# Patient Record
Sex: MALE | Race: OTHER | NOT HISPANIC OR LATINO | Employment: FULL TIME | URBAN - METROPOLITAN AREA
[De-identification: names, ages, dates, MRNs, and addresses within clinical notes are randomized per-mention and may not be internally consistent; named-entity substitution may affect disease eponyms.]

---

## 2022-12-09 ENCOUNTER — HOSPITAL ENCOUNTER (INPATIENT)
Facility: HOSPITAL | Age: 47
LOS: 19 days | Discharge: NURSING, REHAB OR LONG-TERM CARE | DRG: 853 | End: 2022-12-28
Attending: STUDENT IN AN ORGANIZED HEALTH CARE EDUCATION/TRAINING PROGRAM | Admitting: STUDENT IN AN ORGANIZED HEALTH CARE EDUCATION/TRAINING PROGRAM

## 2022-12-09 DIAGNOSIS — N17.9 ACUTE KIDNEY INJURY: ICD-10-CM

## 2022-12-09 DIAGNOSIS — N41.2 PROSTATIC ABSCESS: ICD-10-CM

## 2022-12-09 DIAGNOSIS — R07.9 CHEST PAIN: ICD-10-CM

## 2022-12-09 DIAGNOSIS — R79.89 ELEVATED BRAIN NATRIURETIC PEPTIDE (BNP) LEVEL: ICD-10-CM

## 2022-12-09 DIAGNOSIS — K75.0 HEPATIC ABSCESS: Primary | ICD-10-CM

## 2022-12-09 DIAGNOSIS — R00.0 TACHYCARDIA: ICD-10-CM

## 2022-12-09 PROBLEM — R65.20 SEVERE SEPSIS: Status: ACTIVE | Noted: 2022-12-09

## 2022-12-09 PROBLEM — J98.11 ATELECTASIS: Status: ACTIVE | Noted: 2022-12-09

## 2022-12-09 PROBLEM — A41.9 SEVERE SEPSIS: Status: ACTIVE | Noted: 2022-12-09

## 2022-12-09 PROBLEM — K56.7 ILEUS: Status: ACTIVE | Noted: 2022-12-09

## 2022-12-09 PROBLEM — R82.81 PYURIA: Status: ACTIVE | Noted: 2022-12-09

## 2022-12-09 LAB
ALLENS TEST: ABNORMAL
CREAT UR-MCNC: 98 MG/DL (ref 23–375)
DELSYS: ABNORMAL
FIO2: 32
FLOW: 3
HCO3 UR-SCNC: 16.6 MMOL/L (ref 24–28)
LACTATE SERPL-SCNC: 1.2 MMOL/L (ref 0.5–2.2)
MODE: ABNORMAL
PCO2 BLDA: 27.5 MMHG (ref 35–45)
PH SMN: 7.39 [PH] (ref 7.35–7.45)
PO2 BLDA: 65 MMHG (ref 80–100)
POC BE: -7 MMOL/L
POC SATURATED O2: 93 % (ref 95–100)
POC TCO2: 17 MMOL/L (ref 23–27)
SAMPLE: ABNORMAL
SITE: ABNORMAL
SODIUM UR-SCNC: 30 MMOL/L (ref 20–250)
SP02: 94

## 2022-12-09 PROCEDURE — 36415 COLL VENOUS BLD VENIPUNCTURE: CPT | Performed by: STUDENT IN AN ORGANIZED HEALTH CARE EDUCATION/TRAINING PROGRAM

## 2022-12-09 PROCEDURE — 87102 FUNGUS ISOLATION CULTURE: CPT | Performed by: STUDENT IN AN ORGANIZED HEALTH CARE EDUCATION/TRAINING PROGRAM

## 2022-12-09 PROCEDURE — 63600175 PHARM REV CODE 636 W HCPCS: Performed by: STUDENT IN AN ORGANIZED HEALTH CARE EDUCATION/TRAINING PROGRAM

## 2022-12-09 PROCEDURE — 82570 ASSAY OF URINE CREATININE: CPT | Performed by: STUDENT IN AN ORGANIZED HEALTH CARE EDUCATION/TRAINING PROGRAM

## 2022-12-09 PROCEDURE — 99900035 HC TECH TIME PER 15 MIN (STAT)

## 2022-12-09 PROCEDURE — 87205 SMEAR GRAM STAIN: CPT | Performed by: STUDENT IN AN ORGANIZED HEALTH CARE EDUCATION/TRAINING PROGRAM

## 2022-12-09 PROCEDURE — 87070 CULTURE OTHR SPECIMN AEROBIC: CPT | Performed by: STUDENT IN AN ORGANIZED HEALTH CARE EDUCATION/TRAINING PROGRAM

## 2022-12-09 PROCEDURE — S0030 INJECTION, METRONIDAZOLE: HCPCS | Performed by: STUDENT IN AN ORGANIZED HEALTH CARE EDUCATION/TRAINING PROGRAM

## 2022-12-09 PROCEDURE — 63600175 PHARM REV CODE 636 W HCPCS: Performed by: RADIOLOGY

## 2022-12-09 PROCEDURE — 94761 N-INVAS EAR/PLS OXIMETRY MLT: CPT

## 2022-12-09 PROCEDURE — 87075 CULTR BACTERIA EXCEPT BLOOD: CPT | Performed by: STUDENT IN AN ORGANIZED HEALTH CARE EDUCATION/TRAINING PROGRAM

## 2022-12-09 PROCEDURE — 82803 BLOOD GASES ANY COMBINATION: CPT

## 2022-12-09 PROCEDURE — 87077 CULTURE AEROBIC IDENTIFY: CPT | Performed by: STUDENT IN AN ORGANIZED HEALTH CARE EDUCATION/TRAINING PROGRAM

## 2022-12-09 PROCEDURE — A4216 STERILE WATER/SALINE, 10 ML: HCPCS | Performed by: STUDENT IN AN ORGANIZED HEALTH CARE EDUCATION/TRAINING PROGRAM

## 2022-12-09 PROCEDURE — 20000000 HC ICU ROOM

## 2022-12-09 PROCEDURE — 25000003 PHARM REV CODE 250: Performed by: STUDENT IN AN ORGANIZED HEALTH CARE EDUCATION/TRAINING PROGRAM

## 2022-12-09 PROCEDURE — 83605 ASSAY OF LACTIC ACID: CPT | Mod: 91 | Performed by: STUDENT IN AN ORGANIZED HEALTH CARE EDUCATION/TRAINING PROGRAM

## 2022-12-09 PROCEDURE — 84300 ASSAY OF URINE SODIUM: CPT | Performed by: STUDENT IN AN ORGANIZED HEALTH CARE EDUCATION/TRAINING PROGRAM

## 2022-12-09 PROCEDURE — 87186 SC STD MICRODIL/AGAR DIL: CPT | Performed by: STUDENT IN AN ORGANIZED HEALTH CARE EDUCATION/TRAINING PROGRAM

## 2022-12-09 PROCEDURE — 36600 WITHDRAWAL OF ARTERIAL BLOOD: CPT

## 2022-12-09 PROCEDURE — 25000003 PHARM REV CODE 250: Performed by: RADIOLOGY

## 2022-12-09 RX ORDER — LANOLIN ALCOHOL/MO/W.PET/CERES
800 CREAM (GRAM) TOPICAL
Status: DISCONTINUED | OUTPATIENT
Start: 2022-12-09 | End: 2022-12-10

## 2022-12-09 RX ORDER — CEFEPIME HYDROCHLORIDE 1 G/50ML
2 INJECTION, SOLUTION INTRAVENOUS
Status: DISCONTINUED | OUTPATIENT
Start: 2022-12-09 | End: 2022-12-11

## 2022-12-09 RX ORDER — ACETAMINOPHEN 325 MG/1
650 TABLET ORAL EVERY 4 HOURS PRN
Status: DISCONTINUED | OUTPATIENT
Start: 2022-12-09 | End: 2022-12-28 | Stop reason: HOSPADM

## 2022-12-09 RX ORDER — FENTANYL CITRATE 50 UG/ML
INJECTION, SOLUTION INTRAMUSCULAR; INTRAVENOUS
Status: COMPLETED | OUTPATIENT
Start: 2022-12-09 | End: 2022-12-09

## 2022-12-09 RX ORDER — SODIUM,POTASSIUM PHOSPHATES 280-250MG
2 POWDER IN PACKET (EA) ORAL
Status: DISCONTINUED | OUTPATIENT
Start: 2022-12-09 | End: 2022-12-10

## 2022-12-09 RX ORDER — LIDOCAINE HYDROCHLORIDE 10 MG/ML
INJECTION INFILTRATION; PERINEURAL
Status: COMPLETED | OUTPATIENT
Start: 2022-12-09 | End: 2022-12-09

## 2022-12-09 RX ORDER — NALOXONE HCL 0.4 MG/ML
0.02 VIAL (ML) INJECTION
Status: DISCONTINUED | OUTPATIENT
Start: 2022-12-09 | End: 2022-12-28 | Stop reason: HOSPADM

## 2022-12-09 RX ORDER — MUPIROCIN 20 MG/G
OINTMENT TOPICAL 2 TIMES DAILY
Status: COMPLETED | OUTPATIENT
Start: 2022-12-09 | End: 2022-12-14

## 2022-12-09 RX ORDER — ACETAMINOPHEN 325 MG/1
650 TABLET ORAL EVERY 8 HOURS PRN
Status: DISCONTINUED | OUTPATIENT
Start: 2022-12-09 | End: 2022-12-12

## 2022-12-09 RX ORDER — IBUPROFEN 200 MG
24 TABLET ORAL
Status: DISCONTINUED | OUTPATIENT
Start: 2022-12-09 | End: 2022-12-28 | Stop reason: HOSPADM

## 2022-12-09 RX ORDER — SODIUM CHLORIDE 0.9 % (FLUSH) 0.9 %
10 SYRINGE (ML) INJECTION EVERY 8 HOURS
Status: DISCONTINUED | OUTPATIENT
Start: 2022-12-09 | End: 2022-12-28 | Stop reason: HOSPADM

## 2022-12-09 RX ORDER — GLUCAGON 1 MG
1 KIT INJECTION
Status: DISCONTINUED | OUTPATIENT
Start: 2022-12-09 | End: 2022-12-28 | Stop reason: HOSPADM

## 2022-12-09 RX ORDER — HYDROMORPHONE HYDROCHLORIDE 1 MG/ML
0.5 INJECTION, SOLUTION INTRAMUSCULAR; INTRAVENOUS; SUBCUTANEOUS ONCE
Status: COMPLETED | OUTPATIENT
Start: 2022-12-10 | End: 2022-12-09

## 2022-12-09 RX ORDER — IBUPROFEN 200 MG
16 TABLET ORAL
Status: DISCONTINUED | OUTPATIENT
Start: 2022-12-09 | End: 2022-12-28 | Stop reason: HOSPADM

## 2022-12-09 RX ORDER — TALC
6 POWDER (GRAM) TOPICAL NIGHTLY PRN
Status: DISCONTINUED | OUTPATIENT
Start: 2022-12-09 | End: 2022-12-28 | Stop reason: HOSPADM

## 2022-12-09 RX ORDER — METRONIDAZOLE 500 MG/100ML
500 INJECTION, SOLUTION INTRAVENOUS
Status: DISCONTINUED | OUTPATIENT
Start: 2022-12-09 | End: 2022-12-11

## 2022-12-09 RX ADMIN — MUPIROCIN: 20 OINTMENT TOPICAL at 08:12

## 2022-12-09 RX ADMIN — FENTANYL CITRATE 50 MCG: 50 INJECTION INTRAMUSCULAR; INTRAVENOUS at 06:12

## 2022-12-09 RX ADMIN — HYDROMORPHONE HYDROCHLORIDE 0.5 MG: 1 INJECTION, SOLUTION INTRAMUSCULAR; INTRAVENOUS; SUBCUTANEOUS at 11:12

## 2022-12-09 RX ADMIN — Medication 10 ML: at 02:12

## 2022-12-09 RX ADMIN — METRONIDAZOLE 500 MG: 500 INJECTION, SOLUTION INTRAVENOUS at 07:12

## 2022-12-09 RX ADMIN — SODIUM BICARBONATE: 84 INJECTION, SOLUTION INTRAVENOUS at 04:12

## 2022-12-09 RX ADMIN — Medication 10 ML: at 11:12

## 2022-12-09 RX ADMIN — LIDOCAINE HYDROCHLORIDE 10 ML: 10 INJECTION, SOLUTION INFILTRATION; PERINEURAL at 06:12

## 2022-12-09 RX ADMIN — FENTANYL CITRATE 25 MCG: 50 INJECTION INTRAMUSCULAR; INTRAVENOUS at 06:12

## 2022-12-09 RX ADMIN — ACETAMINOPHEN 650 MG: 325 TABLET ORAL at 08:12

## 2022-12-09 RX ADMIN — VANCOMYCIN HYDROCHLORIDE 1500 MG: 1.5 INJECTION, POWDER, LYOPHILIZED, FOR SOLUTION INTRAVENOUS at 08:12

## 2022-12-09 RX ADMIN — CEFEPIME 2 G: 2 INJECTION, POWDER, FOR SOLUTION INTRAVENOUS at 04:12

## 2022-12-09 NOTE — HPI
47y M w/ hypertension presents with abdominal pain x 2 days. Pt refused  service. He reports that he had fever/rigors three days prior to admission. He then developed worsening abdominal pain from then until admission. He went to an urgent care where he was started on bactrim for UTI. His abdominal pain increased over the next few days. He had a BM yesterday. Denies vomiting, but has had significant nausea and very limited PO intake in the days leading to admission. Aside from the single episode of fever/rigors he denies subsequent fevers.     In the ED he was found to have a R hepatic abscess vs mass. He traveled to the [facility] eight weeks prior to presentation. He denies fevers except for what is listed above. Denies weight loss, cough, night sweats. Denies prior hx of TB. Denies prior hx of cirrhosis

## 2022-12-09 NOTE — ASSESSMENT & PLAN NOTE
Pyuria on admission UA, prior hx of UTI on Bactrim  - empiric antibiotics for sepsis  - follow cultures

## 2022-12-09 NOTE — ASSESSMENT & PLAN NOTE
Pt w/ ill-defined lesion representing abscess vs neoplastic mass. Infective ddx for abscess includes e histolytica vs pyogenic abscess. Denies prior hx of hepatitis, acute panel negative. From endemic region of e histolytica, TB, etc with most recent visit 8 weeks prior to admission  - IR consulted for sample +/- drain  - empiric antibiotics given sepsis  - e histolytica antibody pending  - gen surg consulted

## 2022-12-09 NOTE — ASSESSMENT & PLAN NOTE
Sepsis physiology and lactate likely due to liver abscess; also possibly due to uti/pyelo or secondary to ileus.   - empiric antibiotics  - IVF  - trend lactate      This patient does have evidence of infective focus  My overall impression is sepsis. Vital signs were reviewed and noted in progress note.  Antibiotics given-   Antibiotics (From admission, onward)    Start     Stop Route Frequency Ordered    12/09/22 2100  mupirocin 2 % ointment         12/14 2059 Nasl 2 times daily 12/09/22 1424        Cultures were taken-   Microbiology Results (last 7 days)     ** No results found for the last 168 hours. **        Latest lactate reviewed, they are-  Recent Labs   Lab 12/09/22  0401   LACTATE 2.3*       Organ dysfunction indicated by Acute kidney injury and Acute liver injury  Source- liver abscess    Source control Achieved by- IR/gen surg

## 2022-12-09 NOTE — H&P
[facility] Medicine  History & Physical    Patient Name: Vviek Turner  MRN: [id number]  Patient Class: IP- Inpatient  Admission Date: 12/9/2022  Attending Physician: Mick Gage MD  Primary Care Provider: Primary Doctor No         Patient information was obtained from patient and ER records.     Subjective:     Principal Problem:Hepatic abscess    Chief Complaint: No chief complaint on file.       HPI: 47y M w/ hypertension presents with abdominal pain x 2 days. Pt refused  service. He reports that he had fever/rigors three days prior to admission. He then developed worsening abdominal pain from then until admission. He went to an urgent care where he was started on bactrim for UTI. His abdominal pain increased over the next few days. He had a BM yesterday. Denies vomiting, but has had significant nausea and very limited PO intake in the days leading to admission. Aside from the single episode of fever/rigors he denies subsequent fevers.     In the ED he was found to have a R hepatic abscess vs mass. He traveled to the [facility] eight weeks prior to presentation. He denies fevers except for what is listed above. Denies weight loss, cough, night sweats. Denies prior hx of TB. Denies prior hx of cirrhosis      Past Medical History:   Diagnosis Date    Hypertension        No past surgical history on file.    Review of patient's allergies indicates:  No Known Allergies    Current Facility-Administered Medications on File Prior to Encounter   Medication    [COMPLETED] 0.9%  NaCl infusion    [COMPLETED] 0.9%  NaCl infusion    [DISCONTINUED] piperacillin-tazobactam 4.5 g in dextrose 5 % 100 mL IVPB (ready to mix system)    [DISCONTINUED] piperacillin-tazobactam 4.5 g in dextrose 5 % 100 mL IVPB (ready to mix system)    [DISCONTINUED] vancomycin (VANCOCIN) 1,500 mg in dextrose 5 % 250 mL IVPB    [DISCONTINUED] vancomycin - pharmacy to dose     Current Outpatient Medications on File  Prior to Encounter   Medication Sig    NON FORMULARY MEDICATION Losartan/amlodipine 100/10 mg   Take 1 tablet by mouth daily     Family History    None       Tobacco Use    Smoking status: Not on file    Smokeless tobacco: Not on file   Substance and Sexual Activity    Alcohol use: Not on file    Drug use: Not on file    Sexual activity: Not on file     Review of Systems   Constitutional:  Positive for chills. Negative for fever.   HENT:  Negative for postnasal drip.    Eyes:  Negative for photophobia and visual disturbance.   Respiratory:  Positive for shortness of breath. Negative for cough.    Cardiovascular:  Negative for chest pain and leg swelling.   Gastrointestinal:  Positive for abdominal distention and abdominal pain. Negative for constipation and vomiting.   Genitourinary:  Negative for dysuria and flank pain.   Musculoskeletal:  Negative for arthralgias and back pain.   Skin:  Negative for rash and wound.   Neurological:  Negative for tremors and weakness.   Hematological:  Negative for adenopathy. Does not bruise/bleed easily.   Psychiatric/Behavioral:  Negative for agitation and confusion.    Objective:     Vital Signs (Most Recent):  Temp: 98.4 °F (36.9 °C) (12/09/22 1421)  Pulse: 99 (12/09/22 1421)  Resp: 20 (12/09/22 1421)  BP: (!) 105/52 (12/09/22 1421)  SpO2: 95 % (12/09/22 1421)   Vital Signs (24h Range):  Temp:  [97.6 °F (36.4 °C)-98.8 °F (37.1 °C)] 98.4 °F (36.9 °C)  Pulse:  [87-99] 99  Resp:  [20-38] 20  SpO2:  [89 %-99 %] 95 %  BP: ()/(50-59) 105/52     Weight: 102.7 kg (226 lb 6.6 oz)  Body mass index is 34.43 kg/m².    Physical Exam  Constitutional:       General: He is not in acute distress.     Appearance: He is ill-appearing. He is not toxic-appearing or diaphoretic.   HENT:      Right Ear: External ear normal.      Left Ear: External ear normal.   Eyes:      Conjunctiva/sclera: Conjunctivae normal.      Pupils: Pupils are equal, round, and reactive to light.    Cardiovascular:      Rate and Rhythm: Regular rhythm. Tachycardia present.      Heart sounds: No murmur heard.    No gallop.   Pulmonary:      Effort: Pulmonary effort is normal. No respiratory distress.      Breath sounds: No wheezing or rales.      Comments: tachypneic  Abdominal:      General: Bowel sounds are normal. There is distension.      Palpations: Abdomen is soft.      Tenderness: There is abdominal tenderness. There is no right CVA tenderness, left CVA tenderness, guarding or rebound.   Musculoskeletal:      Right lower leg: No edema.      Left lower leg: No edema.   Skin:     Coloration: Skin is not jaundiced.      Findings: No bruising.   Neurological:      General: No focal deficit present.      Mental Status: He is oriented to person, place, and time.         CRANIAL NERVES     CN III, IV, VI   Pupils are equal, round, and reactive to light.     Significant Labs: All pertinent labs within the past 24 hours have been reviewed.    Significant Imaging: I have reviewed all pertinent imaging results/findings within the past 24 hours.    Assessment/Plan:     * Hepatic abscess  Pt w/ ill-defined lesion representing abscess vs neoplastic mass. Infective ddx for abscess includes e histolytica vs pyogenic abscess. Denies prior hx of hepatitis, acute panel negative. From endemic region of e histolytica, TB, etc with most recent visit 8 weeks prior to admission  - IR consulted for sample +/- drain  - empiric antibiotics given sepsis  - e histolytica antibody pending  - gen surg consulted      Atelectasis  Shortness of breath partly due to atelectasis +/- acidosis.  - will need tx of liver abscess/mass,       Pyuria  Pyuria on admission UA, prior hx of UTI on Bactrim  - empiric antibiotics for sepsis  - follow cultures      Elevated brain natriuretic peptide (BNP) level  Very mild elevation in the setting of renal failure. No fluid in lungs.      Ileus  Pt w/ possible ileus vs SBO on imaging. Had BM yesterday  which argues against obstruction.   - gen surg consulted  - NPO  - not vomiting, will hold off on NG      Severe sepsis  Sepsis physiology and lactate likely due to liver abscess; also possibly due to uti/pyelo or secondary to ileus.   - empiric antibiotics  - IVF  - trend lactate      This patient does have evidence of infective focus  My overall impression is sepsis. Vital signs were reviewed and noted in progress note.  Antibiotics given-   Antibiotics (From admission, onward)    Start     Stop Route Frequency Ordered    12/09/22 2100  mupirocin 2 % ointment         12/14 2059 Nasl 2 times daily 12/09/22 1424        Cultures were taken-   Microbiology Results (last 7 days)     ** No results found for the last 168 hours. **        Latest lactate reviewed, they are-  Recent Labs   Lab 12/09/22  0401   LACTATE 2.3*       Organ dysfunction indicated by Acute kidney injury and Acute liver injury  Source- liver abscess    Source control Achieved by- IR/gen surg      MARYURI (acute kidney injury)  Creatinine at 6.1 at admission. Unknown baseline, but suspect this represents MARYURI. Non-exclusive ddx includes prerenal, ATN from sepsis, Bactrim pseudotoxicity. No hydro seen on CT  - IVF  - nephrology consulted  - urine lytes ordered  - retroperitoneal ultrasound ordered      Patient with acute kidney injury likely due to IVVD/dehydration and acute tubular necrosis MARYURI is currently worsening. Labs reviewed- Renal function/electrolytes with Estimated Creatinine Clearance: 17.8 mL/min (A) (based on SCr of 6.1 mg/dL (H)). according to latest data. Monitor urine output and serial BMP and adjust therapy as needed. Avoid nephrotoxins and renally dose meds for GFR listed above.         VTE Risk Mitigation (From admission, onward)         Ordered     IP VTE HIGH RISK PATIENT  Once         12/09/22 1421     Place sequential compression device  Until discontinued         12/09/22 1421     Reason for No Pharmacological VTE Prophylaxis  Once         Question:  Reasons:  Answer:  IV Heparin w/in 24 hrs. Pre or Post-Op    12/09/22 1421                   Mick Gage MD  Department of Hospital Medicine   [facility]

## 2022-12-09 NOTE — SUBJECTIVE & OBJECTIVE
Past Medical History:   Diagnosis Date    Hypertension        No past surgical history on file.    Review of patient's allergies indicates:  No Known Allergies    Current Facility-Administered Medications on File Prior to Encounter   Medication    [COMPLETED] 0.9%  NaCl infusion    [COMPLETED] 0.9%  NaCl infusion    [DISCONTINUED] piperacillin-tazobactam 4.5 g in dextrose 5 % 100 mL IVPB (ready to mix system)    [DISCONTINUED] piperacillin-tazobactam 4.5 g in dextrose 5 % 100 mL IVPB (ready to mix system)    [DISCONTINUED] vancomycin (VANCOCIN) 1,500 mg in dextrose 5 % 250 mL IVPB    [DISCONTINUED] vancomycin - pharmacy to dose     Current Outpatient Medications on File Prior to Encounter   Medication Sig    NON FORMULARY MEDICATION Losartan/amlodipine 100/10 mg   Take 1 tablet by mouth daily     Family History    None       Tobacco Use    Smoking status: Not on file    Smokeless tobacco: Not on file   Substance and Sexual Activity    Alcohol use: Not on file    Drug use: Not on file    Sexual activity: Not on file     Review of Systems   Constitutional:  Positive for chills. Negative for fever.   HENT:  Negative for postnasal drip.    Eyes:  Negative for photophobia and visual disturbance.   Respiratory:  Positive for shortness of breath. Negative for cough.    Cardiovascular:  Negative for chest pain and leg swelling.   Gastrointestinal:  Positive for abdominal distention and abdominal pain. Negative for constipation and vomiting.   Genitourinary:  Negative for dysuria and flank pain.   Musculoskeletal:  Negative for arthralgias and back pain.   Skin:  Negative for rash and wound.   Neurological:  Negative for tremors and weakness.   Hematological:  Negative for adenopathy. Does not bruise/bleed easily.   Psychiatric/Behavioral:  Negative for agitation and confusion.    Objective:     Vital Signs (Most Recent):  Temp: 98.4 °F (36.9 °C) (12/09/22 1421)  Pulse: 99 (12/09/22 1421)  Resp: 20 (12/09/22 1421)  BP: (!)  105/52 (12/09/22 1421)  SpO2: 95 % (12/09/22 1421)   Vital Signs (24h Range):  Temp:  [97.6 °F (36.4 °C)-98.8 °F (37.1 °C)] 98.4 °F (36.9 °C)  Pulse:  [87-99] 99  Resp:  [20-38] 20  SpO2:  [89 %-99 %] 95 %  BP: ()/(50-59) 105/52     Weight: 102.7 kg (226 lb 6.6 oz)  Body mass index is 34.43 kg/m².    Physical Exam  Constitutional:       General: He is not in acute distress.     Appearance: He is ill-appearing. He is not toxic-appearing or diaphoretic.   HENT:      Right Ear: External ear normal.      Left Ear: External ear normal.   Eyes:      Conjunctiva/sclera: Conjunctivae normal.      Pupils: Pupils are equal, round, and reactive to light.   Cardiovascular:      Rate and Rhythm: Regular rhythm. Tachycardia present.      Heart sounds: No murmur heard.    No gallop.   Pulmonary:      Effort: Pulmonary effort is normal. No respiratory distress.      Breath sounds: No wheezing or rales.      Comments: tachypneic  Abdominal:      General: Bowel sounds are normal. There is distension.      Palpations: Abdomen is soft.      Tenderness: There is abdominal tenderness. There is no right CVA tenderness, left CVA tenderness, guarding or rebound.   Musculoskeletal:      Right lower leg: No edema.      Left lower leg: No edema.   Skin:     Coloration: Skin is not jaundiced.      Findings: No bruising.   Neurological:      General: No focal deficit present.      Mental Status: He is oriented to person, place, and time.         CRANIAL NERVES     CN III, IV, VI   Pupils are equal, round, and reactive to light.     Significant Labs: All pertinent labs within the past 24 hours have been reviewed.    Significant Imaging: I have reviewed all pertinent imaging results/findings within the past 24 hours.

## 2022-12-09 NOTE — ASSESSMENT & PLAN NOTE
Pt w/ possible ileus vs SBO on imaging. Had BM yesterday which argues against obstruction.   - gen surg consulted  - NPO  - not vomiting, will hold off on NG

## 2022-12-09 NOTE — ASSESSMENT & PLAN NOTE
Creatinine at 6.1 at admission. Unknown baseline, but suspect this represents MARYURI. Non-exclusive ddx includes prerenal, ATN from sepsis, Bactrim pseudotoxicity. No hydro seen on CT  - IVF  - nephrology consulted  - urine lytes ordered  - retroperitoneal ultrasound ordered      Patient with acute kidney injury likely due to IVVD/dehydration and acute tubular necrosis MARYURI is currently worsening. Labs reviewed- Renal function/electrolytes with Estimated Creatinine Clearance: 17.8 mL/min (A) (based on SCr of 6.1 mg/dL (H)). according to latest data. Monitor urine output and serial BMP and adjust therapy as needed. Avoid nephrotoxins and renally dose meds for GFR listed above.

## 2022-12-09 NOTE — ASSESSMENT & PLAN NOTE
Shortness of breath partly due to atelectasis +/- acidosis.  - will need tx of liver abscess/mass,

## 2022-12-09 NOTE — NURSING
Arrived to floor via stretcher with [facility], AAO x 4, can make his needs known, safety maintained.

## 2022-12-09 NOTE — H&P
Inpatient Radiology Pre-procedure Note    History of Present Illness:  Vivek Turner is a 47 y.o. male who presents for hepatic abscess aspiration/drainage.  Admission H&P reviewed.  Past Medical History:   Diagnosis Date    Hypertension      No past surgical history on file.    Review of Systems:   As documented in primary team H&P    Home Meds:   Prior to Admission medications    Medication Sig Start Date End Date Taking? Authorizing Provider   NON FORMULARY MEDICATION Losartan/amlodipine 100/10 mg   Take 1 tablet by mouth daily    Historical Provider     Scheduled Meds:    ceFEPime (MAXIPIME) IVPB  2 g Intravenous Q24H    metronidazole  500 mg Intravenous Q8H    mupirocin   Nasal BID    sodium chloride 0.9%  10 mL Intravenous Q8H    vancomycin (VANCOCIN) IVPB  15 mg/kg Intravenous Once     Continuous Infusions:    sodium bicarbonate drip 100 mL/hr at 12/09/22 1625     PRN Meds:acetaminophen, acetaminophen, dextrose 10%, dextrose 10%, glucagon (human recombinant), glucose, glucose, magnesium oxide, magnesium oxide, melatonin, naloxone, potassium, sodium phosphates, potassium, sodium phosphates, potassium, sodium phosphates, Pharmacy to dose Vancomycin consult **AND** vancomycin - pharmacy to dose  Anticoagulants/Antiplatelets: no anticoagulation    Allergies: Review of patient's allergies indicates:  No Known Allergies  Sedation Hx: have not been any systemic reactions    Labs:  Recent Labs   Lab 12/09/22 0401   INR 1.3*       Recent Labs   Lab 12/09/22 0401   WBC 18.50*   HGB 11.6*   HCT 34.9*   MCV 85         Recent Labs   Lab 12/09/22  0401   *   *   K 4.0      CO2 16*   BUN 97*   CREATININE 6.10*   CALCIUM 7.5*   ALT 88*   AST 42   ALBUMIN 3.0*   BILITOT 7.0*         Vitals:  Temp: 98.4 °F (36.9 °C) (12/09/22 1421)  Pulse: 99 (12/09/22 1421)  Resp: 20 (12/09/22 1421)  BP: (!) 105/52 (12/09/22 1421)  SpO2: 95 % (12/09/22 1421)     Physical Exam:  ASA: 2  Mallampati: 2    General: no  acute distress, ill appearing  Mental Status: alert and oriented to person, place and time  HEENT: normocephalic, atraumatic  Chest: unlabored breathing  Heart: regular but rapid heart rate  Abdomen: nondistended, c/o abdominal pain  Extremity: moves all extremities    Plan: proceed with aspiration/drainage of hepatic abscess  Sedation Plan: up to moderate    Que Olivera MD  Staff Radiologist  Department of Radiology  Pager: [phone]

## 2022-12-10 PROBLEM — J96.01 ACUTE HYPOXEMIC RESPIRATORY FAILURE: Status: ACTIVE | Noted: 2022-12-10

## 2022-12-10 PROBLEM — R73.9 HYPERGLYCEMIA: Status: ACTIVE | Noted: 2022-12-10

## 2022-12-10 PROBLEM — D50.9 MICROCYTIC ANEMIA: Status: ACTIVE | Noted: 2022-12-10

## 2022-12-10 LAB
ALBUMIN SERPL BCP-MCNC: 1.7 G/DL (ref 3.5–5.2)
ALP SERPL-CCNC: 132 U/L (ref 55–135)
ALT SERPL W/O P-5'-P-CCNC: 50 U/L (ref 10–44)
ANION GAP SERPL CALC-SCNC: 14 MMOL/L (ref 8–16)
AST SERPL-CCNC: 25 U/L (ref 10–40)
BASOPHILS NFR BLD: 0 % (ref 0–1.9)
BILIRUB DIRECT SERPL-MCNC: 3.3 MG/DL (ref 0.1–0.3)
BILIRUB SERPL-MCNC: 4.4 MG/DL (ref 0.1–1)
BUN SERPL-MCNC: 99 MG/DL (ref 6–20)
CALCIUM SERPL-MCNC: 7.6 MG/DL (ref 8.7–10.5)
CHLORIDE SERPL-SCNC: 104 MMOL/L (ref 95–110)
CO2 SERPL-SCNC: 20 MMOL/L (ref 23–29)
CREAT SERPL-MCNC: 5.5 MG/DL (ref 0.5–1.4)
CREAT UR-MCNC: 88.4 MG/DL (ref 23–375)
CREAT UR-MCNC: 88.4 MG/DL (ref 23–375)
DIFFERENTIAL METHOD: ABNORMAL
EOSINOPHIL NFR BLD: 0 % (ref 0–8)
EOSINOPHIL URNS QL WRIGHT STN: NORMAL
ERYTHROCYTE [DISTWIDTH] IN BLOOD BY AUTOMATED COUNT: 13.4 % (ref 11.5–14.5)
EST. GFR  (NO RACE VARIABLE): 12 ML/MIN/1.73 M^2
FERRITIN SERPL-MCNC: 3633 NG/ML (ref 20–300)
GLUCOSE SERPL-MCNC: 150 MG/DL (ref 70–110)
GRAM STN SPEC: NORMAL
GRAM STN SPEC: NORMAL
HCT VFR BLD AUTO: 29.1 % (ref 40–54)
HGB BLD-MCNC: 10.1 G/DL (ref 14–18)
IMM GRANULOCYTES # BLD AUTO: ABNORMAL K/UL (ref 0–0.04)
IMM GRANULOCYTES NFR BLD AUTO: ABNORMAL % (ref 0–0.5)
IRON SERPL-MCNC: 25 UG/DL (ref 45–160)
LACTATE SERPL-SCNC: 1.3 MMOL/L (ref 0.5–2.2)
LYMPHOCYTES NFR BLD: 3 % (ref 18–48)
MCH RBC QN AUTO: 28.1 PG (ref 27–31)
MCHC RBC AUTO-ENTMCNC: 34.7 G/DL (ref 32–36)
MCV RBC AUTO: 81 FL (ref 82–98)
MONOCYTES NFR BLD: 2 % (ref 4–15)
NEUTROPHILS NFR BLD: 91 % (ref 38–73)
NEUTS BAND NFR BLD MANUAL: 4 %
NRBC BLD-RTO: 0 /100 WBC
PLATELET # BLD AUTO: 329 K/UL (ref 150–450)
PMV BLD AUTO: 10.2 FL (ref 9.2–12.9)
POTASSIUM SERPL-SCNC: 3.7 MMOL/L (ref 3.5–5.1)
PROT SERPL-MCNC: 5.6 G/DL (ref 6–8.4)
PROT UR-MCNC: 17 MG/DL
PROT/CREAT UR: 0.19 MG/G{CREAT} (ref 0–0.2)
RBC # BLD AUTO: 3.59 M/UL (ref 4.6–6.2)
RETICS/RBC NFR AUTO: 1 % (ref 0.4–2)
SATURATED IRON: 17 % (ref 20–50)
SODIUM SERPL-SCNC: 138 MMOL/L (ref 136–145)
SODIUM UR-SCNC: 34 MMOL/L (ref 20–250)
TOTAL IRON BINDING CAPACITY: 148 UG/DL (ref 250–450)
TRANSFERRIN SERPL-MCNC: 100 MG/DL (ref 200–375)
VANCOMYCIN SERPL-MCNC: 36.7 UG/ML
WBC # BLD AUTO: 19.78 K/UL (ref 3.9–12.7)

## 2022-12-10 PROCEDURE — 83036 HEMOGLOBIN GLYCOSYLATED A1C: CPT | Performed by: HOSPITALIST

## 2022-12-10 PROCEDURE — 84156 ASSAY OF PROTEIN URINE: CPT | Performed by: INTERNAL MEDICINE

## 2022-12-10 PROCEDURE — 36415 COLL VENOUS BLD VENIPUNCTURE: CPT | Performed by: HOSPITALIST

## 2022-12-10 PROCEDURE — 63600175 PHARM REV CODE 636 W HCPCS: Performed by: HOSPITALIST

## 2022-12-10 PROCEDURE — 94799 UNLISTED PULMONARY SVC/PX: CPT

## 2022-12-10 PROCEDURE — 99900035 HC TECH TIME PER 15 MIN (STAT)

## 2022-12-10 PROCEDURE — 83605 ASSAY OF LACTIC ACID: CPT | Performed by: INTERNAL MEDICINE

## 2022-12-10 PROCEDURE — 82607 VITAMIN B-12: CPT | Performed by: HOSPITALIST

## 2022-12-10 PROCEDURE — 84300 ASSAY OF URINE SODIUM: CPT | Performed by: INTERNAL MEDICINE

## 2022-12-10 PROCEDURE — 63600175 PHARM REV CODE 636 W HCPCS: Performed by: STUDENT IN AN ORGANIZED HEALTH CARE EDUCATION/TRAINING PROGRAM

## 2022-12-10 PROCEDURE — 80053 COMPREHEN METABOLIC PANEL: CPT | Performed by: STUDENT IN AN ORGANIZED HEALTH CARE EDUCATION/TRAINING PROGRAM

## 2022-12-10 PROCEDURE — 25000003 PHARM REV CODE 250: Performed by: STUDENT IN AN ORGANIZED HEALTH CARE EDUCATION/TRAINING PROGRAM

## 2022-12-10 PROCEDURE — 99254 IP/OBS CNSLTJ NEW/EST MOD 60: CPT | Mod: ,,, | Performed by: SURGERY

## 2022-12-10 PROCEDURE — 20000000 HC ICU ROOM

## 2022-12-10 PROCEDURE — 80202 ASSAY OF VANCOMYCIN: CPT | Performed by: HOSPITALIST

## 2022-12-10 PROCEDURE — 82746 ASSAY OF FOLIC ACID SERUM: CPT | Performed by: HOSPITALIST

## 2022-12-10 PROCEDURE — 27000221 HC OXYGEN, UP TO 24 HOURS

## 2022-12-10 PROCEDURE — A4216 STERILE WATER/SALINE, 10 ML: HCPCS | Performed by: STUDENT IN AN ORGANIZED HEALTH CARE EDUCATION/TRAINING PROGRAM

## 2022-12-10 PROCEDURE — 25000242 PHARM REV CODE 250 ALT 637 W/ HCPCS: Performed by: HOSPITALIST

## 2022-12-10 PROCEDURE — 94640 AIRWAY INHALATION TREATMENT: CPT

## 2022-12-10 PROCEDURE — 82728 ASSAY OF FERRITIN: CPT | Performed by: HOSPITALIST

## 2022-12-10 PROCEDURE — 51798 US URINE CAPACITY MEASURE: CPT

## 2022-12-10 PROCEDURE — S0030 INJECTION, METRONIDAZOLE: HCPCS | Performed by: STUDENT IN AN ORGANIZED HEALTH CARE EDUCATION/TRAINING PROGRAM

## 2022-12-10 PROCEDURE — 87205 SMEAR GRAM STAIN: CPT | Performed by: INTERNAL MEDICINE

## 2022-12-10 PROCEDURE — 85045 AUTOMATED RETICULOCYTE COUNT: CPT | Performed by: HOSPITALIST

## 2022-12-10 PROCEDURE — 63600175 PHARM REV CODE 636 W HCPCS: Performed by: INTERNAL MEDICINE

## 2022-12-10 PROCEDURE — 94760 N-INVAS EAR/PLS OXIMETRY 1: CPT

## 2022-12-10 PROCEDURE — 85025 COMPLETE CBC W/AUTO DIFF WBC: CPT | Performed by: STUDENT IN AN ORGANIZED HEALTH CARE EDUCATION/TRAINING PROGRAM

## 2022-12-10 PROCEDURE — 99254 PR INITIAL INPATIENT CONSULT,LEVL IV: ICD-10-PCS | Mod: ,,, | Performed by: SURGERY

## 2022-12-10 PROCEDURE — 82248 BILIRUBIN DIRECT: CPT | Performed by: HOSPITALIST

## 2022-12-10 PROCEDURE — 36415 COLL VENOUS BLD VENIPUNCTURE: CPT | Performed by: INTERNAL MEDICINE

## 2022-12-10 PROCEDURE — 84466 ASSAY OF TRANSFERRIN: CPT | Performed by: HOSPITALIST

## 2022-12-10 RX ORDER — HYDROMORPHONE HYDROCHLORIDE 1 MG/ML
0.5 INJECTION, SOLUTION INTRAMUSCULAR; INTRAVENOUS; SUBCUTANEOUS EVERY 6 HOURS PRN
Status: DISCONTINUED | OUTPATIENT
Start: 2022-12-10 | End: 2022-12-13

## 2022-12-10 RX ORDER — IPRATROPIUM BROMIDE AND ALBUTEROL SULFATE 2.5; .5 MG/3ML; MG/3ML
3 SOLUTION RESPIRATORY (INHALATION) EVERY 4 HOURS
Status: DISCONTINUED | OUTPATIENT
Start: 2022-12-10 | End: 2022-12-11

## 2022-12-10 RX ORDER — HEPARIN SODIUM 5000 [USP'U]/ML
5000 INJECTION, SOLUTION INTRAVENOUS; SUBCUTANEOUS EVERY 8 HOURS
Status: DISCONTINUED | OUTPATIENT
Start: 2022-12-10 | End: 2022-12-28 | Stop reason: HOSPADM

## 2022-12-10 RX ORDER — OXYCODONE AND ACETAMINOPHEN 5; 325 MG/1; MG/1
1 TABLET ORAL EVERY 4 HOURS PRN
Status: DISCONTINUED | OUTPATIENT
Start: 2022-12-10 | End: 2022-12-28 | Stop reason: HOSPADM

## 2022-12-10 RX ADMIN — HEPARIN SODIUM 5000 UNITS: 5000 INJECTION INTRAVENOUS; SUBCUTANEOUS at 02:12

## 2022-12-10 RX ADMIN — Medication 10 ML: at 09:12

## 2022-12-10 RX ADMIN — Medication 10 ML: at 05:12

## 2022-12-10 RX ADMIN — HEPARIN SODIUM 5000 UNITS: 5000 INJECTION INTRAVENOUS; SUBCUTANEOUS at 09:12

## 2022-12-10 RX ADMIN — METRONIDAZOLE 500 MG: 500 INJECTION, SOLUTION INTRAVENOUS at 08:12

## 2022-12-10 RX ADMIN — SODIUM BICARBONATE: 84 INJECTION, SOLUTION INTRAVENOUS at 02:12

## 2022-12-10 RX ADMIN — ACETAMINOPHEN 650 MG: 325 TABLET ORAL at 09:12

## 2022-12-10 RX ADMIN — MUPIROCIN: 20 OINTMENT TOPICAL at 08:12

## 2022-12-10 RX ADMIN — Medication 10 ML: at 02:12

## 2022-12-10 RX ADMIN — IPRATROPIUM BROMIDE AND ALBUTEROL SULFATE 3 ML: 2.5; .5 SOLUTION RESPIRATORY (INHALATION) at 11:12

## 2022-12-10 RX ADMIN — IPRATROPIUM BROMIDE AND ALBUTEROL SULFATE 3 ML: 2.5; .5 SOLUTION RESPIRATORY (INHALATION) at 08:12

## 2022-12-10 RX ADMIN — CEFEPIME 2 G: 2 INJECTION, POWDER, FOR SOLUTION INTRAVENOUS at 03:12

## 2022-12-10 RX ADMIN — METRONIDAZOLE 500 MG: 500 INJECTION, SOLUTION INTRAVENOUS at 01:12

## 2022-12-10 RX ADMIN — MUPIROCIN: 20 OINTMENT TOPICAL at 09:12

## 2022-12-10 RX ADMIN — HYDROMORPHONE HYDROCHLORIDE 0.5 MG: 1 INJECTION, SOLUTION INTRAMUSCULAR; INTRAVENOUS; SUBCUTANEOUS at 06:12

## 2022-12-10 RX ADMIN — METRONIDAZOLE 500 MG: 500 INJECTION, SOLUTION INTRAVENOUS at 04:12

## 2022-12-10 RX ADMIN — IPRATROPIUM BROMIDE AND ALBUTEROL SULFATE 3 ML: 2.5; .5 SOLUTION RESPIRATORY (INHALATION) at 03:12

## 2022-12-10 NOTE — AI DETERIORATION ALERT
Artificial Intelligence Notification      Admit Date: 2022  LOS: 0  Code Status: Full Code   Date of Consult: 2022  : 1975  Age: 47 y.o.  Weight:   Wt Readings from Last 1 Encounters:   22 102.7 kg (226 lb 6.6 oz)     Sex: male  Bed: [place] [address] A:   MRN: [id number]  Attending Physician: Mick Gage MD  Primary Service: Networked reference to record PCT   Time AI Alert Received: 7:05pm  Time at Bedside: 7:10pm           Patient found diaphoretic, in moderate distress, in pain, and with rapid respiratory rate.  He appears acutely ill.  He was just admitted so H&P and plan from Dr. Gage was reviewed.    Plan to move to MICU for closer monitoring  Check ABG  Repeat Lactic Acid      Current Facility-Administered Medications:     acetaminophen tablet 650 mg, 650 mg, Oral, Q8H PRN, Mick Gage MD    acetaminophen tablet 650 mg, 650 mg, Oral, Q4H PRN, Mick Gage MD    cefepime in dextrose 5 % IVPB 2 g, 2 g, Intravenous, Q24H, Mick Gage MD, Stopped at 22 1707    dextrose 10% bolus 125 mL, 12.5 g, Intravenous, PRN, Mick Gage MD    dextrose 10% bolus 250 mL, 25 g, Intravenous, PRN, Mick Gage MD    glucagon (human recombinant) injection 1 mg, 1 mg, Intramuscular, PRN, Mick Gage MD    glucose chewable tablet 16 g, 16 g, Oral, PRN, Mick Gage MD    glucose chewable tablet 24 g, 24 g, Oral, PRN, Mick Gage MD    magnesium oxide tablet 800 mg, 800 mg, Oral, PRN, Mick Gage MD    magnesium oxide tablet 800 mg, 800 mg, Oral, PRN, Mick Gage MD    melatonin tablet 6 mg, 6 mg, Oral, Nightly PRN, Mick Gage MD    metronidazole IVPB 500 mg, 500 mg, Intravenous, Q8H, Mick Gage MD, Last Rate: 100 mL/hr at 22, 500 mg at 22    mupirocin 2 % ointment, , Nasal, BID, Mick Gage MD    naloxone 0.4 mg/mL injection 0.02 mg, 0.02 mg, Intravenous, PRN, Mick Gage MD    potassium, sodium phosphates  280-160-250 mg packet 2 packet, 2 packet, Oral, PRN, Mick Gage MD    potassium, sodium phosphates 280-160-250 mg packet 2 packet, 2 packet, Oral, PRN, Mick Gaeg MD    potassium, sodium phosphates 280-160-250 mg packet 2 packet, 2 packet, Oral, PRN, Mick Gage MD    sodium bicarbonate 150 mEq in dextrose 5 % 1,000 mL infusion, , Intravenous, Continuous, Mick Gage MD, Last Rate: 100 mL/hr at 12/09/22 1625, New Bag at 12/09/22 1625    sodium chloride 0.9% flush 10 mL, 10 mL, Intravenous, Q8H, Mick Gage MD, 10 mL at 12/09/22 1430    Pharmacy to dose Vancomycin consult, , , Once **AND** vancomycin - pharmacy to dose, , Intravenous, pharmacy to manage frequency, Mick Gage MD    vancomycin 1.5 g in dextrose 5 % 250 mL IVPB (ready to mix), 15 mg/kg, Intravenous, Once, Mick Gage MD        Vital Signs (Most Recent):  Temp: 98.4 °F (36.9 °C) (12/09/22 1421)  Pulse: 101 (12/09/22 1833)  Resp: (!) 41 (12/09/22 1833)  BP: (!) 105/56 (12/09/22 1833)  SpO2: 95 % (12/09/22 1833)   Vital Signs (24h Range):  Temp:  [97.6 °F (36.4 °C)-98.8 °F (37.1 °C)] 98.4 °F (36.9 °C)  Pulse:  [] 101  Resp:  [20-41] 41  SpO2:  [89 %-99 %] 95 %  BP: ()/(50-59) 105/56     Moderate distress  Tachypneic   Abd distended and tender, IR placed drain to RUQ noted  No edema  Diaphoretic  Not able to speak in complete sentences    Recent Results (from the past 24 hour(s))   Urinalysis, Reflex to Urine Culture Urine, Clean Catch    Collection Time: 12/09/22  3:45 AM    Specimen: Urine, Clean Catch   Result Value Ref Range    Specimen UA Urine, Clean Catch     Color, UA Yellow Yellow, Straw, Elizabeth    Appearance, UA Hazy (A) Clear    pH, UA 5.0 5.0 - 9.0    Specific Gravity, UA 1.015     Protein, UA Trace (A) Negative    Glucose, UA Negative Negative    Ketones, UA Negative Negative    Bilirubin (UA) 1+ (A) Negative    Occult Blood UA Trace (A) Negative    Nitrite, UA Negative Negative    Urobilinogen,  UA 2.0-3.0 (A) Negative EU/dL    Leukocytes, UA 1+ (A) Negative   Urinalysis Microscopic    Collection Time: 12/09/22  3:45 AM   Result Value Ref Range    RBC, UA 1 0 - 4 /hpf    WBC, UA 31 (H) 0 - 5 /hpf    Bacteria Rare None-Occ /hpf    Squam Epithel, UA 0 /hpf    Hyaline Casts, UA 3 (A) 0 - 1 /lpf    Ca Oxalate Leticia, UA Occasional None-Moderate    Amorphous, UA Rare None-Moderate    Microscopic Comment SEE COMMENT    Bile, Urine Confirmation    Collection Time: 12/09/22  3:45 AM   Result Value Ref Range    Icotest Positive (A)    COVID-19 Rapid Screening    Collection Time: 12/09/22  3:48 AM   Result Value Ref Range    SARS-CoV-2 RNA, Amplification, Qual Negative Negative   Influenza antigen Nasopharyngeal Swab    Collection Time: 12/09/22  3:49 AM   Result Value Ref Range    Influenza A Ag, EIA Negative Negative    Influenza B Ag, EIA Negative Negative    Flu A & B Source Nasopharyngeal Swab    CBC auto differential    Collection Time: 12/09/22  4:01 AM   Result Value Ref Range    WBC 18.50 (H) 3.90 - 12.70 K/uL    RBC 4.10 (L) 4.60 - 6.20 M/uL    Hemoglobin 11.6 (L) 14.0 - 18.0 g/dL    Hematocrit 34.9 (L) 40.0 - 54.0 %    MCV 85 82 - 98 fL    MCH 28.4 27.0 - 31.0 pg    MCHC 33.3 32.0 - 36.0 g/dL    RDW 14.6 (H) 11.5 - 14.5 %    Platelets 326 150 - 450 K/uL    MPV 8.6 7.4 - 10.4 fL    nRBC 0 0 /100 WBC    Gran % 73.0 38.0 - 73.0 %    Lymph % 1.0 (L) 18.0 - 48.0 %    Mono % 11.0 4.0 - 15.0 %    Eosinophil % 0.0 0.0 - 8.0 %    Basophil % 0.0 0.0 - 1.9 %    Bands 14.0 %    Metamyelocytes 1.0 %    Platelet Estimate Appears normal     Vacuolated Granulocytes Present     Differential Method Manual    Comprehensive metabolic panel    Collection Time: 12/09/22  4:01 AM   Result Value Ref Range    Sodium 131 (L) 136 - 145 mmol/L    Potassium 4.0 3.5 - 5.1 mmol/L    Chloride 100 95 - 110 mmol/L    CO2 16 (L) 23 - 29 mmol/L    Glucose 175 (H) 74 - 106 mg/dL    BUN 97 (H) 9 - 20 mg/dL    Creatinine 6.10 (H) 0.80 - 1.50  mg/dL    Calcium 7.5 (L) 8.4 - 10.2 mg/dL    Total Protein 6.2 (L) 6.3 - 8.2 g/dL    Albumin 3.0 (L) 3.5 - 5.2 g/dL    Total Bilirubin 7.0 (H) 0.2 - 1.3 mg/dL    Alkaline Phosphatase 210 (H) 38 - 145 U/L    AST 42 17 - 59 U/L    ALT 88 (H) 10 - 44 U/L    Anion Gap 15 8 - 16 mmol/L    eGFR 11 (A) >60 mL/min/1.73 m^2   Ammonia    Collection Time: 12/09/22  4:01 AM   Result Value Ref Range    Ammonia <9 (A) 9 - 30 umol/L   Lipase    Collection Time: 12/09/22  4:01 AM   Result Value Ref Range    Lipase Result 40 23 - 300 U/L   Protime-INR    Collection Time: 12/09/22  4:01 AM   Result Value Ref Range    PT 16.5 (H) 12.2 - 14.6 sec    INR 1.3 (H) <1.2   Blood culture #1    Collection Time: 12/09/22  4:01 AM    Specimen: Peripheral, Antecubital, Left; Blood   Result Value Ref Range    Blood Culture, Routine No Growth to date    Lactic acid, plasma    Collection Time: 12/09/22  4:01 AM   Result Value Ref Range    Lactate (Lactic Acid) 2.3 (HH) 0.7 - 2.0 mmol/L   Hepatitis panel, acute    Collection Time: 12/09/22  4:01 AM   Result Value Ref Range    Hepatitis B Surface Ag Non-reactive Non-reactive    Hep B C IgM Non-reactive Non-Reactive    Hep A IgM Non-reactive Non-reactive    Hepatitis C Ab Non-reactive Non-reactive   Troponin I    Collection Time: 12/09/22  4:01 AM   Result Value Ref Range    Troponin I <0.012 ng/mL   NT-Pro Natriuretic Peptide    Collection Time: 12/09/22  4:01 AM   Result Value Ref Range    NT-proBNP 665.0 (H) 5.0 - 450.0 pg/mL   Blood culture #2    Collection Time: 12/09/22  4:23 AM    Specimen: Peripheral, Hand, Right; Blood   Result Value Ref Range    Blood Culture, Routine No Growth to date    Sodium, urine, random    Collection Time: 12/09/22  4:28 PM   Result Value Ref Range    Sodium, Urine 30 20 - 250 mmol/L   Creatinine, urine, random    Collection Time: 12/09/22  4:28 PM   Result Value Ref Range    Creatinine, Urine 98.0 23.0 - 375.0 mg/dL         This encounter was triggered by an  Artificial Intelligence Notification.

## 2022-12-10 NOTE — HPI
47y M w/ hypertension presents with abdominal pain x 2 days. Pt refused  service. He reports that he had fever/rigors three days prior to admission. He then developed worsening abdominal pain from then until admission. He went to an urgent care where he was started on bactrim for UTI. His abdominal pain increased over the next few days. He had a BM yesterday. Denies vomiting, but has had significant nausea and very limited PO intake in the days leading to admission. Aside from the single episode of fever/rigors he denies subsequent fevers.      In the ED he was found to have a R hepatic abscess vs mass. He traveled to the [facility] eight weeks prior to presentation. He denies fevers except for what is listed above. Denies weight loss, cough, night sweats. Denies prior hx of TB. Denies prior hx of cirrhosis

## 2022-12-10 NOTE — PROGRESS NOTES
[facility] Medicine  Progress Note    Patient Name: Vivek Turner  MRN: [id number]  Patient Class: IP- Inpatient   Admission Date: 12/9/2022  Length of Stay: 1 days  Attending Physician: Carolin Howell MD  Primary Care Provider: Primary Doctor No        Subjective:     Principal Problem:Hepatic abscess        HPI:  47y M w/ hypertension presents with abdominal pain x 2 days. Pt refused  service. He reports that he had fever/rigors three days prior to admission. He then developed worsening abdominal pain from then until admission. He went to an urgent care where he was started on bactrim for UTI. His abdominal pain increased over the next few days. He had a BM yesterday. Denies vomiting, but has had significant nausea and very limited PO intake in the days leading to admission. Aside from the single episode of fever/rigors he denies subsequent fevers.     In the ED he was found to have a R hepatic abscess vs mass. He traveled to the [facility] eight weeks prior to presentation. He denies fevers except for what is listed above. Denies weight loss, cough, night sweats. Denies prior hx of TB. Denies prior hx of cirrhosis      Overview/Hospital Course:  Mr Vivek Turner was admitted with severe sepsis due to R liver mass vs abscess and acute renal failure. IR consulted and drain placed on 12/9. Cultures are pending. Started on bicarb gtt for acute renal failure and Nephrology consulted. He was moved to ICU for instability.       Interval History: Moved to ICU post drain placement by IR because he looked unstable. Short of breath this morning. Decreased abdominal pain compared to yesterday.     Review of Systems   Constitutional:  Positive for diaphoresis and fatigue. Negative for chills and fever.   HENT:  Negative for congestion, sinus pressure and sinus pain.    Respiratory:  Positive for shortness of breath. Negative for cough and chest tightness.    Cardiovascular:  Negative  for chest pain, palpitations and leg swelling.   Gastrointestinal:  Positive for abdominal distention and abdominal pain. Negative for constipation, diarrhea, nausea and vomiting.   Musculoskeletal:  Negative for arthralgias and myalgias.   Neurological:  Positive for weakness. Negative for numbness.   Psychiatric/Behavioral:  Negative for confusion.    Objective:     Vital Signs (Most Recent):  Temp: 98.4 °F (36.9 °C) (12/10/22 0800)  Pulse: 87 (12/10/22 0815)  Resp: (!) 33 (12/10/22 0815)  BP: (!) 118/55 (12/10/22 0815)  SpO2: 96 % (12/10/22 0815)   Vital Signs (24h Range):  Temp:  [97.6 °F (36.4 °C)-100.4 °F (38 °C)] 98.4 °F (36.9 °C)  Pulse:  [] 87  Resp:  [20-53] 33  SpO2:  [90 %-99 %] 96 %  BP: ()/(47-63) 118/55     Weight: 103.6 kg (228 lb 6.3 oz)  Body mass index is 36.86 kg/m².    Intake/Output Summary (Last 24 hours) at 12/10/2022 0934  Last data filed at 12/10/2022 0823  Gross per 24 hour   Intake 2088.3 ml   Output 1755 ml   Net 333.3 ml      Physical Exam  Vitals and nursing note reviewed.   Constitutional:       General: He is not in acute distress.     Appearance: He is obese. He is ill-appearing and diaphoretic. He is not toxic-appearing.   HENT:      Head: Normocephalic and atraumatic.      Nose: Nose normal.      Mouth/Throat:      Mouth: Mucous membranes are moist.   Eyes:      General: No scleral icterus.  Cardiovascular:      Rate and Rhythm: Normal rate and regular rhythm.      Pulses: Normal pulses.      Heart sounds: Normal heart sounds. No murmur heard.    No gallop.   Pulmonary:      Effort: No respiratory distress.      Breath sounds: No stridor. Wheezing present. No rhonchi or rales.      Comments: Decreased inspiration. Wheezing diffusely. On 4L NC  Abdominal:      General: Bowel sounds are normal. There is distension.      Palpations: Abdomen is soft. There is no mass.      Tenderness: There is no abdominal tenderness. There is no guarding.      Comments: RUQ drain in place  with minimal brown/orange fluid   Musculoskeletal:      Right lower leg: No edema.      Left lower leg: No edema.   Skin:     General: Skin is warm.   Neurological:      Mental Status: He is alert and oriented to person, place, and time.       Significant Labs: All pertinent labs within the past 24 hours have been reviewed.    Significant Imaging: I have reviewed all pertinent imaging results/findings within the past 24 hours.      Assessment/Plan:      * Hepatic abscess  Pt w/ ill-defined lesion representing abscess vs neoplastic mass. Infective ddx for abscess includes e histolytica vs pyogenic abscess. Denies prior hx of hepatitis, acute viral panel negative. From endemic region of e histolytica, TB, etc with most recent visit 8 weeks prior to admission  - IR consulted for sample +/- drain which was placed on 12/9  - cultures from IR drain are pending  - continue empiric antibiotics    Acute hypoxemic respiratory failure  Patient with Hypoxic Respiratory failure which is Acute.  he is not on home oxygen. Supplemental oxygen was provided and noted- Oxygen Concentration (%):  [32-36] 36.   Signs/symptoms of respiratory failure include- tachypnea, increased work of breathing and wheezing. Contributing diagnoses includes - atelectasis Labs and images were reviewed. Patient Has not had a recent ABG.   - O2 by NC PRN  - nebs PRN  - currently on IVF- watch for volume overload  - CXR shows atelectasis    Hyperglycemia  Check A1c      Microcytic anemia  Hgb 10-11, no prior to review  - check iron panel, B12, folate, retics, direct bili      Atelectasis  Shortness of breath partly due to atelectasis as noted on CXR  - incentive spirometer  - O2 by NC as needed      Pyuria  Pyuria on admission UA, prior hx of UTI on Bactrim. No current urinary symptoms.   - empiric antibiotics for sepsis  - follow cultures      Elevated brain natriuretic peptide (BNP) level  Very mild elevation in the setting of renal failure. Check  TTE      Severe sepsis  Sepsis physiology and lactate likely due to liver abscess.   - empiric antibiotics  - IVF      This patient does have evidence of infective focus  My overall impression is sepsis. Vital signs were reviewed and noted in progress note.  Antibiotics given-   Antibiotics (From admission, onward)    Start     Stop Route Frequency Ordered    12/09/22 2100  mupirocin 2 % ointment         12/14 2059 Nasl 2 times daily 12/09/22 1424    12/09/22 1700  metronidazole IVPB 500 mg         -- IV Every 8 hours (non-standard times) 12/09/22 1553    12/09/22 1652  vancomycin - pharmacy to dose  (vancomycin IVPB)        See [facility] for full Linked Orders Report.    -- IV pharmacy to manage frequency 12/09/22 1553    12/09/22 1600  cefepime in dextrose 5 % IVPB 2 g         -- IV Every 24 hours (non-standard times) 12/09/22 1552        Cultures were taken-   Microbiology Results (last 7 days)     Procedure Component Value Units Date/Time    Gram stain [[id number]] Collected: 12/09/22 1831    Order Status: Completed Specimen: Abscess from Abdomen Updated: 12/10/22 0755     Gram Stain Result Many WBC's      No organisms seen    Culture, Anaerobe [[id number]] Collected: 12/09/22 1831    Order Status: Sent Specimen: Abscess from Abdomen Updated: 12/09/22 1906    Aerobic culture [[id number]] Collected: 12/09/22 1831    Order Status: Sent Specimen: Abscess from Abdomen Updated: 12/09/22 1905    Fungus culture [[id number]] Collected: 12/09/22 1831    Order Status: Sent Specimen: Abscess from Abdomen Updated: 12/09/22 1905        Latest lactate reviewed, they are-  Recent Labs   Lab 12/09/22  0401 12/09/22  1928   LACTATE 2.3* 1.2       Organ dysfunction indicated by Acute kidney injury and Acute liver injury  Source- liver abscess    Source control Achieved by- IR      MARYURI (acute kidney injury)  Creatinine at 6.1 at admission. Unknown baseline with no prior labs to review. FENa 1.4% suggesting intrinsic injury. This may  be ATN from sepsis vs Bactrim pseudotoxicity vs other. No hydro seen on CT or US.   - IVF ordered  - nephrology consulted  - further urine studies are pending  - no signs that he emergently needs dialysis       Patient with acute kidney injury likely due to IVVD/dehydration and acute tubular necrosis MARYURI is currently improving. Labs reviewed- Renal function/electrolytes with Estimated Creatinine Clearance: 19.1 mL/min (A) (based on SCr of 5.5 mg/dL (H)). according to latest data. Monitor urine output and serial BMP and adjust therapy as needed. Avoid nephrotoxins and renally dose meds for GFR listed above.         VTE Risk Mitigation (From admission, onward)         Ordered     heparin (porcine) injection 5,000 Units  Every 8 hours         12/10/22 0838     IP VTE HIGH RISK PATIENT  Once         12/09/22 1421     Place sequential compression device  Until discontinued         12/09/22 1421                Discharge Planning   VIVIEN:      Code Status: Full Code   Is the patient medically ready for discharge?:     Reason for patient still in hospital (select all that apply): Patient trending condition               Critical care time spent on the evaluation and treatment of severe organ dysfunction, review of pertinent labs and imaging studies, discussions with consulting providers and discussions with patient/family: 30 minutes.      Carolin Howell MD  Department of Hospital Medicine   [facility] - Intensive Care

## 2022-12-10 NOTE — PROGRESS NOTES
Pharmacokinetic Initial Assessment: IV Vancomycin    Assessment/Plan:    Initiate intravenous vancomycin with loading dose of 1500 mg once with subsequent doses when random concentrations are less than 20 mcg/mL  Desired empiric serum trough concentration is 10 to 20 mcg/mL  Draw vancomycin random level on 12/10/22 at 0300.  Pharmacy will continue to follow and monitor vancomycin.      Please contact pharmacy at extension [phone] with any questions regarding this assessment.     Thank you for the consult,   Chandu Dickson       Patient brief summary:  Vivek Turner is a 47 y.o. male initiated on antimicrobial therapy with IV Vancomycin for treatment of suspected bacteremia    Drug Allergies:   Review of patient's allergies indicates:  No Known Allergies    Actual Body Weight:   103.6 kg    Renal Function:   Estimated Creatinine Clearance: 17.2 mL/min (A) (based on SCr of 6.1 mg/dL (H)).,     Dialysis Method (if applicable):  N/A    CBC (last 72 hours):  Recent Labs   Lab Result Units 12/09/22  0401   WBC K/uL 18.50*   Hemoglobin g/dL 11.6*   Hematocrit % 34.9*   Platelets K/uL 326   Gran % % 73.0   Lymph % % 1.0*   Mono % % 11.0   Eosinophil % % 0.0   Basophil % % 0.0   Differential Method  Manual       Metabolic Panel (last 72 hours):  Recent Labs   Lab Result Units 12/09/22  0345 12/09/22  0401 12/09/22  1628   Sodium mmol/L  --  131*  --    Sodium, Urine mmol/L  --   --  30   Potassium mmol/L  --  4.0  --    Chloride mmol/L  --  100  --    CO2 mmol/L  --  16*  --    Glucose mg/dL  --  175*  --    Glucose, UA  Negative  --   --    BUN mg/dL  --  97*  --    Creatinine mg/dL  --  6.10*  --    Creatinine, Urine mg/dL  --   --  98.0   Albumin g/dL  --  3.0*  --    Total Bilirubin mg/dL  --  7.0*  --    Alkaline Phosphatase U/L  --  210*  --    AST U/L  --  42  --    ALT U/L  --  88*  --        Drug levels (last 3 results):  No results for input(s): VANCOMYCINRA, VANCORANDOM, VANCOMYCINPE, VANCOPEAK, VANCOMYCINTR,  [facility] in the last 72 hours.    Microbiologic Results:  Microbiology Results (last 7 days)       Procedure Component Value Units Date/Time    Gram stain [[id number]] Collected: 12/09/22 1831    Order Status: Sent Specimen: Abscess from Abdomen Updated: 12/09/22 1907    Culture, Anaerobe [[id number]] Collected: 12/09/22 1831    Order Status: Sent Specimen: Abscess from Abdomen Updated: 12/09/22 1906    Aerobic culture [[id number]] Collected: 12/09/22 1831    Order Status: Sent Specimen: Abscess from Abdomen Updated: 12/09/22 1905    Fungus culture [[id number]] Collected: 12/09/22 1831    Order Status: Sent Specimen: Abscess from Abdomen Updated: 12/09/22 1905

## 2022-12-10 NOTE — NURSING
Patient being transferred to ICU room 274, for further observations, Report called to CHACE Ortiz.

## 2022-12-10 NOTE — CARE UPDATE
[facility] Intensive Care  ICU Shift Summary  Date: 12/10/2022      Prehospitalization: Home  Admit Date / LOS : 12/9/2022/ 1 days    Diagnosis: Hepatic abscess    Consults:        Active: Gen Surg and Nephro       Needed: N/A     Code Status: Full Code   Advanced Directive: <no information>    LDA:  Lines/Drains/Airways       Drain  Duration                  Closed/Suction Drain 12/09/22 1748 Lateral RLQ Accordion <1 day              Peripheral Intravenous Line  Duration                  Peripheral IV - Single Lumen 12/08/22 18 G Right Antecubital 2 days         Peripheral IV - Single Lumen 12/09/22 0401 18 G Left Antecubital 1 day                  Central Lines/Site/Justification:Patient Does Not Have Central Line  Urinary Cath/Order/Justification:Patient Does Not Have Urinary Catheter    Vasopressors/Infusions:    sodium bicarbonate drip 100 mL/hr at 12/10/22 1501          GOALS: Volume/ Hemodynamic: N/A                     RASS: 0  alert and calm    Pain Management: PO       Pain Controlled: yes     Rhythm: NSR and ST    Respiratory Device: Nasal Cannula    Oxygen Concentration (%):  [32-36] 36             Most Recent SBT/ SAT: N/A       MOVE Screen: PASS  ICU Liberation: not applicable    VTE Prophylaxis: Pharm and Mechanical  Mobility: OOB to Chair  Stress Ulcer Prophylaxis: Yes    Isolation: No active isolations    Dietary:   Current Diet Order   Procedures    Diet clear liquid [facility]; Renal     Order Specific Question:   Indicate patient location for additional diet options:     Answer:   [facility]     Order Specific Question:   Additional Diet Options:     Answer:   Renal      Tolerance: yes  Advancement: yes    I & O (24h):    Intake/Output Summary (Last 24 hours) at 12/10/2022 1539  Last data filed at 12/10/2022 1501  Gross per 24 hour   Intake 2873.61 ml   Output 1905 ml   Net 968.61 ml        Restraints: No    Significant Dates:  Post Op Date: N/A  Rescue Date: N/A  Imaging/  Diagnostics:  CT scan rt hepatic abscess    Noteworthy Labs:  see below    COVID Test: (--)  CBC/Anemia Labs: Coags:    Recent Labs   Lab 12/09/22  0401 12/10/22  0350 12/10/22  1147   WBC 18.50* 19.78*  --    HGB 11.6* 10.1*  --    HCT 34.9* 29.1*  --     329  --    MCV 85 81*  --    RDW 14.6* 13.4  --    IRON  --   --  25*   FERRITIN  --   --  3,633*   RETIC  --   --  1.0    Recent Labs   Lab 12/09/22  0401   INR 1.3*        Chemistries:   Recent Labs   Lab 12/09/22  0401 12/10/22  0350   * 138   K 4.0 3.7    104   CO2 16* 20*   BUN 97* 99*   CREATININE 6.10* 5.5*   CALCIUM 7.5* 7.6*   PROT 6.2* 5.6*   BILITOT 7.0* 4.4*   ALKPHOS 210* 132   ALT 88* 50*   AST 42 25        Cardiac Enzymes: Ejection Fractions:    Recent Labs     12/09/22 0401   TROPONINI <0.012    No results found for: EF     POCT Glucose: HbA1c:    No results for input(s): POCTGLUCOSE in the last 168 hours. No results found for: HGBA1C        ICU LOS 20h  Level of Care: Critical Care    Chart Check: 12 HR Done  Shift Summary/Plan for the shift: see care plan note

## 2022-12-10 NOTE — NURSING
Returned to room via bed from IR, right flank with all purpose drain in place, dressing clean , dry & intact.

## 2022-12-10 NOTE — SUBJECTIVE & OBJECTIVE
Current Facility-Administered Medications on File Prior to Encounter   Medication    [COMPLETED] 0.9%  NaCl infusion    [DISCONTINUED] piperacillin-tazobactam 4.5 g in dextrose 5 % 100 mL IVPB (ready to mix system)    [DISCONTINUED] piperacillin-tazobactam 4.5 g in dextrose 5 % 100 mL IVPB (ready to mix system)    [DISCONTINUED] vancomycin (VANCOCIN) 1,500 mg in dextrose 5 % 250 mL IVPB    [DISCONTINUED] vancomycin - pharmacy to dose     Current Outpatient Medications on File Prior to Encounter   Medication Sig    NON FORMULARY MEDICATION Losartan/amlodipine 100/10 mg   Take 1 tablet by mouth daily       Review of patient's allergies indicates:  No Known Allergies    Past Medical History:   Diagnosis Date    Hypertension      No past surgical history on file.  Family History    None       Tobacco Use    Smoking status: Not on file    Smokeless tobacco: Not on file   Substance and Sexual Activity    Alcohol use: Not on file    Drug use: Not on file    Sexual activity: Not on file     Review of Systems   Constitutional:  Negative for appetite change, fatigue, fever and unexpected weight change.   HENT:  Negative for sore throat and trouble swallowing.    Eyes: Negative.    Respiratory:  Negative for cough, shortness of breath and wheezing.    Cardiovascular:  Negative for chest pain and leg swelling.   Gastrointestinal:  Positive for abdominal pain (especially at IR drain insertion site and some of the right upper quadrant). Negative for abdominal distention, blood in stool, constipation, diarrhea, nausea and vomiting.   Endocrine: Negative.    Genitourinary: Negative.    Musculoskeletal:  Negative for back pain.   Skin: Negative.  Negative for rash.   Allergic/Immunologic: Negative.    Neurological: Negative.    Hematological: Negative.    Psychiatric/Behavioral:  Negative for confusion.    Objective:     Vital Signs (Most Recent):  Temp: 98.4 °F (36.9 °C) (12/10/22 0800)  Pulse: 87 (12/10/22 0815)  Resp: (!) 33  (12/10/22 0815)  BP: (!) 118/55 (12/10/22 0815)  SpO2: 96 % (12/10/22 0815)   Vital Signs (24h Range):  Temp:  [97.6 °F (36.4 °C)-100.4 °F (38 °C)] 98.4 °F (36.9 °C)  Pulse:  [] 87  Resp:  [20-53] 33  SpO2:  [90 %-99 %] 96 %  BP: ()/(47-63) 118/55     Weight: 103.6 kg (228 lb 6.3 oz)  Body mass index is 36.86 kg/m².    Physical Exam  Vitals and nursing note reviewed.   Constitutional:       Appearance: He is well-developed.   HENT:      Head: Normocephalic and atraumatic.   Cardiovascular:      Rate and Rhythm: Normal rate.      Heart sounds: Normal heart sounds.   Pulmonary:      Effort: Pulmonary effort is normal.   Abdominal:      General: Bowel sounds are normal. There is no distension.      Palpations: Abdomen is soft.      Tenderness: There is abdominal tenderness (at IR drain site).   Musculoskeletal:         General: Normal range of motion.      Cervical back: Normal range of motion.   Skin:     General: Skin is warm and dry.      Capillary Refill: Capillary refill takes less than 2 seconds.   Neurological:      Mental Status: He is alert and oriented to person, place, and time.   Psychiatric:         Behavior: Behavior normal.       Significant Labs:  I have reviewed all pertinent lab results within the past 24 hours.  CBC:   Recent Labs   Lab 12/10/22  0350   WBC 19.78*   RBC 3.59*   HGB 10.1*   HCT 29.1*      MCV 81*   MCH 28.1   MCHC 34.7     CMP:   Recent Labs   Lab 12/10/22  0350   *   CALCIUM 7.6*   ALBUMIN 1.7*   PROT 5.6*      K 3.7   CO2 20*      BUN 99*   CREATININE 5.5*   ALKPHOS 132   ALT 50*   AST 25   BILITOT 4.4*       Significant Diagnostics:  I have reviewed all pertinent imaging results/findings within the past 24 hours.  CT: I have reviewed all pertinent results/findings within the past 24 hours and my personal findings are:  IR drain placement yesterday for hepatic abscess

## 2022-12-10 NOTE — SEDATION DOCUMENTATION
Called ICU and spoke to rapid response nurse due to respiratory rate and sweating of patient.  He remains oriented and VS unchanged after analgesics given for procedure.

## 2022-12-10 NOTE — EICU
Intervention Initiated From:  Bedside    Alec intervened regarding:  Medication and Pain  Bedside RN called requesting order for pain medication. Pt is NPO & c/o Rt flank pain from drain insertion, describes pain as 7/10.      Doctor Notified:  Yes  Comments: Dr. Karan Murry

## 2022-12-10 NOTE — SUBJECTIVE & OBJECTIVE
Interval History: Moved to ICU post drain placement by IR because he looked unstable. Short of breath this morning. Decreased abdominal pain compared to yesterday.     Review of Systems   Constitutional:  Positive for diaphoresis and fatigue. Negative for chills and fever.   HENT:  Negative for congestion, sinus pressure and sinus pain.    Respiratory:  Positive for shortness of breath. Negative for cough and chest tightness.    Cardiovascular:  Negative for chest pain, palpitations and leg swelling.   Gastrointestinal:  Positive for abdominal distention and abdominal pain. Negative for constipation, diarrhea, nausea and vomiting.   Musculoskeletal:  Negative for arthralgias and myalgias.   Neurological:  Positive for weakness. Negative for numbness.   Psychiatric/Behavioral:  Negative for confusion.    Objective:     Vital Signs (Most Recent):  Temp: 98.4 °F (36.9 °C) (12/10/22 0800)  Pulse: 87 (12/10/22 0815)  Resp: (!) 33 (12/10/22 0815)  BP: (!) 118/55 (12/10/22 0815)  SpO2: 96 % (12/10/22 0815)   Vital Signs (24h Range):  Temp:  [97.6 °F (36.4 °C)-100.4 °F (38 °C)] 98.4 °F (36.9 °C)  Pulse:  [] 87  Resp:  [20-53] 33  SpO2:  [90 %-99 %] 96 %  BP: ()/(47-63) 118/55     Weight: 103.6 kg (228 lb 6.3 oz)  Body mass index is 36.86 kg/m².    Intake/Output Summary (Last 24 hours) at 12/10/2022 0934  Last data filed at 12/10/2022 0823  Gross per 24 hour   Intake 2088.3 ml   Output 1755 ml   Net 333.3 ml      Physical Exam  Vitals and nursing note reviewed.   Constitutional:       General: He is not in acute distress.     Appearance: He is obese. He is ill-appearing and diaphoretic. He is not toxic-appearing.   HENT:      Head: Normocephalic and atraumatic.      Nose: Nose normal.      Mouth/Throat:      Mouth: Mucous membranes are moist.   Eyes:      General: No scleral icterus.  Cardiovascular:      Rate and Rhythm: Normal rate and regular rhythm.      Pulses: Normal pulses.      Heart sounds: Normal heart  sounds. No murmur heard.    No gallop.   Pulmonary:      Effort: No respiratory distress.      Breath sounds: No stridor. Wheezing present. No rhonchi or rales.      Comments: Decreased inspiration. Wheezing diffusely. On 4L NC  Abdominal:      General: Bowel sounds are normal. There is distension.      Palpations: Abdomen is soft. There is no mass.      Tenderness: There is no abdominal tenderness. There is no guarding.      Comments: RUQ drain in place with minimal brown/orange fluid   Musculoskeletal:      Right lower leg: No edema.      Left lower leg: No edema.   Skin:     General: Skin is warm.   Neurological:      Mental Status: He is alert and oriented to person, place, and time.       Significant Labs: All pertinent labs within the past 24 hours have been reviewed.    Significant Imaging: I have reviewed all pertinent imaging results/findings within the past 24 hours.

## 2022-12-10 NOTE — ASSESSMENT & PLAN NOTE
Pt w/ ill-defined lesion representing abscess vs neoplastic mass. Infective ddx for abscess includes e histolytica vs pyogenic abscess. Denies prior hx of hepatitis, acute viral panel negative. From endemic region of e histolytica, TB, etc with most recent visit 8 weeks prior to admission  - IR consulted for sample +/- drain which was placed on 12/9  - cultures from IR drain are pending  - continue empiric antibiotics

## 2022-12-10 NOTE — PROGRESS NOTES
Pharmacokinetic Assessment Follow Up: IV Vancomycin    Vancomycin serum concentration assessment(s):    The random level was drawn correctly and can be used to guide therapy at this time. The measurement is above the desired definitive target range of 10 to 20 mcg/mL.    Vancomycin Regimen Plan:    Re-dose when the random level is less than 20 mcg/mL, next level to be drawn at 0300 on 12/11/22    Drug levels (last 3 results):  Recent Labs   Lab Result Units 12/10/22  0350   Vancomycin, Random ug/mL 36.7       Pharmacy will continue to follow and monitor vancomycin.    Please contact pharmacy at extension [phone] for questions regarding this assessment.    Thank you for the consult,   Chandu Dickson       Patient brief summary:  Vivek Turner is a 47 y.o. male initiated on antimicrobial therapy with IV Vancomycin for treatment of bacteremia    The patient's current regimen is random pulse dosing    Drug Allergies:   Review of patient's allergies indicates:  No Known Allergies    Actual Body Weight:   103.6 kg    Renal Function:   Estimated Creatinine Clearance: 19.1 mL/min (A) (based on SCr of 5.5 mg/dL (H)).,     Dialysis Method (if applicable):  N/A    CBC (last 72 hours):  Recent Labs   Lab Result Units 12/09/22  0401 12/10/22  0350   WBC K/uL 18.50* 19.78*   Hemoglobin g/dL 11.6* 10.1*   Hematocrit % 34.9* 29.1*   Platelets K/uL 326 329   Gran % % 73.0 91.0*   Lymph % % 1.0* 3.0*   Mono % % 11.0 2.0*   Eosinophil % % 0.0 0.0   Basophil % % 0.0 0.0   Differential Method  Manual Automated       Metabolic Panel (last 72 hours):  Recent Labs   Lab Result Units 12/09/22  0345 12/09/22  0401 12/09/22  1628 12/10/22  0350   Sodium mmol/L  --  131*  --  138   Sodium, Urine mmol/L  --   --  30  --    Potassium mmol/L  --  4.0  --  3.7   Chloride mmol/L  --  100  --  104   CO2 mmol/L  --  16*  --  20*   Glucose mg/dL  --  175*  --  150*   Glucose, UA  Negative  --   --   --    BUN mg/dL  --  97*  --  99*   Creatinine  mg/dL  --  6.10*  --  5.5*   Creatinine, Urine mg/dL  --   --  98.0  --    Albumin g/dL  --  3.0*  --  1.7*   Total Bilirubin mg/dL  --  7.0*  --  4.4*   Alkaline Phosphatase U/L  --  210*  --  132   AST U/L  --  42  --  25   ALT U/L  --  88*  --  50*       Vancomycin Administrations:  vancomycin given in the last 96 hours                     vancomycin 1.5 g in dextrose 5 % 250 mL IVPB (ready to mix) (mg) 1,500 mg New Bag 12/09/22 2033                    Microbiologic Results:  Microbiology Results (last 7 days)       Procedure Component Value Units Date/Time    Gram stain [[id number]] Collected: 12/09/22 1831    Order Status: Sent Specimen: Abscess from Abdomen Updated: 12/09/22 1907    Culture, Anaerobe [[id number]] Collected: 12/09/22 1831    Order Status: Sent Specimen: Abscess from Abdomen Updated: 12/09/22 1906    Aerobic culture [[id number]] Collected: 12/09/22 1831    Order Status: Sent Specimen: Abscess from Abdomen Updated: 12/09/22 1905    Fungus culture [[id number]] Collected: 12/09/22 1831    Order Status: Sent Specimen: Abscess from Abdomen Updated: 12/09/22 1905

## 2022-12-10 NOTE — ASSESSMENT & PLAN NOTE
Patient with Hypoxic Respiratory failure which is Acute.  he is not on home oxygen. Supplemental oxygen was provided and noted- Oxygen Concentration (%):  [32-36] 36.   Signs/symptoms of respiratory failure include- tachypnea, increased work of breathing and wheezing. Contributing diagnoses includes - atelectasis Labs and images were reviewed. Patient Has not had a recent ABG.   - O2 by NC PRN  - nebs PRN  - currently on IVF- watch for volume overload  - CXR shows atelectasis

## 2022-12-10 NOTE — HOSPITAL COURSE
Patient underwent IR drainage of hepatic abscess yesterday.    Drain in place with 50 cc of thick juan fluid   We will continue to monitor drain output.

## 2022-12-10 NOTE — CARE UPDATE
[facility] [place]  Nurses Note -- 4 Eyes      12/10/2022       Skin assessed on: Q Shift      [x] No Pressure Injuries Present    [x]Prevention Measures Documented    [] Yes LDA  for Pressure Injury Previously documented     [] Yes New Pressure Injury Discovered   [] LDA for New Pressure Injury Added      Attending RN:  Kaye Blanton, RN     Second RN:  Makenna Alberto RN

## 2022-12-10 NOTE — ASSESSMENT & PLAN NOTE
Sepsis physiology and lactate likely due to liver abscess.   - empiric antibiotics  - IVF      This patient does have evidence of infective focus  My overall impression is sepsis. Vital signs were reviewed and noted in progress note.  Antibiotics given-   Antibiotics (From admission, onward)    Start     Stop Route Frequency Ordered    12/09/22 2100  mupirocin 2 % ointment         12/14 2059 Nasl 2 times daily 12/09/22 1424    12/09/22 1700  metronidazole IVPB 500 mg         -- IV Every 8 hours (non-standard times) 12/09/22 1553    12/09/22 1652  vancomycin - pharmacy to dose  (vancomycin IVPB)        [facility]    -- IV pharmacy to manage frequency 12/09/22 1553    12/09/22 1600  cefepime in dextrose 5 % IVPB 2 g         -- IV Every 24 hours (non-standard times) 12/09/22 1552        Cultures were taken-   Microbiology Results (last 7 days)     Procedure Component Value Units Date/Time    Gram stain [[id number]] Collected: 12/09/22 1831    Order Status: Completed Specimen: Abscess from Abdomen Updated: 12/10/22 0755     Gram Stain Result Many WBC's      No organisms seen    Culture, Anaerobe [[id number]] Collected: 12/09/22 1831    Order Status: Sent Specimen: Abscess from Abdomen Updated: 12/09/22 1906    Aerobic culture [[id number]] Collected: 12/09/22 1831    Order Status: Sent Specimen: Abscess from Abdomen Updated: 12/09/22 1905    Fungus culture [[id number]] Collected: 12/09/22 1831    Order Status: Sent Specimen: Abscess from Abdomen Updated: 12/09/22 1905        Latest lactate reviewed, they are-  Recent Labs   Lab 12/09/22  0401 12/09/22  1928   LACTATE 2.3* 1.2       Organ dysfunction indicated by Acute kidney injury and Acute liver injury  Source- liver abscess    Source control Achieved by- IR

## 2022-12-10 NOTE — CLINICAL REVIEW
IP Sepsis Screen (most recent)       Sepsis Screen (IP) - 12/09/22 180       Is the patient's history or complaint suggestive of a possible infection? Yes  -DD    Are there at least two of the following signs and symptoms present? Yes  -DD    Sepsis signs/symptoms - Tachycardia Tachycardia     >90  -DD    Sepsis signs/symptoms - Tachypnea Tachypnea     >20  -DD    Sepsis signs/symptoms - WBC WBC < 4,000 or WBC > 12,000  -DD    Are any of the following organ dysfunction criteria present and not considered to be due to a chronic condition? Yes  -DD    Organ Dysfunction Criteria Creatinine > 2.0  -DD    Organ Dysfunction Criteria Total Bilirubin > 2.0 Platelet count < 100,000  -DD    Organ Dysfunction Criteria Lactate > 2.0  -DD    Initiate Sepsis Protocol No  -DD    Reason sepsis not considered Pt. receiving appropriate management  -DD              User Key  (r) = Recorded By, (t) = Taken By, (c) = Cosigned By      Initials Name    MIRIAM Louise RN

## 2022-12-10 NOTE — PROGRESS NOTES
SW attempted to complete initial assessment. Patient off unit, unable to complete initial assessment at this time.

## 2022-12-10 NOTE — NURSING
[facility] Intensive Care  ICU Shift Summary  Date: 12/10/2022      Prehospitalization: Home  Admit Date / LOS : 12/9/2022/ 1 days    Diagnosis: Hepatic abscess    Consults:        Active: Gen Surg, IR, and Nephro       Needed: N/A     Code Status: Full Code   Advanced Directive: <no information>    LDA:  Lines/Drains/Airways       Drain  Duration                  Closed/Suction Drain 12/09/22 1748 Lateral RLQ Accordion <1 day              Peripheral Intravenous Line  Duration                  Peripheral IV - Single Lumen 12/08/22 18 G Right Antecubital 2 days         Peripheral IV - Single Lumen 12/09/22 0401 18 G Left Antecubital 1 day                  Central Lines/Site/Justification:Patient Does Not Have Central Line  Urinary Cath/Order/Justification:Patient Does Not Have Urinary Catheter    Vasopressors/Infusions:    sodium bicarbonate drip 100 mL/hr at 12/10/22 0500          GOALS: Volume/ Hemodynamic: SBP < 160                     RASS: N/A    Pain Management: IV       Pain Controlled: yes     Rhythm: NSR and ST    Respiratory Device: Nasal Cannula    Oxygen Concentration (%):  [32-36] 36             Most Recent SBT/ SAT: N/A       MOVE Screen: PASS  ICU Liberation: yes    VTE Prophylaxis: Mechanical  Mobility: Bedrest  Stress Ulcer Prophylaxis: No    Isolation: No active isolations    Dietary:   Current Diet Order   Procedures    Diet NPO      Tolerance: yes  Advancement: no    I & O (24h):    Intake/Output Summary (Last 24 hours) at 12/10/2022 0546  Last data filed at 12/10/2022 0527  Gross per 24 hour   Intake 1788.23 ml   Output 1200 ml   Net 588.23 ml        Restraints: No    Significant Dates:  Post Op Date: N/A  Rescue Date: N/A  Imaging/ Diagnostics: N/A    Noteworthy Labs:  see chart below    COVID Test: (--)  CBC/Anemia Labs: Coags:    Recent Labs   Lab 12/09/22  0401 12/10/22  0350   WBC 18.50* 19.78*   HGB 11.6* 10.1*   HCT 34.9* 29.1*    329   MCV 85 81*   RDW 14.6* 13.4    Recent Labs    Lab 12/09/22  0401   INR 1.3*        Chemistries:   Recent Labs   Lab 12/09/22  0401 12/10/22  0350   * 138   K 4.0 3.7    104   CO2 16* 20*   BUN 97* 99*   CREATININE 6.10* 5.5*   CALCIUM 7.5* 7.6*   PROT 6.2* 5.6*   BILITOT 7.0* 4.4*   ALKPHOS 210* 132   ALT 88* 50*   AST 42 25        Cardiac Enzymes: Ejection Fractions:    Recent Labs     12/09/22 0401   TROPONINI <0.012    No results found for: EF     POCT Glucose: HbA1c:    No results for input(s): POCTGLUCOSE in the last 168 hours. No results found for: HGBA1C        ICU LOS 10h  Level of Care: OK to Transfer    Chart Check: 24 HR Done  Shift Summary/Plan for the shift: see care plan summary

## 2022-12-10 NOTE — NURSING
[facility]  Rapid Response Nurse Note     Rapid Response Metrics:     Admit Date: 2022  LOS: 1  Code Status: Full Code   Date of Consult: 12/10/2022  : 1975  Age: 47 y.o.  Weight:   Wt Readings from Last 1 Encounters:   22 103.6 kg (228 lb 6.3 oz)     Sex: male  Race: Other   [facility] A:   MRN: [id number]      Was the patient discharged from an ICU this admission?   no  Was the patient discharged from a PACU within last 24 hours?  no  Did the patient receive conscious sedation/general anesthesia within last 24 hours?  yes  Was the patient in the ED within the past 24 hours?  yes  Was the patient started on NIPPV within the past 24 hours?  no  Did this progress into an ARC or CPA:  no    Attending Physician: Carolin Howell MD  Rapid Response Indication(s) IR nurse called    SITUATION:     Reason for Call:   Called to evaluate the patient for  tachypnea, diaphoresis post procedure    BACKGROUND:     Why is the patient in the hospital?:liver abcess    ASSESSMENT:   Patient lying on stretcher, respiratory rate in the 40s. He is alert, oriented and painful.     RECOMMENDATIONS:     Pt transferred to ICU    FOLLOW-UP/CONTINGENCY PLAN:   In ICU      PHYSICIAN ESCALATION:     Orders received per Dr. Bates    Disposition: Tx to [facility], bed 274.

## 2022-12-10 NOTE — HOSPITAL COURSE
Mr Vivek Turner was admitted with severe sepsis due to R liver mass vs abscess and acute renal failure. IR consulted and drain placed on 12/9. Cultures with Klebsiella. ID consulted for ABX recommendations. Started on bicarb gtt for acute renal failure and Nephrology consulted. He was moved to ICU for instability. Diagnosed with Ileus; NGT placed with copious bilious output. GI consulted to assess for source of liver abscess. MRCP showed persistent heterogeneous collection within the right hepatic lobe compatible with reported abscess noting percutaneous drainage catheter in place. No significant biliary duct abnormality on MRCP assessment. Pt refused inpatient colonoscopy. Pt transferred to the floor on 12/12.  Cr improving with fluids and TPN. wbc uptrending and looks like new fluid collection next to liver. IR consulted. IR did not see another fluid pocket that was attainable.  Creat continued to improve with resolution of ARF.  Continued on TPN.  Resolution of Ileus and NGT removed.   Persistent leukocytosis.  Repeat CT showing multiple abdominal abscesses in the abdomen persists, as well as a large R pleural effusion, and prostatic abscess. IR consulted for thoracentesis and potentially placing another drain(s), given multiple abscesses and locations.  General Surgery also consulted.  Urology consulted for prostatic abscess.   S/p IR drainage of perihepatic fluid collection - cx with Kleb pneumo thus far. RLQ abdominal fluid collection and R thora cx ngtd. S/p TURP with urology on 12/21 - noted to have purulent fluid.  Leukocytosis finally resolved.  ID recommending IV Ceftriaxone and PO Flagyl until 1/18/2023.  SW working on LTAC placement. IR re-consulted for possible drain removal, recommended repeat CT scan which showed improvement in sizes of fluid collections. Drains still with some output so patient discharged to LTAC with drains in place (instructed to remove once output is 0 for > 24 hours) and to  complete antibiotics until 1/18/23.

## 2022-12-10 NOTE — ASSESSMENT & PLAN NOTE
Shortness of breath partly due to atelectasis as noted on CXR  - incentive spirometer  - O2 by NC as needed

## 2022-12-10 NOTE — ASSESSMENT & PLAN NOTE
Pyuria on admission UA, prior hx of UTI on Bactrim. No current urinary symptoms.   - empiric antibiotics for sepsis  - follow cultures

## 2022-12-10 NOTE — CONSULTS
Renal ICU Consult    Date of Admission:  12/9/2022  1:40 PM        Chief Complaint: No chief complaint on file.      HPI: 46 y/o male with PMHx. Only pertinent for HTN admitted yesterday from the ED where he reported a Hx. Of fever with rigors x 3 days followed by worsening abdominal pain. Initial w/u revealed a R-hepatic abcess which was drained by IR as well as a highly abnormal creatinine (6.1) and BUN (97)   Pte. Denies any knowledge of Kidney disease.    PMH:  Past Medical History:   Diagnosis Date    Hypertension        PSH:  No past surgical history on file.    Allergies:  Review of patient's allergies indicates:  No Known Allergies    Current Facility-Administered Medications on File Prior to Encounter   Medication Dose Route Frequency Provider Last Rate Last Admin    [COMPLETED] 0.9%  NaCl infusion  1,000 mL Intravenous Once Bandar Franks MD   Stopped at 12/09/22 1156    [DISCONTINUED] piperacillin-tazobactam 4.5 g in dextrose 5 % 100 mL IVPB (ready to mix system)  4.5 g Intravenous Q8H Bandar Franks MD   Stopped at 12/09/22 0916    [DISCONTINUED] piperacillin-tazobactam 4.5 g in dextrose 5 % 100 mL IVPB (ready to mix system)  4.5 g Intravenous Q12H Bandar Franks MD        [DISCONTINUED] vancomycin (VANCOCIN) 1,500 mg in dextrose 5 % 250 mL IVPB  1,500 mg Intravenous Once Bandar Franks MD        [DISCONTINUED] vancomycin - pharmacy to dose   Intravenous pharmacy to manage frequency Bandar Franks MD         Current Outpatient Medications on File Prior to Encounter   Medication Sig Dispense Refill    NON FORMULARY MEDICATION Losartan/amlodipine 100/10 mg   Take 1 tablet by mouth daily         Medications:  Current Facility-Administered Medications   Medication Dose Route Frequency Provider Last Rate Last Admin    acetaminophen tablet 650 mg  650 mg Oral Q8H PRN Mick Gage MD   650 mg at 12/09/22 2034    acetaminophen tablet 650 mg  650 mg Oral  Q4H PRN Mick Gage MD        cefepime in dextrose 5 % IVPB 2 g  2 g Intravenous Q24H Mick Gage MD   Stopped at 12/09/22 1707    dextrose 10% bolus 125 mL  12.5 g Intravenous PRN Mick Gage MD        dextrose 10% bolus 250 mL  25 g Intravenous PRN Mick Gage MD        glucagon (human recombinant) injection 1 mg  1 mg Intramuscular PRN Mick Gage MD        glucose chewable tablet 16 g  16 g Oral PRN Mick Gage MD        glucose chewable tablet 24 g  24 g Oral PRN Mick Gage MD        heparin (porcine) injection 5,000 Units  5,000 Units Subcutaneous Q8H Carolin Howell MD        melatonin tablet 6 mg  6 mg Oral Nightly PRN Mick Gage MD        metronidazole IVPB 500 mg  500 mg Intravenous Q8H Mick Gage  mL/hr at 12/10/22 0816 500 mg at 12/10/22 0816    mupirocin 2 % ointment   Nasal BID Mick Gage MD   Given at 12/10/22 0816    naloxone 0.4 mg/mL injection 0.02 mg  0.02 mg Intravenous PRN Mick Gage MD        sodium bicarbonate 150 mEq in dextrose 5 % 1,000 mL infusion   Intravenous Continuous Mick Gage  mL/hr at 12/10/22 0800 Rate Verify at 12/10/22 0800    sodium chloride 0.9% flush 10 mL  10 mL Intravenous Q8H Mick Gage MD   10 mL at 12/10/22 0524    vancomycin - pharmacy to dose   Intravenous pharmacy to manage frequency iMck Gage MD           FamHx:  No family history on file.    SocHx:  Social History     Socioeconomic History    Marital status:            Review of Systems: see H&P       Physical Exam:  Vitals:   Vitals:    12/10/22 0815   BP: (!) 118/55   Pulse: 87   Resp: (!) 33   Temp:        I/O last 3 completed shifts:  In: 1788.2 [I.V.:1365.8; IV Piggyback:422.4]  Out: 1575 [Urine:1525; Drains:50]  I/O this shift:  In: 300.1 [I.V.:300.1]  Out: 180 [Urine:180]    General: No apparent distress.   Neck: supple   Lungs: Unlabored breathing  Heart: RRR  Abdomen: n/a  Ext: n/a  Neurologic:  awake-alert      Laboratories:    Recent Labs   Lab 12/10/22  0350   WBC 19.78*   RBC 3.59*   HGB 10.1*   HCT 29.1*      MCV 81*   MCH 28.1   MCHC 34.7       Recent Labs   Lab 12/10/22  0350   CALCIUM 7.6*   PROT 5.6*      K 3.7   CO2 20*      BUN 99*   CREATININE 5.5*   ALKPHOS 132   ALT 50*   AST 25   BILITOT 4.4*       No results for input(s): COLORU, CLARITYU, SPECGRAV, PHUR, PROTEINUA, GLUCOSEU, BLOODU, WBCU, RBCU, BACTERIA, MUCUS in the last 24 hours.    Invalid input(s):  BILIRUBINCON    Microbiology Results (last 7 days)       Procedure Component Value Units Date/Time    Gram stain [[id number]] Collected: 12/09/22 1831    Order Status: Completed Specimen: Abscess from Abdomen Updated: 12/10/22 0755     Gram Stain Result Many WBC's      No organisms seen    Culture, Anaerobe [[id number]] Collected: 12/09/22 1831    Order Status: Sent Specimen: Abscess from Abdomen Updated: 12/09/22 1906    Aerobic culture [[id number]] Collected: 12/09/22 1831    Order Status: Sent Specimen: Abscess from Abdomen Updated: 12/09/22 1905    Fungus culture [[id number]] Collected: 12/09/22 1831    Order Status: Sent Specimen: Abscess from Abdomen Updated: 12/09/22 1905              Diagnostic Tests:    US Retroperitoneal Complete:  FINDINGS:   Right kidney measures 10.6 cm.  Resistive index of 0.84.  Corticomedullary distinction is maintained.  No hydronephrosis.     Left kidney: Measures 12.1 cm.  Resistive index of 0.74.  Corticomedullary distinction is maintained.  No hydronephrosis.     Urinary bladder is partial fluid-filled at time of scanning and otherwise unremarkable.    Impression:       Elevated renal resistive indices, a nonspecific indicator of underlying medical renal disease.     No hydronephrosis.       Electronically signed by: Josue Hill   Date: 12/10/2022          Assessment:    46 y/o male admitted with:    - Hepatic abscess s/p drainage  - MARYURI (unknown baseline creatinine)  - NAGMA  - Hx.  HTN  - Anemia  - Hypoalbuminemia          Plan:    - Empiric broad spectrum antibiotics  - IVFs: HCO3- ggt  - Bladder scan  - Check FeNa+ and eos  - Renal diet  - Other problems per admitting

## 2022-12-10 NOTE — PLAN OF CARE
Patient continues with hepatic drain, denies pain unless abdomen palpated.   Passing flatus.  Voids per urinal.  Tolerating small amount of clear liquid diet.

## 2022-12-10 NOTE — CONSULTS
[facility] - Intensive Care  General Surgery  Consult Note    Patient Name: Vivek Turner  MRN: [id number]  Code Status: Full Code  Admission Date: 12/9/2022  Hospital Length of Stay: 1 days  Attending Physician: Carolin Howell MD  Primary Care Provider: Primary Doctor No    Patient information was obtained from patient and ER records.     Inpatient consult to General Surgery  Consult performed by: Erasmo Russ MD  Consult ordered by: Mick Gage MD  Reason for consult: Hepatic abscess  Assessment/Recommendations: Recommend IR drain to stay in place and monitor output   Okay for clears   Bowel distention is likely ileus from this intra-abdominal infection.    Patient's pain is better controlled.          Subjective:     Principal Problem: Hepatic abscess    History of Present Illness: 47y M w/ hypertension presents with abdominal pain x 2 days. Pt refused  service. He reports that he had fever/rigors three days prior to admission. He then developed worsening abdominal pain from then until admission. He went to an urgent care where he was started on bactrim for UTI. His abdominal pain increased over the next few days. He had a BM yesterday. Denies vomiting, but has had significant nausea and very limited PO intake in the days leading to admission. Aside from the single episode of fever/rigors he denies subsequent fevers.      In the ED he was found to have a R hepatic abscess vs mass. He traveled to the [facility] eight weeks prior to presentation. He denies fevers except for what is listed above. Denies weight loss, cough, night sweats. Denies prior hx of TB. Denies prior hx of cirrhosis      Current Facility-Administered Medications on File Prior to Encounter   Medication    [COMPLETED] 0.9%  NaCl infusion    [DISCONTINUED] piperacillin-tazobactam 4.5 g in dextrose 5 % 100 mL IVPB (ready to mix system)    [DISCONTINUED] piperacillin-tazobactam 4.5 g in dextrose 5 % 100 mL IVPB (ready  to mix system)    [DISCONTINUED] vancomycin (VANCOCIN) 1,500 mg in dextrose 5 % 250 mL IVPB    [DISCONTINUED] vancomycin - pharmacy to dose     Current Outpatient Medications on File Prior to Encounter   Medication Sig    NON FORMULARY MEDICATION Losartan/amlodipine 100/10 mg   Take 1 tablet by mouth daily       Review of patient's allergies indicates:  No Known Allergies    Past Medical History:   Diagnosis Date    Hypertension      No past surgical history on file.  Family History    None       Tobacco Use    Smoking status: Not on file    Smokeless tobacco: Not on file   Substance and Sexual Activity    Alcohol use: Not on file    Drug use: Not on file    Sexual activity: Not on file     Review of Systems   Constitutional:  Negative for appetite change, fatigue, fever and unexpected weight change.   HENT:  Negative for sore throat and trouble swallowing.    Eyes: Negative.    Respiratory:  Negative for cough, shortness of breath and wheezing.    Cardiovascular:  Negative for chest pain and leg swelling.   Gastrointestinal:  Positive for abdominal pain (especially at IR drain insertion site and some of the right upper quadrant). Negative for abdominal distention, blood in stool, constipation, diarrhea, nausea and vomiting.   Endocrine: Negative.    Genitourinary: Negative.    Musculoskeletal:  Negative for back pain.   Skin: Negative.  Negative for rash.   Allergic/Immunologic: Negative.    Neurological: Negative.    Hematological: Negative.    Psychiatric/Behavioral:  Negative for confusion.    Objective:     Vital Signs (Most Recent):  Temp: 98.4 °F (36.9 °C) (12/10/22 0800)  Pulse: 87 (12/10/22 0815)  Resp: (!) 33 (12/10/22 0815)  BP: (!) 118/55 (12/10/22 0815)  SpO2: 96 % (12/10/22 0815)   Vital Signs (24h Range):  Temp:  [97.6 °F (36.4 °C)-100.4 °F (38 °C)] 98.4 °F (36.9 °C)  Pulse:  [] 87  Resp:  [20-53] 33  SpO2:  [90 %-99 %] 96 %  BP: ()/(47-63) 118/55     Weight: 103.6 kg (228 lb 6.3  oz)  Body mass index is 36.86 kg/m².    Physical Exam  Vitals and nursing note reviewed.   Constitutional:       Appearance: He is well-developed.   HENT:      Head: Normocephalic and atraumatic.   Cardiovascular:      Rate and Rhythm: Normal rate.      Heart sounds: Normal heart sounds.   Pulmonary:      Effort: Pulmonary effort is normal.   Abdominal:      General: Bowel sounds are normal. There is no distension.      Palpations: Abdomen is soft.      Tenderness: There is abdominal tenderness (at IR drain site).   Musculoskeletal:         General: Normal range of motion.      Cervical back: Normal range of motion.   Skin:     General: Skin is warm and dry.      Capillary Refill: Capillary refill takes less than 2 seconds.   Neurological:      Mental Status: He is alert and oriented to person, place, and time.   Psychiatric:         Behavior: Behavior normal.       Significant Labs:  I have reviewed all pertinent lab results within the past 24 hours.  CBC:   Recent Labs   Lab 12/10/22  0350   WBC 19.78*   RBC 3.59*   HGB 10.1*   HCT 29.1*      MCV 81*   MCH 28.1   MCHC 34.7     CMP:   Recent Labs   Lab 12/10/22  0350   *   CALCIUM 7.6*   ALBUMIN 1.7*   PROT 5.6*      K 3.7   CO2 20*      BUN 99*   CREATININE 5.5*   ALKPHOS 132   ALT 50*   AST 25   BILITOT 4.4*       Significant Diagnostics:  I have reviewed all pertinent imaging results/findings within the past 24 hours.  CT: I have reviewed all pertinent results/findings within the past 24 hours and my personal findings are:  IR drain placement yesterday for hepatic abscess      Assessment/Plan:     * Hepatic abscess  Continue IV antibiotics   Monitor drain output   Follow-up cultures   Monitor leukocytosis.        Ileus  Possibly from intra-abdominal infection   Monitor for bowel function.    No NG tube right now.          VTE Risk Mitigation (From admission, onward)         Ordered     heparin (porcine) injection 5,000 Units  Every 8  hours         12/10/22 0838     IP VTE HIGH RISK PATIENT  Once         12/09/22 1421     Place sequential compression device  Until discontinued         12/09/22 1421                Thank you for your consult. I will follow-up with patient. Please contact us if you have any additional questions.    Erasmo Russ MD  General Surgery  [facility] [place]

## 2022-12-10 NOTE — EICU
EICU BRIEF ADMIT NOTE:    HISTORY:  Liver abcess Please refer to H/P and ER notes for detail    CAMERA ASSESSMENT: Two way audiovisual assessment was done: Yes    Telemetry was reviewed. Medical records including notes, labs and imaging were reviewed.Yes    DISCUSSED with bedside nurse.No    ASSESSMENT AND PLAN:    # Liver abscess: s/p IR drainage, TX to ICU for observation. IV antibiotics. Cultures pending. Serial Lactate. Vital signs stable  # MARYURI/CKD: Nephrology consulted, IV fluids    BEST PRACTICES REVIEW:    INTUBATED: No  GLYCEMIN CONTROL:  Diabetes: No  STRESS ULCER PROPHYLAXIS: Not indicated   DVT PROPHYLAXIS:  Pharmacological ( Consider adding). Mechanical SCDs    Thank You for allowing EICU to participate in the care of the patient. Please call as needed      Karan Murry MD  EICU  Critical Care Medicine

## 2022-12-10 NOTE — PROCEDURES
Radiology Post-Procedure Note    Pre Op Diagnosis: right hepatic abscess    Post Op Diagnosis: right hepatic abscess    Procedure: right hepatic abscess drainage    Procedure performed by: Que Olivera MD    Written Informed Consent Obtained: Yes    Specimen Removed: YES 15 mL somewhat thick juan fluid    Estimated Blood Loss: Minimal    Findings: CT was used for localization of abnormal fluid collection. A needle was inserted into the fluid collection and somewhat thick juan fluid was aspirated.  A wire was inserted into the collection and the tract was dilated.  A 12 Japanese all-purpose drainage catheter was inserted and a pigtail loop of the distal end was formed.  The catheter was sutured into place and approximately  15 mL fluid was removed initially for C/S.  Catheter then connected to suction device.     A specimen was sent to the lab for further analysis and culture.    The patient tolerated procedure well and there were no complications. Please see procedure report under Imaging for further details.    Que Olivera MD  Staff Radiologist  Department of Radiology  Pager: [phone]

## 2022-12-10 NOTE — ASSESSMENT & PLAN NOTE
Possibly from intra-abdominal infection   Monitor for bowel function.    No NG tube right now.

## 2022-12-11 PROBLEM — R82.81 PYURIA: Status: RESOLVED | Noted: 2022-12-09 | Resolved: 2022-12-11

## 2022-12-11 PROBLEM — K56.7 ILEUS: Status: ACTIVE | Noted: 2022-12-11

## 2022-12-11 PROBLEM — R79.89 ELEVATED BRAIN NATRIURETIC PEPTIDE (BNP) LEVEL: Status: RESOLVED | Noted: 2022-12-09 | Resolved: 2022-12-11

## 2022-12-11 LAB
ALBUMIN SERPL BCP-MCNC: 1.7 G/DL (ref 3.5–5.2)
ALP SERPL-CCNC: 127 U/L (ref 55–135)
ALT SERPL W/O P-5'-P-CCNC: 39 U/L (ref 10–44)
ANION GAP SERPL CALC-SCNC: 12 MMOL/L (ref 8–16)
ANION GAP SERPL CALC-SCNC: 9 MMOL/L (ref 8–16)
ASCENDING AORTA: 3.27 CM
AST SERPL-CCNC: 18 U/L (ref 10–40)
AV INDEX (PROSTH): 0.92
AV MEAN GRADIENT: 5 MMHG
AV PEAK GRADIENT: 10 MMHG
AV VALVE AREA: 4.23 CM2
AV VELOCITY RATIO: 0.65
BACTERIA SPEC AEROBE CULT: ABNORMAL
BASOPHILS # BLD AUTO: 0.04 K/UL (ref 0–0.2)
BASOPHILS NFR BLD: 0.2 % (ref 0–1.9)
BILIRUB SERPL-MCNC: 3.9 MG/DL (ref 0.1–1)
BSA FOR ECHO PROCEDURE: 2.2 M2
BUN SERPL-MCNC: 82 MG/DL (ref 6–20)
BUN SERPL-MCNC: 85 MG/DL (ref 6–20)
CALCIUM SERPL-MCNC: 7.6 MG/DL (ref 8.7–10.5)
CALCIUM SERPL-MCNC: 8 MG/DL (ref 8.7–10.5)
CHLORIDE SERPL-SCNC: 100 MMOL/L (ref 95–110)
CHLORIDE SERPL-SCNC: 102 MMOL/L (ref 95–110)
CO2 SERPL-SCNC: 26 MMOL/L (ref 23–29)
CO2 SERPL-SCNC: 33 MMOL/L (ref 23–29)
CREAT SERPL-MCNC: 3.2 MG/DL (ref 0.5–1.4)
CREAT SERPL-MCNC: 4.2 MG/DL (ref 0.5–1.4)
CV ECHO LV RWT: 0.6 CM
DIFFERENTIAL METHOD: ABNORMAL
DOP CALC AO PEAK VEL: 1.57 M/S
DOP CALC AO VTI: 21.3 CM
DOP CALC LVOT AREA: 4.6 CM2
DOP CALC LVOT DIAMETER: 2.42 CM
DOP CALC LVOT PEAK VEL: 1.02 M/S
DOP CALC LVOT STROKE VOLUME: 90.11 CM3
DOP CALCLVOT PEAK VEL VTI: 19.6 CM
E WAVE DECELERATION TIME: 179.81 MSEC
E/A RATIO: 1
ECHO LV POSTERIOR WALL: 1.33 CM (ref 0.6–1.1)
EJECTION FRACTION: 65 %
EOSINOPHIL # BLD AUTO: 0 K/UL (ref 0–0.5)
EOSINOPHIL NFR BLD: 0.1 % (ref 0–8)
ERYTHROCYTE [DISTWIDTH] IN BLOOD BY AUTOMATED COUNT: 13.2 % (ref 11.5–14.5)
EST. GFR  (NO RACE VARIABLE): 17 ML/MIN/1.73 M^2
EST. GFR  (NO RACE VARIABLE): 23 ML/MIN/1.73 M^2
ESTIMATED AVG GLUCOSE: 114 MG/DL (ref 68–131)
FOLATE SERPL-MCNC: 10.5 NG/ML (ref 4–24)
FRACTIONAL SHORTENING: 29 % (ref 28–44)
GLUCOSE SERPL-MCNC: 155 MG/DL (ref 70–110)
GLUCOSE SERPL-MCNC: 182 MG/DL (ref 70–110)
HBA1C MFR BLD: 5.6 % (ref 4–5.6)
HCT VFR BLD AUTO: 29.4 % (ref 40–54)
HGB BLD-MCNC: 11 G/DL (ref 14–18)
IMM GRANULOCYTES # BLD AUTO: 0.49 K/UL (ref 0–0.04)
IMM GRANULOCYTES NFR BLD AUTO: 2.1 % (ref 0–0.5)
INTERVENTRICULAR SEPTUM: 1.18 CM (ref 0.6–1.1)
IVC DIAMETER: 1.22 CM
LA MAJOR: 5.2 CM
LA MINOR: 4.81 CM
LA WIDTH: 4.8 CM
LEFT ATRIUM SIZE: 3.66 CM
LEFT ATRIUM VOLUME INDEX: 35.2 ML/M2
LEFT ATRIUM VOLUME: 74.63 CM3
LEFT INTERNAL DIMENSION IN SYSTOLE: 3.16 CM (ref 2.1–4)
LEFT VENTRICLE DIASTOLIC VOLUME INDEX: 42.19 ML/M2
LEFT VENTRICLE DIASTOLIC VOLUME: 89.44 ML
LEFT VENTRICLE MASS INDEX: 98 G/M2
LEFT VENTRICLE SYSTOLIC VOLUME INDEX: 18.7 ML/M2
LEFT VENTRICLE SYSTOLIC VOLUME: 39.65 ML
LEFT VENTRICULAR INTERNAL DIMENSION IN DIASTOLE: 4.44 CM (ref 3.5–6)
LEFT VENTRICULAR MASS: 207.08 G
LV SEPTAL E/E' RATIO: 10.22 M/S
LVOT MG: 2.03 MMHG
LVOT MV: 0.64 CM/S
LYMPHOCYTES # BLD AUTO: 0.7 K/UL (ref 1–4.8)
LYMPHOCYTES NFR BLD: 2.9 % (ref 18–48)
MAGNESIUM SERPL-MCNC: 2.8 MG/DL (ref 1.6–2.6)
MAGNESIUM SERPL-MCNC: 2.9 MG/DL (ref 1.6–2.6)
MCH RBC QN AUTO: 29.8 PG (ref 27–31)
MCHC RBC AUTO-ENTMCNC: 37.4 G/DL (ref 32–36)
MCV RBC AUTO: 80 FL (ref 82–98)
MONOCYTES # BLD AUTO: 0.8 K/UL (ref 0.3–1)
MONOCYTES NFR BLD: 3.4 % (ref 4–15)
MV PEAK A VEL: 0.92 M/S
MV PEAK E VEL: 0.92 M/S
MV STENOSIS PRESSURE HALF TIME: 43.68 MS
MV VALVE AREA P 1/2 METHOD: 5.04 CM2
NEUTROPHILS # BLD AUTO: 21.7 K/UL (ref 1.8–7.7)
NEUTROPHILS NFR BLD: 91.3 % (ref 38–73)
NRBC BLD-RTO: 0 /100 WBC
PHOSPHATE SERPL-MCNC: 3.9 MG/DL (ref 2.7–4.5)
PHOSPHATE SERPL-MCNC: 5 MG/DL (ref 2.7–4.5)
PISA TR MAX VEL: 1.95 M/S
PLATELET # BLD AUTO: 388 K/UL (ref 150–450)
PMV BLD AUTO: 10 FL (ref 9.2–12.9)
POTASSIUM SERPL-SCNC: 3.7 MMOL/L (ref 3.5–5.1)
POTASSIUM SERPL-SCNC: 3.8 MMOL/L (ref 3.5–5.1)
PROT SERPL-MCNC: 6 G/DL (ref 6–8.4)
PV PEAK VELOCITY: 1.53 CM/S
RA MAJOR: 3.78 CM
RA PRESSURE: 3 MMHG
RA WIDTH: 2.71 CM
RBC # BLD AUTO: 3.69 M/UL (ref 4.6–6.2)
RIGHT VENTRICULAR END-DIASTOLIC DIMENSION: 2.4 CM
SODIUM SERPL-SCNC: 140 MMOL/L (ref 136–145)
SODIUM SERPL-SCNC: 142 MMOL/L (ref 136–145)
TDI SEPTAL: 0.09 M/S
TR MAX PG: 15 MMHG
TRICUSPID ANNULAR PLANE SYSTOLIC EXCURSION: 1.96 CM
TROPONIN I SERPL DL<=0.01 NG/ML-MCNC: 0.01 NG/ML (ref 0–0.03)
TROPONIN I SERPL DL<=0.01 NG/ML-MCNC: <0.006 NG/ML (ref 0–0.03)
TV REST PULMONARY ARTERY PRESSURE: 18 MMHG
VANCOMYCIN SERPL-MCNC: 15.4 UG/ML
VIT B12 SERPL-MCNC: >2000 PG/ML (ref 210–950)
WBC # BLD AUTO: 23.77 K/UL (ref 3.9–12.7)

## 2022-12-11 PROCEDURE — 80048 BASIC METABOLIC PNL TOTAL CA: CPT | Mod: XB | Performed by: HOSPITALIST

## 2022-12-11 PROCEDURE — 20000000 HC ICU ROOM

## 2022-12-11 PROCEDURE — 83735 ASSAY OF MAGNESIUM: CPT | Performed by: INTERNAL MEDICINE

## 2022-12-11 PROCEDURE — 25000003 PHARM REV CODE 250: Performed by: INTERNAL MEDICINE

## 2022-12-11 PROCEDURE — 84484 ASSAY OF TROPONIN QUANT: CPT | Performed by: INTERNAL MEDICINE

## 2022-12-11 PROCEDURE — 99223 1ST HOSP IP/OBS HIGH 75: CPT | Mod: ,,, | Performed by: INTERNAL MEDICINE

## 2022-12-11 PROCEDURE — 84100 ASSAY OF PHOSPHORUS: CPT | Performed by: INTERNAL MEDICINE

## 2022-12-11 PROCEDURE — 25000003 PHARM REV CODE 250: Performed by: HOSPITALIST

## 2022-12-11 PROCEDURE — A4216 STERILE WATER/SALINE, 10 ML: HCPCS | Performed by: STUDENT IN AN ORGANIZED HEALTH CARE EDUCATION/TRAINING PROGRAM

## 2022-12-11 PROCEDURE — 83735 ASSAY OF MAGNESIUM: CPT | Mod: 91 | Performed by: HOSPITALIST

## 2022-12-11 PROCEDURE — S5010 5% DEXTROSE AND 0.45% SALINE: HCPCS | Performed by: INTERNAL MEDICINE

## 2022-12-11 PROCEDURE — S0030 INJECTION, METRONIDAZOLE: HCPCS | Performed by: STUDENT IN AN ORGANIZED HEALTH CARE EDUCATION/TRAINING PROGRAM

## 2022-12-11 PROCEDURE — 99223 PR INITIAL HOSPITAL CARE,LEVL III: ICD-10-PCS | Mod: ,,, | Performed by: INTERNAL MEDICINE

## 2022-12-11 PROCEDURE — 80053 COMPREHEN METABOLIC PANEL: CPT | Performed by: STUDENT IN AN ORGANIZED HEALTH CARE EDUCATION/TRAINING PROGRAM

## 2022-12-11 PROCEDURE — 36415 COLL VENOUS BLD VENIPUNCTURE: CPT | Performed by: INTERNAL MEDICINE

## 2022-12-11 PROCEDURE — 87389 HIV-1 AG W/HIV-1&-2 AB AG IA: CPT | Performed by: HOSPITALIST

## 2022-12-11 PROCEDURE — 63600175 PHARM REV CODE 636 W HCPCS: Performed by: HOSPITALIST

## 2022-12-11 PROCEDURE — 63600175 PHARM REV CODE 636 W HCPCS: Performed by: STUDENT IN AN ORGANIZED HEALTH CARE EDUCATION/TRAINING PROGRAM

## 2022-12-11 PROCEDURE — 80202 ASSAY OF VANCOMYCIN: CPT | Performed by: HOSPITALIST

## 2022-12-11 PROCEDURE — 63600175 PHARM REV CODE 636 W HCPCS: Performed by: INTERNAL MEDICINE

## 2022-12-11 PROCEDURE — 25000003 PHARM REV CODE 250: Performed by: STUDENT IN AN ORGANIZED HEALTH CARE EDUCATION/TRAINING PROGRAM

## 2022-12-11 PROCEDURE — 85025 COMPLETE CBC W/AUTO DIFF WBC: CPT | Performed by: STUDENT IN AN ORGANIZED HEALTH CARE EDUCATION/TRAINING PROGRAM

## 2022-12-11 PROCEDURE — 99233 PR SUBSEQUENT HOSPITAL CARE,LEVL III: ICD-10-PCS | Mod: ,,, | Performed by: SURGERY

## 2022-12-11 PROCEDURE — 84100 ASSAY OF PHOSPHORUS: CPT | Mod: 91 | Performed by: HOSPITALIST

## 2022-12-11 PROCEDURE — 99233 SBSQ HOSP IP/OBS HIGH 50: CPT | Mod: ,,, | Performed by: SURGERY

## 2022-12-11 RX ORDER — DEXTROSE MONOHYDRATE AND SODIUM CHLORIDE 5; .45 G/100ML; G/100ML
INJECTION, SOLUTION INTRAVENOUS CONTINUOUS
Status: DISPENSED | OUTPATIENT
Start: 2022-12-11 | End: 2022-12-13

## 2022-12-11 RX ORDER — MAG HYDROX/ALUMINUM HYD/SIMETH 200-200-20
30 SUSPENSION, ORAL (FINAL DOSE FORM) ORAL ONCE
Status: COMPLETED | OUTPATIENT
Start: 2022-12-11 | End: 2022-12-11

## 2022-12-11 RX ORDER — LIDOCAINE HYDROCHLORIDE 20 MG/ML
15 SOLUTION OROPHARYNGEAL ONCE
Status: COMPLETED | OUTPATIENT
Start: 2022-12-11 | End: 2022-12-11

## 2022-12-11 RX ORDER — NITROGLYCERIN 0.4 MG/1
0.4 TABLET SUBLINGUAL EVERY 5 MIN PRN
Status: DISCONTINUED | OUTPATIENT
Start: 2022-12-11 | End: 2022-12-28 | Stop reason: HOSPADM

## 2022-12-11 RX ORDER — IPRATROPIUM BROMIDE AND ALBUTEROL SULFATE 2.5; .5 MG/3ML; MG/3ML
3 SOLUTION RESPIRATORY (INHALATION) EVERY 4 HOURS PRN
Status: DISCONTINUED | OUTPATIENT
Start: 2022-12-11 | End: 2022-12-28 | Stop reason: HOSPADM

## 2022-12-11 RX ORDER — CEFAZOLIN SODIUM 2 G/50ML
2 SOLUTION INTRAVENOUS
Status: DISCONTINUED | OUTPATIENT
Start: 2022-12-11 | End: 2022-12-14

## 2022-12-11 RX ADMIN — METRONIDAZOLE 500 MG: 500 INJECTION, SOLUTION INTRAVENOUS at 05:12

## 2022-12-11 RX ADMIN — MUPIROCIN: 20 OINTMENT TOPICAL at 09:12

## 2022-12-11 RX ADMIN — DEXTROSE AND SODIUM CHLORIDE: 5; .45 INJECTION, SOLUTION INTRAVENOUS at 12:12

## 2022-12-11 RX ADMIN — ALUMINUM HYDROXIDE, MAGNESIUM HYDROXIDE, AND SIMETHICONE 30 ML: 200; 200; 20 SUSPENSION ORAL at 01:12

## 2022-12-11 RX ADMIN — SODIUM BICARBONATE: 84 INJECTION, SOLUTION INTRAVENOUS at 12:12

## 2022-12-11 RX ADMIN — METRONIDAZOLE 500 MG: 500 INJECTION, SOLUTION INTRAVENOUS at 12:12

## 2022-12-11 RX ADMIN — METRONIDAZOLE 500 MG: 500 INJECTION, SOLUTION INTRAVENOUS at 08:12

## 2022-12-11 RX ADMIN — Medication 10 ML: at 09:12

## 2022-12-11 RX ADMIN — HEPARIN SODIUM 5000 UNITS: 5000 INJECTION INTRAVENOUS; SUBCUTANEOUS at 05:12

## 2022-12-11 RX ADMIN — LIDOCAINE HYDROCHLORIDE 15 ML: 20 SOLUTION ORAL at 01:12

## 2022-12-11 RX ADMIN — VANCOMYCIN HYDROCHLORIDE 500 MG: 500 INJECTION, POWDER, LYOPHILIZED, FOR SOLUTION INTRAVENOUS at 04:12

## 2022-12-11 RX ADMIN — Medication 10 ML: at 02:12

## 2022-12-11 RX ADMIN — CEFEPIME 2 G: 2 INJECTION, POWDER, FOR SOLUTION INTRAVENOUS at 04:12

## 2022-12-11 RX ADMIN — HYDROMORPHONE HYDROCHLORIDE 0.5 MG: 1 INJECTION, SOLUTION INTRAMUSCULAR; INTRAVENOUS; SUBCUTANEOUS at 04:12

## 2022-12-11 RX ADMIN — DEXTROSE AND SODIUM CHLORIDE: 5; .45 INJECTION, SOLUTION INTRAVENOUS at 10:12

## 2022-12-11 RX ADMIN — HYDROMORPHONE HYDROCHLORIDE 0.5 MG: 1 INJECTION, SOLUTION INTRAMUSCULAR; INTRAVENOUS; SUBCUTANEOUS at 09:12

## 2022-12-11 RX ADMIN — MUPIROCIN: 20 OINTMENT TOPICAL at 08:12

## 2022-12-11 RX ADMIN — HEPARIN SODIUM 5000 UNITS: 5000 INJECTION INTRAVENOUS; SUBCUTANEOUS at 02:12

## 2022-12-11 RX ADMIN — Medication 10 ML: at 05:12

## 2022-12-11 RX ADMIN — HYDROMORPHONE HYDROCHLORIDE 0.5 MG: 1 INJECTION, SOLUTION INTRAMUSCULAR; INTRAVENOUS; SUBCUTANEOUS at 10:12

## 2022-12-11 RX ADMIN — HYDROMORPHONE HYDROCHLORIDE 0.5 MG: 1 INJECTION, SOLUTION INTRAMUSCULAR; INTRAVENOUS; SUBCUTANEOUS at 01:12

## 2022-12-11 RX ADMIN — HEPARIN SODIUM 5000 UNITS: 5000 INJECTION INTRAVENOUS; SUBCUTANEOUS at 09:12

## 2022-12-11 RX ADMIN — CEFAZOLIN SODIUM 2 G: 2 SOLUTION INTRAVENOUS at 09:12

## 2022-12-11 NOTE — NURSING
Pt remains in ICU alert and oriented. HR running NSR to ST on monitor. BP stable. Pt on 4L NC sating above 90%. Highest temp this shift was 100.1.   Pt's abdomen appeared more distended tonight compared to last night. Accordion drain not staying compressed, bag is filling up with air.     2030: eICU contacted to report findings. New order for KUB given.   2100: KUB obtained. Notified MD Bates about changes in pt.   2110: MD Bates at bedside. New orders for lactate acid lab draw given. Advised to call general surgery about accordion drain.  2145: Called on-call general surgeon Petrnoa. Per MD change accordion drain to bulb suction. Monitor bulb suction hourly. If bulb suction does not stay compressed, he will reassess drain in the morning.   2210: Accordion drain changed to bulb suction will continue to monitor.       0059: Pt had complaints of chest pain 8 out 10, feels like squeezing, not radiating. 12 lead EKG performed. Alerted MD Bates. New orders for chest x-ray, draw troponin level, give pain medication, and GI cocktail given.     0220: Reassessed pt after pain medication and GI cocktail was given. Pt states pain is coming down, now a 5/10. Reported troponin results and reassessment to MD Bates.     0420: Pt complaining of 8/10  squeezing chest pain, not radiating. MD Bates notified.      0430: MD Bates at bedside to assess pt. New orders for CT chest and Lung Scan Ventilation Perfusion.     0535: CT chest performed. Pt tolerated well .    Pt still waiting for Lung scan Ventilation Perfusion. Pt had 1 episode of emesis with small amount of dark green and clear output. Pt updated on plan of care. No new falls, injuries, or skin breakdown this shift.

## 2022-12-11 NOTE — CARE UPDATE
"Mr. Turner developed intermittent episodes of left precordial CP through the night that would come and go.  He is very distended on exam with most bowel loops on the left side.    He describes his CP as sometimes a squeezing pressure, and at other times as a, "sharp scraping" over the left precordium.  He was initially treated with GI cocktail and pain meds, which helped his pain.    He also had episodes of abrupt shortness of breath and tachycardia with moving.  He tells me he is passing gas, and that he has no N/V.    I'm unsure of the cause of his CP - I suspect it is from diaphragmatic compression on the left from ileus and dilated loops of bowel.  Regardless, ruling out ACS, PTE and other intra-thoracic issues with CT chest non-contrast and VQ scan.    BP (!) 113/55   Pulse 93   Temp 98.8 °F (37.1 °C) (Oral)   Resp (!) 21   Ht 5' 6" (1.676 m)   Wt 103.6 kg (228 lb 6.3 oz)   SpO2 95%   BMI 36.86 kg/m²   Moderate distress  Tachycardia  Bilateral rhonchi  Distended, non-painful abdomen (except at catheter drain entrance), decreased BS.  1+ Edema to legs    Recent Results (from the past 12 hour(s))   Lactic acid, plasma    Collection Time: 12/10/22 10:04 PM   Result Value Ref Range    Lactate (Lactic Acid) 1.3 0.5 - 2.2 mmol/L   Comprehensive Metabolic Panel    Collection Time: 12/11/22  1:34 AM   Result Value Ref Range    Sodium 140 136 - 145 mmol/L    Potassium 3.7 3.5 - 5.1 mmol/L    Chloride 102 95 - 110 mmol/L    CO2 26 23 - 29 mmol/L    Glucose 182 (H) 70 - 110 mg/dL    BUN 85 (H) 6 - 20 mg/dL    Creatinine 4.2 (H) 0.5 - 1.4 mg/dL    Calcium 7.6 (L) 8.7 - 10.5 mg/dL    Total Protein 6.0 6.0 - 8.4 g/dL    Albumin 1.7 (L) 3.5 - 5.2 g/dL    Total Bilirubin 3.9 (H) 0.1 - 1.0 mg/dL    Alkaline Phosphatase 127 55 - 135 U/L    AST 18 10 - 40 U/L    ALT 39 10 - 44 U/L    Anion Gap 12 8 - 16 mmol/L    eGFR 17 (A) >60 mL/min/1.73 m^2   CBC Auto Differential    Collection Time: 12/11/22  1:34 AM   Result Value " Ref Range    WBC 23.77 (H) 3.90 - 12.70 K/uL    RBC 3.69 (L) 4.60 - 6.20 M/uL    Hemoglobin 11.0 (L) 14.0 - 18.0 g/dL    Hematocrit 29.4 (L) 40.0 - 54.0 %    MCV 80 (L) 82 - 98 fL    MCH 29.8 27.0 - 31.0 pg    MCHC 37.4 (H) 32.0 - 36.0 g/dL    RDW 13.2 11.5 - 14.5 %    Platelets 388 150 - 450 K/uL    MPV 10.0 9.2 - 12.9 fL    Immature Granulocytes 2.1 (H) 0.0 - 0.5 %    Gran # (ANC) 21.7 (H) 1.8 - 7.7 K/uL    Immature Grans (Abs) 0.49 (H) 0.00 - 0.04 K/uL    Lymph # 0.7 (L) 1.0 - 4.8 K/uL    Mono # 0.8 0.3 - 1.0 K/uL    Eos # 0.0 0.0 - 0.5 K/uL    Baso # 0.04 0.00 - 0.20 K/uL    nRBC 0 0 /100 WBC    Gran % 91.3 (H) 38.0 - 73.0 %    Lymph % 2.9 (L) 18.0 - 48.0 %    Mono % 3.4 (L) 4.0 - 15.0 %    Eosinophil % 0.1 0.0 - 8.0 %    Basophil % 0.2 0.0 - 1.9 %    Differential Method Automated    Vancomycin, Random    Collection Time: 12/11/22  1:34 AM   Result Value Ref Range    Vancomycin, Random 15.4 Not established ug/mL   Magnesium    Collection Time: 12/11/22  1:34 AM   Result Value Ref Range    Magnesium 2.9 (H) 1.6 - 2.6 mg/dL   Phosphorus    Collection Time: 12/11/22  1:34 AM   Result Value Ref Range    Phosphorus 5.0 (H) 2.7 - 4.5 mg/dL   Troponin I    Collection Time: 12/11/22  1:34 AM   Result Value Ref Range    Troponin I 0.014 0.000 - 0.026 ng/mL     ABDOMEN AP  FINDINGS:  Single AP view of the abdomen demonstrates a right sided pigtail catheter with its tip projecting over the right abdomen.  There are dilated loops of small bowel predominantly in left abdomen.      Electronically signed by: Alexandrea Chaparro  Date:                                            12/10/2022  Time:                                           22:11    AP PORTABLE CHEST  FINDINGS:  AP portable chest radiograph demonstrates enlargement of the cardiac silhouette, which may be exaggerated due to low lung volumes.  There is a subtle asymmetrical density in the right of the mid lung field, which may represent atelectasis or early infiltrate.   Linear plates of atelectasis are seen in the left lower lung field.  No pneumothorax is detected.      Electronically signed by: Alexandrea Chaparro  Date:                                            12/11/2022  Time:                                           01:39

## 2022-12-11 NOTE — EICU
Called about changes in MARLEY drain position, accordion bag filling up with air. No other changes on exam.  KUB stat ordered,but doubt would be very helpful. Surgery evaluation is suggested if any change in clinical status.  D/w RN

## 2022-12-11 NOTE — HPI
"47M with h/o htn admitted 12/9 with several days of progressively worsening abdominal pain. Reports fever and nausea and decreased po intake.  Denies diarrhea. Denies weight loss. Notably reports traveling to [facility] several months ago.     Had abdominal ct and ultrasound  1. No sonographic abnormality of the gallbladder detected.  2. Ill-defined region of decreased echotexture within the posterior aspect of the right hepatic lobe corresponding to the findings seen on recent prior CT.  This could indicate a mass, abscess, or intense fatty infiltration.  3. Diffuse increased hepatic parenchymal echotexture suspected to represent hepatic steatosis.      S/p IR drainage of abscess. Reports pain improving.   KLEBSIELLA PNEUMONIAE   Many      Resulting Agency [facility]        Susceptibility     Klebsiella pneumoniae     CULTURE, AEROBIC  (SPECIFY SOURCE)     Amox/K Clav'ate <=8/4 mcg/mL Sensitive     Amp/Sulbactam <=8/4 mcg/mL Sensitive     Cefazolin <=2 mcg/mL Sensitive     Cefepime <=2 mcg/mL Sensitive     Ceftriaxone <=1 mcg/mL Sensitive     Ciprofloxacin <=1 mcg/mL Sensitive     Ertapenem <=0.5 mcg/mL Sensitive     Gentamicin <=4 mcg/mL Sensitive     Levofloxacin <=2 mcg/mL Sensitive     Meropenem <=1 mcg/mL Sensitive     Minocycline <=4 mcg/mL Sensitive     Piperacillin/Tazo <=16 mcg/mL Sensitive     Tetracycline <=4 mcg/mL Sensitive     Tobramycin <=4 mcg/mL Sensitive     Trimeth/Sulfa <=2/38 mcg/mL Sensitive                 Silvino improving    On cefepime/metronidazole    Hiv ordered, pending      ID consulted for "klebsiella liver abscess"  "

## 2022-12-11 NOTE — ASSESSMENT & PLAN NOTE
Unclear how he got this. IR drain placed 12/9. Cultures with Klebsiella, pansensitive.   - continue cefepime  - if no anaerobic growth, can likely DC flagyl tomorrow  - ID consulted  - HIV test and A1c pending

## 2022-12-11 NOTE — ASSESSMENT & PLAN NOTE
Creatinine at 6.1 at admission. Unknown baseline with no prior labs to review. FENa 1.4% suggesting intrinsic injury. This may be ATN from sepsis +/- Bactrim pseudotoxicity. No hydro seen on CT or US.   - IVF ordered  - Nephrology consulted  - this is improving     Patient with acute kidney injury likely due to IVVD/dehydration and acute tubular necrosis MARYURI is currently improving. Labs reviewed- Renal function/electrolytes with Estimated Creatinine Clearance: 25 mL/min (A) (based on SCr of 4.2 mg/dL (H)). according to latest data. Monitor urine output and serial BMP and adjust therapy as needed. Avoid nephrotoxins and renally dose meds for GFR listed above.

## 2022-12-11 NOTE — PLAN OF CARE
Patient very uncomfortable at beginning of shift, tachypneic, restless, vomiting bile colored emesis, abdomen much more distended as compared to yesterday.    Patient much better after NGT placed and abdomen decompressed

## 2022-12-11 NOTE — ASSESSMENT & PLAN NOTE
Patient has Non- operative ileus which is adynamic in etiology which is worsening. Will treat conservatively with bowel rest, IV fluids, serial abdominal exams and avoidance of GI paralytics such as narcotics or anti-spasmodics. Monitor patient closely.   - NGT placed 12/11 with already close to 2L bilious output  - repeat BMP, Mg, Phos

## 2022-12-11 NOTE — CLINICAL REVIEW
IP Sepsis Screen (most recent)       Sepsis Screen (IP) - 12/11/22 1130       Is the patient's history or complaint suggestive of a possible infection? Yes  -LW    Are there at least two of the following signs and symptoms present? Yes  -LW    Sepsis signs/symptoms - Tachycardia Tachycardia     >90  -LW    Sepsis signs/symptoms - Tachypnea Tachypnea     >20  -LW    Sepsis signs/symptoms - WBC WBC < 4,000 or WBC > 12,000  -LW    Are any of the following organ dysfunction criteria present and not considered to be due to a chronic condition? Yes  -LW    Organ Dysfunction Criteria Creatinine > 2.0  -LW    Organ Dysfunction Criteria Total Bilirubin > 2.0 Platelet count < 100,000  -LW    Initiate Sepsis Protocol No  -LW    Reason sepsis not considered Pt. receiving appropriate management  -              User Key  (r) = Recorded By, (t) = Taken By, (c) = Cosigned By      Initials Name    ERIKA Park RN

## 2022-12-11 NOTE — PROGRESS NOTES
Renal ICU Progress Note    Date of Admission:  12/9/2022  1:40 PM    Length of Stay: 2  Days    Subjective: c.o. abd. Pain (had NGT insertion and currently on low intermittent suction)    Objective:    Current Facility-Administered Medications   Medication    acetaminophen tablet 650 mg    acetaminophen tablet 650 mg    albuterol-ipratropium 2.5 mg-0.5 mg/3 mL nebulizer solution 3 mL    cefepime in dextrose 5 % IVPB 2 g    dextrose 10% bolus 125 mL    dextrose 10% bolus 250 mL    glucagon (human recombinant) injection 1 mg    glucose chewable tablet 16 g    glucose chewable tablet 24 g    heparin (porcine) injection 5,000 Units    HYDROmorphone injection 0.5 mg    melatonin tablet 6 mg    metronidazole IVPB 500 mg    mupirocin 2 % ointment    naloxone 0.4 mg/mL injection 0.02 mg    nitroGLYCERIN SL tablet 0.4 mg    oxyCODONE-acetaminophen 5-325 mg per tablet 1 tablet    sodium bicarbonate 150 mEq in dextrose 5 % 1,000 mL infusion    sodium chloride 0.9% flush 10 mL    vancomycin - pharmacy to dose       Vitals:    12/11/22 0335 12/11/22 0400 12/11/22 0500 12/11/22 0600   BP:  (!) 110/58 (!) 113/58 118/67   Pulse:  91 96 100   Resp:  (!) 26 (!) 28 (!) 32   Temp: 98.6 °F (37 °C)      TempSrc: Oral      SpO2:  (!) 93% (!) 92% (!) 94%   Weight:       Height:           I/O last 3 completed shifts:  In: 3420.3 [I.V.:2750.7; IV Piggyback:669.6]  Out: 2415 [Urine:2330; Drains:85]  I/O this shift:  In: 1210.1 [I.V.:1029.9; IV Piggyback:180.1]  Out: 450 [Urine:425; Drains:25]      Physical Exam:     General: NAD  Neck: supple  Heart: RRR  Lungs: unlabored breathing  Abdomen: n/a  Limbs: n/a  Neurologic: awake      Laboratories:    Recent Labs   Lab 12/11/22  0134   WBC 23.77*   RBC 3.69*   HGB 11.0*   HCT 29.4*      MCV 80*   MCH 29.8   MCHC 37.4*       Recent Labs   Lab 12/11/22  0134   CALCIUM 7.6*   PROT 6.0      K 3.7   CO2 26      BUN 85*   CREATININE 4.2*   ALKPHOS 127   ALT 39    AST 18   BILITOT 3.9*       No results for input(s): COLORU, CLARITYU, SPECGRAV, PHUR, PROTEINUA, GLUCOSEU, BLOODU, WBCU, RBCU, BACTERIA, MUCUS in the last 24 hours.    Invalid input(s):  BILIRUBINCON    Microbiology Results (last 7 days)       Procedure Component Value Units Date/Time    Aerobic culture [[id number]]  (Abnormal) Collected: 12/09/22 1831    Order Status: Completed Specimen: Abscess from Abdomen Updated: 12/10/22 1050     Aerobic Bacterial Culture GRAM NEGATIVE MARY, NON-LACTOSE   Many  Identification and susceptibility pending      Gram stain [[id number]] Collected: 12/09/22 1831    Order Status: Completed Specimen: Abscess from Abdomen Updated: 12/10/22 0755     Gram Stain Result Many WBC's      No organisms seen    Culture, Anaerobe [[id number]] Collected: 12/09/22 1831    Order Status: Sent Specimen: Abscess from Abdomen Updated: 12/09/22 1906    Fungus culture [[id number]] Collected: 12/09/22 1831    Order Status: Sent Specimen: Abscess from Abdomen Updated: 12/09/22 1905              Diagnostic Tests:     CT Chest:  Impression:       Small pleural effusions with consolidative change RIGHT greater than LEFT lung bases.  Atelectasis would be a leading consideration.  Follow-up to resolution recommended to exclude underlying obstructive lesion.       Electronically signed by: Segun Villeda MD   Date: 12/11/2022            Assessment:    48 y/o male admitted with:      - Hepatic abscess (GNR in culture)  s/p drainage  - Ileus s/p NGT  - Intrinsic MARYURI FeNa+ > 1.5%  (unknown baseline creatinine) improving as well as UO  - NAGMA resolved  - Hx. HTN  - Fe++ def. Anemia  - Hypoalbuminemia  - Mild hyperPO4-           Plan:       - Broad spectrum antibiotics  - Stop HCO3- ggt  - IVFs: D5-0.45% saline  - Bladder scan PRN  - NPO  - Surgery following s/p NGT placement.  - Other problems per admitting

## 2022-12-11 NOTE — CARE UPDATE
[facility]  ICU Shift Summary  Date: 12/11/2022      Prehospitalization: Home  Admit Date / LOS : 12/9/2022/ 2 days    Diagnosis: Hepatic abscess    Consults:        Active: Gen Surg and Nephro       Needed: OT and PT     Code Status: Full Code   Advanced Directive: <no information>    LDA:  Lines/Drains/Airways       Drain  Duration                  Closed/Suction Drain 12/09/22 1748 Lateral RLQ Bulb 1 day         NG/OG Tube 12/11/22 0735 nasogastric 16 Fr. Right nostril <1 day              Peripheral Intravenous Line  Duration                  Peripheral IV - Single Lumen 12/08/22 18 G Right Antecubital 3 days         Peripheral IV - Single Lumen 12/09/22 0401 18 G Left Antecubital 2 days                  Central Lines/Site/Justification:Patient Does Not Have Central Line  Urinary Cath/Order/Justification:Patient Does Not Have Urinary Catheter    Vasopressors/Infusions:    dextrose 5 % and 0.45 % NaCl 100 mL/hr at 12/11/22 1300          GOALS: Volume/ Hemodynamic: N/A                     RASS: 0  alert and calm    Pain Management: IV       Pain Controlled: yes     Rhythm: NSR and ST    Respiratory Device: Nasal Cannula                  Most Recent SBT/ SAT: N/A       MOVE Screen: FAIL  ICU Liberation: not applicable    VTE Prophylaxis: Pharm and Mechanical  Mobility: Bedrest  Stress Ulcer Prophylaxis: Yes    Isolation: No active isolations    Dietary:   Current Diet Order   Procedures    Diet NPO      Tolerance: not applicable  Advancement: no    I & O (24h):    Intake/Output Summary (Last 24 hours) at 12/11/2022 1612  Last data filed at 12/11/2022 1300  Gross per 24 hour   Intake 2376.28 ml   Output 2740 ml   Net -363.72 ml        Restraints: No    Significant Dates:  Post Op Date: N/A  Rescue Date: N/A  Imaging/ Diagnostics:  CT scan rt hepatic abscess--drain placed    Noteworthy Labs:  see below    COVID Test: (--)  CBC/Anemia Labs: Coags:    Recent Labs   Lab 12/10/22  0350 12/10/22  1147  12/11/22  0134   WBC 19.78*  --  23.77*   HGB 10.1*  --  11.0*   HCT 29.1*  --  29.4*     --  388   MCV 81*  --  80*   RDW 13.4  --  13.2   IRON  --  25*  --    FERRITIN  --  3,633*  --    RETIC  --  1.0  --    FOLATE  --  10.5  --    [id number]  --  >2000*  --     Recent Labs   Lab 12/09/22  0401   INR 1.3*        Chemistries:   Recent Labs   Lab 12/10/22  0350 12/11/22  0134    140   K 3.7 3.7    102   CO2 20* 26   BUN 99* 85*   CREATININE 5.5* 4.2*   CALCIUM 7.6* 7.6*   PROT 5.6* 6.0   BILITOT 4.4* 3.9*   ALKPHOS 132 127   ALT 50* 39   AST 25 18   MG  --  2.9*   PHOS  --  5.0*        Cardiac Enzymes: Ejection Fractions:    Recent Labs     12/11/22  0134 12/11/22  0454   TROPONINI 0.014 <0.006    EF   Date Value Ref Range Status   12/11/2022 65 % Final        POCT Glucose: HbA1c:    No results for input(s): POCTGLUCOSE in the last 168 hours. Hemoglobin A1C   Date Value Ref Range Status   12/10/2022 5.6 4.0 - 5.6 % Final     Comment:     ADA Screening Guidelines:  5.7-6.4%  Consistent with prediabetes  >or=6.5%  Consistent with diabetes    High levels of fetal hemoglobin interfere with the HbA1C  assay. Heterozygous hemoglobin variants (HbS, HgC, etc)do  not significantly interfere with this assay.   However, presence of multiple variants may affect accuracy.             ICU LOS 1d 20h  Level of Care: Critical Care    Chart Check: 12 HR Done  Shift Summary/Plan for the shift: see care plan note

## 2022-12-11 NOTE — PLAN OF CARE
Problem: Adult Inpatient Plan of Care  Goal: Plan of Care Review  Outcome: Ongoing, Not Progressing  Goal: Patient-Specific Goal (Individualized)  Outcome: Ongoing, Not Progressing  Goal: Absence of Hospital-Acquired Illness or Injury  Outcome: Ongoing, Not Progressing  Goal: Optimal Comfort and Wellbeing  Outcome: Ongoing, Not Progressing  Goal: Readiness for Transition of Care  Outcome: Ongoing, Not Progressing     Problem: Adjustment to Illness (Sepsis/Septic Shock)  Goal: Optimal Coping  Outcome: Ongoing, Not Progressing     Problem: Bleeding (Sepsis/Septic Shock)  Goal: Absence of Bleeding  Outcome: Ongoing, Not Progressing     Problem: Glycemic Control Impaired (Sepsis/Septic Shock)  Goal: Blood Glucose Level Within Desired Range  Outcome: Ongoing, Not Progressing     Problem: Infection Progression (Sepsis/Septic Shock)  Goal: Absence of Infection Signs and Symptoms  Outcome: Ongoing, Not Progressing     Problem: Nutrition Impaired (Sepsis/Septic Shock)  Goal: Optimal Nutrition Intake  Outcome: Ongoing, Not Progressing     Problem: Fluid and Electrolyte Imbalance (Acute Kidney Injury/Impairment)  Goal: Fluid and Electrolyte Balance  Outcome: Ongoing, Not Progressing     Problem: Oral Intake Inadequate (Acute Kidney Injury/Impairment)  Goal: Optimal Nutrition Intake  Outcome: Ongoing, Not Progressing     Problem: Renal Function Impairment (Acute Kidney Injury/Impairment)  Goal: Effective Renal Function  Outcome: Ongoing, Not Progressing

## 2022-12-11 NOTE — ASSESSMENT & PLAN NOTE
This patient does have evidence of infective focus  My overall impression is sepsis. Vital signs were reviewed and noted in progress note.  Antibiotics given-   Antibiotics (From admission, onward)    Start     Stop Route Frequency Ordered    12/09/22 2100  mupirocin 2 % ointment         12/14 2059 Nasl 2 times daily 12/09/22 1424    12/09/22 1700  metronidazole IVPB 500 mg         -- IV Every 8 hours (non-standard times) 12/09/22 1553    12/09/22 1600  cefepime in dextrose 5 % IVPB 2 g         -- IV Every 24 hours (non-standard times) 12/09/22 1552        Cultures were taken-   Microbiology Results (last 7 days)     Procedure Component Value Units Date/Time    Culture, Anaerobe [[id number]] Collected: 12/09/22 1831    Order Status: Completed Specimen: Abscess from Abdomen Updated: 12/11/22 1224     Anaerobic Culture Culture in progress    Aerobic culture [[id number]]  (Abnormal)  (Susceptibility) Collected: 12/09/22 1831    Order Status: Completed Specimen: Abscess from Abdomen Updated: 12/11/22 0915     Aerobic Bacterial Culture KLEBSIELLA PNEUMONIAE  Many      Gram stain [[id number]] Collected: 12/09/22 1831    Order Status: Completed Specimen: Abscess from Abdomen Updated: 12/10/22 0755     Gram Stain Result Many WBC's      No organisms seen    Fungus culture [[id number]] Collected: 12/09/22 1831    Order Status: Sent Specimen: Abscess from Abdomen Updated: 12/09/22 1905        Latest lactate reviewed, they are-  Recent Labs   Lab 12/09/22  0401 12/09/22  1928 12/10/22  2204   LACTATE 2.3* 1.2 1.3       Organ dysfunction indicated by Acute kidney injury and Acute liver injury  Source- liver abscess    Source control Achieved by- IR  WBC worsening. Will repeat tomorrow  ID consulted

## 2022-12-11 NOTE — PROGRESS NOTES
[facility]  Progress Note    Patient Name: Vivek Turner  MRN: [id number]  Patient Class: IP- Inpatient   Admission Date: 12/9/2022  Length of Stay: 2 days  Attending Physician: Carolin Howell MD  Primary Care Provider: Primary Doctor No        Subjective:     Principal Problem:Hepatic abscess        HPI:  47y M w/ hypertension presents with abdominal pain x 2 days. Pt refused  service. He reports that he had fever/rigors three days prior to admission. He then developed worsening abdominal pain from then until admission. He went to an urgent care where he was started on bactrim for UTI. His abdominal pain increased over the next few days. He had a BM yesterday. Denies vomiting, but has had significant nausea and very limited PO intake in the days leading to admission. Aside from the single episode of fever/rigors he denies subsequent fevers.     In the ED he was found to have a R hepatic abscess vs mass. He traveled to the [facility] eight weeks prior to presentation. He denies fevers except for what is listed above. Denies weight loss, cough, night sweats. Denies prior hx of TB. Denies prior hx of cirrhosis      Overview/Hospital Course:  Mr Vivek Turner was admitted with severe sepsis due to R liver mass vs abscess and acute renal failure. IR consulted and drain placed on 12/9. Cultures are Klebsiella. ID consulted. Started on bicarb gtt for acute renal failure and Nephrology consulted. He was moved to ICU for instability. He has ileus; NGT placed with copious bilious output. Renal failure improving.       Interval History: [place]  used. Worsening abdominal distension. NGT placed with copious bilious output. Now feeling better but still has abdominal pain.     Review of Systems   Constitutional:  Positive for diaphoresis and fatigue. Negative for chills and fever.   HENT:  Negative for congestion, sinus pressure and sinus pain.    Respiratory:  Positive  for shortness of breath. Negative for cough and chest tightness.    Cardiovascular:  Negative for chest pain, palpitations and leg swelling.   Gastrointestinal:  Positive for abdominal distention and abdominal pain. Negative for constipation, diarrhea, nausea and vomiting.   Musculoskeletal:  Negative for arthralgias and myalgias.   Neurological:  Positive for weakness. Negative for numbness.   Psychiatric/Behavioral:  Negative for confusion.    Objective:     Vital Signs (Most Recent):  Temp: 98.5 °F (36.9 °C) (12/11/22 1101)  Pulse: (!) 112 (12/11/22 1200)  Resp: (!) 32 (12/11/22 1200)  BP: 136/70 (12/11/22 1200)  SpO2: (!) 92 % (12/11/22 1200)   Vital Signs (24h Range):  Temp:  [98.2 °F (36.8 °C)-100.1 °F (37.8 °C)] 98.5 °F (36.9 °C)  Pulse:  [] 112  Resp:  [21-58] 32  SpO2:  [89 %-98 %] 92 %  BP: ()/(51-83) 136/70     Weight: 103.6 kg (228 lb 6.3 oz)  Body mass index is 36.86 kg/m².    Intake/Output Summary (Last 24 hours) at 12/11/2022 1310  Last data filed at 12/11/2022 1200  Gross per 24 hour   Intake 2632.92 ml   Output 2740 ml   Net -107.08 ml        Physical Exam  Vitals and nursing note reviewed.   Constitutional:       General: He is not in acute distress.     Appearance: He is obese. He is ill-appearing and diaphoretic. He is not toxic-appearing.   HENT:      Head: Normocephalic and atraumatic.      Nose:      Comments: NGT in place with bilious output     Mouth/Throat:      Mouth: Mucous membranes are moist.   Eyes:      General: No scleral icterus.  Cardiovascular:      Rate and Rhythm: Regular rhythm. Tachycardia present.      Pulses: Normal pulses.      Heart sounds: Normal heart sounds. No murmur heard.    No gallop.   Pulmonary:      Effort: No respiratory distress.      Breath sounds: Normal breath sounds. No stridor. No wheezing, rhonchi or rales.      Comments: Decreased inspiration. Wheezing diffusely. On 4L NC  Abdominal:      General: Bowel sounds are normal. There is distension.       Palpations: Abdomen is soft. There is no mass.      Tenderness: There is no abdominal tenderness. There is no guarding.      Comments: RUQ drain in place with minimal brown/orange fluid   Musculoskeletal:      Right lower leg: No edema.      Left lower leg: No edema.   Skin:     General: Skin is warm.   Neurological:      Mental Status: He is alert and oriented to person, place, and time.       Significant Labs: All pertinent labs within the past 24 hours have been reviewed.    Significant Imaging: I have reviewed all pertinent imaging results/findings within the past 24 hours.      Assessment/Plan:      * Hepatic abscess  Unclear how he got this. IR drain placed 12/9. Cultures with Klebsiella, pansensitive.   - continue cefepime  - if no anaerobic growth, can likely DC flagyl tomorrow  - ID consulted  - HIV test and A1c pending    Ileus  Patient has Non- operative ileus which is adynamic in etiology which is worsening. Will treat conservatively with bowel rest, IV fluids, serial abdominal exams and avoidance of GI paralytics such as narcotics or anti-spasmodics. Monitor patient closely.   - NGT placed 12/11 with already close to 2L bilious output  - repeat BMP, Mg, Phos      Acute hypoxemic respiratory failure  Patient with Hypoxic Respiratory failure which is Acute.  he is not on home oxygen.   Signs/symptoms of respiratory failure include- tachypnea, increased work of breathing and wheezing. Contributing diagnoses includes - atelectasis Labs and images were reviewed. Patient Has not had a recent ABG.   - O2 by NC PRN  - nebs PRN  - currently on IVF- watch for volume overload  - CXR shows atelectasis    Hyperglycemia  Check A1c- pending    Microcytic anemia  Hgb 10-11, no prior to review. Iron deficient- TSAT 17, but also significant acute inflammation- ferritin 3633.         Atelectasis  Shortness of breath partly due to atelectasis as noted on CXR  - incentive spirometer  - O2 by NC as needed      Severe  sepsis  This patient does have evidence of infective focus  My overall impression is sepsis. Vital signs were reviewed and noted in progress note.  Antibiotics given-   Antibiotics (From admission, onward)      Start     Stop Route Frequency Ordered    12/09/22 2100  mupirocin 2 % ointment         12/14 2059 Nasl 2 times daily 12/09/22 1424    12/09/22 1700  metronidazole IVPB 500 mg         -- IV Every 8 hours (non-standard times) 12/09/22 1553    12/09/22 1600  cefepime in dextrose 5 % IVPB 2 g         -- IV Every 24 hours (non-standard times) 12/09/22 1552          Cultures were taken-   Microbiology Results (last 7 days)       Procedure Component Value Units Date/Time    Culture, Anaerobe [[id number]] Collected: 12/09/22 1831    Order Status: Completed Specimen: Abscess from Abdomen Updated: 12/11/22 1224     Anaerobic Culture Culture in progress    Aerobic culture [[id number]]  (Abnormal)  (Susceptibility) Collected: 12/09/22 1831    Order Status: Completed Specimen: Abscess from Abdomen Updated: 12/11/22 0915     Aerobic Bacterial Culture KLEBSIELLA PNEUMONIAE  Many      Gram stain [[id number]] Collected: 12/09/22 1831    Order Status: Completed Specimen: Abscess from Abdomen Updated: 12/10/22 0755     Gram Stain Result Many WBC's      No organisms seen    Fungus culture [[id number]] Collected: 12/09/22 1831    Order Status: Sent Specimen: Abscess from Abdomen Updated: 12/09/22 1905          Latest lactate reviewed, they are-  Recent Labs   Lab 12/09/22  0401 12/09/22  1928 12/10/22  2204   LACTATE 2.3* 1.2 1.3       Organ dysfunction indicated by Acute kidney injury and Acute liver injury  Source- liver abscess    Source control Achieved by- IR  WBC worsening. Will repeat tomorrow  ID consulted       MARYURI (acute kidney injury)  Creatinine at 6.1 at admission. Unknown baseline with no prior labs to review. FENa 1.4% suggesting intrinsic injury. This may be ATN from sepsis +/- Bactrim pseudotoxicity. No hydro seen  on CT or US.   - IVF ordered  - Nephrology consulted  - this is improving     Patient with acute kidney injury likely due to IVVD/dehydration and acute tubular necrosis MARYURI is currently improving. Labs reviewed- Renal function/electrolytes with Estimated Creatinine Clearance: 25 mL/min (A) (based on SCr of 4.2 mg/dL (H)). according to latest data. Monitor urine output and serial BMP and adjust therapy as needed. Avoid nephrotoxins and renally dose meds for GFR listed above.         VTE Risk Mitigation (From admission, onward)           Ordered     heparin (porcine) injection 5,000 Units  Every 8 hours         12/10/22 0838     IP VTE HIGH RISK PATIENT  Once         12/09/22 1421     Place sequential compression device  Until discontinued         12/09/22 1421                    Discharge Planning   VIVIEN:      Code Status: Full Code   Is the patient medically ready for discharge?:     Reason for patient still in hospital (select all that apply): Patient trending condition           5:03 PM  Updated brother in law Trip Kirkland by phone.     Critical care time spent on the evaluation and treatment of severe organ dysfunction, review of pertinent labs and imaging studies, discussions with consulting providers and discussions with patient/family: 30 minutes.      Carolin Howell MD  Department of Hospital Medicine   [facility] - Intensive Care

## 2022-12-11 NOTE — CARE UPDATE
[facility]  Nurses Note -- 4 Eyes      12/11/2022       Skin assessed on: Q Shift      [x] No Pressure Injuries Present    [x]Prevention Measures Documented    [] Yes LDA  for Pressure Injury Previously documented     [] Yes New Pressure Injury Discovered   [] LDA for New Pressure Injury Added      Attending RN:  Kaye Blanton RN     Second RN:  Destiney Pastor RN

## 2022-12-11 NOTE — PROGRESS NOTES
[facility] - Intensive Care  General Surgery  Progress Note    Subjective:     History of Present Illness:  47y M w/ hypertension presents with abdominal pain x 2 days. Pt refused  service. He reports that he had fever/rigors three days prior to admission. He then developed worsening abdominal pain from then until admission. He went to an urgent care where he was started on bactrim for UTI. His abdominal pain increased over the next few days. He had a BM yesterday. Denies vomiting, but has had significant nausea and very limited PO intake in the days leading to admission. Aside from the single episode of fever/rigors he denies subsequent fevers.      In the ED he was found to have a R hepatic abscess vs mass. He traveled to the [facility] eight weeks prior to presentation. He denies fevers except for what is listed above. Denies weight loss, cough, night sweats. Denies prior hx of TB. Denies prior hx of cirrhosis      Post-Op Info:  * No surgery found *         Interval History:  Patient with hepatic abscess and IR drain in place.    Drain still putting out purulent.    Proximally 30-40 cc in 24 hours.    Patient had significant abdominal pain and distention this morning so an NG tube was placed in approximately 2 L dark green bilious fluid was obtained.    Patient felt much better after NG tube was placed.    He also had a bowel movement last night.        Medications:  Continuous Infusions:   dextrose 5 % and 0.45 % NaCl 100 mL/hr at 12/11/22 1300     Scheduled Meds:   ceFEPime (MAXIPIME) IVPB  2 g Intravenous Q24H    heparin (porcine)  5,000 Units Subcutaneous Q8H    metronidazole  500 mg Intravenous Q8H    mupirocin   Nasal BID    sodium chloride 0.9%  10 mL Intravenous Q8H     PRN Meds:acetaminophen, acetaminophen, albuterol-ipratropium, dextrose 10%, dextrose 10%, glucagon (human recombinant), glucose, glucose, HYDROmorphone, melatonin, naloxone, nitroGLYCERIN, oxyCODONE-acetaminophen      Review of patient's allergies indicates:  No Known Allergies  Objective:     Vital Signs (Most Recent):  Temp: 98.5 °F (36.9 °C) (12/11/22 1101)  Pulse: 101 (12/11/22 1300)  Resp: (!) 24 (12/11/22 1300)  BP: 135/69 (12/11/22 1300)  SpO2: 95 % (12/11/22 1300)   Vital Signs (24h Range):  Temp:  [98.2 °F (36.8 °C)-100.1 °F (37.8 °C)] 98.5 °F (36.9 °C)  Pulse:  [] 101  Resp:  [21-58] 24  SpO2:  [89 %-97 %] 95 %  BP: ()/(51-83) 135/69     Weight: 103.6 kg (228 lb 6.3 oz)  Body mass index is 36.86 kg/m².    Intake/Output - Last 3 Shifts         12/09 0700  12/10 0659 12/10 0700 12/11 0659 12/11 0700  12/12 0659    I.V. (mL/kg) 1365.8 (13.2) 2414.9 (23.3) 635 (6.1)    IV Piggyback 422.4 427.3 100    Total Intake(mL/kg) 1788.2 (17.3) 2842.2 (27.4) 735.1 (7.1)    Urine (mL/kg/hr) 1525 1230 (0.5) 680 (0.9)    Emesis/NG output  0     Drains 50 60 1350    Stool  0     Total Output 1575 1290 2030    Net +213.2 +1552.2 -1294.9           Urine Occurrence  1 x     Stool Occurrence  1 x     Emesis Occurrence  0 x             Physical Exam  Vitals and nursing note reviewed.   Constitutional:       Appearance: He is well-developed.   HENT:      Head: Normocephalic and atraumatic.   Cardiovascular:      Rate and Rhythm: Normal rate.      Heart sounds: Normal heart sounds.   Pulmonary:      Effort: Pulmonary effort is normal.   Abdominal:      General: Bowel sounds are normal. There is distension.      Palpations: Abdomen is soft.      Tenderness: There is abdominal tenderness (moderate mild, the slightly improved).   Musculoskeletal:         General: Normal range of motion.      Cervical back: Normal range of motion.   Skin:     General: Skin is warm and dry.      Capillary Refill: Capillary refill takes less than 2 seconds.   Neurological:      Mental Status: He is alert and oriented to person, place, and time.   Psychiatric:         Behavior: Behavior normal.       Significant Labs:  I have reviewed all pertinent lab  results within the past 24 hours.  CBC:   Recent Labs   Lab 12/11/22 0134   WBC 23.77*   RBC 3.69*   HGB 11.0*   HCT 29.4*      MCV 80*   MCH 29.8   MCHC 37.4*     CMP:   Recent Labs   Lab 12/11/22 0134   *   CALCIUM 7.6*   ALBUMIN 1.7*   PROT 6.0      K 3.7   CO2 26      BUN 85*   CREATININE 4.2*   ALKPHOS 127   ALT 39   AST 18   BILITOT 3.9*     Assessment/Plan:     * Hepatic abscess  Continue IV antibiotics   Monitor drain output   Follow-up cultures   Monitor leukocytosis.        Ileus  NG tube decompression for now.    Continue to low wall suction.    Monitor output.            Erasmo Russ MD  General Surgery  [facility] - [place], [place] - Intensive Care

## 2022-12-11 NOTE — SUBJECTIVE & OBJECTIVE
Interval History:  Patient with hepatic abscess and IR drain in place.    Drain still putting out purulent.    Proximally 30-40 cc in 24 hours.    Patient had significant abdominal pain and distention this morning so an NG tube was placed in approximately 2 L dark green bilious fluid was obtained.    Patient felt much better after NG tube was placed.    He also had a bowel movement last night.        Medications:  Continuous Infusions:   dextrose 5 % and 0.45 % NaCl 100 mL/hr at 12/11/22 1300     Scheduled Meds:   ceFEPime (MAXIPIME) IVPB  2 g Intravenous Q24H    heparin (porcine)  5,000 Units Subcutaneous Q8H    metronidazole  500 mg Intravenous Q8H    mupirocin   Nasal BID    sodium chloride 0.9%  10 mL Intravenous Q8H     PRN Meds:acetaminophen, acetaminophen, albuterol-ipratropium, dextrose 10%, dextrose 10%, glucagon (human recombinant), glucose, glucose, HYDROmorphone, melatonin, naloxone, nitroGLYCERIN, oxyCODONE-acetaminophen     Review of patient's allergies indicates:  No Known Allergies  Objective:     Vital Signs (Most Recent):  Temp: 98.5 °F (36.9 °C) (12/11/22 1101)  Pulse: 101 (12/11/22 1300)  Resp: (!) 24 (12/11/22 1300)  BP: 135/69 (12/11/22 1300)  SpO2: 95 % (12/11/22 1300)   Vital Signs (24h Range):  Temp:  [98.2 °F (36.8 °C)-100.1 °F (37.8 °C)] 98.5 °F (36.9 °C)  Pulse:  [] 101  Resp:  [21-58] 24  SpO2:  [89 %-97 %] 95 %  BP: ()/(51-83) 135/69     Weight: 103.6 kg (228 lb 6.3 oz)  Body mass index is 36.86 kg/m².    Intake/Output - Last 3 Shifts         12/09 0700  12/10 0659 12/10 0700  12/11 0659 12/11 0700  12/12 0659    I.V. (mL/kg) 1365.8 (13.2) 2414.9 (23.3) 635 (6.1)    IV Piggyback 422.4 427.3 100    Total Intake(mL/kg) 1788.2 (17.3) 2842.2 (27.4) 735.1 (7.1)    Urine (mL/kg/hr) 1525 1230 (0.5) 680 (0.9)    Emesis/NG output  0     Drains 50 60 1350    Stool  0     Total Output 1575 1290 2030    Net +213.2 +1552.2 -1294.9           Urine Occurrence  1 x     Stool Occurrence  1 x      Emesis Occurrence  0 x             Physical Exam  Vitals and nursing note reviewed.   Constitutional:       Appearance: He is well-developed.   HENT:      Head: Normocephalic and atraumatic.   Cardiovascular:      Rate and Rhythm: Normal rate.      Heart sounds: Normal heart sounds.   Pulmonary:      Effort: Pulmonary effort is normal.   Abdominal:      General: Bowel sounds are normal. There is distension.      Palpations: Abdomen is soft.      Tenderness: There is abdominal tenderness (moderate mild, the slightly improved).   Musculoskeletal:         General: Normal range of motion.      Cervical back: Normal range of motion.   Skin:     General: Skin is warm and dry.      Capillary Refill: Capillary refill takes less than 2 seconds.   Neurological:      Mental Status: He is alert and oriented to person, place, and time.   Psychiatric:         Behavior: Behavior normal.       Significant Labs:  I have reviewed all pertinent lab results within the past 24 hours.  CBC:   Recent Labs   Lab 12/11/22 0134   WBC 23.77*   RBC 3.69*   HGB 11.0*   HCT 29.4*      MCV 80*   MCH 29.8   MCHC 37.4*     CMP:   Recent Labs   Lab 12/11/22 0134   *   CALCIUM 7.6*   ALBUMIN 1.7*   PROT 6.0      K 3.7   CO2 26      BUN 85*   CREATININE 4.2*   ALKPHOS 127   ALT 39   AST 18   BILITOT 3.9*

## 2022-12-11 NOTE — PROGRESS NOTES
Therapy with Vancomycin complete and consult discontinued by provider.  Pharmacy will sign off, please re-consult as needed. Thank You

## 2022-12-11 NOTE — ASSESSMENT & PLAN NOTE
Patient with Hypoxic Respiratory failure which is Acute.  he is not on home oxygen.   Signs/symptoms of respiratory failure include- tachypnea, increased work of breathing and wheezing. Contributing diagnoses includes - atelectasis Labs and images were reviewed. Patient Has not had a recent ABG.   - O2 by NC PRN  - nebs PRN  - currently on IVF- watch for volume overload  - CXR shows atelectasis

## 2022-12-11 NOTE — ASSESSMENT & PLAN NOTE
Hgb 10-11, no prior to review. Iron deficient- TSAT 17, but also significant acute inflammation- ferritin 3633.

## 2022-12-11 NOTE — PROGRESS NOTES
Pharmacokinetic Assessment Follow Up: IV Vancomycin    Vancomycin serum concentration assessment(s):    The random level was drawn correctly and can be used to guide therapy at this time. The measurement is within the desired definitive target range of 10 to 20 mcg/mL.    Vancomycin Regimen Plan:    Re-dose when the random level is less than 20 mcg/mL, next level to be drawn at 0300 on 12/12/22    Drug levels (last 3 results):  Recent Labs   Lab Result Units 12/10/22  0350 12/11/22  0134   Vancomycin, Random ug/mL 36.7 15.4       Pharmacy will continue to follow and monitor vancomycin.    Please contact pharmacy at extension [phone] for questions regarding this assessment.    Thank you for the consult,   Chandu Dickson       Patient brief summary:  Vivek Turner is a 47 y.o. male initiated on antimicrobial therapy with IV Vancomycin for treatment of bacteremia    The patient's current regimen is random pulse dosing    Drug Allergies:   Review of patient's allergies indicates:  No Known Allergies    Actual Body Weight:   103.6 kg    Renal Function:   Estimated Creatinine Clearance: 25 mL/min (A) (based on SCr of 4.2 mg/dL (H)).,     Dialysis Method (if applicable):  N/A    CBC (last 72 hours):  Recent Labs   Lab Result Units 12/09/22  0401 12/10/22  0350 12/11/22  0134   WBC K/uL 18.50* 19.78* 23.77*   Hemoglobin g/dL 11.6* 10.1* 11.0*   Hematocrit % 34.9* 29.1* 29.4*   Platelets K/uL 326 329 388   Gran % % 73.0 91.0* 91.3*   Lymph % % 1.0* 3.0* 2.9*   Mono % % 11.0 2.0* 3.4*   Eosinophil % % 0.0 0.0 0.1   Basophil % % 0.0 0.0 0.2   Differential Method  Manual Automated Automated       Metabolic Panel (last 72 hours):  Recent Labs   Lab Result Units 12/09/22  0345 12/09/22  0401 12/09/22  1628 12/10/22  0350 12/10/22  1027 12/11/22  0134   Sodium mmol/L  --  131*  --  138  --  140   Sodium, Urine mmol/L  --   --  30  --  34  --    Potassium mmol/L  --  4.0  --  3.7  --  3.7   Chloride mmol/L  --  100  --  104   --  102   CO2 mmol/L  --  16*  --  20*  --  26   Glucose mg/dL  --  175*  --  150*  --  182*   Glucose, UA  Negative  --   --   --   --   --    BUN mg/dL  --  97*  --  99*  --  85*   Creatinine mg/dL  --  6.10*  --  5.5*  --  4.2*   Creatinine, Urine mg/dL  --   --  98.0  --  88.4  88.4  --    Albumin g/dL  --  3.0*  --  1.7*  --  1.7*   Total Bilirubin mg/dL  --  7.0*  --  4.4*  --  3.9*   Alkaline Phosphatase U/L  --  210*  --  132  --  127   AST U/L  --  42  --  25  --  18   ALT U/L  --  88*  --  50*  --  39   Magnesium mg/dL  --   --   --   --   --  2.9*   Phosphorus mg/dL  --   --   --   --   --  5.0*       Vancomycin Administrations:  vancomycin given in the last 96 hours                     vancomycin 1.5 g in dextrose 5 % 250 mL IVPB (ready to mix) (mg) 1,500 mg New Bag 12/09/22 2033                    Microbiologic Results:  Microbiology Results (last 7 days)       Procedure Component Value Units Date/Time    Aerobic culture [[id number]]  (Abnormal) Collected: 12/09/22 1831    Order Status: Completed Specimen: Abscess from Abdomen Updated: 12/10/22 1050     Aerobic Bacterial Culture GRAM NEGATIVE MARY, NON-LACTOSE   Many  Identification and susceptibility pending      Gram stain [[id number]] Collected: 12/09/22 1831    Order Status: Completed Specimen: Abscess from Abdomen Updated: 12/10/22 0755     Gram Stain Result Many WBC's      No organisms seen    Culture, Anaerobe [[id number]] Collected: 12/09/22 1831    Order Status: Sent Specimen: Abscess from Abdomen Updated: 12/09/22 1906    Fungus culture [[id number]] Collected: 12/09/22 1831    Order Status: Sent Specimen: Abscess from Abdomen Updated: 12/09/22 1905

## 2022-12-11 NOTE — SUBJECTIVE & OBJECTIVE
Interval History: Tagalog  used. Worsening abdominal distension. NGT placed with copious bilious output. Now feeling better but still has abdominal pain.     Review of Systems   Constitutional:  Positive for diaphoresis and fatigue. Negative for chills and fever.   HENT:  Negative for congestion, sinus pressure and sinus pain.    Respiratory:  Positive for shortness of breath. Negative for cough and chest tightness.    Cardiovascular:  Negative for chest pain, palpitations and leg swelling.   Gastrointestinal:  Positive for abdominal distention and abdominal pain. Negative for constipation, diarrhea, nausea and vomiting.   Musculoskeletal:  Negative for arthralgias and myalgias.   Neurological:  Positive for weakness. Negative for numbness.   Psychiatric/Behavioral:  Negative for confusion.    Objective:     Vital Signs (Most Recent):  Temp: 98.5 °F (36.9 °C) (12/11/22 1101)  Pulse: (!) 112 (12/11/22 1200)  Resp: (!) 32 (12/11/22 1200)  BP: 136/70 (12/11/22 1200)  SpO2: (!) 92 % (12/11/22 1200)   Vital Signs (24h Range):  Temp:  [98.2 °F (36.8 °C)-100.1 °F (37.8 °C)] 98.5 °F (36.9 °C)  Pulse:  [] 112  Resp:  [21-58] 32  SpO2:  [89 %-98 %] 92 %  BP: ()/(51-83) 136/70     Weight: 103.6 kg (228 lb 6.3 oz)  Body mass index is 36.86 kg/m².    Intake/Output Summary (Last 24 hours) at 12/11/2022 1310  Last data filed at 12/11/2022 1200  Gross per 24 hour   Intake 2632.92 ml   Output 2740 ml   Net -107.08 ml        Physical Exam  Vitals and nursing note reviewed.   Constitutional:       General: He is not in acute distress.     Appearance: He is obese. He is ill-appearing and diaphoretic. He is not toxic-appearing.   HENT:      Head: Normocephalic and atraumatic.      Nose:      Comments: NGT in place with bilious output     Mouth/Throat:      Mouth: Mucous membranes are moist.   Eyes:      General: No scleral icterus.  Cardiovascular:      Rate and Rhythm: Regular rhythm. Tachycardia present.       Pulses: Normal pulses.      Heart sounds: Normal heart sounds. No murmur heard.    No gallop.   Pulmonary:      Effort: No respiratory distress.      Breath sounds: Normal breath sounds. No stridor. No wheezing, rhonchi or rales.      Comments: Decreased inspiration. Wheezing diffusely. On 4L NC  Abdominal:      General: Bowel sounds are normal. There is distension.      Palpations: Abdomen is soft. There is no mass.      Tenderness: There is no abdominal tenderness. There is no guarding.      Comments: RUQ drain in place with minimal brown/orange fluid   Musculoskeletal:      Right lower leg: No edema.      Left lower leg: No edema.   Skin:     General: Skin is warm.   Neurological:      Mental Status: He is alert and oriented to person, place, and time.       Significant Labs: All pertinent labs within the past 24 hours have been reviewed.    Significant Imaging: I have reviewed all pertinent imaging results/findings within the past 24 hours.

## 2022-12-11 NOTE — NURSING
[facility] - [place] Intensive Care  ICU Shift Summary  Date: 12/11/2022      Prehospitalization: Home  Admit Date / LOS : 12/9/2022/ 2 days    Diagnosis: Hepatic abscess    Consults:        Active: Gen Surg, IR, and Nephro       Needed: N/A     Code Status: Full Code   Advanced Directive: <no information>    LDA:  Lines/Drains/Airways       Drain  Duration                  Closed/Suction Drain 12/09/22 1748 Lateral RLQ Bulb 1 day              Peripheral Intravenous Line  Duration                  Peripheral IV - Single Lumen 12/08/22 18 G Right Antecubital 3 days         Peripheral IV - Single Lumen 12/09/22 0401 18 G Left Antecubital 2 days                  Central Lines/Site/Justification:Patient Does Not Have Central Line  Urinary Cath/Order/Justification:Patient Does Not Have Urinary Catheter    Vasopressors/Infusions:    sodium bicarbonate drip 100 mL/hr at 12/11/22 0600          GOALS: Volume/ Hemodynamic: SBP < 160                     RASS: N/A    Pain Management: IV       Pain Controlled: no     Rhythm: NSR and ST    Respiratory Device: Nasal Cannula                  Most Recent SBT/ SAT: N/A       MOVE Screen: FAIL  ICU Liberation: no    VTE Prophylaxis: Pharm  Mobility: Bedrest  Stress Ulcer Prophylaxis: No    Isolation: No active isolations    Dietary:   Current Diet Order   Procedures    [facility] Ren[facility]     Order Specific Question:   Indicate patient location for additional diet options:     Answer:   [facility]     Order Specific Question:   Additional Diet Options:     Answer:   Renal      Tolerance: yes  Advancement: no    I & O (24h):    Intake/Output Summary (Last 24 hours) at 12/11/2022 0639  Last data filed at 12/11/2022 0600  Gross per 24 hour   Intake 2842.17 ml   Output 1290 ml   Net 1552.17 ml        Restraints: No    Significant Dates:  Post Op Date: N/A  Rescue Date: N/A  Imaging/ Diagnostics: N/A    Noteworthy Labs:  see chart below    COVID Test: (--)  CBC/Anemia  Labs: Coags:    Recent Labs   Lab 12/10/22  0350 12/10/22  1147 12/11/22  0134   WBC 19.78*  --  23.77*   HGB 10.1*  --  11.0*   HCT 29.1*  --  29.4*     --  388   MCV 81*  --  80*   RDW 13.4  --  13.2   IRON  --  25*  --    FERRITIN  --  3,633*  --    RETIC  --  1.0  --     Recent Labs   Lab 12/09/22  0401   INR 1.3*        Chemistries:   Recent Labs   Lab 12/10/22  0350 12/11/22 0134    140   K 3.7 3.7    102   CO2 20* 26   BUN 99* 85*   CREATININE 5.5* 4.2*   CALCIUM 7.6* 7.6*   PROT 5.6* 6.0   BILITOT 4.4* 3.9*   ALKPHOS 132 127   ALT 50* 39   AST 25 18   MG  --  2.9*   PHOS  --  5.0*        Cardiac Enzymes: Ejection Fractions:    Recent Labs     12/11/22  0134 12/11/22  0454   TROPONINI 0.014 <0.006    No results found for: EF     POCT Glucose: HbA1c:    No results for input(s): POCTGLUCOSE in the last 168 hours. No results found for: HGBA1C        ICU LOS 1d 11h  Level of Care: Critical Care    Chart Check: 24 HR Done  Shift Summary/Plan for the shift: see care plan summary

## 2022-12-11 NOTE — CARE UPDATE
Patient up to commode chair with assistance x2,  patient tachypneic, with resp rate 40-60/min, using accessory and abdominal muscles to breathe.   Heart rate to 126-140/min   Oxygen to 5l/min per nasal cannula.    Assisted back to bed, medicated for pain.

## 2022-12-12 PROBLEM — N17.0 ATN (ACUTE TUBULAR NECROSIS): Status: ACTIVE | Noted: 2022-12-09

## 2022-12-12 LAB
ALBUMIN SERPL BCP-MCNC: 1.7 G/DL (ref 3.5–5.2)
ALP SERPL-CCNC: 105 U/L (ref 55–135)
ALT SERPL W/O P-5'-P-CCNC: 25 U/L (ref 10–44)
ANION GAP SERPL CALC-SCNC: 9 MMOL/L (ref 8–16)
AST SERPL-CCNC: 16 U/L (ref 10–40)
BASOPHILS # BLD AUTO: 0.02 K/UL (ref 0–0.2)
BASOPHILS NFR BLD: 0.1 % (ref 0–1.9)
BILIRUB SERPL-MCNC: 3 MG/DL (ref 0.1–1)
BUN SERPL-MCNC: 72 MG/DL (ref 6–20)
CALCIUM SERPL-MCNC: 7.8 MG/DL (ref 8.7–10.5)
CHLORIDE SERPL-SCNC: 103 MMOL/L (ref 95–110)
CO2 SERPL-SCNC: 33 MMOL/L (ref 23–29)
CREAT SERPL-MCNC: 2.5 MG/DL (ref 0.5–1.4)
DIFFERENTIAL METHOD: ABNORMAL
EOSINOPHIL # BLD AUTO: 0.1 K/UL (ref 0–0.5)
EOSINOPHIL NFR BLD: 0.3 % (ref 0–8)
ERYTHROCYTE [DISTWIDTH] IN BLOOD BY AUTOMATED COUNT: 13.8 % (ref 11.5–14.5)
EST. GFR  (NO RACE VARIABLE): 31 ML/MIN/1.73 M^2
GLUCOSE SERPL-MCNC: 163 MG/DL (ref 70–110)
HCT VFR BLD AUTO: 33.8 % (ref 40–54)
HGB BLD-MCNC: 11.5 G/DL (ref 14–18)
HIV 1+2 AB+HIV1 P24 AG SERPL QL IA: NORMAL
IMM GRANULOCYTES # BLD AUTO: 0.12 K/UL (ref 0–0.04)
IMM GRANULOCYTES NFR BLD AUTO: 0.6 % (ref 0–0.5)
LYMPHOCYTES # BLD AUTO: 0.8 K/UL (ref 1–4.8)
LYMPHOCYTES NFR BLD: 4.1 % (ref 18–48)
MCH RBC QN AUTO: 28.3 PG (ref 27–31)
MCHC RBC AUTO-ENTMCNC: 34 G/DL (ref 32–36)
MCV RBC AUTO: 83 FL (ref 82–98)
MONOCYTES # BLD AUTO: 1.1 K/UL (ref 0.3–1)
MONOCYTES NFR BLD: 5.7 % (ref 4–15)
NEUTROPHILS # BLD AUTO: 16.7 K/UL (ref 1.8–7.7)
NEUTROPHILS NFR BLD: 89.2 % (ref 38–73)
NRBC BLD-RTO: 0 /100 WBC
PLATELET # BLD AUTO: 435 K/UL (ref 150–450)
PMV BLD AUTO: 10.1 FL (ref 9.2–12.9)
POTASSIUM SERPL-SCNC: 3.9 MMOL/L (ref 3.5–5.1)
PROT SERPL-MCNC: 6.1 G/DL (ref 6–8.4)
RBC # BLD AUTO: 4.07 M/UL (ref 4.6–6.2)
SODIUM SERPL-SCNC: 145 MMOL/L (ref 136–145)
WBC # BLD AUTO: 18.68 K/UL (ref 3.9–12.7)

## 2022-12-12 PROCEDURE — 36415 COLL VENOUS BLD VENIPUNCTURE: CPT | Performed by: STUDENT IN AN ORGANIZED HEALTH CARE EDUCATION/TRAINING PROGRAM

## 2022-12-12 PROCEDURE — 25000003 PHARM REV CODE 250: Performed by: STUDENT IN AN ORGANIZED HEALTH CARE EDUCATION/TRAINING PROGRAM

## 2022-12-12 PROCEDURE — 20000000 HC ICU ROOM

## 2022-12-12 PROCEDURE — 85025 COMPLETE CBC W/AUTO DIFF WBC: CPT | Performed by: STUDENT IN AN ORGANIZED HEALTH CARE EDUCATION/TRAINING PROGRAM

## 2022-12-12 PROCEDURE — A4216 STERILE WATER/SALINE, 10 ML: HCPCS | Performed by: STUDENT IN AN ORGANIZED HEALTH CARE EDUCATION/TRAINING PROGRAM

## 2022-12-12 PROCEDURE — 99223 1ST HOSP IP/OBS HIGH 75: CPT | Mod: ,,, | Performed by: INTERNAL MEDICINE

## 2022-12-12 PROCEDURE — 25000003 PHARM REV CODE 250: Performed by: INTERNAL MEDICINE

## 2022-12-12 PROCEDURE — S5010 5% DEXTROSE AND 0.45% SALINE: HCPCS | Performed by: INTERNAL MEDICINE

## 2022-12-12 PROCEDURE — 99223 PR INITIAL HOSPITAL CARE,LEVL III: ICD-10-PCS | Mod: ,,, | Performed by: INTERNAL MEDICINE

## 2022-12-12 PROCEDURE — 94761 N-INVAS EAR/PLS OXIMETRY MLT: CPT

## 2022-12-12 PROCEDURE — 63600175 PHARM REV CODE 636 W HCPCS: Performed by: HOSPITALIST

## 2022-12-12 PROCEDURE — 99223 1ST HOSP IP/OBS HIGH 75: CPT | Mod: ,,, | Performed by: NURSE PRACTITIONER

## 2022-12-12 PROCEDURE — 80053 COMPREHEN METABOLIC PANEL: CPT | Performed by: STUDENT IN AN ORGANIZED HEALTH CARE EDUCATION/TRAINING PROGRAM

## 2022-12-12 PROCEDURE — 27000221 HC OXYGEN, UP TO 24 HOURS

## 2022-12-12 PROCEDURE — 99223 PR INITIAL HOSPITAL CARE,LEVL III: ICD-10-PCS | Mod: ,,, | Performed by: NURSE PRACTITIONER

## 2022-12-12 PROCEDURE — 63600175 PHARM REV CODE 636 W HCPCS: Performed by: INTERNAL MEDICINE

## 2022-12-12 RX ORDER — ACETAMINOPHEN 650 MG/1
650 SUPPOSITORY RECTAL EVERY 8 HOURS PRN
Status: DISCONTINUED | OUTPATIENT
Start: 2022-12-12 | End: 2022-12-28 | Stop reason: HOSPADM

## 2022-12-12 RX ADMIN — HEPARIN SODIUM 5000 UNITS: 5000 INJECTION INTRAVENOUS; SUBCUTANEOUS at 09:12

## 2022-12-12 RX ADMIN — Medication 10 ML: at 09:12

## 2022-12-12 RX ADMIN — DEXTROSE AND SODIUM CHLORIDE: 5; .45 INJECTION, SOLUTION INTRAVENOUS at 09:12

## 2022-12-12 RX ADMIN — Medication 10 ML: at 05:12

## 2022-12-12 RX ADMIN — HEPARIN SODIUM 5000 UNITS: 5000 INJECTION INTRAVENOUS; SUBCUTANEOUS at 02:12

## 2022-12-12 RX ADMIN — HYDROMORPHONE HYDROCHLORIDE 0.5 MG: 1 INJECTION, SOLUTION INTRAMUSCULAR; INTRAVENOUS; SUBCUTANEOUS at 03:12

## 2022-12-12 RX ADMIN — HEPARIN SODIUM 5000 UNITS: 5000 INJECTION INTRAVENOUS; SUBCUTANEOUS at 05:12

## 2022-12-12 RX ADMIN — CEFAZOLIN SODIUM 2 G: 2 SOLUTION INTRAVENOUS at 09:12

## 2022-12-12 RX ADMIN — ACETAMINOPHEN 650 MG: 650 SUPPOSITORY RECTAL at 08:12

## 2022-12-12 RX ADMIN — MUPIROCIN: 20 OINTMENT TOPICAL at 09:12

## 2022-12-12 NOTE — PROGRESS NOTES
Renal ICU Progress Note    Date of Admission:  12/9/2022  1:40 PM    Length of Stay: 3  Days    Subjective: no new complaints    Objective:    Current Facility-Administered Medications   Medication    acetaminophen tablet 650 mg    acetaminophen tablet 650 mg    albuterol-ipratropium 2.5 mg-0.5 mg/3 mL nebulizer solution 3 mL    cefazolin (ANCEF) 2 gram in dextrose 5% 50 mL IVPB (premix)    dextrose 10% bolus 125 mL    dextrose 10% bolus 250 mL    dextrose 5 % and 0.45 % NaCl infusion    glucagon (human recombinant) injection 1 mg    glucose chewable tablet 16 g    glucose chewable tablet 24 g    heparin (porcine) injection 5,000 Units    HYDROmorphone injection 0.5 mg    melatonin tablet 6 mg    mupirocin 2 % ointment    naloxone 0.4 mg/mL injection 0.02 mg    nitroGLYCERIN SL tablet 0.4 mg    oxyCODONE-acetaminophen 5-325 mg per tablet 1 tablet    sodium chloride 0.9% flush 10 mL       Vitals:    12/12/22 0335 12/12/22 0355 12/12/22 0400 12/12/22 0500   BP:   124/60 132/64   Pulse:   96 93   Resp:  18 (!) 24 (!) 22   Temp: 98.2 °F (36.8 °C)      TempSrc: Oral      SpO2:   97% 98%   Weight:       Height:           I/O last 3 completed shifts:  In: 4325 [I.V.:3647.8; IV Piggyback:677.2]  Out: 4235 [Urine:2360; Drains:1875]  I/O this shift:  In: 1043.6 [I.V.:993.7; IV Piggyback:49.9]  Out: 700 [Urine:700]      Physical Exam:     General: NAD  Neck: supple  Heart: RRR  Lungs: unlabored breathing  Abdomen: n/a  Limbs: n/a  Neurologic: awake      Laboratories:    Recent Labs   Lab 12/12/22  0420   WBC 18.68*   RBC 4.07*   HGB 11.5*   HCT 33.8*      MCV 83   MCH 28.3   MCHC 34.0         Recent Labs   Lab 12/12/22  0420   CALCIUM 7.8*   PROT 6.1      K 3.9   CO2 33*      BUN 72*   CREATININE 2.5*   ALKPHOS 105   ALT 25   AST 16   BILITOT 3.0*         No results for input(s): COLORU, CLARITYU, SPECGRAV, PHUR, PROTEINUA, GLUCOSEU, BLOODU, WBCU, RBCU, BACTERIA, MUCUS in the last 24  hours.    Invalid input(s):  BILIRUBINCON    Microbiology Results (last 7 days)       Procedure Component Value Units Date/Time    Culture, Anaerobe [[id number]] Collected: 12/09/22 1831    Order Status: Completed Specimen: Abscess from Abdomen Updated: 12/11/22 1224     Anaerobic Culture Culture in progress    Aerobic culture [[id number]]  (Abnormal)  (Susceptibility) Collected: 12/09/22 1831    Order Status: Completed Specimen: Abscess from Abdomen Updated: 12/11/22 0915     Aerobic Bacterial Culture KLEBSIELLA PNEUMONIAE  Many      Gram stain [[id number]] Collected: 12/09/22 1831    Order Status: Completed Specimen: Abscess from Abdomen Updated: 12/10/22 0755     Gram Stain Result Many WBC's      No organisms seen    Fungus culture [[id number]] Collected: 12/09/22 1831    Order Status: Sent Specimen: Abscess from Abdomen Updated: 12/09/22 1905              Diagnostic Tests:     CT Chest:  Impression:       Small pleural effusions with consolidative change RIGHT greater than LEFT lung bases.  Atelectasis would be a leading consideration.  Follow-up to resolution recommended to exclude underlying obstructive lesion.       Electronically signed by: Segun Villeda MD   Date: 12/11/2022            Assessment:    48 y/o male admitted with:      - Hepatic Klebsiella abscess s/p drainage  - Improving cholestasis  - Ileus s/p NGT  - Intrinsic MARYURI FeNa+ > 1.5%  (unknown baseline creatinine) improving as well as UO  - NAGMA resolved  - Hx. HTN  - Fe++ def. Anemia  - Hypoalbuminemia             Plan:       - Broad spectrum antibiotics  - IVFs: D5-0.45% saline  - Bladder scan PRN  - NPO  - Surgery following s/p NGT placement.  - Other problems per admitting                       5

## 2022-12-12 NOTE — CONSULTS
"[facility] - [place] - Intensive Care  Infectious Disease  Consult Note    Patient Name: Vivek Turner  MRN: [id number]  Admission Date: 12/9/2022  Hospital Length of Stay: 2 days  Attending Physician: Carolin Howell MD  Primary Care Provider: Primary Doctor No     Isolation Status: No active isolations    Patient information was obtained from patient and ER records.      Inpatient consult to Infectious Diseases  Consult performed by: Polly Mora MD  Consult ordered by: Carolin Howell MD        Assessment/Plan:     * Hepatic abscess  47M with h/o htn admitted 12/9 with abdominal pain, fount to have liver abscess and acute kidney injury, s/p IR drainage. bcx ngtd. Abscess cultures with pan-susceptible klebsiella. On cefepime/metronidazole. ID consulted for "klebsiella liver abscess"    Recommendations:   - de-escalate to cefazolin, needs renal adjustment as MARYURI improves  - duration of abx until radiographic resolution of abscess  - could consider transitioning to po abx on d/c, so would avoid picc  - consider GI evaluation for consideration of mrcp/colonosocopy to work up cause of abscess  - f/u pending hiv test            Thank you for your consult. I will follow-up with patient. Please contact us if you have any additional questions.    Polly Mora MD  Infectious Disease  [facility] [place] Intensive Care    Subjective:     Principal Problem: Hepatic abscess    HPI:   47M with h/o htn admitted 12/9 with several days of progressively worsening abdominal pain. Reports fever and nausea and decreased po intake.  Denies diarrhea. Denies weight loss. Notably reports traveling to [facility] several months ago.     Had abdominal ct and ultrasound  1. No sonographic abnormality of the gallbladder detected.  2. Ill-defined region of decreased echotexture within the posterior aspect of the right hepatic lobe corresponding to the findings seen on recent prior CT.  This could indicate a mass, abscess, or intense fatty " "infiltration.  3. Diffuse increased hepatic parenchymal echotexture suspected to represent hepatic steatosis.      S/p IR drainage of abscess. Reports pain improving.   KLEBSIELLA PNEUMONIAE   Many      Resulting Agency [facility]        Susceptibility     Klebsiella pneumoniae     CULTURE, AEROBIC  (SPECIFY SOURCE)     Amox/K Clav'ate <=8/4 mcg/mL Sensitive     Amp/Sulbactam <=8/4 mcg/mL Sensitive     Cefazolin <=2 mcg/mL Sensitive     Cefepime <=2 mcg/mL Sensitive     Ceftriaxone <=1 mcg/mL Sensitive     Ciprofloxacin <=1 mcg/mL Sensitive     Ertapenem <=0.5 mcg/mL Sensitive     Gentamicin <=4 mcg/mL Sensitive     Levofloxacin <=2 mcg/mL Sensitive     Meropenem <=1 mcg/mL Sensitive     Minocycline <=4 mcg/mL Sensitive     Piperacillin/Tazo <=16 mcg/mL Sensitive     Tetracycline <=4 mcg/mL Sensitive     Tobramycin <=4 mcg/mL Sensitive     Trimeth/Sulfa <=2/38 mcg/mL Sensitive                 Silvino improving    On cefepime/metronidazole    Hiv ordered, pending      ID consulted for "klebsiella liver abscess"      No current facility-administered medications on file prior to encounter.     Current Outpatient Medications on File Prior to Encounter   Medication Sig    NON FORMULARY MEDICATION Losartan/amlodipine 100/10 mg   Take 1 tablet by mouth daily       Review of patient's allergies indicates:  No Known Allergies    Past Medical History:   Diagnosis Date    Hypertension      No past surgical history on file.  Family History    None       Tobacco Use    Smoking status: Not on file    Smokeless tobacco: Not on file   Substance and Sexual Activity    Alcohol use: Not on file    Drug use: Not on file    Sexual activity: Not on file     Review of Systems   Constitutional:  Negative for appetite change, fatigue, fever and unexpected weight change.   HENT:  Negative for sore throat and trouble swallowing.    Eyes: Negative.    Respiratory:  Negative for cough, shortness of breath and wheezing.    Cardiovascular:  Negative " for chest pain and leg swelling.   Gastrointestinal:  Positive for abdominal pain (especially at IR drain insertion site and some of the right upper quadrant). Negative for abdominal distention, blood in stool, constipation, diarrhea, nausea and vomiting.   Endocrine: Negative.    Genitourinary: Negative.    Musculoskeletal:  Negative for back pain.   Skin: Negative.  Negative for rash.   Allergic/Immunologic: Negative.    Neurological: Negative.    Hematological: Negative.    Psychiatric/Behavioral:  Negative for confusion.    Objective:     Vital Signs (Most Recent):  Temp: 98.9 °F (37.2 °C) (12/11/22 1600)  Pulse: 95 (12/11/22 1600)  Resp: (!) 24 (12/11/22 1624)  BP: (!) 152/60 (12/11/22 1600)  SpO2: 95 % (12/11/22 1600)   Vital Signs (24h Range):  Temp:  [98.2 °F (36.8 °C)-100.1 °F (37.8 °C)] 98.9 °F (37.2 °C)  Pulse:  [] 95  Resp:  [21-42] 24  SpO2:  [92 %-97 %] 95 %  BP: ()/(51-83) 152/60     Weight: 103.6 kg (228 lb 6.3 oz)  Body mass index is 36.86 kg/m².    Physical Exam  Vitals and nursing note reviewed.   Constitutional:       Appearance: He is well-developed.   HENT:      Head: Normocephalic and atraumatic.   Cardiovascular:      Rate and Rhythm: Normal rate.      Heart sounds: Normal heart sounds.   Pulmonary:      Effort: Pulmonary effort is normal.   Abdominal:      General: Bowel sounds are normal. There is no distension.      Palpations: Abdomen is soft.      Tenderness: There is abdominal tenderness (at IR drain site).   Musculoskeletal:         General: Normal range of motion.      Cervical back: Normal range of motion.   Skin:     General: Skin is warm and dry.      Capillary Refill: Capillary refill takes less than 2 seconds.   Neurological:      Mental Status: He is alert and oriented to person, place, and time.   Psychiatric:         Behavior: Behavior normal.       Significant Labs:  I have reviewed all pertinent lab results within the past 24 hours.  CBC:   Recent Labs   Lab  12/11/22 0134   WBC 23.77*   RBC 3.69*   HGB 11.0*   HCT 29.4*      MCV 80*   MCH 29.8   MCHC 37.4*       CMP:   Recent Labs   Lab 12/11/22 0134 12/11/22  1600   * 155*   CALCIUM 7.6* 8.0*   ALBUMIN 1.7*  --    PROT 6.0  --     142   K 3.7 3.8   CO2 26 33*    100   BUN 85* 82*   CREATININE 4.2* 3.2*   ALKPHOS 127  --    ALT 39  --    AST 18  --    BILITOT 3.9*  --          Significant Diagnostics:  I have reviewed all pertinent imaging results/findings within the past 24 hours.  CT: I have reviewed all pertinent results/findings within the past 24 hours and my personal findings are:  IR drain placement yesterday for hepatic abscess

## 2022-12-12 NOTE — PROGRESS NOTES
[facility] [place] Intensive Care  Infectious Disease  Progress Note    Patient Name: Vivek Turner  MRN: [id number]  Admission Date: 12/9/2022  Length of Stay: 3 days  Attending Physician: Carolin Howell MD  Primary Care Provider: Primary Doctor No    Isolation Status: No active isolations  Assessment/Plan:      * Hepatic abscess  47M with h/o HTN admitted 12/9 with abdominal pain, fount to have liver abscess and MARYURI, s/p IR drainage on 12/9. bcx ngtd. Abscess cultures with pan-susceptible klebsiella. Was on cefepime/metronidazole, switched to cefazolin 12/11.  Etiology of abscess is unclear.  HIV neg.     Recommendations:   - continue cefazolin  - f/u anaerobic culture  - duration of abx until radiographic resolution of abscess; will need repeat CT in approx 4 wks  - may consider transitioning to po abx on d/c  - GI planning on  mrcp and possible colonosocopy to work up cause of abscess; appreciate assistance              Anticipated Disposition: tbd     Thank you for your consult. I will follow-up with patient. Please contact us if you have any additional questions.    Chelsea Catherine MD  Infectious Disease  [facility] [place] Intensive Care    Subjective:     Principal Problem:Hepatic abscess    HPI:   47M with h/o htn admitted 12/9 with several days of progressively worsening abdominal pain. Reports fever and nausea and decreased po intake.  Denies diarrhea. Denies weight loss. Notably reports traveling to [facility] several months ago.     Had abdominal ct and ultrasound  1. No sonographic abnormality of the gallbladder detected.  2. Ill-defined region of decreased echotexture within the posterior aspect of the right hepatic lobe corresponding to the findings seen on recent prior CT.  This could indicate a mass, abscess, or intense fatty infiltration.  3. Diffuse increased hepatic parenchymal echotexture suspected to represent hepatic steatosis.      S/p IR drainage of abscess. Reports pain improving.  "  KLEBSIELLA PNEUMONIAE   Many      Resulting Agency [facility]        Susceptibility     Klebsiella pneumoniae     CULTURE, AEROBIC  (SPECIFY SOURCE)     Amox/K Clav'ate <=8/4 mcg/mL Sensitive     Amp/Sulbactam <=8/4 mcg/mL Sensitive     Cefazolin <=2 mcg/mL Sensitive     Cefepime <=2 mcg/mL Sensitive     Ceftriaxone <=1 mcg/mL Sensitive     Ciprofloxacin <=1 mcg/mL Sensitive     Ertapenem <=0.5 mcg/mL Sensitive     Gentamicin <=4 mcg/mL Sensitive     Levofloxacin <=2 mcg/mL Sensitive     Meropenem <=1 mcg/mL Sensitive     Minocycline <=4 mcg/mL Sensitive     Piperacillin/Tazo <=16 mcg/mL Sensitive     Tetracycline <=4 mcg/mL Sensitive     Tobramycin <=4 mcg/mL Sensitive     Trimeth/Sulfa <=2/38 mcg/mL Sensitive                 Silvino improving    On cefepime/metronidazole    Hiv ordered, pending      ID consulted for "klebsiella liver abscess"    Interval History: Remained afebrile overnight. Reports abdominal pain has improved. Says he had a BM yesterday. NGT remains in place for ileus.    Review of Systems   Constitutional:  Negative for chills, fatigue and fever.   Respiratory:  Negative for cough and shortness of breath.    Gastrointestinal:  Positive for abdominal pain. Negative for diarrhea.   Genitourinary:  Negative for difficulty urinating and dysuria.   Skin:  Negative for rash and wound.   Neurological:  Negative for headaches.   All other systems reviewed and are negative.  Objective:     Vital Signs (Most Recent):  Temp: 98.1 °F (36.7 °C) (12/12/22 1130)  Pulse: 92 (12/12/22 1400)  Resp: 20 (12/12/22 1400)  BP: 129/63 (12/12/22 1400)  SpO2: 96 % (12/12/22 1400) Vital Signs (24h Range):  Temp:  [98.1 °F (36.7 °C)-98.9 °F (37.2 °C)] 98.1 °F (36.7 °C)  Pulse:  [] 92  Resp:  [17-39] 20  SpO2:  [95 %-99 %] 96 %  BP: (119-152)/(56-81) 129/63     Weight: 103.6 kg (228 lb 6.3 oz)  Body mass index is 36.86 kg/m².    Estimated Creatinine Clearance: 42.1 mL/min (A) (based on SCr of 2.5 mg/dL (H)).    Physical " Exam  Vitals reviewed.   Constitutional:       Appearance: He is ill-appearing.   HENT:      Head: Normocephalic and atraumatic.      Nose:      Comments: NGT to suction  Eyes:      Conjunctiva/sclera: Conjunctivae normal.      Pupils: Pupils are equal, round, and reactive to light.   Cardiovascular:      Rate and Rhythm: Normal rate and regular rhythm.   Pulmonary:      Effort: Pulmonary effort is normal. No respiratory distress.      Breath sounds: Normal breath sounds.   Abdominal:      General: Abdomen is flat. Bowel sounds are normal. There is distension.      Palpations: Abdomen is soft.      Tenderness: There is no abdominal tenderness. There is no guarding.   Musculoskeletal:      Cervical back: Normal range of motion.      Right lower leg: No edema.      Left lower leg: No edema.   Skin:     Findings: No erythema or rash.   Neurological:      General: No focal deficit present.      Mental Status: He is alert and oriented to person, place, and time.   Psychiatric:         Behavior: Behavior normal.       Significant Labs: Blood Culture:   Recent Labs   Lab 12/09/22  0401 12/09/22  0423   LABBLOO No Growth to date  No Growth to date  No Growth to date  No Growth to date No Growth to date  No Growth to date  No Growth to date  No Growth to date     Wound Culture:   Recent Labs   Lab 12/09/22  1831   LABAERO KLEBSIELLA PNEUMONIAE  Many  *     All pertinent labs within the past 24 hours have been reviewed.    Significant Imaging: I have reviewed all pertinent imaging results/findings within the past 24 hours.

## 2022-12-12 NOTE — CONSULTS
" [facility] Consultation Note    Patient Complaint: generalized weakness    PCP:   Primary Doctor No       LOS: 3        Initial History of Present Illness (HPI):  This is a 47 y.o. male consulted to GI service for Klebsiella liver abscess. Interview conducted with [employer] services in the language of Tagalog. Patient complaint of generalized weakness with associated fatigue, HA , difficuly urinating and chills that began last week. Patient endorses traveling to the [facility] 8 weeks ago and had not experienced any of these symptoms prior. Reports seeing a provider in the [facility] and informed of having a fatty liver and high cholesterol. Reports after trip, symptoms began and saw another provider and received a "shot", patient was unable to name the shot or the reasoning for it's administration. Reports the symptoms reemerged last week. Denies smoking, heavy drinking and illicit drug use. Denies n/v, abdominal pain, constipation or diarrhea. Reports abdominal distention. Reports he is able to pass flatus and last stool this am.      Review of Systems   Constitutional:  Positive for chills. Negative for activity change, diaphoresis and fever.   HENT:  Negative for congestion, drooling, rhinorrhea, sore throat and trouble swallowing.    Eyes:  Negative for discharge.   Respiratory:  Negative for cough, shortness of breath and wheezing.    Cardiovascular:  Negative for chest pain, palpitations and leg swelling.   Gastrointestinal:  Positive for abdominal distention. Negative for abdominal pain, anal bleeding, blood in stool, constipation, diarrhea, nausea, rectal pain and vomiting.   Endocrine: Negative for cold intolerance and heat intolerance.   Genitourinary:  Positive for difficulty urinating. Negative for frequency, hematuria and urgency.   Musculoskeletal:  Negative for arthralgias.   Skin:  Negative for color change, pallor and rash.   Neurological:  Positive for weakness and " headaches. Negative for syncope, facial asymmetry and numbness.   Psychiatric/Behavioral:  Negative for agitation and confusion. The patient is not nervous/anxious.          Medical History:  has a past medical history of Hypertension.    Surgical History: Patient denies any pertinent past surgical history.    Family History: Patient denies any pertinent family history.    Social History: Denies smoking. Occasional social drinker with wife. Denies illicit drug use.    Review of patient's allergies indicates:  No Known Allergies    No current facility-administered medications on file prior to encounter.     Current Outpatient Medications on File Prior to Encounter   Medication Sig Dispense Refill    NON FORMULARY MEDICATION Losartan/amlodipine 100/10 mg   Take 1 tablet by mouth daily          Objective Findings:    Vital Signs:  Temp:  [98.2 °F (36.8 °C)-98.9 °F (37.2 °C)]   Pulse:  []   Resp:  [17-39]   BP: (119-152)/(56-77)   SpO2:  [92 %-99 %]   Body mass index is 36.86 kg/m².      Physical Exam  Vitals and nursing note reviewed.   Constitutional:       Appearance: Normal appearance.   HENT:      Head: Normocephalic.      Nose: Nose normal.      Mouth/Throat:      Mouth: Mucous membranes are moist.   Eyes:      Pupils: Pupils are equal, round, and reactive to light.   Cardiovascular:      Heart sounds: Normal heart sounds.   Pulmonary:      Effort: Pulmonary effort is normal.      Breath sounds: Normal breath sounds.   Abdominal:      General: Bowel sounds are normal. There is no distension.      Palpations: Abdomen is soft.      Tenderness: There is no abdominal tenderness.   Musculoskeletal:         General: Normal range of motion.      Cervical back: Normal range of motion.   Skin:     Capillary Refill: Capillary refill takes less than 2 seconds.   Neurological:      General: No focal deficit present.      Mental Status: He is alert and oriented to person, place, and time.   Psychiatric:         Mood and  Affect: Mood normal.         Behavior: Behavior normal.         Thought Content: Thought content normal.         Judgment: Judgment normal.             Labs:  Lab Results   Component Value Date    WBC 18.68 (H) 2022    HGB 11.5 (L) 2022    HCT 33.8 (L) 2022     2022    ALT 25 2022    AST 16 2022     2022    K 3.9 2022     2022    CREATININE 2.5 (H) 2022    BUN 72 (H) 2022    CO2 33 (H) 2022    INR 1.3 (H) 2022    HGBA1C 5.6 12/10/2022             Imagin/11 Xray abdomen-Multiple loops of dilated small bowel are present in the left abdomen that measure up to 6.5 cm.    US abdomen- No detected gallbladder wall thickening, cholelithiasis, or pericholecystic fluid.  Common duct normal in size measuring 4 mm.  Diffuse component of increased hepatic parenchymal echotexture.  There is an ill-defined region of decreased echogenicity within the posterior aspect of the right hepatic lobe.    I have independently reviewed and interpreted the imaging above    Assessment:  Patient is a .47 y.o. y/o .male with  has a past medical history of Hypertension. Consulted to the GI service for klebsiella liver abscess.               Recommendations:  Leukocytosis.Hepatic abscess. Elevated bilirubin. Ileus. ERYN. Anemia of acute infection.  Wbc improving. Liver abscess drained on  with aerobic culture positive for klebsiella pneumoniae. On cefazolin, ID following. Bilirubin is elevated, improved s/p abscess drainage. Continue to monitor levels. There is the question of what is the possible source of the liver abscess. Plan for MRCP today and colonoscopy TBD patient optimization and patient agreeable to prep and procedure.   Dilated small bowel suspicion of illeus noted on imaging and NG tube with large output per Dr Howell's note on . Today with 300ml of green output, abdomen still distended. Rec to continue NG. MRI MRCP  ordered to further assess ileus.  Iron deficiency anemia with anemia of acute infection noted with labs. No overt bleeding. Continue to monitor hgb.      Thank you so much for allowing us to participate in the care of Vivek Turner . Please contact us if you have any additional questions.    Sidra Crawford NP  Gastroenterology  [facility]

## 2022-12-12 NOTE — ASSESSMENT & PLAN NOTE
47M with h/o HTN admitted 12/9 with abdominal pain, fount to have liver abscess and MARYURI, s/p IR drainage on 12/9. bcx ngtd. Abscess cultures with pan-susceptible klebsiella. Was on cefepime/metronidazole, switched to cefazolin 12/11.  Etiology of abscess is unclear.  HIV neg.     Recommendations:   - continue cefazolin  - f/u anaerobic culture  - duration of abx until radiographic resolution of abscess; will need repeat CT in approx 4 wks  - may consider transitioning to po abx on d/c  - GI planning on  mrcp and possible colonosocopy to work up cause of abscess; appreciate assistance

## 2022-12-12 NOTE — PROVIDER TRANSFER
Mr Vivek Turner was admitted with severe sepsis due to R liver mass vs abscess and acute renal failure. IR consulted and drain placed on 12/9. Cultures are Klebsiella. ID consulted- on cefazolin. Started on bicarb gtt for acute renal failure and Nephrology consulted. He was moved to ICU for instability. He has ileus; NGT placed with copious bilious output. Renal failure improving. GI consulted to assess for source of liver abscess- MRCP pending, declined cscope.    To do  - monitor ileus. Still has too much output today to clamp or remove NGT  - follow up MRCP  - follow ID recs for antibiotics  - monitor renal function  - PT, OT consulted    Carolin Howell MD  12/12/2022  2:57 PM

## 2022-12-12 NOTE — ASSESSMENT & PLAN NOTE
Creatinine at 6.1 at admission. Unknown baseline with no prior labs to review. FENa 1.4% suggesting intrinsic injury. This may be ATN from sepsis +/- Bactrim pseudotoxicity. No hydro seen on CT or US.   - IVF ordered  - Nephrology consulted  - this is improving     Patient with acute kidney injury likely due to IVVD/dehydration and acute tubular necrosis MARYURI is currently improving. Labs reviewed- Renal function/electrolytes with Estimated Creatinine Clearance: 42.1 mL/min (A) (based on SCr of 2.5 mg/dL (H)). according to latest data. Monitor urine output and serial BMP and adjust therapy as needed. Avoid nephrotoxins and renally dose meds for GFR listed above.

## 2022-12-12 NOTE — PLAN OF CARE
Pt remains in ICU alert and oriented. HR running NSR on monitor. BP stable. Pt on 4L NC sating above 95%. Normothermic. D5 and 0.45% NS infusing at 100ml/hr. NG tube remained in place with 450 ml of dark green output. Abdominal distention decreased compared to last night. 1 bowel movement this shift dark green in color. Pt had complaints of abdominal pain, prn pain medication given x2 doses. Pain improvement after pain medication was given. Drain in place with 15 of purulent, green sediment drainage. Pt updating on plan of care. No new falls, injuries, or skin breakdown this shift.

## 2022-12-12 NOTE — ASSESSMENT & PLAN NOTE
This patient does have evidence of infective focus. My overall impression is sepsis. Vital signs were reviewed and noted in progress note.  Antibiotics given-   Antibiotics (From admission, onward)    Start     Stop Route Frequency Ordered    12/11/22 2100  cefazolin (ANCEF) 2 gram in dextrose 5% 50 mL IVPB (premix)         -- IV Every 12 hours (non-standard times) 12/11/22 1801    12/09/22 2100  mupirocin 2 % ointment         12/14 2059 Nasl 2 times daily 12/09/22 1424        Cultures were taken-   Microbiology Results (last 7 days)     Procedure Component Value Units Date/Time    Culture, Anaerobe [[id number]] Collected: 12/09/22 1831    Order Status: Completed Specimen: Abscess from Abdomen Updated: 12/11/22 1224     Anaerobic Culture Culture in progress    Aerobic culture [[id number]]  (Abnormal)  (Susceptibility) Collected: 12/09/22 1831    Order Status: Completed Specimen: Abscess from Abdomen Updated: 12/11/22 0915     Aerobic Bacterial Culture KLEBSIELLA PNEUMONIAE  Many      Gram stain [[id number]] Collected: 12/09/22 1831    Order Status: Completed Specimen: Abscess from Abdomen Updated: 12/10/22 0755     Gram Stain Result Many WBC's      No organisms seen    Fungus culture [[id number]] Collected: 12/09/22 1831    Order Status: Sent Specimen: Abscess from Abdomen Updated: 12/09/22 1905        Latest lactate reviewed, they are-  Recent Labs   Lab 12/09/22  1928 12/10/22  2204   LACTATE 1.2 1.3       Organ dysfunction indicated by Acute kidney injury and Acute liver injury  Source- liver abscess    Source control Achieved by- IR  This is improving.

## 2022-12-12 NOTE — PROGRESS NOTES
[facility] Medicine  Progress Note    Patient Name: Vivek Turner  MRN: [id number]  Patient Class: IP- Inpatient   Admission Date: 12/9/2022  Length of Stay: 3 days  Attending Physician: Carolin Howell MD  Primary Care Provider: Primary Doctor No        Subjective:     Principal Problem:Hepatic abscess        HPI:  47y M w/ hypertension presents with abdominal pain x 2 days. Pt refused  service. He reports that he had fever/rigors three days prior to admission. He then developed worsening abdominal pain from then until admission. He went to an urgent care where he was started on bactrim for UTI. His abdominal pain increased over the next few days. He had a BM yesterday. Denies vomiting, but has had significant nausea and very limited PO intake in the days leading to admission. Aside from the single episode of fever/rigors he denies subsequent fevers.     In the ED he was found to have a R hepatic abscess vs mass. He traveled to the [facility] eight weeks prior to presentation. He denies fevers except for what is listed above. Denies weight loss, cough, night sweats. Denies prior hx of TB. Denies prior hx of cirrhosis      Overview/Hospital Course:  Mr Vivek Turner was admitted with severe sepsis due to R liver mass vs abscess and acute renal failure. IR consulted and drain placed on 12/9. Cultures are Klebsiella. ID consulted. Started on bicarb gtt for acute renal failure and Nephrology consulted. He was moved to ICU for instability. He has ileus; NGT placed with copious bilious output. Renal failure improving. GI consulted to assess for source of liver abscess.       Interval History: Feeling better today. Wants NGT out.     Review of Systems   Constitutional:  Positive for fatigue. Negative for chills, diaphoresis and fever.   HENT:  Negative for congestion, sinus pressure and sinus pain.    Respiratory:  Negative for cough, chest tightness and shortness of breath.     Cardiovascular:  Negative for chest pain, palpitations and leg swelling.   Gastrointestinal:  Positive for abdominal distention. Negative for abdominal pain, constipation, diarrhea, nausea and vomiting.   Musculoskeletal:  Negative for arthralgias and myalgias.   Neurological:  Positive for weakness. Negative for numbness.   Psychiatric/Behavioral:  Negative for confusion.    Objective:     Vital Signs (Most Recent):  Temp: 98.2 °F (36.8 °C) (12/12/22 0335)  Pulse: 92 (12/12/22 0905)  Resp: (!) 21 (12/12/22 0905)  BP: 136/74 (12/12/22 0905)  SpO2: 96 % (12/12/22 0905)   Vital Signs (24h Range):  Temp:  [98.2 °F (36.8 °C)-98.9 °F (37.2 °C)] 98.2 °F (36.8 °C)  Pulse:  [] 92  Resp:  [17-39] 21  SpO2:  [92 %-99 %] 96 %  BP: (119-152)/(56-77) 136/74     Weight: 103.6 kg (228 lb 6.3 oz)  Body mass index is 36.86 kg/m².    Intake/Output Summary (Last 24 hours) at 12/12/2022 1030  Last data filed at 12/12/2022 0600  Gross per 24 hour   Intake 2131.42 ml   Output 2555 ml   Net -423.58 ml        Physical Exam  Vitals and nursing note reviewed.   Constitutional:       General: He is not in acute distress.     Appearance: He is obese. He is ill-appearing. He is not toxic-appearing or diaphoretic.   HENT:      Head: Normocephalic and atraumatic.      Nose:      Comments: NGT in place with bilious output     Mouth/Throat:      Mouth: Mucous membranes are moist.   Eyes:      General: No scleral icterus.  Cardiovascular:      Rate and Rhythm: Normal rate and regular rhythm.      Pulses: Normal pulses.      Heart sounds: Normal heart sounds. No murmur heard.    No gallop.   Pulmonary:      Effort: No respiratory distress.      Breath sounds: Normal breath sounds. No stridor. No wheezing, rhonchi or rales.      Comments: Decreased inspiration. Wheezing diffusely. On 4L NC  Abdominal:      General: Bowel sounds are normal. There is distension (less than yesterday).      Palpations: Abdomen is soft. There is no mass.       Tenderness: There is no abdominal tenderness. There is no guarding.      Comments: RUQ drain in place with minimal brown/orange fluid   Musculoskeletal:      Right lower leg: No edema.      Left lower leg: No edema.   Skin:     General: Skin is warm.   Neurological:      Mental Status: He is alert and oriented to person, place, and time.       Significant Labs: All pertinent labs within the past 24 hours have been reviewed.    Significant Imaging: I have reviewed all pertinent imaging results/findings within the past 24 hours.      Assessment/Plan:      * Hepatic abscess  Unclear how he got this. IR drain placed 12/9. Cultures with Klebsiella, pansensitive.   - on cefazolin per ID recs  - GI consulted to assess for source of liver abscess    Ileus  Patient has Non- operative ileus which is adynamic in etiology which is worsening. Will treat conservatively with bowel rest, IV fluids, serial abdominal exams and avoidance of GI paralytics such as narcotics or anti-spasmodics. Monitor patient closely.   - NGT placed 12/11 with large output-- continue      Acute hypoxemic respiratory failure  Patient with Hypoxic Respiratory failure which is Acute.  he is not on home oxygen.   Signs/symptoms of respiratory failure include- tachypnea, increased work of breathing and wheezing. Contributing diagnoses includes - atelectasis Labs and images were reviewed. Patient Has not had a recent ABG.   - O2 by NC PRN  - nebs PRN  - CXR shows atelectasis    Hyperglycemia  A1c normal.     Microcytic anemia  Hgb 10-11, no prior to review. Iron deficient- TSAT 17, but also significant acute inflammation- ferritin 3633. B12, folate replete.         Atelectasis  Shortness of breath partly due to atelectasis as noted on CXR  - incentive spirometer  - O2 by NC as needed      Severe sepsis  This patient does have evidence of infective focus. My overall impression is sepsis. Vital signs were reviewed and noted in progress note.  Antibiotics given-    Antibiotics (From admission, onward)      Start     Stop Route Frequency Ordered    12/11/22 2100  cefazolin (ANCEF) 2 gram in dextrose 5% 50 mL IVPB (premix)         -- IV Every 12 hours (non-standard times) 12/11/22 1801    12/09/22 2100  mupirocin 2 % ointment         12/14 2059 Nasl 2 times daily 12/09/22 1424          Cultures were taken-   Microbiology Results (last 7 days)       Procedure Component Value Units Date/Time    Culture, Anaerobe [[id number]] Collected: 12/09/22 1831    Order Status: Completed Specimen: Abscess from Abdomen Updated: 12/11/22 1224     Anaerobic Culture Culture in progress    Aerobic culture [[id number]]  (Abnormal)  (Susceptibility) Collected: 12/09/22 1831    Order Status: Completed Specimen: Abscess from Abdomen Updated: 12/11/22 0915     Aerobic Bacterial Culture KLEBSIELLA PNEUMONIAE  Many      Gram stain [[id number]] Collected: 12/09/22 1831    Order Status: Completed Specimen: Abscess from Abdomen Updated: 12/10/22 0755     Gram Stain Result Many WBC's      No organisms seen    Fungus culture [[id number]] Collected: 12/09/22 1831    Order Status: Sent Specimen: Abscess from Abdomen Updated: 12/09/22 1905          Latest lactate reviewed, they are-  Recent Labs   Lab 12/09/22  1928 12/10/22  2204   LACTATE 1.2 1.3       Organ dysfunction indicated by Acute kidney injury and Acute liver injury  Source- liver abscess    Source control Achieved by- IR  This is improving.       ATN (acute tubular necrosis)  Creatinine at 6.1 at admission. Unknown baseline with no prior labs to review. FENa 1.4% suggesting intrinsic injury. This may be ATN from sepsis +/- Bactrim pseudotoxicity. No hydro seen on CT or US.   - IVF ordered  - Nephrology consulted  - this is improving     Patient with acute kidney injury likely due to IVVD/dehydration and acute tubular necrosis MARYURI is currently improving. Labs reviewed- Renal function/electrolytes with Estimated Creatinine Clearance: 42.1 mL/min (A)  (based on SCr of 2.5 mg/dL (H)). according to latest data. Monitor urine output and serial BMP and adjust therapy as needed. Avoid nephrotoxins and renally dose meds for GFR listed above.         VTE Risk Mitigation (From admission, onward)           Ordered     heparin (porcine) injection 5,000 Units  Every 8 hours         12/10/22 0838     IP VTE HIGH RISK PATIENT  Once         12/09/22 1421     Place sequential compression device  Until discontinued         12/09/22 1421                    Discharge Planning   VIVIEN:      Code Status: Full Code   Is the patient medically ready for discharge?:     Reason for patient still in hospital (select all that apply): Patient trending condition  Discharge Plan A: Home      5:05 PM  Updated brother in law Trip by phone.       Critical care time spent on the evaluation and treatment of severe organ dysfunction, review of pertinent labs and imaging studies, discussions with consulting providers and discussions with patient/family: 45 minutes.      Carolin Howell MD  Department of Hospital Medicine   [facility] - [place] - Intensive Care

## 2022-12-12 NOTE — ASSESSMENT & PLAN NOTE
Patient has Non- operative ileus which is adynamic in etiology which is worsening. Will treat conservatively with bowel rest, IV fluids, serial abdominal exams and avoidance of GI paralytics such as narcotics or anti-spasmodics. Monitor patient closely.   - NGT placed 12/11 with large output-- continue

## 2022-12-12 NOTE — NURSING
[facility] Intensive Care  ICU Shift Summary  Date: 12/12/2022      Prehospitalization: Home  Admit Date / LOS : 12/9/2022/ 3 days    Diagnosis: Hepatic abscess    Consults:        Active: Gen Surg, IR, and Nephro       Needed: N/A     Code Status: Full Code   Advanced Directive: <no information>    LDA:  Lines/Drains/Airways       Drain  Duration                  Closed/Suction Drain 12/09/22 1748 Lateral RLQ Bulb 2 days         NG/OG Tube 12/11/22 0735 nasogastric 16 Fr. Right nostril <1 day              Peripheral Intravenous Line  Duration                  Peripheral IV - Single Lumen 12/08/22 18 G Right Antecubital 4 days         Peripheral IV - Single Lumen 12/09/22 0401 18 G Left Antecubital 3 days                  Central Lines/Site/Justification:Patient Does Not Have Central Line  Urinary Cath/Order/Justification:Patient Does Not Have Urinary Catheter    Vasopressors/Infusions:    dextrose 5 % and 0.45 % NaCl 100 mL/hr at 12/12/22 0500          GOALS: Volume/ Hemodynamic: SBP < 160                     RASS: N/A    Pain Management: IV       Pain Controlled: yes     Rhythm: NSR    Respiratory Device: Nasal Cannula                  Most Recent SBT/ SAT: N/A       MOVE Screen: PASS  ICU Liberation: no    VTE Prophylaxis: Pharm  Mobility: Bedrest  Stress Ulcer Prophylaxis: No    Isolation: No active isolations    Dietary:   Current Diet Order   Procedures    Diet NPO      Tolerance: yes  Advancement: no    I & O (24h):    Intake/Output Summary (Last 24 hours) at 12/12/2022 0530  Last data filed at 12/12/2022 0500  Gross per 24 hour   Intake 2590.17 ml   Output 3830 ml   Net -1239.83 ml        Restraints: No    Significant Dates:  Post Op Date: N/A  Rescue Date: N/A  Imaging/ Diagnostics: N/A    Noteworthy Labs:  see chart below    COVID Test: (--)  CBC/Anemia Labs: Coags:    Recent Labs   Lab 12/10/22  1147 12/11/22  0134 12/12/22  0420   WBC  --  23.77* 18.68*   HGB  --  11.0* 11.5*   HCT  --  29.4* 33.8*    PLT  --  388 435   MCV  --  80* 83   RDW  --  13.2 13.8   IRON 25*  --   --    FERRITIN 3,633*  --   --    RETIC 1.0  --   --    FOLATE 10.5  --   --    HJCUCXNI96 >2000*  --   --     Recent Labs   Lab 12/09/22  0401   INR 1.3*        Chemistries:   Recent Labs   Lab 12/11/22  0134 12/11/22  1600 12/12/22  0420    142 145   K 3.7 3.8 3.9    100 103   CO2 26 33* 33*   BUN 85* 82* 72*   CREATININE 4.2* 3.2* 2.5*   CALCIUM 7.6* 8.0* 7.8*   PROT 6.0  --  6.1   BILITOT 3.9*  --  3.0*   ALKPHOS 127  --  105   ALT 39  --  25   AST 18  --  16   MG 2.9* 2.8*  --    PHOS 5.0* 3.9  --         Cardiac Enzymes: Ejection Fractions:    Recent Labs     12/11/22  0134 12/11/22  0454   TROPONINI 0.014 <0.006    EF   Date Value Ref Range Status   12/11/2022 65 % Final        POCT Glucose: HbA1c:    No results for input(s): POCTGLUCOSE in the last 168 hours. Hemoglobin A1C   Date Value Ref Range Status   12/10/2022 5.6 4.0 - 5.6 % Final     Comment:     ADA Screening Guidelines:  5.7-6.4%  Consistent with prediabetes  >or=6.5%  Consistent with diabetes    High levels of fetal hemoglobin interfere with the HbA1C  assay. Heterozygous hemoglobin variants (HbS, HgC, etc)do  not significantly interfere with this assay.   However, presence of multiple variants may affect accuracy.             ICU LOS 2d 10h  Level of Care: Critical Care    Chart Check: 24 HR Done  Shift Summary/Plan for the shift: see are plan summary

## 2022-12-12 NOTE — NURSING
[facility] [place]  Nurses Note -- 4 Eyes      12/12/2022       Skin assessed on: Q Shift      [x] No Pressure Injuries Present    []Prevention Measures Documented    [] Yes LDA  for Pressure Injury Previously documented     [] Yes New Pressure Injury Discovered   [] LDA for New Pressure Injury Added      Attending RN:  CHACE Ortiz    Second RN:  CHACE Easley

## 2022-12-12 NOTE — PLAN OF CARE
"Initial assessment completed at bed side to discuss how patient will manage his care at the next level.  Roll of CM discussed.  Patient identified by using 2 identifiers:  Name and date of birth.  Patient was able to answer questions without the use of interpretor.  He reports that he is from the [facility] and that he is here in the [place] to work.  His wife, children and other family members remain in the [place].  He does not have a local support system.     [facility] [place] - Intensive Care  Initial Discharge Assessment       Primary Care Provider: Primary Doctor No    Admission Diagnosis: Acute kidney injury [N17.9]  Severe sepsis [A41.9, R65.20]    Admission Date: 12/9/2022  Expected Discharge Date:     Discharge Barriers Identified: No family/friends to help    Payor: /     Extended Emergency Contact Information  Primary Emergency Contact: Mick Matthew  Mobile Phone: [phone]  Relation: Friend  Preferred language: English   needed? No  Secondary Emergency Contact: Calvin Devlin  Mobile Phone: [phone]  Relation: Friend  Preferred language: English   needed? No    Discharge Plan A: Home  Discharge Plan B:  (tbd)    No Pharmacies Listed    Initial Assessment (most recent)       Adult Discharge Assessment - 12/11/22 1510          Discharge Assessment    Assessment Type Discharge Planning Assessment     Confirmed/corrected address, phone number and insurance Yes     Confirmed Demographics Correct on Facesheet     Source of Information patient     When was your last doctors appointment? --   Does not recall    Communicated VIVIEN with patient/caregiver Date not available/Unable to determine     Reason For Admission "Pain"     People in Home alone     Do you expect to return to your current living situation? Yes     Do you have help at home or someone to help you manage your care at home? No     Prior to hospitilization cognitive status: Alert/Oriented     Current cognitive status: " Alert/Oriented     Equipment Currently Used at Home none     Readmission within 30 days? No     Patient currently being followed by outpatient case management? No     Do you currently have service(s) that help you manage your care at home? No     Do you take prescription medications? No     Do you have prescription coverage? No     How do you get to doctors appointments? family or friend will provide     Are you on dialysis? No     Do you take coumadin? No     Discharge Plan A Home     Discharge Plan B --   tbd    DME Needed Upon Discharge  none     Discharge Plan discussed with: Patient     Discharge Barriers Identified No family/friends to help

## 2022-12-12 NOTE — ASSESSMENT & PLAN NOTE
Unclear how he got this. IR drain placed 12/9. Cultures with Klebsiella, pansensitive.   - on cefazolin per ID recs  - GI consulted to assess for source of liver abscess

## 2022-12-12 NOTE — ASSESSMENT & PLAN NOTE
"47M with h/o htn admitted 12/9 with abdominal pain, fount to have liver abscess and acute kidney injury, s/p IR drainage. bcx ngtd. Abscess cultures with pan-susceptible klebsiella. On cefepime/metronidazole. ID consulted for "klebsiella liver abscess"    Recommendations:   - de-escalate to cefazolin, needs renal adjustment as MARYURI improves  - duration of abx until radiographic resolution of abscess  - could consider transitioning to po abx on d/c, so would avoid picc  - consider GI evaluation for consideration of mrcp/colonosocopy to work up cause of abscess  - f/u pending hiv test      "

## 2022-12-12 NOTE — ASSESSMENT & PLAN NOTE
Hgb 10-11, no prior to review. Iron deficient- TSAT 17, but also significant acute inflammation- ferritin 3633. B12, folate replete.

## 2022-12-12 NOTE — SUBJECTIVE & OBJECTIVE
No current facility-administered medications on file prior to encounter.     Current Outpatient Medications on File Prior to Encounter   Medication Sig    NON FORMULARY MEDICATION Losartan/amlodipine 100/10 mg   Take 1 tablet by mouth daily       Review of patient's allergies indicates:  No Known Allergies    Past Medical History:   Diagnosis Date    Hypertension      No past surgical history on file.  Family History    None       Tobacco Use    Smoking status: Not on file    Smokeless tobacco: Not on file   Substance and Sexual Activity    Alcohol use: Not on file    Drug use: Not on file    Sexual activity: Not on file     Review of Systems   Constitutional:  Negative for appetite change, fatigue, fever and unexpected weight change.   HENT:  Negative for sore throat and trouble swallowing.    Eyes: Negative.    Respiratory:  Negative for cough, shortness of breath and wheezing.    Cardiovascular:  Negative for chest pain and leg swelling.   Gastrointestinal:  Positive for abdominal pain (especially at IR drain insertion site and some of the right upper quadrant). Negative for abdominal distention, blood in stool, constipation, diarrhea, nausea and vomiting.   Endocrine: Negative.    Genitourinary: Negative.    Musculoskeletal:  Negative for back pain.   Skin: Negative.  Negative for rash.   Allergic/Immunologic: Negative.    Neurological: Negative.    Hematological: Negative.    Psychiatric/Behavioral:  Negative for confusion.    Objective:     Vital Signs (Most Recent):  Temp: 98.9 °F (37.2 °C) (12/11/22 1600)  Pulse: 95 (12/11/22 1600)  Resp: (!) 24 (12/11/22 1624)  BP: (!) 152/60 (12/11/22 1600)  SpO2: 95 % (12/11/22 1600)   Vital Signs (24h Range):  Temp:  [98.2 °F (36.8 °C)-100.1 °F (37.8 °C)] 98.9 °F (37.2 °C)  Pulse:  [] 95  Resp:  [21-42] 24  SpO2:  [92 %-97 %] 95 %  BP: ()/(51-83) 152/60     Weight: 103.6 kg (228 lb 6.3 oz)  Body mass index is 36.86 kg/m².    Physical Exam  Vitals and nursing  note reviewed.   Constitutional:       Appearance: He is well-developed.   HENT:      Head: Normocephalic and atraumatic.   Cardiovascular:      Rate and Rhythm: Normal rate.      Heart sounds: Normal heart sounds.   Pulmonary:      Effort: Pulmonary effort is normal.   Abdominal:      General: Bowel sounds are normal. There is no distension.      Palpations: Abdomen is soft.      Tenderness: There is abdominal tenderness (at IR drain site).   Musculoskeletal:         General: Normal range of motion.      Cervical back: Normal range of motion.   Skin:     General: Skin is warm and dry.      Capillary Refill: Capillary refill takes less than 2 seconds.   Neurological:      Mental Status: He is alert and oriented to person, place, and time.   Psychiatric:         Behavior: Behavior normal.       Significant Labs:  I have reviewed all pertinent lab results within the past 24 hours.  CBC:   Recent Labs   Lab 12/11/22 0134   WBC 23.77*   RBC 3.69*   HGB 11.0*   HCT 29.4*      MCV 80*   MCH 29.8   MCHC 37.4*       CMP:   Recent Labs   Lab 12/11/22 0134 12/11/22  1600   * 155*   CALCIUM 7.6* 8.0*   ALBUMIN 1.7*  --    PROT 6.0  --     142   K 3.7 3.8   CO2 26 33*    100   BUN 85* 82*   CREATININE 4.2* 3.2*   ALKPHOS 127  --    ALT 39  --    AST 18  --    BILITOT 3.9*  --          Significant Diagnostics:  I have reviewed all pertinent imaging results/findings within the past 24 hours.  CT: I have reviewed all pertinent results/findings within the past 24 hours and my personal findings are:  IR drain placement yesterday for hepatic abscess

## 2022-12-12 NOTE — ASSESSMENT & PLAN NOTE
Patient with Hypoxic Respiratory failure which is Acute.  he is not on home oxygen.   Signs/symptoms of respiratory failure include- tachypnea, increased work of breathing and wheezing. Contributing diagnoses includes - atelectasis Labs and images were reviewed. Patient Has not had a recent ABG.   - O2 by NC PRN  - nebs PRN  - CXR shows atelectasis

## 2022-12-12 NOTE — CARE UPDATE
General Surgery Treatment Plan    Patient continues with NGT in place for ileus, having high output from tube (>2200 cc over 24 hours). Having bowel movements. Leukocytosis resolving, remains on broad spectrum antibiotics with ID following. IR drain in place with 30 cc output.     - Continue NGT to LIWS with bowel rest as output is still very high. Ileus is to be expected with his concomitant intra-abdominal infection, should resolve with time.   - Will defer management of IR drain to primary team and IR  - Antibiotics per ID recommendations and culture data growing Klebsiella.  - Rest of care per primary.    We will continue to follow along. Please call with questions or concerns.     Tejal Mcqueen MD  General Surgery, PGY-IV  Pager: [phone]  12/12/2022 7:59 AM

## 2022-12-12 NOTE — SUBJECTIVE & OBJECTIVE
Interval History: Feeling better today. Wants NGT out.     Review of Systems   Constitutional:  Positive for fatigue. Negative for chills, diaphoresis and fever.   HENT:  Negative for congestion, sinus pressure and sinus pain.    Respiratory:  Negative for cough, chest tightness and shortness of breath.    Cardiovascular:  Negative for chest pain, palpitations and leg swelling.   Gastrointestinal:  Positive for abdominal distention. Negative for abdominal pain, constipation, diarrhea, nausea and vomiting.   Musculoskeletal:  Negative for arthralgias and myalgias.   Neurological:  Positive for weakness. Negative for numbness.   Psychiatric/Behavioral:  Negative for confusion.    Objective:     Vital Signs (Most Recent):  Temp: 98.2 °F (36.8 °C) (12/12/22 0335)  Pulse: 92 (12/12/22 0905)  Resp: (!) 21 (12/12/22 0905)  BP: 136/74 (12/12/22 0905)  SpO2: 96 % (12/12/22 0905)   Vital Signs (24h Range):  Temp:  [98.2 °F (36.8 °C)-98.9 °F (37.2 °C)] 98.2 °F (36.8 °C)  Pulse:  [] 92  Resp:  [17-39] 21  SpO2:  [92 %-99 %] 96 %  BP: (119-152)/(56-77) 136/74     Weight: 103.6 kg (228 lb 6.3 oz)  Body mass index is 36.86 kg/m².    Intake/Output Summary (Last 24 hours) at 12/12/2022 1030  Last data filed at 12/12/2022 0600  Gross per 24 hour   Intake 2131.42 ml   Output 2555 ml   Net -423.58 ml        Physical Exam  Vitals and nursing note reviewed.   Constitutional:       General: He is not in acute distress.     Appearance: He is obese. He is ill-appearing. He is not toxic-appearing or diaphoretic.   HENT:      Head: Normocephalic and atraumatic.      Nose:      Comments: NGT in place with bilious output     Mouth/Throat:      Mouth: Mucous membranes are moist.   Eyes:      General: No scleral icterus.  Cardiovascular:      Rate and Rhythm: Normal rate and regular rhythm.      Pulses: Normal pulses.      Heart sounds: Normal heart sounds. No murmur heard.    No gallop.   Pulmonary:      Effort: No respiratory distress.       Breath sounds: Normal breath sounds. No stridor. No wheezing, rhonchi or rales.      Comments: Decreased inspiration. Wheezing diffusely. On 4L NC  Abdominal:      General: Bowel sounds are normal. There is distension (less than yesterday).      Palpations: Abdomen is soft. There is no mass.      Tenderness: There is no abdominal tenderness. There is no guarding.      Comments: RUQ drain in place with minimal brown/orange fluid   Musculoskeletal:      Right lower leg: No edema.      Left lower leg: No edema.   Skin:     General: Skin is warm.   Neurological:      Mental Status: He is alert and oriented to person, place, and time.       Significant Labs: All pertinent labs within the past 24 hours have been reviewed.    Significant Imaging: I have reviewed all pertinent imaging results/findings within the past 24 hours.

## 2022-12-12 NOTE — SUBJECTIVE & OBJECTIVE
Interval History: Remained afebrile overnight. Reports abdominal pain has improved. Says he had a BM yesterday. NGT remains in place for ileus.    Review of Systems   Constitutional:  Negative for chills, fatigue and fever.   Respiratory:  Negative for cough and shortness of breath.    Gastrointestinal:  Positive for abdominal pain. Negative for diarrhea.   Genitourinary:  Negative for difficulty urinating and dysuria.   Skin:  Negative for rash and wound.   Neurological:  Negative for headaches.   All other systems reviewed and are negative.  Objective:     Vital Signs (Most Recent):  Temp: 98.1 °F (36.7 °C) (12/12/22 1130)  Pulse: 92 (12/12/22 1400)  Resp: 20 (12/12/22 1400)  BP: 129/63 (12/12/22 1400)  SpO2: 96 % (12/12/22 1400) Vital Signs (24h Range):  Temp:  [98.1 °F (36.7 °C)-98.9 °F (37.2 °C)] 98.1 °F (36.7 °C)  Pulse:  [] 92  Resp:  [17-39] 20  SpO2:  [95 %-99 %] 96 %  BP: (119-152)/(56-81) 129/63     Weight: 103.6 kg (228 lb 6.3 oz)  Body mass index is 36.86 kg/m².    Estimated Creatinine Clearance: 42.1 mL/min (A) (based on SCr of 2.5 mg/dL (H)).    Physical Exam  Vitals reviewed.   Constitutional:       Appearance: He is ill-appearing.   HENT:      Head: Normocephalic and atraumatic.      Nose:      Comments: NGT to suction  Eyes:      Conjunctiva/sclera: Conjunctivae normal.      Pupils: Pupils are equal, round, and reactive to light.   Cardiovascular:      Rate and Rhythm: Normal rate and regular rhythm.   Pulmonary:      Effort: Pulmonary effort is normal. No respiratory distress.      Breath sounds: Normal breath sounds.   Abdominal:      General: Abdomen is flat. Bowel sounds are normal. There is distension.      Palpations: Abdomen is soft.      Tenderness: There is no abdominal tenderness. There is no guarding.   Musculoskeletal:      Cervical back: Normal range of motion.      Right lower leg: No edema.      Left lower leg: No edema.   Skin:     Findings: No erythema or rash.    Neurological:      General: No focal deficit present.      Mental Status: He is alert and oriented to person, place, and time.   Psychiatric:         Behavior: Behavior normal.       Significant Labs: Blood Culture:   Recent Labs   Lab 12/09/22  0401 12/09/22  0423   LABBLOO No Growth to date  No Growth to date  No Growth to date  No Growth to date No Growth to date  No Growth to date  No Growth to date  No Growth to date     Wound Culture:   Recent Labs   Lab 12/09/22  1831   LABAERO KLEBSIELLA PNEUMONIAE  Many  *     All pertinent labs within the past 24 hours have been reviewed.    Significant Imaging: I have reviewed all pertinent imaging results/findings within the past 24 hours.

## 2022-12-12 NOTE — CLINICAL REVIEW
IP Sepsis Screen (most recent)       Sepsis Screen (IP) - 12/12/22 8683       Is the patient's history or complaint suggestive of a possible infection? Yes  -DD    Are there at least two of the following signs and symptoms present? Yes  -DD    Sepsis signs/symptoms - Tachycardia Tachycardia     >90  -DD    Sepsis signs/symptoms - Tachypnea Tachypnea     >20  -DD    Sepsis signs/symptoms - WBC WBC < 4,000 or WBC > 12,000  -DD    Are any of the following organ dysfunction criteria present and not considered to be due to a chronic condition? Yes  -DD    Organ Dysfunction Criteria Creatinine > 2.0  -DD    Organ Dysfunction Criteria Total Bilirubin > 2.0 Platelet count < 100,000  -DD    Initiate Sepsis Protocol No  -DD    Reason sepsis not considered Pt. receiving appropriate management  -DD              User Key  (r) = Recorded By, (t) = Taken By, (c) = Cosigned By      Initials Name    MIRIAM Louise RN

## 2022-12-13 LAB
ALBUMIN SERPL BCP-MCNC: 1.8 G/DL (ref 3.5–5.2)
ALP SERPL-CCNC: 108 U/L (ref 55–135)
ALT SERPL W/O P-5'-P-CCNC: 19 U/L (ref 10–44)
ANION GAP SERPL CALC-SCNC: 6 MMOL/L (ref 8–16)
AST SERPL-CCNC: 21 U/L (ref 10–40)
BACTERIA SPEC ANAEROBE CULT: NORMAL
BASOPHILS # BLD AUTO: 0.03 K/UL (ref 0–0.2)
BASOPHILS NFR BLD: 0.2 % (ref 0–1.9)
BILIRUB SERPL-MCNC: 2.5 MG/DL (ref 0.1–1)
BUN SERPL-MCNC: 59 MG/DL (ref 6–20)
CALCIUM SERPL-MCNC: 7.9 MG/DL (ref 8.7–10.5)
CHLORIDE SERPL-SCNC: 106 MMOL/L (ref 95–110)
CO2 SERPL-SCNC: 40 MMOL/L (ref 23–29)
CREAT SERPL-MCNC: 2 MG/DL (ref 0.5–1.4)
DIFFERENTIAL METHOD: ABNORMAL
EOSINOPHIL # BLD AUTO: 0.1 K/UL (ref 0–0.5)
EOSINOPHIL NFR BLD: 0.5 % (ref 0–8)
ERYTHROCYTE [DISTWIDTH] IN BLOOD BY AUTOMATED COUNT: 14.3 % (ref 11.5–14.5)
EST. GFR  (NO RACE VARIABLE): 41 ML/MIN/1.73 M^2
GLUCOSE SERPL-MCNC: 158 MG/DL (ref 70–110)
HCT VFR BLD AUTO: 35.6 % (ref 40–54)
HGB BLD-MCNC: 11.5 G/DL (ref 14–18)
IMM GRANULOCYTES # BLD AUTO: 0.4 K/UL (ref 0–0.04)
IMM GRANULOCYTES NFR BLD AUTO: 2 % (ref 0–0.5)
LYMPHOCYTES # BLD AUTO: 1.1 K/UL (ref 1–4.8)
LYMPHOCYTES NFR BLD: 5.7 % (ref 18–48)
MCH RBC QN AUTO: 27.9 PG (ref 27–31)
MCHC RBC AUTO-ENTMCNC: 32.3 G/DL (ref 32–36)
MCV RBC AUTO: 86 FL (ref 82–98)
MONOCYTES # BLD AUTO: 1.3 K/UL (ref 0.3–1)
MONOCYTES NFR BLD: 6.4 % (ref 4–15)
NEUTROPHILS # BLD AUTO: 16.7 K/UL (ref 1.8–7.7)
NEUTROPHILS NFR BLD: 85.2 % (ref 38–73)
NRBC BLD-RTO: 0 /100 WBC
PLATELET # BLD AUTO: 490 K/UL (ref 150–450)
PMV BLD AUTO: 10.1 FL (ref 9.2–12.9)
POTASSIUM SERPL-SCNC: 4.1 MMOL/L (ref 3.5–5.1)
PROT SERPL-MCNC: 6.4 G/DL (ref 6–8.4)
RBC # BLD AUTO: 4.12 M/UL (ref 4.6–6.2)
SODIUM SERPL-SCNC: 152 MMOL/L (ref 136–145)
WBC # BLD AUTO: 19.59 K/UL (ref 3.9–12.7)

## 2022-12-13 PROCEDURE — 63600175 PHARM REV CODE 636 W HCPCS: Performed by: STUDENT IN AN ORGANIZED HEALTH CARE EDUCATION/TRAINING PROGRAM

## 2022-12-13 PROCEDURE — 97161 PT EVAL LOW COMPLEX 20 MIN: CPT

## 2022-12-13 PROCEDURE — A4216 STERILE WATER/SALINE, 10 ML: HCPCS | Performed by: HOSPITALIST

## 2022-12-13 PROCEDURE — 97165 OT EVAL LOW COMPLEX 30 MIN: CPT

## 2022-12-13 PROCEDURE — 21400001 HC TELEMETRY ROOM

## 2022-12-13 PROCEDURE — 99900035 HC TECH TIME PER 15 MIN (STAT)

## 2022-12-13 PROCEDURE — 94760 N-INVAS EAR/PLS OXIMETRY 1: CPT

## 2022-12-13 PROCEDURE — S5010 5% DEXTROSE AND 0.45% SALINE: HCPCS | Performed by: STUDENT IN AN ORGANIZED HEALTH CARE EDUCATION/TRAINING PROGRAM

## 2022-12-13 PROCEDURE — 25000003 PHARM REV CODE 250: Performed by: STUDENT IN AN ORGANIZED HEALTH CARE EDUCATION/TRAINING PROGRAM

## 2022-12-13 PROCEDURE — 99232 SBSQ HOSP IP/OBS MODERATE 35: CPT | Mod: ,,, | Performed by: NURSE PRACTITIONER

## 2022-12-13 PROCEDURE — S0030 INJECTION, METRONIDAZOLE: HCPCS | Performed by: INTERNAL MEDICINE

## 2022-12-13 PROCEDURE — A4216 STERILE WATER/SALINE, 10 ML: HCPCS | Performed by: STUDENT IN AN ORGANIZED HEALTH CARE EDUCATION/TRAINING PROGRAM

## 2022-12-13 PROCEDURE — 25000003 PHARM REV CODE 250: Performed by: INTERNAL MEDICINE

## 2022-12-13 PROCEDURE — 94799 UNLISTED PULMONARY SVC/PX: CPT

## 2022-12-13 PROCEDURE — 85025 COMPLETE CBC W/AUTO DIFF WBC: CPT | Performed by: STUDENT IN AN ORGANIZED HEALTH CARE EDUCATION/TRAINING PROGRAM

## 2022-12-13 PROCEDURE — 99232 PR SUBSEQUENT HOSPITAL CARE,LEVL II: ICD-10-PCS | Mod: ,,, | Performed by: NURSE PRACTITIONER

## 2022-12-13 PROCEDURE — 63600175 PHARM REV CODE 636 W HCPCS: Performed by: INTERNAL MEDICINE

## 2022-12-13 PROCEDURE — S5010 5% DEXTROSE AND 0.45% SALINE: HCPCS | Performed by: INTERNAL MEDICINE

## 2022-12-13 PROCEDURE — 99233 SBSQ HOSP IP/OBS HIGH 50: CPT | Mod: ,,, | Performed by: INTERNAL MEDICINE

## 2022-12-13 PROCEDURE — 63600175 PHARM REV CODE 636 W HCPCS: Performed by: HOSPITALIST

## 2022-12-13 PROCEDURE — 99233 PR SUBSEQUENT HOSPITAL CARE,LEVL III: ICD-10-PCS | Mod: ,,, | Performed by: INTERNAL MEDICINE

## 2022-12-13 PROCEDURE — 36415 COLL VENOUS BLD VENIPUNCTURE: CPT | Performed by: STUDENT IN AN ORGANIZED HEALTH CARE EDUCATION/TRAINING PROGRAM

## 2022-12-13 PROCEDURE — 27000221 HC OXYGEN, UP TO 24 HOURS

## 2022-12-13 PROCEDURE — 80053 COMPREHEN METABOLIC PANEL: CPT | Performed by: STUDENT IN AN ORGANIZED HEALTH CARE EDUCATION/TRAINING PROGRAM

## 2022-12-13 PROCEDURE — 25000003 PHARM REV CODE 250: Performed by: HOSPITALIST

## 2022-12-13 RX ORDER — HYDROMORPHONE HYDROCHLORIDE 1 MG/ML
1 INJECTION, SOLUTION INTRAMUSCULAR; INTRAVENOUS; SUBCUTANEOUS EVERY 6 HOURS PRN
Status: DISCONTINUED | OUTPATIENT
Start: 2022-12-13 | End: 2022-12-16

## 2022-12-13 RX ORDER — METRONIDAZOLE 500 MG/100ML
500 INJECTION, SOLUTION INTRAVENOUS
Status: DISCONTINUED | OUTPATIENT
Start: 2022-12-13 | End: 2022-12-22

## 2022-12-13 RX ORDER — DEXTROSE MONOHYDRATE AND SODIUM CHLORIDE 5; .45 G/100ML; G/100ML
INJECTION, SOLUTION INTRAVENOUS CONTINUOUS
Status: DISCONTINUED | OUTPATIENT
Start: 2022-12-13 | End: 2022-12-14

## 2022-12-13 RX ADMIN — Medication 10 ML: at 10:12

## 2022-12-13 RX ADMIN — MUPIROCIN: 20 OINTMENT TOPICAL at 09:12

## 2022-12-13 RX ADMIN — METRONIDAZOLE 500 MG: 500 INJECTION, SOLUTION INTRAVENOUS at 03:12

## 2022-12-13 RX ADMIN — HYDROMORPHONE HYDROCHLORIDE 0.5 MG: 1 INJECTION, SOLUTION INTRAMUSCULAR; INTRAVENOUS; SUBCUTANEOUS at 08:12

## 2022-12-13 RX ADMIN — CEFAZOLIN SODIUM 2 G: 2 SOLUTION INTRAVENOUS at 08:12

## 2022-12-13 RX ADMIN — MUPIROCIN: 20 OINTMENT TOPICAL at 08:12

## 2022-12-13 RX ADMIN — Medication 10 ML: at 01:12

## 2022-12-13 RX ADMIN — DEXTROSE AND SODIUM CHLORIDE: 5; .45 INJECTION, SOLUTION INTRAVENOUS at 08:12

## 2022-12-13 RX ADMIN — METRONIDAZOLE 500 MG: 500 INJECTION, SOLUTION INTRAVENOUS at 08:12

## 2022-12-13 RX ADMIN — DEXTROSE AND SODIUM CHLORIDE: 5; .45 INJECTION, SOLUTION INTRAVENOUS at 06:12

## 2022-12-13 RX ADMIN — CEFAZOLIN SODIUM 2 G: 2 SOLUTION INTRAVENOUS at 09:12

## 2022-12-13 RX ADMIN — Medication 10 ML: at 05:12

## 2022-12-13 RX ADMIN — HYDROMORPHONE HYDROCHLORIDE 0.5 MG: 1 INJECTION, SOLUTION INTRAMUSCULAR; INTRAVENOUS; SUBCUTANEOUS at 03:12

## 2022-12-13 RX ADMIN — HEPARIN SODIUM 5000 UNITS: 5000 INJECTION INTRAVENOUS; SUBCUTANEOUS at 01:12

## 2022-12-13 RX ADMIN — DEXTROSE AND SODIUM CHLORIDE: 5; .45 INJECTION, SOLUTION INTRAVENOUS at 01:12

## 2022-12-13 RX ADMIN — HYDROMORPHONE HYDROCHLORIDE 1 MG: 1 INJECTION, SOLUTION INTRAMUSCULAR; INTRAVENOUS; SUBCUTANEOUS at 01:12

## 2022-12-13 RX ADMIN — HEPARIN SODIUM 5000 UNITS: 5000 INJECTION INTRAVENOUS; SUBCUTANEOUS at 05:12

## 2022-12-13 RX ADMIN — HYDROMORPHONE HYDROCHLORIDE 1 MG: 1 INJECTION, SOLUTION INTRAMUSCULAR; INTRAVENOUS; SUBCUTANEOUS at 10:12

## 2022-12-13 RX ADMIN — HEPARIN SODIUM 5000 UNITS: 5000 INJECTION INTRAVENOUS; SUBCUTANEOUS at 10:12

## 2022-12-13 NOTE — PLAN OF CARE
Pt remains in ICU on 2L NC, Tylenol suppository given for max temp 101, current temp 98.7, 1000 mL urine output, 750 mL green bile output from NG at LIS, 3 small loose mucoid BM, Dilaudid given for abd pain x1 with relief.

## 2022-12-13 NOTE — ASSESSMENT & PLAN NOTE
Unclear how he got this. IR drain placed 12/9. Cultures with Klebsiella, pansensitive.   - MRCP:persistent heterogeneous collection within the right hepatic lobe compatible with reported abscess noting percutaneous drainage catheter in place. No significant biliary duct abnormality on MRCP assessment.  - Blood cx with NGTD  - ID consulted: on cefazolin, added flagyl on 12/13  - GI consulted to assess for source of liver abscess. Pt refuses inpatient colonoscopy

## 2022-12-13 NOTE — PROGRESS NOTES
"[facility] Progress Note    Patient Complaint: generalized weakness    PCP:   Primary Doctor No       LOS: 4        Initial History of Present Illness (HPI):  This is a 47 y.o. male consulted to GI service for Klebsiella liver abscess. Interview conducted with [employer] translation services in the language of Tagalog. Patient complaint of generalized weakness with associated fatigue, HA , difficuly urinating and chills that began last week. Patient endorses traveling to the [facility] 8 weeks ago and had not experienced any of these symptoms prior. Reports seeing a provider in the [facility] and informed of having a fatty liver and high cholesterol. Reports after trip, symptoms began and saw another provider and received a "shot", patient was unable to name the shot or the reasoning for it's administration. Reports the symptoms reemerged last week. Denies smoking, heavy drinking and illicit drug use. Denies n/v, abdominal pain, constipation or diarrhea. Reports abdominal distention. Reports he is able to pass flatus and last stool this am.    Interval Hx  Febrile overnight.Abdominal distention improved yet complaining of lower abdominal pain today. NG in place and continues to have significant output. MRCP on yesterday negative for abnormality or signs of inflammatory involvement of biliary or pancreatic systems.    Review of Systems   Constitutional:  Positive for chills. Negative for activity change, diaphoresis and fever.   HENT:  Negative for congestion, drooling, rhinorrhea, sore throat and trouble swallowing.    Eyes:  Negative for discharge.   Respiratory:  Negative for cough, shortness of breath and wheezing.    Cardiovascular:  Negative for chest pain, palpitations and leg swelling.   Gastrointestinal:  Positive for abdominal distention. Negative for abdominal pain, anal bleeding, blood in stool, constipation, diarrhea, nausea, rectal pain and vomiting.   Endocrine: Negative for cold " intolerance and heat intolerance.   Genitourinary:  Positive for difficulty urinating. Negative for frequency, hematuria and urgency.   Musculoskeletal:  Negative for arthralgias.   Skin:  Negative for color change, pallor and rash.   Neurological:  Positive for weakness and headaches. Negative for syncope, facial asymmetry and numbness.   Psychiatric/Behavioral:  Negative for agitation and confusion. The patient is not nervous/anxious.          Medical History:  has a past medical history of Hypertension.    Surgical History: Patient denies any pertinent past surgical history.    Family History: Patient denies any pertinent family history.    Social History: Denies smoking. Occasional social drinker with wife. Denies illicit drug use.    Review of patient's allergies indicates:  No Known Allergies    No current facility-administered medications on file prior to encounter.     Current Outpatient Medications on File Prior to Encounter   Medication Sig Dispense Refill    NON FORMULARY MEDICATION Losartan/amlodipine 100/10 mg   Take 1 tablet by mouth daily          Objective Findings:    Vital Signs:  Temp:  [97.7 °F (36.5 °C)-101 °F (38.3 °C)]   Pulse:  [78-98]   Resp:  [16-26]   BP: (113-148)/(62-81)   SpO2:  [92 %-100 %]   Body mass index is 36.86 kg/m².      Physical Exam  Vitals and nursing note reviewed.   Constitutional:       Appearance: Normal appearance.   HENT:      Head: Normocephalic.      Nose: Nose normal.      Mouth/Throat:      Mouth: Mucous membranes are moist.   Eyes:      Pupils: Pupils are equal, round, and reactive to light.   Cardiovascular:      Heart sounds: Normal heart sounds.   Pulmonary:      Effort: Pulmonary effort is normal.      Breath sounds: Normal breath sounds.   Abdominal:      General: Bowel sounds are normal. There is no distension.      Palpations: Abdomen is soft.      Tenderness: There is no abdominal tenderness.   Musculoskeletal:         General: Normal range of motion.       Cervical back: Normal range of motion.   Skin:     Capillary Refill: Capillary refill takes less than 2 seconds.   Neurological:      General: No focal deficit present.      Mental Status: He is alert and oriented to person, place, and time.   Psychiatric:         Mood and Affect: Mood normal.         Behavior: Behavior normal.         Thought Content: Thought content normal.         Judgment: Judgment normal.             Labs:  Lab Results   Component Value Date    WBC 19.59 (H) 2022    HGB 11.5 (L) 2022    HCT 35.6 (L) 2022     (H) 2022    ALT 19 2022    AST 21 2022     (H) 2022    K 4.1 2022     2022    CREATININE 2.0 (H) 2022    BUN 59 (H) 2022    CO2 40 (H) 2022    INR 1.3 (H) 2022    HGBA1C 5.6 12/10/2022             Imagin/13 MRCP-Persistent heterogeneous collection within the right hepatic lobe compatible with reported abscess noting percutaneous drainage catheter in place.No significant biliary duct abnormality on MRCP assessment noting limitations from motion artifact.Distended loops of small bowel in the left mid abdomen with air-fluid levels.       Xray abdomen-Multiple loops of dilated small bowel are present in the left abdomen that measure up to 6.5 cm.      US abdomen- No detected gallbladder wall thickening, cholelithiasis, or pericholecystic fluid.  Common duct normal in size measuring 4 mm.  Diffuse component of increased hepatic parenchymal echotexture.  There is an ill-defined region of decreased echogenicity within the posterior aspect of the right hepatic lobe.    I have independently reviewed and interpreted the imaging above    Assessment:  Patient is a .47 y.o. y/o .male with  has a past medical history of Hypertension. Consulted to the GI service for klebsiella liver abscess.               Recommendations:  Leukocytosis.Hepatic abscess. Elevated bilirubin. Ileus. ERYN. Anemia of  acute infection.  Wbc improving. Liver abscess drained on 12/9 with aerobic culture positive for klebsiella pneumoniae. On cefazolin, ID following. Bilirubin is elevated, improved s/p abscess drainage. Continue to monitor levels. There is the question of what is the possible source of the liver abscess. Plan for MRCP today and colonoscopy TBD patient optimization and patient agreeable to prep and procedure.   Dilated small bowel suspicion of illeus noted on imaging and NG tube with large output per Dr Howell's note on 12/11. Today with 300ml of green output, abdomen still distended. Rec to continue NG. MRI MRCP ordered to further assess ileus.  Iron deficiency anemia with anemia of acute infection noted with labs. No overt bleeding. Continue to monitor hgb.    --No acute findings on MRCP. Refused inpatient colonoscopy. Will discuss outpatient option. NG wilth 200ml this am, reassess in evening and if output noticeably decreasing d/c and begin clear liquids. If not re-assess in am to pull if appropriate.    Thank you so much for allowing us to participate in the care of Vivek Turner . Please contact us if you have any additional questions.    Sidra Crawford NP  Gastroenterology  [facility] [place] [facility]

## 2022-12-13 NOTE — SUBJECTIVE & OBJECTIVE
Interval History: Feeling better today. Wants NGT out. States that he is very hungry. No N/V or diarrhea. No abdominal pain this morning.     Review of Systems   Constitutional:  Positive for fatigue. Negative for chills, diaphoresis and fever.   HENT:  Negative for congestion and trouble swallowing.    Respiratory:  Negative for cough, chest tightness and shortness of breath.    Cardiovascular:  Negative for chest pain, palpitations and leg swelling.   Gastrointestinal:  Positive for abdominal distention. Negative for abdominal pain, constipation, diarrhea, nausea and vomiting.   Musculoskeletal:  Negative for arthralgias and myalgias.   Neurological:  Positive for weakness. Negative for numbness.   Psychiatric/Behavioral:  Negative for confusion.      Objective:     Vital Signs (Most Recent):  Temp: 98.6 °F (37 °C) (12/13/22 1044)  Pulse: 92 (12/13/22 1044)  Resp: 20 (12/13/22 1044)  BP: 135/81 (12/13/22 1044)  SpO2: (!) 92 % (12/13/22 1044)   Vital Signs (24h Range):  Temp:  [97.7 °F (36.5 °C)-101 °F (38.3 °C)] 98.6 °F (37 °C)  Pulse:  [78-98] 92  Resp:  [16-26] 20  SpO2:  [92 %-100 %] 92 %  BP: (113-148)/(62-81) 135/81     Weight: 103.6 kg (228 lb 6.3 oz)  Body mass index is 36.86 kg/m².    Intake/Output Summary (Last 24 hours) at 12/13/2022 1229  Last data filed at 12/13/2022 0900  Gross per 24 hour   Intake 2122.82 ml   Output 3500 ml   Net -1377.18 ml        Physical Exam  Vitals and nursing note reviewed.   Constitutional:       General: He is not in acute distress.     Appearance: He is obese. He is ill-appearing. He is not toxic-appearing or diaphoretic.   HENT:      Head: Normocephalic and atraumatic.      Nose:      Comments: NGT in place with bilious output     Mouth/Throat:      Mouth: Mucous membranes are moist.   Eyes:      General: No scleral icterus.  Cardiovascular:      Rate and Rhythm: Normal rate and regular rhythm.      Pulses: Normal pulses.      Heart sounds: Normal heart sounds. No murmur  heard.    No gallop.   Pulmonary:      Effort: No respiratory distress.      Breath sounds: Normal breath sounds. No wheezing or rales.      Comments: Decreased inspiration. No wheezes on exam. On 2L NC  Abdominal:      General: Bowel sounds are normal. There is distension (less than yesterday).      Palpations: Abdomen is soft. There is no mass.      Tenderness: There is no abdominal tenderness. There is no guarding.      Comments: RUQ drain in place with minimal brown/orange fluid   Musculoskeletal:      Right lower leg: No edema.      Left lower leg: No edema.   Skin:     General: Skin is warm.   Neurological:      Mental Status: He is alert and oriented to person, place, and time.       Significant Labs: All pertinent labs within the past 24 hours have been reviewed.    Significant Imaging: I have reviewed all pertinent imaging results/findings within the past 24 hours.

## 2022-12-13 NOTE — PLAN OF CARE
Problem: Physical Therapy  Goal: Physical Therapy Goal  Description: Goals to be met by: 22     Patient will increase functional independence with mobility by performin. Pt to mod I with bed mobility.  2. Pt to transfer with supervision/mod I.  3. Pt to ambulate 150' /c or /s AD and supervision.    Outcome: Ongoing, Progressing   Initial eval completed.  See in chart for details.

## 2022-12-13 NOTE — NURSING TRANSFER
Nursing Transfer Note      12/13/2022     Reason patient is being transferred: ICU stepdown    Transfer From: ICU    Transfer via stretcher    Transfer with cardiac monitoring and o2 therapy    Transported by transport and ICU nurse    Medicines sent: yes    Any special needs or follow-up needed: no    Chart send with patient: Yes    Notified: patient will notify family    Patient reassessed at: 11am 12/13/2022  Upon arrival to floor: cardiac monitor applied, patient oriented to room, call bell in reach, and bed in lowest position    Denies any pain or discomfort at this time. NGT in place to right nare, IVF infusing. MARLEY drain to right upper quad, draining minimal yellow/white drainage Will continue plan of care

## 2022-12-13 NOTE — NURSING
[facility] Intensive Care  ICU Shift Summary  Date: 12/12/2022      Prehospitalization: Home  Admit Date / LOS : 12/9/2022/ 3 days    Diagnosis: Hepatic abscess    Consults:        Active: GI, ID, Nephro, OT, and PT       Needed: N/A     Code Status: Full Code   Advanced Directive: <no information>    LDA:  Lines/Drains/Airways       Drain  Duration                  Closed/Suction Drain 12/09/22 1748 Lateral RLQ Bulb 3 days         NG/OG Tube 12/11/22 0735 nasogastric 16 Fr. Right nostril 1 day              Peripheral Intravenous Line  Duration                  Peripheral IV - Single Lumen 12/08/22 18 G Right Antecubital 4 days         Peripheral IV - Single Lumen 12/09/22 0401 18 G Left Antecubital 3 days                  Central Lines/Site/Justification:Patient Does Not Have Central Line  Urinary Cath/Order/Justification:Patient Does Not Have Urinary Catheter    Vasopressors/Infusions:    dextrose 5 % and 0.45 % NaCl 100 mL/hr at 12/12/22 1800          GOALS: Volume/ Hemodynamic: MAP >                     RASS: 0  alert and calm    Pain Management: IV       Pain Controlled: yes     Rhythm: NSR    Respiratory Device: Nasal Cannula                  Most Recent SBT/ SAT: N/A       MOVE Screen: PT Consult  ICU Liberation: yes    VTE Prophylaxis: Pharm  Mobility: bedrest  Stress Ulcer Prophylaxis: No    Isolation: No active isolations    Dietary:   Current Diet Order   Procedures    Diet NPO      Tolerance: no  Advancement: no    I & O (24h):    Intake/Output Summary (Last 24 hours) at 12/12/2022 1804  Last data filed at 12/12/2022 1800  Gross per 24 hour   Intake 2454.19 ml   Output 3390 ml   Net -935.81 ml        Restraints: No    Significant Dates:  Post Op Date: N/A  Rescue Date: N/A  Imaging/ Diagnostics: N/A    Noteworthy Labs:  none    COVID Test: (--)  CBC/Anemia Labs: Coags:    Recent Labs   Lab 12/10/22  1147 12/11/22  0134 12/12/22  0420   WBC  --  23.77* 18.68*   HGB  --  11.0* 11.5*   HCT  --  29.4* 33.8*    PLT  --  388 435   MCV  --  80* 83   RDW  --  13.2 13.8   IRON 25*  --   --    FERRITIN 3,633*  --   --    RETIC 1.0  --   --    FOLATE 10.5  --   --    UZMVYRYA58 >2000*  --   --     Recent Labs   Lab 12/09/22  0401   INR 1.3*        Chemistries:   Recent Labs   Lab 12/11/22  0134 12/11/22  1600 12/12/22  0420    142 145   K 3.7 3.8 3.9    100 103   CO2 26 33* 33*   BUN 85* 82* 72*   CREATININE 4.2* 3.2* 2.5*   CALCIUM 7.6* 8.0* 7.8*   PROT 6.0  --  6.1   BILITOT 3.9*  --  3.0*   ALKPHOS 127  --  105   ALT 39  --  25   AST 18  --  16   MG 2.9* 2.8*  --    PHOS 5.0* 3.9  --         Cardiac Enzymes: Ejection Fractions:    Recent Labs     12/11/22  0134 12/11/22  0454   TROPONINI 0.014 <0.006    EF   Date Value Ref Range Status   12/11/2022 65 % Final        POCT Glucose: HbA1c:    No results for input(s): POCTGLUCOSE in the last 168 hours. Hemoglobin A1C   Date Value Ref Range Status   12/10/2022 5.6 4.0 - 5.6 % Final     Comment:     ADA Screening Guidelines:  5.7-6.4%  Consistent with prediabetes  >or=6.5%  Consistent with diabetes    High levels of fetal hemoglobin interfere with the HbA1C  assay. Heterozygous hemoglobin variants (HbS, HgC, etc)do  not significantly interfere with this assay.   However, presence of multiple variants may affect accuracy.             ICU LOS 2d 22h  Level of Care: Critical Care and OK to Transfer    Chart Check: 12 HR Done  Shift Summary/Plan for the shift: pt remains in ICU, oriented x4. In NSR. 2LNC, pt denies SOB/chest pain. NGT to LIWS- 850ml total out. Denies abdominal pain. Voids per urinal. 3 small Bms- loose/mucoid green. D51/2 NS continues to infuse. MRI completed, pt tolerated well.  utilized, all questions addressed and pt updated on plan of care

## 2022-12-13 NOTE — ASSESSMENT & PLAN NOTE
Patient has Non- operative ileus which is adynamic in etiology which is worsening. Will treat conservatively with bowel rest, IV fluids, serial abdominal exams and avoidance of GI paralytics such as narcotics or anti-spasmodics. Monitor patient closely.     - NGT placed 12/11 with large output-- continue  -GI following   -Surgery following

## 2022-12-13 NOTE — NURSING
[facility] [place]  Nurses Note -- 4 Eyes      12/13/2022       Skin assessed on: Q Shift      [x] No Pressure Injuries Present    [x]Prevention Measures Documented    [] Yes LDA  for Pressure Injury Previously documented     [] Yes New Pressure Injury Discovered   [] LDA for New Pressure Injury Added      Attending RN:  CHACE Taylor     Second RN:  CHACE Cervantes

## 2022-12-13 NOTE — PROGRESS NOTES
[facility] Medicine  Progress Note    Patient Name: Vivek Turner  MRN: [id number]  Patient Class: IP- Inpatient   Admission Date: 12/9/2022  Length of Stay: 4 days  Attending Physician: Elmer Noriega DO  Primary Care Provider: Primary Doctor No        Subjective:     Principal Problem:Hepatic abscess        HPI:  47y M w/ hypertension presents with abdominal pain x 2 days. Pt refused  service. He reports that he had fever/rigors three days prior to admission. He then developed worsening abdominal pain from then until admission. He went to an urgent care where he was started on bactrim for UTI. His abdominal pain increased over the next few days. He had a BM yesterday. Denies vomiting, but has had significant nausea and very limited PO intake in the days leading to admission. Aside from the single episode of fever/rigors he denies subsequent fevers.     In the ED he was found to have a R hepatic abscess vs mass. He traveled to the [facility] eight weeks prior to presentation. He denies fevers except for what is listed above. Denies weight loss, cough, night sweats. Denies prior hx of TB. Denies prior hx of cirrhosis      Overview/Hospital Course:  Mr Vivek Turner was admitted with severe sepsis due to R liver mass vs abscess and acute renal failure. IR consulted and drain placed on 12/9. Cultures with Klebsiella. ID consulted-continue cefazolin, and restarted Flagyl on 12/13. Started on bicarb gtt for acute renal failure and Nephrology consulted. He was moved to ICU for instability. He has ileus; NGT placed with copious bilious output. Renal failure improving. GI consulted to assess for source of liver abscess. MRCP showed persistent heterogeneous collection within the right hepatic lobe compatible with reported abscess noting percutaneous drainage catheter in place. No significant biliary duct abnormality on MRCP assessment. Pt refused inpatient colonoscopy. Pt transferred to the  floor on 12/12. PT/OT recommends HH. Monitor renal function and ileus at this time.       Interval History: Feeling better today. Wants NGT out. States that he is very hungry. No N/V or diarrhea. No abdominal pain this morning.     Review of Systems   Constitutional:  Positive for fatigue. Negative for chills, diaphoresis and fever.   HENT:  Negative for congestion and trouble swallowing.    Respiratory:  Negative for cough, chest tightness and shortness of breath.    Cardiovascular:  Negative for chest pain, palpitations and leg swelling.   Gastrointestinal:  Positive for abdominal distention. Negative for abdominal pain, constipation, diarrhea, nausea and vomiting.   Musculoskeletal:  Negative for arthralgias and myalgias.   Neurological:  Positive for weakness. Negative for numbness.   Psychiatric/Behavioral:  Negative for confusion.      Objective:     Vital Signs (Most Recent):  Temp: 98.6 °F (37 °C) (12/13/22 1044)  Pulse: 92 (12/13/22 1044)  Resp: 20 (12/13/22 1044)  BP: 135/81 (12/13/22 1044)  SpO2: (!) 92 % (12/13/22 1044)   Vital Signs (24h Range):  Temp:  [97.7 °F (36.5 °C)-101 °F (38.3 °C)] 98.6 °F (37 °C)  Pulse:  [78-98] 92  Resp:  [16-26] 20  SpO2:  [92 %-100 %] 92 %  BP: (113-148)/(62-81) 135/81     Weight: 103.6 kg (228 lb 6.3 oz)  Body mass index is 36.86 kg/m².    Intake/Output Summary (Last 24 hours) at 12/13/2022 1229  Last data filed at 12/13/2022 0900  Gross per 24 hour   Intake 2122.82 ml   Output 3500 ml   Net -1377.18 ml        Physical Exam  Vitals and nursing note reviewed.   Constitutional:       General: He is not in acute distress.     Appearance: He is obese. He is ill-appearing. He is not toxic-appearing or diaphoretic.   HENT:      Head: Normocephalic and atraumatic.      Nose:      Comments: NGT in place with bilious output     Mouth/Throat:      Mouth: Mucous membranes are moist.   Eyes:      General: No scleral icterus.  Cardiovascular:      Rate and Rhythm: Normal rate and  regular rhythm.      Pulses: Normal pulses.      Heart sounds: Normal heart sounds. No murmur heard.    No gallop.   Pulmonary:      Effort: No respiratory distress.      Breath sounds: Normal breath sounds. No wheezing or rales.      Comments: Decreased inspiration. No wheezes on exam. On 2L NC  Abdominal:      General: Bowel sounds are normal. There is distension (less than yesterday).      Palpations: Abdomen is soft. There is no mass.      Tenderness: There is no abdominal tenderness. There is no guarding.      Comments: RUQ drain in place with minimal brown/orange fluid   Musculoskeletal:      Right lower leg: No edema.      Left lower leg: No edema.   Skin:     General: Skin is warm.   Neurological:      Mental Status: He is alert and oriented to person, place, and time.       Significant Labs: All pertinent labs within the past 24 hours have been reviewed.    Significant Imaging: I have reviewed all pertinent imaging results/findings within the past 24 hours.      Assessment/Plan:      * Hepatic abscess  Unclear how he got this. IR drain placed 12/9. Cultures with Klebsiella, pansensitive.   - MRCP:persistent heterogeneous collection within the right hepatic lobe compatible with reported abscess noting percutaneous drainage catheter in place. No significant biliary duct abnormality on MRCP assessment.  - Blood cx with NGTD  - ID consulted: on cefazolin, added flagyl on 12/13  - GI consulted to assess for source of liver abscess. Pt refuses inpatient colonoscopy      Severe sepsis  This patient does have evidence of infective focus. My overall impression is sepsis. Vital signs were reviewed and noted in progress note.  Antibiotics given-   Antibiotics (From admission, onward)    Start     Stop Route Frequency Ordered    12/13/22 0833  metronidazole IVPB 500 mg         -- IV Every 8 hours (non-standard times) 12/13/22 0833    12/11/22 2100  cefazolin (ANCEF) 2 gram in dextrose 5% 50 mL IVPB (premix)         --  IV Every 12 hours (non-standard times) 12/11/22 1801    12/09/22 2100  mupirocin 2 % ointment         12/14 2059 Nasl 2 times daily 12/09/22 1424        Cultures were taken-   Microbiology Results (last 7 days)     Procedure Component Value Units Date/Time    Culture, Anaerobe [[id number]] Collected: 12/09/22 1831    Order Status: Completed Specimen: Abscess from Abdomen Updated: 12/13/22 0835     Anaerobic Culture No anaerobes isolated    Aerobic culture [[id number]]  (Abnormal)  (Susceptibility) Collected: 12/09/22 1831    Order Status: Completed Specimen: Abscess from Abdomen Updated: 12/11/22 0915     Aerobic Bacterial Culture KLEBSIELLA PNEUMONIAE  Many      Gram stain [[id number]] Collected: 12/09/22 1831    Order Status: Completed Specimen: Abscess from Abdomen Updated: 12/10/22 0755     Gram Stain Result Many WBC's      No organisms seen    Fungus culture [[id number]] Collected: 12/09/22 1831    Order Status: Sent Specimen: Abscess from Abdomen Updated: 12/09/22 1905        Latest lactate reviewed, they are-  Recent Labs   Lab 12/10/22  2204   LACTATE 1.3       Organ dysfunction indicated by Acute kidney injury and Acute liver injury  Source- liver abscess    Source control Achieved by- IR    -Due to hepatic abscess  -s/p IR drainage on 12/09  -ID consulted:  Continue cefazolin and Flagyl  -blood cultures with no growth to date   -abscess culture with pansensitive Klebsiella   -continue IV antibiotics as above   -This is improving.       ATN (acute tubular necrosis)  Creatinine at 6.1 at admission. Unknown baseline with no prior labs to review. FENa 1.4% suggesting intrinsic injury. This may be ATN from sepsis +/- Bactrim pseudotoxicity. No hydro seen on CT or US.   - IVF ordered  - Nephrology consulted  - this is improving     Patient with acute kidney injury likely due to IVVD/dehydration and acute tubular necrosis MARYURI is currently improving. Labs reviewed- Renal function/electrolytes with Estimated Creatinine  Clearance: 52.6 mL/min (A) (based on SCr of 2 mg/dL (H)). according to latest data. Monitor urine output and serial BMP and adjust therapy as needed. Avoid nephrotoxins and renally dose meds for GFR listed above.       Acute hypoxemic respiratory failure  Patient with Hypoxic Respiratory failure which is Acute.  he is not on home oxygen.   Signs/symptoms of respiratory failure include- tachypnea, increased work of breathing and wheezing. Contributing diagnoses includes - atelectasis Labs and images were reviewed. Patient Has not had a recent ABG.     - O2 by NC PRN  - nebs PRN  - CXR shows atelectasis    Atelectasis  Shortness of breath partly due to atelectasis as noted on CXR  - incentive spirometer  - O2 by NC as needed      Ileus  Patient has Non- operative ileus which is adynamic in etiology which is worsening. Will treat conservatively with bowel rest, IV fluids, serial abdominal exams and avoidance of GI paralytics such as narcotics or anti-spasmodics. Monitor patient closely.     - NGT placed 12/11 with large output-- continue  -GI following   -Surgery following       Microcytic anemia  Hgb 10-11, no prior to review.   Iron deficient- TSAT 17, but also significant acute inflammation- ferritin 3633.   B12, folate replete.   Stable        Hyperglycemia  A1c normal.       VTE Risk Mitigation (From admission, onward)         Ordered     heparin (porcine) injection 5,000 Units  Every 8 hours         12/10/22 0838     IP VTE HIGH RISK PATIENT  Once         12/09/22 1421     Place sequential compression device  Until discontinued         12/09/22 1421                Discharge Planning   VIVIEN: 12/16/2022     Code Status: Full Code   Is the patient medically ready for discharge?:     Reason for patient still in hospital (select all that apply): Patient trending condition, Laboratory test, Treatment, Consult recommendations and PT / OT recommendations  Discharge Plan A: Home                  Natalya-Miley Noriega,  DO  Department of Hospital Medicine   [facility] [place]

## 2022-12-13 NOTE — CLINICAL REVIEW
IP Sepsis Screen (most recent)       Sepsis Screen (IP) - 12/13/22 1501       Is the patient's history or complaint suggestive of a possible infection? Yes  -DD    Are there at least two of the following signs and symptoms present? Yes  -DD    Sepsis signs/symptoms - Tachycardia Tachycardia     >90  -DD    Sepsis signs/symptoms - WBC WBC < 4,000 or WBC > 12,000  -DD    Are any of the following organ dysfunction criteria present and not considered to be due to a chronic condition? Yes  -DD    Organ Dysfunction Criteria Total Bilirubin > 2.0 Platelet count < 100,000  -DD    Initiate Sepsis Protocol No  -DD    Reason sepsis not considered Pt. receiving appropriate management  -DD              User Key  (r) = Recorded By, (t) = Taken By, (c) = Cosigned By      Initials Name    MIRIAM Louise RN

## 2022-12-13 NOTE — PROGRESS NOTES
[facility]  Infectious Disease  Progress Note    Patient Name: Vivek Turner  MRN: [id number]  Admission Date: 12/9/2022  Length of Stay: 4 days  Attending Physician: Elmer Noriega DO  Primary Care Provider: Primary Doctor No    Isolation Status: No active isolations  Assessment/Plan:      * Hepatic abscess  47M with h/o HTN admitted 12/9 with abdominal pain, fount to have liver abscess and MARYURI, s/p IR drainage on 12/9 (CX + pan-sen kleb pna), now complicated by ileus, MARYURI.  bcx ngtd. Was on cefepime/metronidazole, switched to cefazolin 12/11. HIV neg.     Etiology of abscess is unclear. MRCP 12/12 unremarkable. Showed heterogenous fluid collection at inferior aspect of the right hepatic lobe measuring approximately 8.8 x 6.8 x 9.3 cm (known abscess).    Tmax 101 overnight.    Recommendations:   - continue cefazolin  - restart metronidazole while awaiting for final anaerobic cx results (ordered)  - duration of abx until radiographic resolution of abscess; will need repeat CT in approx 4 wks  - given size of abscess will likely DC on IV abx x atleast 4 wks  - refused colonoscopy; can schedule outpt  - if febrile please repeat BCx              Anticipated Disposition: tbd    Thank you for your consult. I will follow-up with patient. Please contact us if you have any additional questions.    Chelsea Catherine MD  Infectious Disease  [facility]    Subjective:     Principal Problem:Hepatic abscess    HPI:   47M with h/o htn admitted 12/9 with several days of progressively worsening abdominal pain. Reports fever and nausea and decreased po intake.  Denies diarrhea. Denies weight loss. Notably reports traveling to [facility] several months ago.     Had abdominal ct and ultrasound  1. No sonographic abnormality of the gallbladder detected.  2. Ill-defined region of decreased echotexture within the posterior aspect of the right hepatic lobe corresponding to the findings seen on  "recent prior CT.  This could indicate a mass, abscess, or intense fatty infiltration.  3. Diffuse increased hepatic parenchymal echotexture suspected to represent hepatic steatosis.      S/p IR drainage of abscess. Reports pain improving.   KLEBSIELLA PNEUMONIAE   Many      Resulting Agency [facility]        Susceptibility     Klebsiella pneumoniae     CULTURE, AEROBIC  (SPECIFY SOURCE)     Amox/K Clav'ate <=8/4 mcg/mL Sensitive     Amp/Sulbactam <=8/4 mcg/mL Sensitive     Cefazolin <=2 mcg/mL Sensitive     Cefepime <=2 mcg/mL Sensitive     Ceftriaxone <=1 mcg/mL Sensitive     Ciprofloxacin <=1 mcg/mL Sensitive     Ertapenem <=0.5 mcg/mL Sensitive     Gentamicin <=4 mcg/mL Sensitive     Levofloxacin <=2 mcg/mL Sensitive     Meropenem <=1 mcg/mL Sensitive     Minocycline <=4 mcg/mL Sensitive     Piperacillin/Tazo <=16 mcg/mL Sensitive     Tetracycline <=4 mcg/mL Sensitive     Tobramycin <=4 mcg/mL Sensitive     Trimeth/Sulfa <=2/38 mcg/mL Sensitive                 Silvino improving    On cefepime/metronidazole    Hiv ordered, pending      ID consulted for "klebsiella liver abscess"    Interval History: Febrile overnight. MRCP yesterday unremarkable aside from known liver abscess,    Review of Systems   Constitutional:  Negative for chills, fatigue and fever.   Respiratory:  Negative for cough and shortness of breath.    Gastrointestinal:  Positive for abdominal pain. Negative for diarrhea.   Genitourinary:  Negative for difficulty urinating and dysuria.   Skin:  Negative for rash and wound.   Neurological:  Negative for headaches.   All other systems reviewed and are negative.  Objective:     Vital Signs (Most Recent):  Temp: 98.7 °F (37.1 °C) (12/13/22 0500)  Pulse: 91 (12/13/22 0545)  Resp: (!) 22 (12/13/22 0545)  BP: 131/66 (12/13/22 0500)  SpO2: (!) 94 % (12/13/22 0545)   Vital Signs (24h Range):  Temp:  [98.1 °F (36.7 °C)-101 °F (38.3 °C)] 98.7 °F (37.1 °C)  Pulse:  [78-98] 91  Resp:  [17-26] 22  SpO2:  [94 %-100 %] 94 " %  BP: (113-140)/(62-81) 131/66     Weight: 103.6 kg (228 lb 6.3 oz)  Body mass index is 36.86 kg/m².    Estimated Creatinine Clearance: 52.6 mL/min (A) (based on SCr of 2 mg/dL (H)).    Physical Exam  Vitals reviewed.   Constitutional:       Appearance: He is ill-appearing.   HENT:      Head: Normocephalic and atraumatic.      Nose:      Comments: NGT to suction  Eyes:      Conjunctiva/sclera: Conjunctivae normal.      Pupils: Pupils are equal, round, and reactive to light.   Cardiovascular:      Rate and Rhythm: Normal rate and regular rhythm.   Pulmonary:      Effort: Pulmonary effort is normal. No respiratory distress.      Breath sounds: Normal breath sounds.   Abdominal:      General: Abdomen is flat. Bowel sounds are normal. There is distension.      Palpations: Abdomen is soft.      Tenderness: There is no abdominal tenderness. There is no guarding.   Musculoskeletal:      Cervical back: Normal range of motion.      Right lower leg: No edema.      Left lower leg: No edema.   Skin:     Findings: No erythema or rash.   Neurological:      General: No focal deficit present.      Mental Status: He is alert and oriented to person, place, and time.   Psychiatric:         Behavior: Behavior normal.       Significant Labs: Blood Culture:   Recent Labs   Lab 12/09/22  0401 12/09/22  0423   LABBLOO No Growth to date  No Growth to date  No Growth to date  No Growth to date  No Growth to date No Growth to date  No Growth to date  No Growth to date  No Growth to date  No Growth to date       Significant Imaging: I have reviewed all pertinent imaging results/findings within the past 24 hours.

## 2022-12-13 NOTE — PT/OT/SLP EVAL
Physical Therapy Evaluation    Patient Name:  Vivek Turner   MRN:  [id number]    Recommendations:     Discharge Recommendations: home health PT   Discharge Equipment Recommendations:  (TBD)       Assessment:     Vivek Turner is a 47 y.o. male admitted with a medical diagnosis of Hepatic abscess.  He presents with the following impairments/functional limitations: weakness, impaired endurance, impaired functional mobility, gait instability, impaired balance, decreased lower extremity function, pain .    Rehab Prognosis: Good; patient would benefit from acute skilled PT services to address these deficits and reach maximum level of function.    Recent Surgery: * No surgery found *      Plan:     During this hospitalization, patient to be seen 5 x/week to address the identified rehab impairments via gait training, therapeutic activities, therapeutic exercises and progress toward the following goals:    Plan of Care Expires:  12/20/22    Subjective     Chief Complaint: Pain (R) side at tube site.  Patient/Family Comments/goals: Pt agreeable to therapy evaluations.  Pain/Comfort:  Pain Rating 1: 4/10  Location - Side 1: Right  Location - Orientation 1: lower  Location 1: abdomen (at drain site)  Pain Addressed 1: Reposition    Patients cultural, spiritual, Evangelical conflicts given the current situation: no    Living Environment:  PTA pt lived alone in a 1 story house with no steps to enter.  Prior to admission, patients level of function was independent; worked.  Equipment used at home: none.  DME owned (not currently used): none.  Upon discharge, patient will have assistance from unknown.    Objective:     Communicated with nurse prior to session who reports pt about to be transferred to floor.  Patient found supine with blood pressure cuff, MARLEY drain, NG tube, oxygen, peripheral IV, telemetry, pulse ox (continuous)  upon PT entry to room.    General Precautions: Standard,    Orthopedic Precautions:N/A   Braces:  N/A  Respiratory Status: Nasal cannula, flow 2 L/min    Exams:  Cognitive Exam:  Patient is oriented to Person, Place, Time, and Situation  Sensation:    -       Intact  light/touch BLEs  RLE ROM: WFL  RLE Strength: WFL  LLE ROM: WFL  LLE Strength: WFL    Functional Mobility:  Bed Mobility:     Supine to Sit: minimum assistance  Sit to Supine: stand by assistance  Transfers:     Sit to Stand:  contact guard assistance with no AD  Gait: 2 small steps to HOB with CGA  Balance: Fair static/dynamic standing      AM-PAC 6 CLICK MOBILITY  Total Score:17       Treatment & Education:  Educated on role of PT and POC.  Pt sat EOB, able to stand and take a couple of steps to HOB.    Patient left HOB elevated with all lines intact and call button in reach.    GOALS:   Multidisciplinary Problems       Physical Therapy Goals          Problem: Physical Therapy    Goal Priority Disciplines Outcome Goal Variances Interventions   Physical Therapy Goal     PT, PT/OT Ongoing, Progressing     Description: Goals to be met by: 22     Patient will increase functional independence with mobility by performin. Pt to mod I with bed mobility.  2. Pt to transfer with supervision/mod I.  3. Pt to ambulate 150' /c or /s AD and supervision.                         History:     Past Medical History:   Diagnosis Date    Hypertension        No past surgical history on file.    Time Tracking:     PT Received On: 22  PT Start Time: 1007     PT Stop Time: 1018  PT Total Time (min): 11 min     Billable Minutes: Evaluation 11 Co-eval with LAURA Ferro)       2022

## 2022-12-13 NOTE — SUBJECTIVE & OBJECTIVE
Interval History: Febrile overnight. MRCP yesterday unremarkable aside from known liver abscess,    Review of Systems   Constitutional:  Negative for chills, fatigue and fever.   Respiratory:  Negative for cough and shortness of breath.    Gastrointestinal:  Positive for abdominal pain. Negative for diarrhea.   Genitourinary:  Negative for difficulty urinating and dysuria.   Skin:  Negative for rash and wound.   Neurological:  Negative for headaches.   All other systems reviewed and are negative.  Objective:     Vital Signs (Most Recent):  Temp: 98.7 °F (37.1 °C) (12/13/22 0500)  Pulse: 91 (12/13/22 0545)  Resp: (!) 22 (12/13/22 0545)  BP: 131/66 (12/13/22 0500)  SpO2: (!) 94 % (12/13/22 0545)   Vital Signs (24h Range):  Temp:  [98.1 °F (36.7 °C)-101 °F (38.3 °C)] 98.7 °F (37.1 °C)  Pulse:  [78-98] 91  Resp:  [17-26] 22  SpO2:  [94 %-100 %] 94 %  BP: (113-140)/(62-81) 131/66     Weight: 103.6 kg (228 lb 6.3 oz)  Body mass index is 36.86 kg/m².    Estimated Creatinine Clearance: 52.6 mL/min (A) (based on SCr of 2 mg/dL (H)).    Physical Exam  Vitals reviewed.   Constitutional:       Appearance: He is ill-appearing.   HENT:      Head: Normocephalic and atraumatic.      Nose:      Comments: NGT to suction  Eyes:      Conjunctiva/sclera: Conjunctivae normal.      Pupils: Pupils are equal, round, and reactive to light.   Cardiovascular:      Rate and Rhythm: Normal rate and regular rhythm.   Pulmonary:      Effort: Pulmonary effort is normal. No respiratory distress.      Breath sounds: Normal breath sounds.   Abdominal:      General: Abdomen is flat. Bowel sounds are normal. There is distension.      Palpations: Abdomen is soft.      Tenderness: There is no abdominal tenderness. There is no guarding.   Musculoskeletal:      Cervical back: Normal range of motion.      Right lower leg: No edema.      Left lower leg: No edema.   Skin:     Findings: No erythema or rash.   Neurological:      General: No focal deficit  present.      Mental Status: He is alert and oriented to person, place, and time.   Psychiatric:         Behavior: Behavior normal.       Significant Labs: Blood Culture:   Recent Labs   Lab 12/09/22  0401 12/09/22  0423   LABBLOO No Growth to date  No Growth to date  No Growth to date  No Growth to date  No Growth to date No Growth to date  No Growth to date  No Growth to date  No Growth to date  No Growth to date       Significant Imaging: I have reviewed all pertinent imaging results/findings within the past 24 hours.

## 2022-12-13 NOTE — CARE UPDATE
[facility] - [place], [place] Intensive Care  ICU Shift Summary  Date: 12/13/2022      Prehospitalization: Home  Admit Date / LOS : 12/9/2022/ 4 days    Diagnosis: Hepatic abscess    Consults:        Active: GI, ID, OT, and PT       Needed: N/A     Code Status: Full Code   Advanced Directive: <no information>    LDA:  Lines/Drains/Airways       Drain  Duration                  Closed/Suction Drain 12/09/22 1748 Lateral RLQ Bulb 3 days         NG/OG Tube 12/11/22 0735 nasogastric 16 Fr. Right nostril 1 day              Peripheral Intravenous Line  Duration                  Peripheral IV - Single Lumen 12/08/22 18 G Right Antecubital 5 days         Peripheral IV - Single Lumen 12/09/22 0401 18 G Left Antecubital 4 days                  Central Lines/Site/Justification:Patient Does Not Have Central Line  Urinary Cath/Order/Justification:Patient Does Not Have Urinary Catheter    Vasopressors/Infusions:    dextrose 5 % and 0.45 % NaCl 100 mL/hr at 12/13/22 0500          GOALS: Volume/ Hemodynamic: N/A                     RASS: 0  alert and calm    Pain Management: IV       Pain Controlled: yes     Rhythm: NSR    Respiratory Device: Nasal Cannula                  Most Recent SBT/ SAT: N/A       MOVE Screen: PT Consult  ICU Liberation: not applicable    VTE Prophylaxis: Pharm  Mobility: Bedrest  Stress Ulcer Prophylaxis: No    Isolation: No active isolations    Dietary:   Current Diet Order   Procedures    Diet NPO      Tolerance: not applicable  Advancement: no    I & O (24h):    Intake/Output Summary (Last 24 hours) at 12/13/2022 0550  Last data filed at 12/13/2022 0547  Gross per 24 hour   Intake 2304.94 ml   Output 3800 ml   Net -1495.06 ml        Restraints: No    Significant Dates:  Post Op Date: N/A  Rescue Date: N/A  Imaging/ Diagnostics: N/A    Noteworthy Labs:  see labs    COVID Test: (--)  CBC/Anemia Labs: Coags:    Recent Labs   Lab 12/10/22  1147 12/11/22  0134 12/12/22  0420 12/13/22  0409   WBC  --    < > 18.68* 19.59*   HGB   --    < > 11.5* 11.5*   HCT  --    < > 33.8* 35.6*   PLT  --    < > 435 490*   MCV  --    < > 83 86   RDW  --    < > 13.8 14.3   IRON 25*  --   --   --    FERRITIN 3,633*  --   --   --    RETIC 1.0  --   --   --    FOLATE 10.5  --   --   --    [id number] >2000*  --   --   --     < > = values in this interval not displayed.    Recent Labs   Lab 12/09/22  0401   INR 1.3*        Chemistries:   Recent Labs   Lab 12/11/22  0134 12/11/22  1600 12/12/22  0420 12/13/22  0409    142 145 152*   K 3.7 3.8 3.9 4.1    100 103 106   CO2 26 33* 33* 40*   BUN 85* 82* 72* 59*   CREATININE 4.2* 3.2* 2.5* 2.0*   CALCIUM 7.6* 8.0* 7.8* 7.9*   PROT 6.0  --  6.1 6.4   BILITOT 3.9*  --  3.0* 2.5*   ALKPHOS 127  --  105 108   ALT 39  --  25 19   AST 18  --  16 21   MG 2.9* 2.8*  --   --    PHOS 5.0* 3.9  --   --         Cardiac Enzymes: Ejection Fractions:    Recent Labs     12/11/22  0134 12/11/22  0454   TROPONINI 0.014 <0.006    EF   Date Value Ref Range Status   12/11/2022 65 % Final        POCT Glucose: HbA1c:    No results for input(s): POCTGLUCOSE in the last 168 hours. Hemoglobin A1C   Date Value Ref Range Status   12/10/2022 5.6 4.0 - 5.6 % Final     Comment:     ADA Screening Guidelines:  5.7-6.4%  Consistent with prediabetes  >or=6.5%  Consistent with diabetes    High levels of fetal hemoglobin interfere with the HbA1C  assay. Heterozygous hemoglobin variants (HbS, HgC, etc)do  not significantly interfere with this assay.   However, presence of multiple variants may affect accuracy.             ICU LOS 3d 10h  Level of Care: OK to Transfer    Chart Check: 24 HR Done  Shift Summary/Plan for the shift: see care plan note

## 2022-12-13 NOTE — PLAN OF CARE
Problem: Occupational Therapy  Goal: Occupational Therapy Goal  Description: Goals to be met by: 12/27/22     Patient will increase functional independence with ADLs by performing:    UE Dressing with [place].  LE Dressing with [place].  Grooming while standing at sink with Modified [place] and Assistive Devices as needed.  Toileting from toilet with Modified [place] and Assistive Devices as needed for hygiene and clothing management.   Supine to sit with Modified [place].  Step transfer with Modified [place]  Toilet transfer to toilet with Modified [place].  Upper extremity exercise program x15 reps per handout, with independence.    Outcome: Ongoing, Progressing   The patient participated in OT with some unsteadiness with standing. The patient was limited by NGTWS. The patient will benefit from OT to address functional deficits.

## 2022-12-13 NOTE — PLAN OF CARE
Problem: Adult Inpatient Plan of Care  Goal: Plan of Care Review  Outcome: Ongoing, Progressing  Goal: Patient-Specific Goal (Individualized)  Outcome: Ongoing, Progressing  Goal: Absence of Hospital-Acquired Illness or Injury  Outcome: Ongoing, Progressing  Goal: Optimal Comfort and Wellbeing  Outcome: Ongoing, Progressing  Goal: Readiness for Transition of Care  Outcome: Ongoing, Progressing     Problem: Adjustment to Illness (Sepsis/Septic Shock)  Goal: Optimal Coping  Outcome: Ongoing, Progressing     Problem: Bleeding (Sepsis/Septic Shock)  Goal: Absence of Bleeding  Outcome: Ongoing, Progressing     Problem: Glycemic Control Impaired (Sepsis/Septic Shock)  Goal: Blood Glucose Level Within Desired Range  Outcome: Ongoing, Progressing     Problem: Infection Progression (Sepsis/Septic Shock)  Goal: Absence of Infection Signs and Symptoms  Outcome: Ongoing, Progressing     Problem: Fluid and Electrolyte Imbalance (Acute Kidney Injury/Impairment)  Goal: Fluid and Electrolyte Balance  Outcome: Ongoing, Progressing     Problem: Renal Function Impairment (Acute Kidney Injury/Impairment)  Goal: Effective Renal Function  Outcome: Ongoing, Progressing     Problem: Skin Injury Risk Increased  Goal: Skin Health and Integrity  Outcome: Ongoing, Progressing     Problem: Fall Injury Risk  Goal: Absence of Fall and Fall-Related Injury  Outcome: Ongoing, Progressing

## 2022-12-13 NOTE — ASSESSMENT & PLAN NOTE
This patient does have evidence of infective focus. My overall impression is sepsis. Vital signs were reviewed and noted in progress note.  Antibiotics given-   Antibiotics (From admission, onward)    Start     Stop Route Frequency Ordered    12/13/22 0833  metronidazole IVPB 500 mg         -- IV Every 8 hours (non-standard times) 12/13/22 0833    12/11/22 2100  cefazolin (ANCEF) 2 gram in dextrose 5% 50 mL IVPB (premix)         -- IV Every 12 hours (non-standard times) 12/11/22 1801 12/09/22 2100  mupirocin 2 % ointment         12/14 2059 Nasl 2 times daily 12/09/22 1424        Cultures were taken-   Microbiology Results (last 7 days)     Procedure Component Value Units Date/Time    Culture, Anaerobe [[id number]] Collected: 12/09/22 1831    Order Status: Completed Specimen: Abscess from Abdomen Updated: 12/13/22 0835     Anaerobic Culture No anaerobes isolated    Aerobic culture [[id number]]  (Abnormal)  (Susceptibility) Collected: 12/09/22 1831    Order Status: Completed Specimen: Abscess from Abdomen Updated: 12/11/22 0915     Aerobic Bacterial Culture KLEBSIELLA PNEUMONIAE  Many      Gram stain [[id number]] Collected: 12/09/22 1831    Order Status: Completed Specimen: Abscess from Abdomen Updated: 12/10/22 0755     Gram Stain Result Many WBC's      No organisms seen    Fungus culture [[id number]] Collected: 12/09/22 1831    Order Status: Sent Specimen: Abscess from Abdomen Updated: 12/09/22 1905        Latest lactate reviewed, they are-  Recent Labs   Lab 12/10/22  2204   LACTATE 1.3       Organ dysfunction indicated by Acute kidney injury and Acute liver injury  Source- liver abscess    Source control Achieved by- IR    -Due to hepatic abscess  -s/p IR drainage on 12/09  -ID consulted:  Continue cefazolin and Flagyl  -blood cultures with no growth to date   -abscess culture with pansensitive Klebsiella   -continue IV antibiotics as above   -This is improving.

## 2022-12-13 NOTE — PROGRESS NOTES
Renal ICU Progress Note    Date of Admission:  12/9/2022  1:40 PM    Length of Stay: 4  Days    Subjective: Hungry    Objective:    Current Facility-Administered Medications   Medication    acetaminophen suppository 650 mg    acetaminophen tablet 650 mg    albuterol-ipratropium 2.5 mg-0.5 mg/3 mL nebulizer solution 3 mL    cefazolin (ANCEF) 2 gram in dextrose 5% 50 mL IVPB (premix)    dextrose 10% bolus 125 mL    dextrose 10% bolus 250 mL    dextrose 5 % and 0.45 % NaCl infusion    glucagon (human recombinant) injection 1 mg    glucose chewable tablet 16 g    glucose chewable tablet 24 g    heparin (porcine) injection 5,000 Units    HYDROmorphone injection 0.5 mg    melatonin tablet 6 mg    mupirocin 2 % ointment    naloxone 0.4 mg/mL injection 0.02 mg    nitroGLYCERIN SL tablet 0.4 mg    oxyCODONE-acetaminophen 5-325 mg per tablet 1 tablet    sodium chloride 0.9% flush 10 mL       Vitals:    12/13/22 0330 12/13/22 0341 12/13/22 0345 12/13/22 0400   BP:    128/71   Pulse: 86 86 88 89   Resp: 19 (!) 24 18 18   Temp:       TempSrc:       SpO2: 97% 96% 96% 97%   Weight:       Height:           I/O last 3 completed shifts:  In: 3785.1 [I.V.:3437.3; IV Piggyback:347.8]  Out: 6335 [Urine:3205; Drains:3130]  I/O this shift:  In: 946.2 [I.V.:897.4; IV Piggyback:48.7]  Out: 850 [Urine:800; Drains:50]      Physical Exam:     General: NAD  Neck: supple  Heart: RRR  Lungs: unlabored breathing  Abdomen: NGT to suction  Limbs: n/a  Neurologic: awake      Laboratories:    Recent Labs   Lab 12/13/22  0409   WBC 19.59*   RBC 4.12*   HGB 11.5*   HCT 35.6*   *   MCV 86   MCH 27.9   MCHC 32.3         No results for input(s): GLUCOSE, CALCIUM, PROT, NA, K, CO2, CL, BUN, CREATININE, ALKPHOS, ALT, AST, BILITOT in the last 24 hours.    Invalid input(s):  ALBUMIN      No results for input(s): COLORU, CLARITYU, SPECGRAV, PHUR, PROTEINUA, GLUCOSEU, BLOODU, WBCU, RBCU, BACTERIA, MUCUS in the last 24  hours.    Invalid input(s):  BILIRUBINCON    Microbiology Results (last 7 days)       Procedure Component Value Units Date/Time    Culture, Anaerobe [[id number]] Collected: 12/09/22 1831    Order Status: Completed Specimen: Abscess from Abdomen Updated: 12/11/22 1224     Anaerobic Culture Culture in progress    Aerobic culture [[id number]]  (Abnormal)  (Susceptibility) Collected: 12/09/22 1831    Order Status: Completed Specimen: Abscess from Abdomen Updated: 12/11/22 0915     Aerobic Bacterial Culture KLEBSIELLA PNEUMONIAE  Many      Gram stain [[id number]] Collected: 12/09/22 1831    Order Status: Completed Specimen: Abscess from Abdomen Updated: 12/10/22 0755     Gram Stain Result Many WBC's      No organisms seen    Fungus culture [[id number]] Collected: 12/09/22 1831    Order Status: Sent Specimen: Abscess from Abdomen Updated: 12/09/22 1905              Diagnostic Tests:     CT Chest:  Impression:       Small pleural effusions with consolidative change RIGHT greater than LEFT lung bases.  Atelectasis would be a leading consideration.  Follow-up to resolution recommended to exclude underlying obstructive lesion.       Electronically signed by: Segun Villeda MD   Date: 12/11/2022            Assessment:    48 y/o male admitted with:      - Hepatic Klebsiella abscess s/p drainage  - Improving cholestasis  - Ileus s/p NGT  - Intrinsic MARYURI FeNa+ > 1.5%  (unknown baseline creatinine) improving as well as UO  - NAGMA resolved  - Hx. HTN  - Fe++ def. Anemia  - Hypoalbuminemia             Plan:       - Broad spectrum antibiotics per ID  - IVFs: D5-0.45% saline  - Bladder scan PRN  - NPO for now, consider Parenteral nutrition  - Surgery following s/p NGT placement.  - Other problems per admitting

## 2022-12-13 NOTE — ASSESSMENT & PLAN NOTE
47M with h/o HTN admitted 12/9 with abdominal pain, fount to have liver abscess and MARYURI, s/p IR drainage on 12/9 (CX + pan-sen kleb pna), now complicated by ileus, MARYURI.  bcx ngtd. Was on cefepime/metronidazole, switched to cefazolin 12/11. HIV neg.     Etiology of abscess is unclear. MRCP 12/12 unremarkable. Showed heterogenous fluid collection at inferior aspect of the right hepatic lobe measuring approximately 8.8 x 6.8 x 9.3 cm (known abscess).    Tmax 101 overnight.    Recommendations:   - continue cefazolin  - restart metronidazole while awaiting for final anaerobic cx results (ordered)  - duration of abx until radiographic resolution of abscess; will need repeat CT in approx 4 wks  - given size of abscess will likely DC on IV abx x atleast 4 wks  - refused colonoscopy; can schedule outpt  - if febrile please repeat BCx

## 2022-12-13 NOTE — ASSESSMENT & PLAN NOTE
Creatinine at 6.1 at admission. Unknown baseline with no prior labs to review. FENa 1.4% suggesting intrinsic injury. This may be ATN from sepsis +/- Bactrim pseudotoxicity. No hydro seen on CT or US.   - IVF ordered  - Nephrology consulted  - this is improving     Patient with acute kidney injury likely due to IVVD/dehydration and acute tubular necrosis MARYURI is currently improving. Labs reviewed- Renal function/electrolytes with Estimated Creatinine Clearance: 52.6 mL/min (A) (based on SCr of 2 mg/dL (H)). according to latest data. Monitor urine output and serial BMP and adjust therapy as needed. Avoid nephrotoxins and renally dose meds for GFR listed above.

## 2022-12-13 NOTE — PT/OT/SLP EVAL
Occupational Therapy   Evaluation    Name: Vivek Turner  MRN: [id number]  Admitting Diagnosis: Hepatic abscess  Recent Surgery: * No surgery found *      Recommendations:     Discharge Recommendations: home health OT  Discharge Equipment Recommendations:   (TBD)  Barriers to discharge:   (patient lives alone)    Assessment:     Vivek Turner is a 47 y.o. male with a medical diagnosis of Hepatic abscess.   Performance deficits affecting function: weakness, impaired endurance, impaired self care skills, impaired functional mobility, gait instability, impaired balance, decreased upper extremity function, impaired skin, pain.    The patient participated in OT eval with c/o generalized weakness and c/o pain/discomfort at the IR drain site. The patient with some unsteadiness while standing and side stepping along the bed.   The patient was limited by NGTWS and pending transfer to the [facility] unit.    Rehab Prognosis: Good; patient would benefit from acute skilled OT services to address these deficits and reach maximum level of function.       Plan:     Patient to be seen 3 x/week, 5 x/week to address the above listed problems via self-care/home management, therapeutic exercises  Plan of Care Expires: 12/27/22  Plan of Care Reviewed with: patient    Subjective     Chief Complaint: feels weak  Patient/Family Comments/goals: agreeable to OT eval    Occupational Profile:  Living Environment: lives alone in a H with no RONNIE  Previous level of function: (I) self care and functional mobility  Roles and Routines: works in [place]   Equipment Used at Home: none  Assistance upon Discharge: ?    Pain/Comfort:  Pain Rating 1: 4/10  Location - Side 1: Right  Location 1: abdomen (at drain site)    Patients cultural, spiritual, Hinduism conflicts given the current situation: no    Objective:     Communicated with: nurse prior to session.  Patient found HOB elevated with blood pressure cuff, MARLEY drain, Other (comments), pulse ox  (continuous), NG tube, telemetry (NGTWS) upon OT entry to room.    General Precautions: Standard, fall  Orthopedic Precautions: N/A  Braces: N/A  Respiratory Status: Nasal cannula, flow 2 L/min    Occupational Performance:    Bed Mobility:    Patient completed Scooting/Bridging with minimum assistance  Patient completed Supine to Sit with minimum assistance  Patient completed Sit to Supine with minimum assistance    Functional Mobility/Transfers:  Patient completed Sit <> Stand Transfer with contact guard assistance  with  rolling walker   Functional Mobility: The patient side stepped 2-3 steps with CGA    Activities of Daily Living:  Feeding:  NPO with NGTWS  Upper Body Dressing: minimum assistance (limited by lines)  Lower Body Dressing: dependence    Toileting: modified independence use of urinal    Cognitive/Visual Perceptual:  Cognitive/Psychosocial Skills:     -       Oriented to: Person, Place, Time, and Situation   -       Follows Commands/attention:Follows two-step commands  -       Communication: clear/fluent  -       Memory: No Deficits noted  -       Safety awareness/insight to disability: intact   -       Mood/Affect/Coping skills/emotional control: Appropriate to situation    Physical Exam:  Balance: -       fair+  Postural examination/scapula alignment:    -       No postural abnormalities identified  Skin integrity: Visible skin intact and IR drain to right flank  Edema:  abdomin is distended  Upper Extremity Range of Motion:     -       Right Upper Extremity: WFL  -       Left Upper Extremity: WFL  Upper Extremity Strength:    -       Right Upper Extremity: WFL  -       Left Upper Extremity: WFL    AMPAC 6 Click ADL:  AMPAC Total Score: 20    Treatment & Education:  Participated in OT eval  Educated the patient re: OT role and POC  Vitals monitored HR 93, /77, SpO2 98%    Patient left HOB elevated with all lines intact, call button in reach, and nurse notified    GOALS:   Multidisciplinary  Problems       Occupational Therapy Goals          Problem: Occupational Therapy    Goal Priority Disciplines Outcome Interventions   Occupational Therapy Goal     OT, PT/OT Ongoing, Progressing    Description: Goals to be met by: 12/27/22     Patient will increase functional independence with ADLs by performing:    UE Dressing with [place].  LE Dressing with [place].  Grooming while standing at sink with Modified [place] and Assistive Devices as needed.  Toileting from toilet with Modified [place] and Assistive Devices as needed for hygiene and clothing management.   Supine to sit with Modified [place].  Step transfer with Modified [place]  Toilet transfer to toilet with Modified [place].  Upper extremity exercise program x15 reps per handout, with independence.                         History:     Past Medical History:   Diagnosis Date    Hypertension        No past surgical history on file.    Time Tracking:     OT Date of Treatment: 12/13/22  OT Start Time: 1009  OT Stop Time: 1018  OT Total Time (min): 9 min    Billable Minutes:Evaluation (with PT)    12/13/2022

## 2022-12-14 LAB
ALBUMIN SERPL BCP-MCNC: 2 G/DL (ref 3.5–5.2)
ALP SERPL-CCNC: 103 U/L (ref 55–135)
ALT SERPL W/O P-5'-P-CCNC: 18 U/L (ref 10–44)
ANION GAP SERPL CALC-SCNC: 12 MMOL/L (ref 8–16)
AST SERPL-CCNC: 23 U/L (ref 10–40)
BASOPHILS # BLD AUTO: 0.03 K/UL (ref 0–0.2)
BASOPHILS NFR BLD: 0.1 % (ref 0–1.9)
BILIRUB SERPL-MCNC: 2.4 MG/DL (ref 0.1–1)
BUN SERPL-MCNC: 55 MG/DL (ref 6–20)
CALCIUM SERPL-MCNC: 8.3 MG/DL (ref 8.7–10.5)
CHLORIDE SERPL-SCNC: 109 MMOL/L (ref 95–110)
CHOLEST SERPL-MCNC: 110 MG/DL (ref 120–199)
CHOLEST/HDLC SERPL: 12.2 {RATIO} (ref 2–5)
CO2 SERPL-SCNC: 35 MMOL/L (ref 23–29)
CREAT SERPL-MCNC: 2 MG/DL (ref 0.5–1.4)
DIFFERENTIAL METHOD: ABNORMAL
EOSINOPHIL # BLD AUTO: 0.1 K/UL (ref 0–0.5)
EOSINOPHIL NFR BLD: 0.3 % (ref 0–8)
ERYTHROCYTE [DISTWIDTH] IN BLOOD BY AUTOMATED COUNT: 14.5 % (ref 11.5–14.5)
EST. GFR  (NO RACE VARIABLE): 41 ML/MIN/1.73 M^2
GLUCOSE SERPL-MCNC: 148 MG/DL (ref 70–110)
HCT VFR BLD AUTO: 36.1 % (ref 40–54)
HDLC SERPL-MCNC: 9 MG/DL (ref 40–75)
HDLC SERPL: 8.2 % (ref 20–50)
HGB BLD-MCNC: 11.9 G/DL (ref 14–18)
IMM GRANULOCYTES # BLD AUTO: 0.36 K/UL (ref 0–0.04)
IMM GRANULOCYTES NFR BLD AUTO: 1.8 % (ref 0–0.5)
LDLC SERPL CALC-MCNC: 60 MG/DL (ref 63–159)
LYMPHOCYTES # BLD AUTO: 1.4 K/UL (ref 1–4.8)
LYMPHOCYTES NFR BLD: 6.9 % (ref 18–48)
MCH RBC QN AUTO: 29.2 PG (ref 27–31)
MCHC RBC AUTO-ENTMCNC: 33 G/DL (ref 32–36)
MCV RBC AUTO: 89 FL (ref 82–98)
MONOCYTES # BLD AUTO: 1.3 K/UL (ref 0.3–1)
MONOCYTES NFR BLD: 6.6 % (ref 4–15)
NEUTROPHILS # BLD AUTO: 17.2 K/UL (ref 1.8–7.7)
NEUTROPHILS NFR BLD: 84.3 % (ref 38–73)
NONHDLC SERPL-MCNC: 101 MG/DL
NRBC BLD-RTO: 0 /100 WBC
PLATELET # BLD AUTO: 546 K/UL (ref 150–450)
PMV BLD AUTO: 10 FL (ref 9.2–12.9)
POCT GLUCOSE: 137 MG/DL (ref 70–110)
POCT GLUCOSE: 168 MG/DL (ref 70–110)
POTASSIUM SERPL-SCNC: 4.5 MMOL/L (ref 3.5–5.1)
PROT SERPL-MCNC: 6.8 G/DL (ref 6–8.4)
RBC # BLD AUTO: 4.07 M/UL (ref 4.6–6.2)
SODIUM SERPL-SCNC: 156 MMOL/L (ref 136–145)
TRIGL SERPL-MCNC: 205 MG/DL (ref 30–150)
WBC # BLD AUTO: 20.35 K/UL (ref 3.9–12.7)

## 2022-12-14 PROCEDURE — 80061 LIPID PANEL: CPT | Performed by: HOSPITALIST

## 2022-12-14 PROCEDURE — 99900035 HC TECH TIME PER 15 MIN (STAT)

## 2022-12-14 PROCEDURE — 63600175 PHARM REV CODE 636 W HCPCS: Performed by: STUDENT IN AN ORGANIZED HEALTH CARE EDUCATION/TRAINING PROGRAM

## 2022-12-14 PROCEDURE — 63600175 PHARM REV CODE 636 W HCPCS: Performed by: INTERNAL MEDICINE

## 2022-12-14 PROCEDURE — 87040 BLOOD CULTURE FOR BACTERIA: CPT | Mod: 59 | Performed by: STUDENT IN AN ORGANIZED HEALTH CARE EDUCATION/TRAINING PROGRAM

## 2022-12-14 PROCEDURE — 63600175 PHARM REV CODE 636 W HCPCS: Performed by: HOSPITALIST

## 2022-12-14 PROCEDURE — 80053 COMPREHEN METABOLIC PANEL: CPT | Performed by: HOSPITALIST

## 2022-12-14 PROCEDURE — 25000003 PHARM REV CODE 250: Performed by: INTERNAL MEDICINE

## 2022-12-14 PROCEDURE — 97116 GAIT TRAINING THERAPY: CPT | Mod: CQ

## 2022-12-14 PROCEDURE — 94799 UNLISTED PULMONARY SVC/PX: CPT

## 2022-12-14 PROCEDURE — S0030 INJECTION, METRONIDAZOLE: HCPCS | Performed by: INTERNAL MEDICINE

## 2022-12-14 PROCEDURE — 94761 N-INVAS EAR/PLS OXIMETRY MLT: CPT

## 2022-12-14 PROCEDURE — 21400001 HC TELEMETRY ROOM

## 2022-12-14 PROCEDURE — 99233 SBSQ HOSP IP/OBS HIGH 50: CPT | Mod: ,,, | Performed by: INTERNAL MEDICINE

## 2022-12-14 PROCEDURE — 97530 THERAPEUTIC ACTIVITIES: CPT | Mod: CQ

## 2022-12-14 PROCEDURE — 25000003 PHARM REV CODE 250: Performed by: HOSPITALIST

## 2022-12-14 PROCEDURE — 27000221 HC OXYGEN, UP TO 24 HOURS

## 2022-12-14 PROCEDURE — 36415 COLL VENOUS BLD VENIPUNCTURE: CPT | Performed by: HOSPITALIST

## 2022-12-14 PROCEDURE — 99232 PR SUBSEQUENT HOSPITAL CARE,LEVL II: ICD-10-PCS | Mod: ,,, | Performed by: NURSE PRACTITIONER

## 2022-12-14 PROCEDURE — A4216 STERILE WATER/SALINE, 10 ML: HCPCS | Performed by: HOSPITALIST

## 2022-12-14 PROCEDURE — 97110 THERAPEUTIC EXERCISES: CPT | Mod: CQ

## 2022-12-14 PROCEDURE — 99232 SBSQ HOSP IP/OBS MODERATE 35: CPT | Mod: ,,, | Performed by: NURSE PRACTITIONER

## 2022-12-14 PROCEDURE — 85025 COMPLETE CBC W/AUTO DIFF WBC: CPT | Performed by: HOSPITALIST

## 2022-12-14 PROCEDURE — 99233 PR SUBSEQUENT HOSPITAL CARE,LEVL III: ICD-10-PCS | Mod: ,,, | Performed by: INTERNAL MEDICINE

## 2022-12-14 RX ORDER — CEFAZOLIN SODIUM 2 G/50ML
2 SOLUTION INTRAVENOUS
Status: DISCONTINUED | OUTPATIENT
Start: 2022-12-14 | End: 2022-12-16

## 2022-12-14 RX ORDER — DEXTROSE MONOHYDRATE 50 MG/ML
INJECTION, SOLUTION INTRAVENOUS CONTINUOUS
Status: ACTIVE | OUTPATIENT
Start: 2022-12-14 | End: 2022-12-16

## 2022-12-14 RX ADMIN — HEPARIN SODIUM 5000 UNITS: 5000 INJECTION INTRAVENOUS; SUBCUTANEOUS at 05:12

## 2022-12-14 RX ADMIN — Medication 10 ML: at 09:12

## 2022-12-14 RX ADMIN — HYDROMORPHONE HYDROCHLORIDE 1 MG: 1 INJECTION, SOLUTION INTRAMUSCULAR; INTRAVENOUS; SUBCUTANEOUS at 04:12

## 2022-12-14 RX ADMIN — METRONIDAZOLE 500 MG: 500 INJECTION, SOLUTION INTRAVENOUS at 08:12

## 2022-12-14 RX ADMIN — Medication 10 ML: at 05:12

## 2022-12-14 RX ADMIN — DEXTROSE: 5 SOLUTION INTRAVENOUS at 08:12

## 2022-12-14 RX ADMIN — HEPARIN SODIUM 5000 UNITS: 5000 INJECTION INTRAVENOUS; SUBCUTANEOUS at 01:12

## 2022-12-14 RX ADMIN — Medication 10 ML: at 01:12

## 2022-12-14 RX ADMIN — HEPARIN SODIUM 5000 UNITS: 5000 INJECTION INTRAVENOUS; SUBCUTANEOUS at 09:12

## 2022-12-14 RX ADMIN — METRONIDAZOLE 500 MG: 500 INJECTION, SOLUTION INTRAVENOUS at 12:12

## 2022-12-14 RX ADMIN — MUPIROCIN: 20 OINTMENT TOPICAL at 08:12

## 2022-12-14 RX ADMIN — CEFAZOLIN SODIUM 2 G: 2 SOLUTION INTRAVENOUS at 08:12

## 2022-12-14 RX ADMIN — CEFAZOLIN SODIUM 2 G: 2 SOLUTION INTRAVENOUS at 03:12

## 2022-12-14 RX ADMIN — METRONIDAZOLE 500 MG: 500 INJECTION, SOLUTION INTRAVENOUS at 03:12

## 2022-12-14 NOTE — CONSULTS
[facility] [place] - Telemetry  Adult Nutrition  Consult Note    SUMMARY     Recommendations    1) Current PPN regimen Clinimic-E 4.25/5 @ 75 mL    Recommend transitioning pt to Custom TPN formulation to better meet pt specific needs.    Obtain Central Line Access.  TPN Custom forumal goal rate @ 100 mL/hr  3.5% AA (336kcal), 15% Dex (1440 kcal) + Standard Daily lipids to avoid EFFA deficiency. Monitor pt TAG's - if > 400 d/c lipids until return to under 400 and then re-initiate 3x weekly.    Initiate at 1/2 goal rate of 50 mL and titrate by 10 mL q6h until goal rate is met.  Monitor BG, Phos, Mg, and K for refeeding. Hold rate and replete if derangements occur, correct and then re-begin to advance toward goal.    *PharmD & MD to mange Electrolytes/Micros    2) Monitor I/O's, Daily weight, Nutrition related Labs   - Renals  3) When medically able ADAT to clear liquids and then towards Diabetic 2000 kcal diet, Encourage and monitor PO intake.  4) Addition of ONS if warranted at this time.      Goals: Pt to have titrated to goal without complication by RD follow ip.  Nutrition Goal Status: new  Communication of RD Recs:  (POC)    Assessment and Plan  Nutrition Problem  Inadequate Oral intake    Related to (etiology):  NPO per MD    Signs and Symptoms (as evidenced by):   NPO, just initiated PPN      Interventions/Recommendations (treatment strategy):  Advance to Custom TPN formulation.  Collaboration of care with other providers    Nutrition Diagnosis Status:   New         Malnutrition Assessment  NFPE not performed 2' remote consult. Will continue to \minitor.                                       Reason for Assessment    Reason For Assessment: consult, new TPN  Diagnosis:  (Hepatic abcess)  Relevant Medical History:   Patient Active Problem List   Diagnosis    Hepatic abscess    ATN (acute tubular necrosis)    Severe sepsis    Atelectasis    Microcytic anemia    Hyperglycemia    Acute hypoxemic respiratory failure    Ileus  "     General Information Comments:   12/13 - Pt consulted for New TPN recs - per chart review pt Hepatic abbess now with RUG drain and NGT in place to assist with abscess grainage and abdominal distention. Stating feeling better though upeset he is unable to eat.  Nutrition Discharge Planning: Pending Medical Course    Nutrition Risk Screen    Nutrition Risk Screen: tube feeding or parenteral nutrition    Nutrition/Diet History    Spiritual, Cultural Beliefs, Baptism Practices, Values that Affect Care: no  Food Allergies: NKFA  Factors Affecting Nutritional Intake: NPO, nausea/vomiting, constipation, abdominal distension    Anthropometrics    Temp: 98.3 °F (36.8 °C)  Height Method: Stated  Height: 5' 6" (167.6 cm)  Height (inches): 66 in  Weight Method: Bed Scale  Weight: 103.6 kg (228 lb 6.3 oz)  Weight (lb): 228.4 lb  Ideal Body Weight (IBW), Male: 142 lb  % Ideal Body Weight, Male (lb): 160.85 %  BMI (Calculated): 36.9  BMI Grade: 35 - 39.9 - obesity - grade II  Weight Loss:  (INEZ)  Usual Body Weight (UBW), kg:  (INEZ)       Wt Readings from Last 10 Encounters:   12/09/22 103.6 kg (228 lb 6.3 oz)   12/09/22 106 kg (233 lb 9.6 oz)   12/09/22 106 kg (233 lb 11 oz)     Lab/Procedures/Meds    Pertinent Labs Reviewed: reviewed  CBC:  Recent Labs   Lab 12/14/22  0205   WBC 20.35*   HGB 11.9*   HCT 36.1*   *     CMP:  Recent Labs   Lab 12/14/22  0205   CALCIUM 8.3*   ALBUMIN 2.0*   PROT 6.8   *   K 4.5   CO2 35*      BUN 55*   CREATININE 2.0*   ALKPHOS 103   ALT 18   AST 23   BILITOT 2.4*     Pertinent Medications Reviewed: reviewed  Scheduled Meds:   ceFAZolin (ANCEF) IVPB  2 g Intravenous Q8H    heparin (porcine)  5,000 Units Subcutaneous Q8H    metronidazole  500 mg Intravenous Q8H    sodium chloride 0.9%  10 mL Intravenous Q8H     Continuous Infusions:   Amino acid 4.25% - dextrose 5% (CLINIMIX-E) solution with additives (1L provides 42.5 gm AA, 50 gm CHO (170 kcal/L dextrose), Na 35, K 30, Mg 5, " Ca 4.5, Acetate 70, Cl 39, Phos 15)      dextrose 5 % 100 mL/hr at 12/14/22 0804     PRN Meds:.acetaminophen, acetaminophen, albuterol-ipratropium, dextrose 10%, dextrose 10%, glucagon (human recombinant), glucose, glucose, HYDROmorphone, melatonin, naloxone, nitroGLYCERIN, oxyCODONE-acetaminophen    Physical Findings/Assessment  RUQ abdominal drain placed    Estimated/Assessed Needs    Weight Used For Calorie Calculations: 103.6 kg (228 lb 6.3 oz)  Energy Calorie Requirements (kcal): 2072 - 2590  Energy Need Method: Kcal/kg (20 - 25 per hypermetabolic status)  Protein Requirements: 77 - 97g (1.2 - 1.5 g/kg IBW per BMI > 30, MARYURI/ATN)  Weight Used For Protein Calculations: 64.4 kg (142 lb)  Fluid Requirements (mL): 2.07(1mL/kcal) - 3.6 L (3g mL/kg IBW) (1 mL/kcal - or 35 mL / kg depending rate of drain output and dehydration status)  Estimated Fluid Requirement Method:  (or per MD)  RDA Method (mL): 2072  CHO Requirement: 259g      Nutrition Prescription Ordered    Current Diet Order: NPO    Evaluation of Received Nutrient/Fluid Intake    I/O: - 4.5 L since admit  Energy Calories Required: not meeting needs  Protein Required: not meeting needs  Fluid Required: not meeting needs  Comments: LBM 12/14  % Intake of Estimated Energy Needs: 0 - 25 %  % Meal Intake: NPO    Intake/Output - Last 3 Shifts         12/12 0700  12/13 0659 12/13 0700 12/14 0659 12/14 0700  12/15 0659    I.V. (mL/kg) 2208 (21.3) 299.1 (2.9)     IV Piggyback 96.8 49.7     Total Intake(mL/kg) 2304.8 (22.2) 348.8 (3.4)     Urine (mL/kg/hr) 2200 (0.9) 1625 (0.7) 550 (0.8)    Drains 1600 1050     Stool 0      Total Output 3800 2675 550    Net -1495.2 -2326.2 -550           Stool Occurrence 3 x            Nutrition Risk    Level of Risk/Frequency of Follow-up: high     Monitor and Evaluation    Food and Nutrient Intake: energy intake, parenteral nutrition intake  Food and Nutrient Adminstration: diet order, enteral and parenteral nutrition  administration  Knowledge/Beliefs/Attitudes: beliefs and attitudes  Physical Activity and Function: nutrition-related ADLs and IADLs  Anthropometric Measurements: weight, weight change  Biochemical Data, Medical Tests and Procedures: electrolyte and renal panel, gastrointestinal profile, glucose/endocrine profile, inflammatory profile, lipid profile  Nutrition-Focused Physical Findings: overall appearance       Nutrition Follow-Up    RD Follow-up?: Yes  Neli Pardo, BS, RDN, LDN

## 2022-12-14 NOTE — PLAN OF CARE
Recommendations     1) Current PPN regimen Clinimic-E 4.25/5 @ 75 mL     Recommend transitioning pt to Custom TPN formulation to better meet pt specific needs.     Obtain Central Line Access.  TPN Custom forumal goal rate @ 100 mL/hr  3.5% AA (336kcal), 15% Dex (1440 kcal) + Standard Daily lipids to avoid EFFA deficiency. Monitor pt TAG's - if > 400 d/c lipids until return to under 400 and then re-initiate 3x weekly.     Initiate at 1/2 goal rate of 50 mL and titrate by 10 mL q6h until goal rate is met.  Monitor BG, Phos, Mg, and K for refeeding. Hold rate and replete if derangements occur, correct and then re-begin to advance toward goal.     *PharmD & MD to mange Electrolytes/Micros     2) Monitor I/O's, Daily weight, Nutrition related Labs              - Renals  3) When medically able ADAT to clear liquids and then towards Diabetic 2000 kcal diet, Encourage and monitor PO intake.  4) Addition of ONS if warranted at this time.        Goals: Pt to have titrated to goal without complication by RD follow ip.  Nutrition Goal Status: new  Communication of RD Recs:  (POC)

## 2022-12-14 NOTE — SUBJECTIVE & OBJECTIVE
Interval History: Feeling better today. Wants to really eat. States abdominal pain only happens with the NGT starts having suction. No abdominal pain at this time. No N/V or diarrhea. Very frustrated that he cannot eat. Continues to have a lot of output from NGT. Output of about 850ml over the last 24hrs.     Review of Systems   Constitutional:  Positive for fatigue. Negative for chills, diaphoresis and fever.   HENT:  Negative for congestion and trouble swallowing.    Respiratory:  Negative for cough, chest tightness and shortness of breath.    Cardiovascular:  Negative for chest pain, palpitations and leg swelling.   Gastrointestinal:  Positive for abdominal distention. Negative for abdominal pain, constipation, diarrhea, nausea and vomiting.   Musculoskeletal:  Negative for arthralgias and myalgias.   Neurological:  Positive for weakness. Negative for numbness.   Psychiatric/Behavioral:  Negative for confusion.      Objective:     Vital Signs (Most Recent):  Temp: 98.6 °F (37 °C) (12/14/22 0725)  Pulse: 93 (12/14/22 0856)  Resp: 19 (12/14/22 0725)  BP: (!) 142/78 (12/14/22 0725)  SpO2: (!) 91 % (12/14/22 0856)   Vital Signs (24h Range):  Temp:  [98.2 °F (36.8 °C)-100 °F (37.8 °C)] 98.6 °F (37 °C)  Pulse:  [86-95] 93  Resp:  [16-20] 19  SpO2:  [91 %-97 %] 91 %  BP: (134-142)/(75-81) 142/78     Weight: 103.6 kg (228 lb 6.3 oz)  Body mass index is 36.86 kg/m².    Intake/Output Summary (Last 24 hours) at 12/14/2022 1056  Last data filed at 12/14/2022 0822  Gross per 24 hour   Intake --   Output 2475 ml   Net -2475 ml        Physical Exam  Vitals and nursing note reviewed.   Constitutional:       General: He is not in acute distress.     Appearance: He is obese. He is ill-appearing. He is not toxic-appearing or diaphoretic.   HENT:      Head: Normocephalic and atraumatic.      Nose:      Comments: NGT in place with bilious output     Mouth/Throat:      Mouth: Mucous membranes are moist.   Eyes:      General: No  scleral icterus.  Cardiovascular:      Rate and Rhythm: Normal rate and regular rhythm.      Pulses: Normal pulses.      Heart sounds: Normal heart sounds. No murmur heard.    No gallop.   Pulmonary:      Effort: No respiratory distress.      Breath sounds: Normal breath sounds. No wheezing or rales.      Comments: Decreased inspiration. No wheezes on exam. On 2L NC  Abdominal:      General: Bowel sounds are normal. There is distension.      Palpations: Abdomen is soft.      Tenderness: There is no abdominal tenderness. There is no guarding.      Comments: RUQ drain in place with minimal brown/orange fluid   Musculoskeletal:      Right lower leg: No edema.      Left lower leg: No edema.   Skin:     General: Skin is warm.   Neurological:      Mental Status: He is alert and oriented to person, place, and time.   Psychiatric:      Comments: Appears frustrated that he cannot eat at this time        Significant Labs: All pertinent labs within the past 24 hours have been reviewed.    Significant Imaging: I have reviewed all pertinent imaging results/findings within the past 24 hours.

## 2022-12-14 NOTE — PLAN OF CARE
Problem: Adjustment to Illness (Sepsis/Septic Shock)  Goal: Optimal Coping  Intervention: Optimize Psychosocial Adjustment to Illness  Flowsheets (Taken 12/13/2022 1832)  Supportive Measures: active listening utilized  Family/Support System Care: support provided     Problem: Bleeding (Sepsis/Septic Shock)  Goal: Absence of Bleeding  Intervention: Monitor and Manage Bleeding  Flowsheets (Taken 12/13/2022 1832)  Bleeding Precautions: blood pressure closely monitored     Problem: Glycemic Control Impaired (Sepsis/Septic Shock)  Goal: Blood Glucose Level Within Desired Range  Intervention: Optimize Glycemic Control  Flowsheets (Taken 12/13/2022 1832)  Glycemic Management: blood glucose monitored     Problem: Infection Progression (Sepsis/Septic Shock)  Goal: Absence of Infection Signs and Symptoms  Intervention: Initiate Sepsis Management  Flowsheets (Taken 12/13/2022 1832)  Infection Prevention: hand hygiene promoted  Infection Management: aseptic technique maintained  Intervention: Promote Stabilization  Flowsheets (Taken 12/13/2022 1832)  Fever Reduction/Comfort Measures: fluid intake increased  Fluid/Electrolyte Management: fluids provided  Intervention: Promote Recovery  Flowsheets (Taken 12/13/2022 1832)  Sleep/Rest Enhancement: regular sleep/rest pattern promoted  Activity Management: Sitting at edge of bed - L2

## 2022-12-14 NOTE — PROGRESS NOTES
" [facility] Progress Note    Patient Complaint: generalized weakness    PCP:   Primary Doctor No       LOS: 5        Initial History of Present Illness (HPI):  This is a 47 y.o. male consulted to GI service for Klebsiella liver abscess. Interview conducted with [employer] translation services in the language of Tagalog. Patient complaint of generalized weakness with associated fatigue, HA , difficuly urinating and chills that began last week. Patient endorses traveling to the [facility] 8 weeks ago and had not experienced any of these symptoms prior. Reports seeing a provider in the [facility] and informed of having a fatty liver and high cholesterol. Reports after trip, symptoms began and saw another provider and received a "shot", patient was unable to name the shot or the reasoning for it's administration. Reports the symptoms reemerged last week. Denies smoking, heavy drinking and illicit drug use. Denies n/v, abdominal pain, constipation or diarrhea. Reports abdominal distention. Reports he is able to pass flatus and last stool this am.    Interval Hx  No fever overnight.Abdominal distention improved yet still with over 800ml output in canister from NG. Requesting a diet.    Review of Systems   Constitutional:  Positive for chills. Negative for activity change, diaphoresis and fever.   HENT:  Negative for congestion, drooling, rhinorrhea, sore throat and trouble swallowing.    Eyes:  Negative for discharge.   Respiratory:  Negative for cough, shortness of breath and wheezing.    Cardiovascular:  Negative for chest pain, palpitations and leg swelling.   Gastrointestinal:  Positive for abdominal distention. Negative for abdominal pain, anal bleeding, blood in stool, constipation, diarrhea, nausea, rectal pain and vomiting.   Endocrine: Negative for cold intolerance and heat intolerance.   Genitourinary:  Positive for difficulty urinating. Negative for frequency, hematuria and urgency. "   Musculoskeletal:  Negative for arthralgias.   Skin:  Negative for color change, pallor and rash.   Neurological:  Positive for weakness and headaches. Negative for syncope, facial asymmetry and numbness.   Psychiatric/Behavioral:  Negative for agitation and confusion. The patient is not nervous/anxious.          Medical History:  has a past medical history of Hypertension.    Surgical History: Patient denies any pertinent past surgical history.    Family History: Patient denies any pertinent family history.    Social History: Denies smoking. Occasional social drinker with wife. Denies illicit drug use.    Review of patient's allergies indicates:  No Known Allergies    No current facility-administered medications on file prior to encounter.     Current Outpatient Medications on File Prior to Encounter   Medication Sig Dispense Refill    NON FORMULARY MEDICATION Losartan/amlodipine 100/10 mg   Take 1 tablet by mouth daily          Objective Findings:    Vital Signs:  Temp:  [98.2 °F (36.8 °C)-100 °F (37.8 °C)]   Pulse:  [86-99]   Resp:  [16-20]   BP: (134-142)/(75-84)   SpO2:  [91 %-97 %]   Body mass index is 36.86 kg/m².      Physical Exam  Vitals and nursing note reviewed.   Constitutional:       Appearance: Normal appearance.   HENT:      Head: Normocephalic.      Nose: Nose normal.      Mouth/Throat:      Mouth: Mucous membranes are moist.   Eyes:      Pupils: Pupils are equal, round, and reactive to light.   Cardiovascular:      Heart sounds: Normal heart sounds.   Pulmonary:      Effort: Pulmonary effort is normal.      Breath sounds: Normal breath sounds.   Abdominal:      General: Bowel sounds are normal. There is no distension.      Palpations: Abdomen is soft.      Tenderness: There is no abdominal tenderness.   Musculoskeletal:         General: Normal range of motion.      Cervical back: Normal range of motion.   Skin:     Capillary Refill: Capillary refill takes less than 2 seconds.   Neurological:       General: No focal deficit present.      Mental Status: He is alert and oriented to person, place, and time.   Psychiatric:         Mood and Affect: Mood normal.         Behavior: Behavior normal.         Thought Content: Thought content normal.         Judgment: Judgment normal.             Labs:  Lab Results   Component Value Date    WBC 20.35 (H) 2022    HGB 11.9 (L) 2022    HCT 36.1 (L) 2022     (H) 2022    CHOL 110 (L) 2022    TRIG 205 (H) 2022    HDL 9 (L) 2022    ALT 18 2022    AST 23 2022     (H) 2022    K 4.5 2022     2022    CREATININE 2.0 (H) 2022    BUN 55 (H) 2022    CO2 35 (H) 2022    INR 1.3 (H) 2022    HGBA1C 5.6 12/10/2022             Imagin/13 MRCP-Persistent heterogeneous collection within the right hepatic lobe compatible with reported abscess noting percutaneous drainage catheter in place.No significant biliary duct abnormality on MRCP assessment noting limitations from motion artifact.Distended loops of small bowel in the left mid abdomen with air-fluid levels.       Xray abdomen-Multiple loops of dilated small bowel are present in the left abdomen that measure up to 6.5 cm.      US abdomen- No detected gallbladder wall thickening, cholelithiasis, or pericholecystic fluid.  Common duct normal in size measuring 4 mm.  Diffuse component of increased hepatic parenchymal echotexture.  There is an ill-defined region of decreased echogenicity within the posterior aspect of the right hepatic lobe.    I have independently reviewed and interpreted the imaging above    Assessment:  Patient is a .47 y.o. y/o .male with  has a past medical history of Hypertension. Consulted to the GI service for klebsiella liver abscess.               Recommendations:  Leukocytosis.Hepatic abscess. Elevated bilirubin. Ileus. ERYN. Anemia of acute infection.  Wbc improving. Liver abscess drained on  12/9 with aerobic culture positive for klebsiella pneumoniae. On cefazolin, ID following. Bilirubin is elevated, improved s/p abscess drainage. Continue to monitor levels. There is the question of what is the possible source of the liver abscess. Plan for MRCP today and colonoscopy TBD patient optimization and patient agreeable to prep and procedure.   Dilated small bowel suspicion of illeus noted on imaging and NG tube with large output per Dr Howell's note on 12/11. Today with 300ml of green output, abdomen still distended. Rec to continue NG. MRI MRCP ordered to further assess ileus.  Iron deficiency anemia with anemia of acute infection noted with labs. No overt bleeding. Continue to monitor hgb.    --No acute findings on MRCP. Willing to undergo colonoscopy. NG wilth over 800ml in last 24hr, reassess in evening and if output noticeably decreasing d/c and begin clear liquids. If not re-assess in am to pull if appropriate.  Colonoscopy tbd when ileus is optimized.    Thank you so much for allowing us to participate in the care of Vivek Turner . Please contact us if you have any additional questions.    Sidra Crawford NP  Gastroenterology  [facility] - Med Surg

## 2022-12-14 NOTE — PLAN OF CARE
Problem: Adult Inpatient Plan of Care  Goal: Plan of Care Review  Outcome: Ongoing, Progressing  Goal: Patient-Specific Goal (Individualized)  Outcome: Ongoing, Progressing  Goal: Absence of Hospital-Acquired Illness or Injury  Outcome: Ongoing, Progressing  Goal: Optimal Comfort and Wellbeing  Outcome: Ongoing, Progressing  Goal: Readiness for Transition of Care  Outcome: Ongoing, Progressing     Problem: Adjustment to Illness (Sepsis/Septic Shock)  Goal: Optimal Coping  Outcome: Ongoing, Progressing     Problem: Bleeding (Sepsis/Septic Shock)  Goal: Absence of Bleeding  Outcome: Ongoing, Progressing     Problem: Glycemic Control Impaired (Sepsis/Septic Shock)  Goal: Blood Glucose Level Within Desired Range  Outcome: Ongoing, Progressing     Problem: Infection Progression (Sepsis/Septic Shock)  Goal: Absence of Infection Signs and Symptoms  Outcome: Ongoing, Progressing     Problem: Nutrition Impaired (Sepsis/Septic Shock)  Goal: Optimal Nutrition Intake  Outcome: Ongoing, Progressing     Problem: Fluid and Electrolyte Imbalance (Acute Kidney Injury/Impairment)  Goal: Fluid and Electrolyte Balance  Outcome: Ongoing, Progressing     Problem: Oral Intake Inadequate (Acute Kidney Injury/Impairment)  Goal: Optimal Nutrition Intake  Outcome: Ongoing, Progressing     Problem: Renal Function Impairment (Acute Kidney Injury/Impairment)  Goal: Effective Renal Function  Outcome: Ongoing, Progressing     Problem: Skin Injury Risk Increased  Goal: Skin Health and Integrity  Outcome: Ongoing, Progressing     Problem: Fall Injury Risk  Goal: Absence of Fall and Fall-Related Injury  Outcome: Ongoing, Progressing

## 2022-12-14 NOTE — PROGRESS NOTES
[facility]  Infectious Disease  Progress Note    Patient Name: Vivek Turner  MRN: [id number]  Admission Date: 12/9/2022  Length of Stay: 5 days  Attending Physician: Elmer Noriega DO  Primary Care Provider: Primary Doctor No    Isolation Status: No active isolations  Assessment/Plan:      * Hepatic abscess  47M with h/o HTN admitted 12/9 with abdominal pain, fount to have liver abscess and MARYURI, s/p IR drainage on 12/9 (CX + pan-sen kleb pna), now complicated by ileus, MARYURI, hypernatremia.  bcx ngtd. Was on cefepime/metronidazole, switched to cefazolin 12/11. HIV neg.     Etiology of abscess is unclear. MRCP 12/12 unremarkable. Showed heterogenous fluid collection at inferior aspect of the right hepatic lobe measuring approximately 8.8 x 6.8 x 9.3 cm (known abscess).    Tmax 100 overnight. Leukocytosis uptrending, but suspect due to hemoconcentration (Na 156). Plts and Hgb also uptrending.     Recommendations:   - continue cefazolin 2g q 8h    - continue flagy for now pending BCx results  - if febrile or has worsening abdominal pain would repeat CT abdomen r/o obstruction   - duration of abx until radiographic resolution of abscess; will need repeat CT in approx 4 wks  - given size of abscess will likely DC on IV abx x atleast 4 wks  - refused colonoscopy; can schedule outpt  - if febrile please repeat BCx              Anticipated Disposition: tbd    Thank you for your consult. I will follow-up with patient. Please contact us if you have any additional questions.       Chelsea Catherine MD  Infectious Disease  [facility]    Subjective:     Principal Problem:Hepatic abscess    HPI:   47M with h/o htn admitted 12/9 with several days of progressively worsening abdominal pain. Reports fever and nausea and decreased po intake.  Denies diarrhea. Denies weight loss. Notably reports traveling to [facility] several months ago.     Had abdominal ct and ultrasound  1. No sonographic abnormality  "of the gallbladder detected.  2. Ill-defined region of decreased echotexture within the posterior aspect of the right hepatic lobe corresponding to the findings seen on recent prior CT.  This could indicate a mass, abscess, or intense fatty infiltration.  3. Diffuse increased hepatic parenchymal echotexture suspected to represent hepatic steatosis.      S/p IR drainage of abscess. Reports pain improving.   KLEBSIELLA PNEUMONIAE   Many      Resulting Agency [facility]        Susceptibility     Klebsiella pneumoniae     CULTURE, AEROBIC  (SPECIFY SOURCE)     Amox/K Clav'ate <=8/4 mcg/mL Sensitive     Amp/Sulbactam <=8/4 mcg/mL Sensitive     Cefazolin <=2 mcg/mL Sensitive     Cefepime <=2 mcg/mL Sensitive     Ceftriaxone <=1 mcg/mL Sensitive     Ciprofloxacin <=1 mcg/mL Sensitive     Ertapenem <=0.5 mcg/mL Sensitive     Gentamicin <=4 mcg/mL Sensitive     Levofloxacin <=2 mcg/mL Sensitive     Meropenem <=1 mcg/mL Sensitive     Minocycline <=4 mcg/mL Sensitive     Piperacillin/Tazo <=16 mcg/mL Sensitive     Tetracycline <=4 mcg/mL Sensitive     Tobramycin <=4 mcg/mL Sensitive     Trimeth/Sulfa <=2/38 mcg/mL Sensitive                 Silvino improving    On cefepime/metronidazole    Hiv ordered, pending      ID consulted for "klebsiella liver abscess"    Interval History: Afebrile overnight. Remains with NG tube to suction for ileus. Complaining about feeling hungry.    Review of Systems   Constitutional:  Negative for chills, fatigue and fever.   Respiratory:  Negative for cough and shortness of breath.    Gastrointestinal:  Negative for abdominal pain and diarrhea.   Genitourinary:  Negative for difficulty urinating and dysuria.   Skin:  Negative for rash and wound.   Neurological:  Negative for headaches.   All other systems reviewed and are negative.  Objective:     Vital Signs (Most Recent):  Temp: 98.3 °F (36.8 °C) (12/14/22 1124)  Pulse: 99 (12/14/22 1124)  Resp: 19 (12/14/22 1124)  BP: 137/84 (12/14/22 1124)  SpO2: (!) 92 " % (12/14/22 1124)   Vital Signs (24h Range):  Temp:  [98.2 °F (36.8 °C)-100 °F (37.8 °C)] 98.3 °F (36.8 °C)  Pulse:  [86-99] 99  Resp:  [16-20] 19  SpO2:  [91 %-97 %] 92 %  BP: (134-142)/(75-84) 137/84     Weight: 103.6 kg (228 lb 6.3 oz)  Body mass index is 36.86 kg/m².    Estimated Creatinine Clearance: 52.6 mL/min (A) (based on SCr of 2 mg/dL (H)).    Physical Exam  Vitals reviewed.   Constitutional:       Appearance: He is ill-appearing.   HENT:      Head: Normocephalic and atraumatic.      Nose:      Comments: NGT to suction  Eyes:      Conjunctiva/sclera: Conjunctivae normal.      Pupils: Pupils are equal, round, and reactive to light.   Cardiovascular:      Rate and Rhythm: Normal rate and regular rhythm.   Pulmonary:      Effort: Pulmonary effort is normal. No respiratory distress.      Breath sounds: Normal breath sounds.   Abdominal:      General: Abdomen is flat. Bowel sounds are normal. There is distension.      Palpations: Abdomen is soft.      Tenderness: There is no abdominal tenderness. There is no guarding.   Musculoskeletal:      Cervical back: Normal range of motion.      Right lower leg: No edema.      Left lower leg: No edema.   Skin:     Findings: No erythema or rash.   Neurological:      General: No focal deficit present.      Mental Status: He is alert and oriented to person, place, and time.   Psychiatric:         Behavior: Behavior normal.       Significant Labs: Blood Culture:   Recent Labs   Lab 12/09/22  0401 12/09/22  0423 12/14/22  0205   LABBLOO No growth after 5 days. No growth after 5 days. No Growth to date  No Growth to date         Significant Imaging: I have reviewed all pertinent imaging results/findings within the past 24 hours.

## 2022-12-14 NOTE — PROGRESS NOTES
Renal Progress Note    Date of Admission:  12/9/2022  1:40 PM    Length of Stay: 5  Days    Subjective: n/a    Objective:    Current Facility-Administered Medications   Medication    acetaminophen suppository 650 mg    acetaminophen tablet 650 mg    albuterol-ipratropium 2.5 mg-0.5 mg/3 mL nebulizer solution 3 mL    Amino acid 4.25% - dextrose 5% (CLINIMIX-E) solution with additives (1L provides 42.5 gm AA, 50 gm CHO (170 kcal/L dextrose), Na 35, K 30, Mg 5, Ca 4.5, Acetate 70, Cl 39, Phos 15)    cefazolin (ANCEF) 2 gram in dextrose 5% 50 mL IVPB (premix)    dextrose 10% bolus 125 mL    dextrose 10% bolus 250 mL    dextrose 5 % and 0.45 % NaCl infusion    glucagon (human recombinant) injection 1 mg    glucose chewable tablet 16 g    glucose chewable tablet 24 g    heparin (porcine) injection 5,000 Units    HYDROmorphone injection 1 mg    melatonin tablet 6 mg    metronidazole IVPB 500 mg    mupirocin 2 % ointment    naloxone 0.4 mg/mL injection 0.02 mg    nitroGLYCERIN SL tablet 0.4 mg    oxyCODONE-acetaminophen 5-325 mg per tablet 1 tablet    sodium chloride 0.9% flush 10 mL       Vitals:    12/13/22 2233 12/13/22 2334 12/14/22 0416 12/14/22 0537   BP:  136/75  (!) 140/81   BP Location:  Left arm  Left arm   Patient Position:  Lying  Lying   Pulse:  91  86   Resp: 16 18 20 16   Temp:  98.9 °F (37.2 °C)  100 °F (37.8 °C)   TempSrc:  Oral  Oral   SpO2:  97%  96%   Weight:       Height:           I/O last 3 completed shifts:  In: 2653.6 [I.V.:2507.1; IV Piggyback:146.4]  Out: 4950 [Urine:3150; Drains:1800]  No intake/output data recorded.      Physical Exam:     General: NAD  Neck: supple  Heart: RRR  Lungs: unlabored breathing  Abdomen: NGT to suction  Limbs: n/a  Neurologic: awake      Laboratories:    Recent Labs   Lab 12/14/22  0205   WBC 20.35*   RBC 4.07*   HGB 11.9*   HCT 36.1*   *   MCV 89   MCH 29.2   MCHC 33.0         Recent Labs   Lab 12/14/22  0205   CALCIUM 8.3*   PROT 6.8    *   K 4.5   CO2 35*      BUN 55*   CREATININE 2.0*   ALKPHOS 103   ALT 18   AST 23   BILITOT 2.4*         No results for input(s): COLORU, CLARITYU, SPECGRAV, PHUR, PROTEINUA, GLUCOSEU, BLOODU, WBCU, RBCU, BACTERIA, MUCUS in the last 24 hours.    Invalid input(s):  BILIRUBINCON    Microbiology Results (last 7 days)       Procedure Component Value Units Date/Time    Blood culture [[id number]] Collected: 12/14/22 0205    Order Status: Sent Specimen: Blood from Antecubital, Right Arm Updated: 12/14/22 0232    Blood culture [[id number]] Collected: 12/14/22 0205    Order Status: Sent Specimen: Blood from Antecubital, Right Updated: 12/14/22 0231    Culture, Anaerobe [[id number]] Collected: 12/09/22 1831    Order Status: Completed Specimen: Abscess from Abdomen Updated: 12/13/22 0835     Anaerobic Culture No anaerobes isolated    Aerobic culture [[id number]]  (Abnormal)  (Susceptibility) Collected: 12/09/22 1831    Order Status: Completed Specimen: Abscess from Abdomen Updated: 12/11/22 0915     Aerobic Bacterial Culture KLEBSIELLA PNEUMONIAE  Many      Gram stain [[id number]] Collected: 12/09/22 1831    Order Status: Completed Specimen: Abscess from Abdomen Updated: 12/10/22 0755     Gram Stain Result Many WBC's      No organisms seen    Fungus culture [[id number]] Collected: 12/09/22 1831    Order Status: Sent Specimen: Abscess from Abdomen Updated: 12/09/22 1905              Diagnostic Tests:     MRI MRCP:  Impression:       Persistent heterogeneous collection within the right hepatic lobe compatible with reported abscess noting percutaneous drainage catheter in place.     No significant biliary duct abnormality on MRCP assessment noting limitations from motion artifact.     Distended loops of small bowel in the left mid abdomen with air-fluid levels, incompletely characterized on today's exam although similar to previous exams.  Recommend clinical correlation, could represent ileus.  Developing obstruction not  excluded.     Mesenteric edema, trace ascites, and small pleural effusions.     Additional findings as above.       Electronically signed by: Erasmo Betancourt   Date: 12/13/2022              Assessment:    48 y/o male admitted with:      - Hepatic Klebsiella abscess s/p drainage  - HyperNa+  - Persistent ileus   - Intrinsic MARYURI FeNa+ > 1.5%  (unknown baseline creatinine) improving as well as UO  - NAGMA resolved  - Hx. HTN  - Fe++ def. Anemia  - Hypoalbuminemia             Plan:    - IDecrease IVF rate  - Broad spectrum antibiotics per ID  - TPN started  - Bladder scan PRN  - Will follow Nct-dlohu-ktdmh-acid/base and I&O  - NPO + NG suction  - GI following, Colonoscopy pending  - Other problems per admitting

## 2022-12-14 NOTE — ASSESSMENT & PLAN NOTE
Patient has Non- operative ileus which is adynamic in etiology which is worsening. Will treat conservatively with bowel rest, IV fluids, serial abdominal exams and avoidance of GI paralytics such as narcotics or anti-spasmodics. Monitor patient closely.     - NGT placed 12/11 with large output-- continue  -GI following   -Surgery following   -Nutrition consulted  -Initiated TPN on 12/14

## 2022-12-14 NOTE — SUBJECTIVE & OBJECTIVE
Interval History: Afebrile overnight. Remains with NG tube to suction for ileus. Complaining about feeling hungry.    Review of Systems   Constitutional:  Negative for chills, fatigue and fever.   Respiratory:  Negative for cough and shortness of breath.    Gastrointestinal:  Negative for abdominal pain and diarrhea.   Genitourinary:  Negative for difficulty urinating and dysuria.   Skin:  Negative for rash and wound.   Neurological:  Negative for headaches.   All other systems reviewed and are negative.  Objective:     Vital Signs (Most Recent):  Temp: 98.3 °F (36.8 °C) (12/14/22 1124)  Pulse: 99 (12/14/22 1124)  Resp: 19 (12/14/22 1124)  BP: 137/84 (12/14/22 1124)  SpO2: (!) 92 % (12/14/22 1124)   Vital Signs (24h Range):  Temp:  [98.2 °F (36.8 °C)-100 °F (37.8 °C)] 98.3 °F (36.8 °C)  Pulse:  [86-99] 99  Resp:  [16-20] 19  SpO2:  [91 %-97 %] 92 %  BP: (134-142)/(75-84) 137/84     Weight: 103.6 kg (228 lb 6.3 oz)  Body mass index is 36.86 kg/m².    Estimated Creatinine Clearance: 52.6 mL/min (A) (based on SCr of 2 mg/dL (H)).    Physical Exam  Vitals reviewed.   Constitutional:       Appearance: He is ill-appearing.   HENT:      Head: Normocephalic and atraumatic.      Nose:      Comments: NGT to suction  Eyes:      Conjunctiva/sclera: Conjunctivae normal.      Pupils: Pupils are equal, round, and reactive to light.   Cardiovascular:      Rate and Rhythm: Normal rate and regular rhythm.   Pulmonary:      Effort: Pulmonary effort is normal. No respiratory distress.      Breath sounds: Normal breath sounds.   Abdominal:      General: Abdomen is flat. Bowel sounds are normal. There is distension.      Palpations: Abdomen is soft.      Tenderness: There is no abdominal tenderness. There is no guarding.   Musculoskeletal:      Cervical back: Normal range of motion.      Right lower leg: No edema.      Left lower leg: No edema.   Skin:     Findings: No erythema or rash.   Neurological:      General: No focal deficit  present.      Mental Status: He is alert and oriented to person, place, and time.   Psychiatric:         Behavior: Behavior normal.       Significant Labs: Blood Culture:   Recent Labs   Lab 12/09/22  0401 12/09/22  0423 12/14/22  0205   LABBLOO No growth after 5 days. No growth after 5 days. No Growth to date  No Growth to date         Significant Imaging: I have reviewed all pertinent imaging results/findings within the past 24 hours.

## 2022-12-14 NOTE — PLAN OF CARE
Problem: Physical Therapy  Goal: Physical Therapy Goal  Description: Goals to be met by: 22     Patient will increase functional independence with mobility by performin. Pt to mod I with bed mobility.  2. Pt to transfer with supervision/mod I.  3. Pt to ambulate 150' /c or /s AD and supervision.    Outcome: Ongoing, Progressing   SpO2 92-94% on RA throughout treatment. Pt is UIC post treatment, nurse notified. Pt required CGA for transfer/ambulation using RW; Min A for Sup to Sit

## 2022-12-14 NOTE — ASSESSMENT & PLAN NOTE
Unclear how he got this. IR drain placed 12/9. Cultures with Klebsiella, pansensitive.   - MRCP:persistent heterogeneous collection within the right hepatic lobe compatible with reported abscess noting percutaneous drainage catheter in place. No significant biliary duct abnormality on MRCP assessment.  - Blood cx with NGTD  - Abscess culture with klebsiella. No anaerobes  - ID consulted: on cefazolin, added flagyl on 12/13  - GI consulted to assess for source of liver abscess. Pt refuses inpatient colonoscopy. Likely plan for outpatient cscope.

## 2022-12-14 NOTE — ASSESSMENT & PLAN NOTE
Hgb 10-11, no prior to review.   Iron deficient- TSAT 17, but also significant acute inflammation- ferritin 3633.   B12, folate replete.   Stable

## 2022-12-14 NOTE — ASSESSMENT & PLAN NOTE
47M with h/o HTN admitted 12/9 with abdominal pain, fount to have liver abscess and MARYURI, s/p IR drainage on 12/9 (CX + pan-sen kleb pna), now complicated by ileus, MARYURI, hypernatremia.  bcx ngtd. Was on cefepime/metronidazole, switched to cefazolin 12/11. HIV neg.     Etiology of abscess is unclear. MRCP 12/12 unremarkable. Showed heterogenous fluid collection at inferior aspect of the right hepatic lobe measuring approximately 8.8 x 6.8 x 9.3 cm (known abscess).    Tmax 100 overnight. Leukocytosis uptrending, but suspect due to hemoconcentration (Na 156).    Recommendations:   - continue cefazolin 2g q 8h    - continue flagy for now pending BCx results  - if febrile or has worsening abdominal pain would repeat CT abdomen r/o obstruction   - duration of abx until radiographic resolution of abscess; will need repeat CT in approx 4 wks  - given size of abscess will likely DC on IV abx x atleast 4 wks  - refused colonoscopy; can schedule outpt  - if febrile please repeat BCx

## 2022-12-14 NOTE — PROGRESS NOTES
[facility] Medicine  Progress Note    Patient Name: Vivek Turner  MRN: [id number]  Patient Class: IP- Inpatient   Admission Date: 12/9/2022  Length of Stay: 5 days  Attending Physician: Elmer Noriega DO  Primary Care Provider: Primary Doctor No        Subjective:     Principal Problem:Hepatic abscess        HPI:  47y M w/ hypertension presents with abdominal pain x 2 days. Pt refused  service. He reports that he had fever/rigors three days prior to admission. He then developed worsening abdominal pain from then until admission. He went to an urgent care where he was started on bactrim for UTI. His abdominal pain increased over the next few days. He had a BM yesterday. Denies vomiting, but has had significant nausea and very limited PO intake in the days leading to admission. Aside from the single episode of fever/rigors he denies subsequent fevers.     In the ED he was found to have a R hepatic abscess vs mass. He traveled to the [facility] eight weeks prior to presentation. He denies fevers except for what is listed above. Denies weight loss, cough, night sweats. Denies prior hx of TB. Denies prior hx of cirrhosis      Overview/Hospital Course:  Mr Vivek Turner was admitted with severe sepsis due to R liver mass vs abscess and acute renal failure. IR consulted and drain placed on 12/9. Cultures with Klebsiella. ID consulted-continue cefazolin, and restarted Flagyl on 12/13. Started on bicarb gtt for acute renal failure and Nephrology consulted. He was moved to ICU for instability. He has ileus; NGT placed with copious bilious output. Renal failure improving. GI consulted to assess for source of liver abscess. MRCP showed persistent heterogeneous collection within the right hepatic lobe compatible with reported abscess noting percutaneous drainage catheter in place. No significant biliary duct abnormality on MRCP assessment. Pt refused inpatient colonoscopy. Pt transferred to the  floor on 12/12. PT/OT recommends HH. Monitor renal function and ileus at this time.       Interval History: Feeling better today. Wants to really eat. States abdominal pain only happens with the NGT starts having suction. No abdominal pain at this time. No N/V or diarrhea. Very frustrated that he cannot eat. Continues to have a lot of output from NGT. Output of about 850ml over the last 24hrs.     Review of Systems   Constitutional:  Positive for fatigue. Negative for chills, diaphoresis and fever.   HENT:  Negative for congestion and trouble swallowing.    Respiratory:  Negative for cough, chest tightness and shortness of breath.    Cardiovascular:  Negative for chest pain, palpitations and leg swelling.   Gastrointestinal:  Positive for abdominal distention. Negative for abdominal pain, constipation, diarrhea, nausea and vomiting.   Musculoskeletal:  Negative for arthralgias and myalgias.   Neurological:  Positive for weakness. Negative for numbness.   Psychiatric/Behavioral:  Negative for confusion.      Objective:     Vital Signs (Most Recent):  Temp: 98.6 °F (37 °C) (12/14/22 0725)  Pulse: 93 (12/14/22 0856)  Resp: 19 (12/14/22 0725)  BP: (!) 142/78 (12/14/22 0725)  SpO2: (!) 91 % (12/14/22 0856)   Vital Signs (24h Range):  Temp:  [98.2 °F (36.8 °C)-100 °F (37.8 °C)] 98.6 °F (37 °C)  Pulse:  [86-95] 93  Resp:  [16-20] 19  SpO2:  [91 %-97 %] 91 %  BP: (134-142)/(75-81) 142/78     Weight: 103.6 kg (228 lb 6.3 oz)  Body mass index is 36.86 kg/m².    Intake/Output Summary (Last 24 hours) at 12/14/2022 1056  Last data filed at 12/14/2022 0822  Gross per 24 hour   Intake --   Output 2475 ml   Net -2475 ml        Physical Exam  Vitals and nursing note reviewed.   Constitutional:       General: He is not in acute distress.     Appearance: He is obese. He is ill-appearing. He is not toxic-appearing or diaphoretic.   HENT:      Head: Normocephalic and atraumatic.      Nose:      Comments: NGT in place with bilious  output     Mouth/Throat:      Mouth: Mucous membranes are moist.   Eyes:      General: No scleral icterus.  Cardiovascular:      Rate and Rhythm: Normal rate and regular rhythm.      Pulses: Normal pulses.      Heart sounds: Normal heart sounds. No murmur heard.    No gallop.   Pulmonary:      Effort: No respiratory distress.      Breath sounds: Normal breath sounds. No wheezing or rales.      Comments: Decreased inspiration. No wheezes on exam. On 2L NC  Abdominal:      General: Bowel sounds are normal. There is distension.      Palpations: Abdomen is soft.      Tenderness: There is no abdominal tenderness. There is no guarding.      Comments: RUQ drain in place with minimal brown/orange fluid   Musculoskeletal:      Right lower leg: No edema.      Left lower leg: No edema.   Skin:     General: Skin is warm.   Neurological:      Mental Status: He is alert and oriented to person, place, and time.   Psychiatric:      Comments: Appears frustrated that he cannot eat at this time        Significant Labs: All pertinent labs within the past 24 hours have been reviewed.    Significant Imaging: I have reviewed all pertinent imaging results/findings within the past 24 hours.      Assessment/Plan:      * Hepatic abscess  Unclear how he got this. IR drain placed 12/9. Cultures with Klebsiella, pansensitive.   - MRCP:persistent heterogeneous collection within the right hepatic lobe compatible with reported abscess noting percutaneous drainage catheter in place. No significant biliary duct abnormality on MRCP assessment.  - Blood cx with NGTD  - Abscess culture with klebsiella. No anaerobes  - ID consulted: on cefazolin, added flagyl on 12/13  - GI consulted to assess for source of liver abscess. Pt refuses inpatient colonoscopy. Likely plan for outpatient cscope.       Severe sepsis  This patient does have evidence of infective focus. My overall impression is sepsis. Vital signs were reviewed and noted in progress  note.  Antibiotics given-   Antibiotics (From admission, onward)    Start     Stop Route Frequency Ordered    12/14/22 0800  cefazolin (ANCEF) 2 gram in dextrose 5% 50 mL IVPB (premix)         -- IV Every 8 hours (non-standard times) 12/14/22 0745    12/13/22 0833  metronidazole IVPB 500 mg         -- IV Every 8 hours (non-standard times) 12/13/22 0833        Cultures were taken-   Microbiology Results (last 7 days)     Procedure Component Value Units Date/Time    Blood culture [[id number]] Collected: 12/14/22 0205    Order Status: Completed Specimen: Blood from Antecubital, Right Arm Updated: 12/14/22 0912     Blood Culture, Routine No Growth to date    Narrative:      Blood cx in the AM with AM labs    Blood culture [[id number]] Collected: 12/14/22 0205    Order Status: Completed Specimen: Blood from Antecubital, Right Updated: 12/14/22 0912     Blood Culture, Routine No Growth to date    Narrative:      Blood cx in the AM with AM labs    Culture, Anaerobe [[id number]] Collected: 12/09/22 1831    Order Status: Completed Specimen: Abscess from Abdomen Updated: 12/13/22 0835     Anaerobic Culture No anaerobes isolated    Aerobic culture [[id number]]  (Abnormal)  (Susceptibility) Collected: 12/09/22 1831    Order Status: Completed Specimen: Abscess from Abdomen Updated: 12/11/22 0915     Aerobic Bacterial Culture KLEBSIELLA PNEUMONIAE  Many      Gram stain [[id number]] Collected: 12/09/22 1831    Order Status: Completed Specimen: Abscess from Abdomen Updated: 12/10/22 0755     Gram Stain Result Many WBC's      No organisms seen    Fungus culture [[id number]] Collected: 12/09/22 1831    Order Status: Sent Specimen: Abscess from Abdomen Updated: 12/09/22 1905        Latest lactate reviewed, they are-  No results for input(s): LACTATE in the last 72 hours.    Organ dysfunction indicated by Acute kidney injury and Acute liver injury  Source- liver abscess    Source control Achieved by- IR    -Due to hepatic abscess  -s/p IR  drainage on 12/09  -ID consulted:  Continue cefazolin and Flagyl  -blood cultures with no growth to date   -abscess culture with pansensitive Klebsiella   -continue IV antibiotics as above   -Leukocytosis worsening       ATN (acute tubular necrosis)  Creatinine at 6.1 at admission. Unknown baseline with no prior labs to review. FENa 1.4% suggesting intrinsic injury. This may be ATN from sepsis +/- Bactrim pseudotoxicity. No hydro seen on CT or US.   - IVF ordered  - Nephrology consulted  - this is improving     Patient with acute kidney injury likely due to IVVD/dehydration and acute tubular necrosis MARYURI is currently improving. Labs reviewed- Renal function/electrolytes with Estimated Creatinine Clearance: 52.6 mL/min (A) (based on SCr of 2 mg/dL (H)). according to latest data. Monitor urine output and serial BMP and adjust therapy as needed. Avoid nephrotoxins and renally dose meds for GFR listed above.       Acute hypoxemic respiratory failure  Patient with Hypoxic Respiratory failure which is Acute.  he is not on home oxygen.   Signs/symptoms of respiratory failure include- tachypnea, increased work of breathing and wheezing. Contributing diagnoses includes - atelectasis Labs and images were reviewed. Patient Has not had a recent ABG.     - O2 by NC PRN  - nebs PRN  - CXR shows atelectasis    Atelectasis  Shortness of breath partly due to atelectasis as noted on CXR  - incentive spirometer  - O2 by NC as needed      Ileus  Patient has Non- operative ileus which is adynamic in etiology which is worsening. Will treat conservatively with bowel rest, IV fluids, serial abdominal exams and avoidance of GI paralytics such as narcotics or anti-spasmodics. Monitor patient closely.     - NGT placed 12/11 with large output-- continue  -GI following   -Surgery following   -Nutrition consulted  -Initiated TPN on 12/14      Microcytic anemia  Hgb 10-11, no prior to review.   Iron deficient- TSAT 17, but also significant acute  inflammation- ferritin 3633.   B12, folate replete.   Stable        Hyperglycemia  A1c normal.       VTE Risk Mitigation (From admission, onward)         Ordered     heparin (porcine) injection 5,000 Units  Every 8 hours         12/10/22 0838     IP VTE HIGH RISK PATIENT  Once         12/09/22 1421     Place sequential compression device  Until discontinued         12/09/22 1421                Discharge Planning   VIVIEN: 12/16/2022     Code Status: Full Code   Is the patient medically ready for discharge?:     Reason for patient still in hospital (select all that apply): Patient trending condition, Treatment and Consult recommendations  Discharge Plan A: Home                  Elmer Noriega DO  Department of Hospital Medicine   [facility] - Telemetry

## 2022-12-14 NOTE — ASSESSMENT & PLAN NOTE
This patient does have evidence of infective focus. My overall impression is sepsis. Vital signs were reviewed and noted in progress note.  Antibiotics given-   Antibiotics (From admission, onward)    Start     Stop Route Frequency Ordered    12/14/22 0800  cefazolin (ANCEF) 2 gram in dextrose 5% 50 mL IVPB (premix)         -- IV Every 8 hours (non-standard times) 12/14/22 0745    12/13/22 0833  metronidazole IVPB 500 mg         -- IV Every 8 hours (non-standard times) 12/13/22 0833        Cultures were taken-   Microbiology Results (last 7 days)     Procedure Component Value Units Date/Time    Blood culture [[id number]] Collected: 12/14/22 0205    Order Status: Completed Specimen: Blood from Antecubital, Right Arm Updated: 12/14/22 0912     Blood Culture, Routine No Growth to date    Narrative:      Blood cx in the AM with AM labs    Blood culture [[id number]] Collected: 12/14/22 0205    Order Status: Completed Specimen: Blood from Antecubital, Right Updated: 12/14/22 0912     Blood Culture, Routine No Growth to date    Narrative:      Blood cx in the AM with AM labs    Culture, Anaerobe [[id number]] Collected: 12/09/22 1831    Order Status: Completed Specimen: Abscess from Abdomen Updated: 12/13/22 0835     Anaerobic Culture No anaerobes isolated    Aerobic culture [[id number]]  (Abnormal)  (Susceptibility) Collected: 12/09/22 1831    Order Status: Completed Specimen: Abscess from Abdomen Updated: 12/11/22 0915     Aerobic Bacterial Culture KLEBSIELLA PNEUMONIAE  Many      Gram stain [[id number]] Collected: 12/09/22 1831    Order Status: Completed Specimen: Abscess from Abdomen Updated: 12/10/22 0755     Gram Stain Result Many WBC's      No organisms seen    Fungus culture [[id number]] Collected: 12/09/22 1831    Order Status: Sent Specimen: Abscess from Abdomen Updated: 12/09/22 1905        Latest lactate reviewed, they are-  No results for input(s): LACTATE in the last 72 hours.    Organ dysfunction indicated by Acute  kidney injury and Acute liver injury  Source- liver abscess    Source control Achieved by- IR    -Due to hepatic abscess  -s/p IR drainage on 12/09  -ID consulted:  Continue cefazolin and Flagyl  -blood cultures with no growth to date   -abscess culture with pansensitive Klebsiella   -continue IV antibiotics as above   -Leukocytosis worsening

## 2022-12-14 NOTE — PT/OT/SLP PROGRESS
Physical Therapy Treatment    Patient Name:  Vivek Turner   MRN:  [id number]    Recommendations:     Discharge Recommendations: home health PT  Discharge Equipment Recommendations:  (TBD)  Barriers to discharge: None    Assessment:     Vivek Turner is a 47 y.o. male admitted with a medical diagnosis of Hepatic abscess.  He presents with the following impairments/functional limitations: weakness, impaired endurance, impaired functional mobility, gait instability, impaired balance, decreased lower extremity function, decreased upper extremity function, impaired skin, edema.    SpO2 92-94% on RA throughout treatment. Pt is [facility] post treatment, nurse notified. Pt required CGA for transfer/ambulation using RW; Min A for Sup to Sit    Rehab Prognosis: Good; patient would benefit from acute skilled PT services to address these deficits and reach maximum level of function.    Recent Surgery: * No surgery found *      Plan:     During this hospitalization, patient to be seen 5 x/week to address the identified rehab impairments via gait training, therapeutic activities, therapeutic exercises and progress toward the following goals:    Plan of Care Expires:  12/20/22    Subjective     Chief Complaint: requested to use the toilet  Patient/Family Comments/goals: Pt agreed to participate.  Pain/Comfort:  Pain Rating 1: 0/10  Pain Rating Post-Intervention 1: 0/10      Objective:     Communicated with nurse Rivas prior to session.  Patient found HOB elevated with MARLEY drain, NG tube, telemetry, peripheral IV, oxygen upon PT entry to room.     General Precautions: Standard, fall  Orthopedic Precautions: N/A  Braces: N/A  Respiratory Status: Nasal cannula, flow 1 L/min     Functional Mobility:  Bed Mobility:     Scooting: anterior scoot to EOB with contact guard assistance  Supine to Sit: minimum assistance for trunk support and BLE management with HOB elevated  Transfers:   Gait Belt don prior OOB activity  Sit to Stand: from EOB and  BSC with contact guard assistance with rolling walker; v/c for proper hand placement  Bed to BSC:  contact guard assistance with rolling walker using Step Transfer  BSC to BS Chair: contact guard assistance with rolling walker using Step Transfer  Gait: Pt ambulated ~ 5 steps (from Bed to BSC) and ~8 steps (from BSC to BS Chair) with CGA using RW. Pt demo reciprocal gait pattern with decreased reese and step length; v/c for proper hand placement on AD, sequencing, AD management,  with ambulation  Balance: Good sitting; Fair Standing    AM-PAC 6 CLICK MOBILITY  Turning over in bed (including adjusting bedclothes, sheets and blankets)?: 3  Sitting down on and standing up from a chair with arms (e.g., wheelchair, bedside commode, etc.): 3  Moving from lying on back to sitting on the side of the bed?: 3  Moving to and from a bed to a chair (including a wheelchair)?: 3  Need to walk in hospital room?: 3  Climbing 3-5 steps with a railing?: 2  Basic Mobility Total Score: 17       Treatment & Education:  Educated pt on role of acute care PT and PT POC, safety while in hospital including calling nurse for mobility, and call light usage; importance of OOB activity and performing BLE ex throughout the day, pt verbalized understanding.  BLE ex in seated at EOB 2 x 10 reps AP, LAQ, Marching  Pt had BM, pt carmela ~1-2 min using RW for deepa hygiene in standing.     Patient left up in chair in reclined position with BLE offloaded by pillows, tray table near by, all lines intact, call button in reach, and nurse notified.    GOALS:   Multidisciplinary Problems       Physical Therapy Goals          Problem: Physical Therapy    Goal Priority Disciplines Outcome Goal Variances Interventions   Physical Therapy Goal     PT, PT/OT Ongoing, Progressing     Description: Goals to be met by: 22     Patient will increase functional independence with mobility by performin. Pt to mod I with bed mobility.  2. Pt to transfer  with supervision/mod I.  3. Pt to ambulate 150' /c or /s AD and supervision.                         Time Tracking:     PT Received On: 12/14/22  PT Start Time: 0940     PT Stop Time: 1029  PT Total Time (min): 49 min     Billable Minutes: Gait Training 14 min, Therapeutic Activity 20 min, and Therapeutic Exercise 15 min    Treatment Type: Treatment  PT/PTA: PTA     PTA Visit Number: 1     12/14/2022

## 2022-12-15 LAB
ALBUMIN SERPL BCP-MCNC: 2.2 G/DL (ref 3.5–5.2)
ALP SERPL-CCNC: 112 U/L (ref 55–135)
ALT SERPL W/O P-5'-P-CCNC: 13 U/L (ref 10–44)
ANION GAP SERPL CALC-SCNC: 9 MMOL/L (ref 8–16)
AST SERPL-CCNC: 23 U/L (ref 10–40)
BASOPHILS # BLD AUTO: 0.03 K/UL (ref 0–0.2)
BASOPHILS NFR BLD: 0.2 % (ref 0–1.9)
BILIRUB SERPL-MCNC: 2 MG/DL (ref 0.1–1)
BUN SERPL-MCNC: 56 MG/DL (ref 6–20)
CALCIUM SERPL-MCNC: 8.3 MG/DL (ref 8.7–10.5)
CHLORIDE SERPL-SCNC: 106 MMOL/L (ref 95–110)
CO2 SERPL-SCNC: 38 MMOL/L (ref 23–29)
CREAT SERPL-MCNC: 2.1 MG/DL (ref 0.5–1.4)
DIFFERENTIAL METHOD: ABNORMAL
EOSINOPHIL # BLD AUTO: 0 K/UL (ref 0–0.5)
EOSINOPHIL NFR BLD: 0.2 % (ref 0–8)
ERYTHROCYTE [DISTWIDTH] IN BLOOD BY AUTOMATED COUNT: 14.5 % (ref 11.5–14.5)
EST. GFR  (NO RACE VARIABLE): 39 ML/MIN/1.73 M^2
GLUCOSE SERPL-MCNC: 152 MG/DL (ref 70–110)
HCT VFR BLD AUTO: 39.5 % (ref 40–54)
HGB BLD-MCNC: 12.2 G/DL (ref 14–18)
IMM GRANULOCYTES # BLD AUTO: 0.21 K/UL (ref 0–0.04)
IMM GRANULOCYTES NFR BLD AUTO: 1.2 % (ref 0–0.5)
LYMPHOCYTES # BLD AUTO: 1.6 K/UL (ref 1–4.8)
LYMPHOCYTES NFR BLD: 9.7 % (ref 18–48)
MAGNESIUM SERPL-MCNC: 2.4 MG/DL (ref 1.6–2.6)
MCH RBC QN AUTO: 27.8 PG (ref 27–31)
MCHC RBC AUTO-ENTMCNC: 30.9 G/DL (ref 32–36)
MCV RBC AUTO: 90 FL (ref 82–98)
MONOCYTES # BLD AUTO: 0.9 K/UL (ref 0.3–1)
MONOCYTES NFR BLD: 5.2 % (ref 4–15)
NEUTROPHILS # BLD AUTO: 14.1 K/UL (ref 1.8–7.7)
NEUTROPHILS NFR BLD: 83.5 % (ref 38–73)
NRBC BLD-RTO: 0 /100 WBC
PHOSPHATE SERPL-MCNC: 4.1 MG/DL (ref 2.7–4.5)
PLATELET # BLD AUTO: 581 K/UL (ref 150–450)
PMV BLD AUTO: 10.1 FL (ref 9.2–12.9)
POCT GLUCOSE: 113 MG/DL (ref 70–110)
POCT GLUCOSE: 129 MG/DL (ref 70–110)
POCT GLUCOSE: 133 MG/DL (ref 70–110)
POTASSIUM SERPL-SCNC: 3.9 MMOL/L (ref 3.5–5.1)
PROT SERPL-MCNC: 7.2 G/DL (ref 6–8.4)
RBC # BLD AUTO: 4.39 M/UL (ref 4.6–6.2)
SODIUM SERPL-SCNC: 153 MMOL/L (ref 136–145)
WBC # BLD AUTO: 16.86 K/UL (ref 3.9–12.7)

## 2022-12-15 PROCEDURE — 85025 COMPLETE CBC W/AUTO DIFF WBC: CPT | Performed by: HOSPITALIST

## 2022-12-15 PROCEDURE — 63600175 PHARM REV CODE 636 W HCPCS: Performed by: HOSPITALIST

## 2022-12-15 PROCEDURE — B4185 PARENTERAL SOL 10 GM LIPIDS: HCPCS | Performed by: STUDENT IN AN ORGANIZED HEALTH CARE EDUCATION/TRAINING PROGRAM

## 2022-12-15 PROCEDURE — 63600175 PHARM REV CODE 636 W HCPCS: Performed by: STUDENT IN AN ORGANIZED HEALTH CARE EDUCATION/TRAINING PROGRAM

## 2022-12-15 PROCEDURE — 21400001 HC TELEMETRY ROOM

## 2022-12-15 PROCEDURE — 25000003 PHARM REV CODE 250: Performed by: STUDENT IN AN ORGANIZED HEALTH CARE EDUCATION/TRAINING PROGRAM

## 2022-12-15 PROCEDURE — 25000003 PHARM REV CODE 250: Performed by: HOSPITALIST

## 2022-12-15 PROCEDURE — 84100 ASSAY OF PHOSPHORUS: CPT | Performed by: STUDENT IN AN ORGANIZED HEALTH CARE EDUCATION/TRAINING PROGRAM

## 2022-12-15 PROCEDURE — A4217 STERILE WATER/SALINE, 500 ML: HCPCS | Performed by: STUDENT IN AN ORGANIZED HEALTH CARE EDUCATION/TRAINING PROGRAM

## 2022-12-15 PROCEDURE — 80053 COMPREHEN METABOLIC PANEL: CPT | Performed by: HOSPITALIST

## 2022-12-15 PROCEDURE — 83735 ASSAY OF MAGNESIUM: CPT | Performed by: STUDENT IN AN ORGANIZED HEALTH CARE EDUCATION/TRAINING PROGRAM

## 2022-12-15 PROCEDURE — 36415 COLL VENOUS BLD VENIPUNCTURE: CPT | Performed by: HOSPITALIST

## 2022-12-15 PROCEDURE — 36569 INSJ PICC 5 YR+ W/O IMAGING: CPT

## 2022-12-15 PROCEDURE — 99233 PR SUBSEQUENT HOSPITAL CARE,LEVL III: ICD-10-PCS | Mod: ,,, | Performed by: INTERNAL MEDICINE

## 2022-12-15 PROCEDURE — A4216 STERILE WATER/SALINE, 10 ML: HCPCS | Performed by: HOSPITALIST

## 2022-12-15 PROCEDURE — C1751 CATH, INF, PER/CENT/MIDLINE: HCPCS

## 2022-12-15 PROCEDURE — S0030 INJECTION, METRONIDAZOLE: HCPCS | Performed by: INTERNAL MEDICINE

## 2022-12-15 PROCEDURE — 99233 SBSQ HOSP IP/OBS HIGH 50: CPT | Mod: ,,, | Performed by: NURSE PRACTITIONER

## 2022-12-15 PROCEDURE — 99233 SBSQ HOSP IP/OBS HIGH 50: CPT | Mod: ,,, | Performed by: INTERNAL MEDICINE

## 2022-12-15 PROCEDURE — 63600175 PHARM REV CODE 636 W HCPCS: Performed by: INTERNAL MEDICINE

## 2022-12-15 PROCEDURE — 99233 PR SUBSEQUENT HOSPITAL CARE,LEVL III: ICD-10-PCS | Mod: ,,, | Performed by: NURSE PRACTITIONER

## 2022-12-15 PROCEDURE — A4216 STERILE WATER/SALINE, 10 ML: HCPCS | Performed by: STUDENT IN AN ORGANIZED HEALTH CARE EDUCATION/TRAINING PROGRAM

## 2022-12-15 PROCEDURE — 25000003 PHARM REV CODE 250: Performed by: INTERNAL MEDICINE

## 2022-12-15 RX ORDER — SODIUM CHLORIDE 0.9 % (FLUSH) 0.9 %
10 SYRINGE (ML) INJECTION EVERY 6 HOURS
Status: DISCONTINUED | OUTPATIENT
Start: 2022-12-15 | End: 2022-12-28 | Stop reason: HOSPADM

## 2022-12-15 RX ORDER — SODIUM CHLORIDE 0.9 % (FLUSH) 0.9 %
10 SYRINGE (ML) INJECTION
Status: DISCONTINUED | OUTPATIENT
Start: 2022-12-15 | End: 2022-12-28 | Stop reason: HOSPADM

## 2022-12-15 RX ORDER — POLYETHYLENE GLYCOL 3350 17 G/17G
17 POWDER, FOR SOLUTION ORAL DAILY
Status: DISCONTINUED | OUTPATIENT
Start: 2022-12-15 | End: 2022-12-28 | Stop reason: HOSPADM

## 2022-12-15 RX ADMIN — HEPARIN SODIUM 5000 UNITS: 5000 INJECTION INTRAVENOUS; SUBCUTANEOUS at 10:12

## 2022-12-15 RX ADMIN — SOYBEAN OIL 250 ML: 20 INJECTION, SOLUTION INTRAVENOUS at 10:12

## 2022-12-15 RX ADMIN — Medication 10 ML: at 10:12

## 2022-12-15 RX ADMIN — CEFAZOLIN SODIUM 2 G: 2 SOLUTION INTRAVENOUS at 07:12

## 2022-12-15 RX ADMIN — METRONIDAZOLE 500 MG: 500 INJECTION, SOLUTION INTRAVENOUS at 01:12

## 2022-12-15 RX ADMIN — METRONIDAZOLE 500 MG: 500 INJECTION, SOLUTION INTRAVENOUS at 07:12

## 2022-12-15 RX ADMIN — Medication 10 ML: at 06:12

## 2022-12-15 RX ADMIN — METRONIDAZOLE 500 MG: 500 INJECTION, SOLUTION INTRAVENOUS at 04:12

## 2022-12-15 RX ADMIN — ASCORBIC ACID, VITAMIN A PALMITATE, CHOLECALCIFEROL, THIAMINE HYDROCHLORIDE, RIBOFLAVIN-5 PHOSPHATE SODIUM, PYRIDOXINE HYDROCHLORIDE, NIACINAMIDE, DEXPANTHENOL, ALPHA-TOCOPHEROL ACETATE, VITAMIN K1, FOLIC ACID, BIOTIN, CYANOCOBALAMIN: 200; 3300; 200; 6; 3.6; 6; 40; 15; 10; 150; 600; 60; 5 INJECTION, SOLUTION INTRAVENOUS at 10:12

## 2022-12-15 RX ADMIN — CEFAZOLIN SODIUM 2 G: 2 SOLUTION INTRAVENOUS at 01:12

## 2022-12-15 RX ADMIN — HEPARIN SODIUM 5000 UNITS: 5000 INJECTION INTRAVENOUS; SUBCUTANEOUS at 01:12

## 2022-12-15 RX ADMIN — MAGNESIUM SULFATE HEPTAHYDRATE: 500 INJECTION, SOLUTION INTRAMUSCULAR; INTRAVENOUS at 10:12

## 2022-12-15 RX ADMIN — CEFAZOLIN SODIUM 2 G: 2 SOLUTION INTRAVENOUS at 04:12

## 2022-12-15 RX ADMIN — HEPARIN SODIUM 5000 UNITS: 5000 INJECTION INTRAVENOUS; SUBCUTANEOUS at 06:12

## 2022-12-15 RX ADMIN — Medication 10 ML: at 02:12

## 2022-12-15 RX ADMIN — Medication 10 ML: at 05:12

## 2022-12-15 NOTE — PT/OT/SLP PROGRESS
Physical Therapy      Patient Name:  Vivek Turner   MRN:  [id number]    9:50 am Patient not seen today secondary to Patient unwilling to participate despite encouragement. Pt is irritated with the NG tube and asked when he can have it removed, nurse notified. Will follow-up as able.

## 2022-12-15 NOTE — ASSESSMENT & PLAN NOTE
Creatinine at 6.1 at admission. Unknown baseline with no prior labs to review. FENa 1.4% suggesting intrinsic injury. This may be ATN from sepsis +/- Bactrim pseudotoxicity. No hydro seen on CT or US.   - IVF ordered  - Nephrology consulted  - this is improving     Patient with acute kidney injury likely due to IVVD/dehydration and acute tubular necrosis MARYURI is currently improving. Labs reviewed- Renal function/electrolytes with Estimated Creatinine Clearance: 48.3 mL/min (A) (based on SCr of 2.1 mg/dL (H)). according to latest data. Monitor urine output and serial BMP and adjust therapy as needed. Avoid nephrotoxins and renally dose meds for GFR listed above.

## 2022-12-15 NOTE — PROGRESS NOTES
Pt unable to have PICC line placed due to sever weather. PICC line will be placed 12/15/2022 in AM. Order for TPN at 2200 held due to no PICC line placement. TPN will be started once line is placed and per MD order. Will continue to monitor

## 2022-12-15 NOTE — SUBJECTIVE & OBJECTIVE
Interval History: Afebrile overnight. Remains with NG tube to suction for ileus. Denies abdominal pain.     Review of Systems   Constitutional:  Negative for chills, fatigue and fever.   Respiratory:  Negative for cough and shortness of breath.    Gastrointestinal:  Negative for abdominal pain and diarrhea.   Genitourinary:  Negative for difficulty urinating and dysuria.   Skin:  Negative for rash and wound.   Neurological:  Negative for headaches.   All other systems reviewed and are negative.  Objective:     Vital Signs (Most Recent):  Temp: 98.3 °F (36.8 °C) (12/15/22 1203)  Pulse: 95 (12/15/22 1203)  Resp: 18 (12/15/22 1203)  BP: (!) 140/79 (12/15/22 1203)  SpO2: (!) 93 % (12/15/22 1203)   Vital Signs (24h Range):  Temp:  [98.3 °F (36.8 °C)-99.5 °F (37.5 °C)] 98.3 °F (36.8 °C)  Pulse:  [81-97] 95  Resp:  [17-19] 18  SpO2:  [90 %-93 %] 93 %  BP: (134-148)/(64-83) 140/79     Weight: 96.4 kg (212 lb 8.4 oz)  Body mass index is 34.3 kg/m².    Estimated Creatinine Clearance: 48.3 mL/min (A) (based on SCr of 2.1 mg/dL (H)).    Physical Exam  Vitals reviewed.   Constitutional:       Appearance: He is ill-appearing.   HENT:      Head: Normocephalic and atraumatic.      Nose:      Comments: NGT to suction  Eyes:      Conjunctiva/sclera: Conjunctivae normal.      Pupils: Pupils are equal, round, and reactive to light.   Cardiovascular:      Rate and Rhythm: Normal rate and regular rhythm.   Pulmonary:      Effort: Pulmonary effort is normal. No respiratory distress.      Breath sounds: Normal breath sounds.   Abdominal:      General: Abdomen is flat. Bowel sounds are normal. There is distension.      Palpations: Abdomen is soft.      Tenderness: There is no abdominal tenderness. There is no guarding.   Musculoskeletal:      Cervical back: Normal range of motion.      Right lower leg: No edema.      Left lower leg: No edema.   Skin:     Findings: No erythema or rash.   Neurological:      General: No focal deficit present.       Mental Status: He is alert and oriented to person, place, and time.   Psychiatric:         Behavior: Behavior normal.       Significant Labs: Blood Culture:   Recent Labs   Lab 12/09/22  0401 12/09/22  0423 12/14/22  0205   LABBLOO No growth after 5 days. No growth after 5 days. No Growth to date  No Growth to date  No Growth to date  No Growth to date         Significant Imaging: I have reviewed all pertinent imaging results/findings within the past 24 hours.

## 2022-12-15 NOTE — ASSESSMENT & PLAN NOTE
This patient does have evidence of infective focus. My overall impression is sepsis. Vital signs were reviewed and noted in progress note.  Antibiotics given-   Antibiotics (From admission, onward)    Start     Stop Route Frequency Ordered    12/14/22 0800  cefazolin (ANCEF) 2 gram in dextrose 5% 50 mL IVPB (premix)         -- IV Every 8 hours (non-standard times) 12/14/22 0745    12/13/22 0833  metronidazole IVPB 500 mg         -- IV Every 8 hours (non-standard times) 12/13/22 0833        Cultures were taken-   Microbiology Results (last 7 days)     Procedure Component Value Units Date/Time    Blood culture [[id number]] Collected: 12/14/22 0205    Order Status: Completed Specimen: Blood from Antecubital, Right Arm Updated: 12/15/22 0303     Blood Culture, Routine No Growth to date      No Growth to date    Narrative:      Blood cx in the AM with AM labs    Blood culture [[id number]] Collected: 12/14/22 0205    Order Status: Completed Specimen: Blood from Antecubital, Right Updated: 12/15/22 0303     Blood Culture, Routine No Growth to date      No Growth to date    Narrative:      Blood cx in the AM with AM labs    Culture, Anaerobe [[id number]] Collected: 12/09/22 1831    Order Status: Completed Specimen: Abscess from Abdomen Updated: 12/13/22 0835     Anaerobic Culture No anaerobes isolated    Aerobic culture [[id number]]  (Abnormal)  (Susceptibility) Collected: 12/09/22 1831    Order Status: Completed Specimen: Abscess from Abdomen Updated: 12/11/22 0915     Aerobic Bacterial Culture KLEBSIELLA PNEUMONIAE  Many      Gram stain [[id number]] Collected: 12/09/22 1831    Order Status: Completed Specimen: Abscess from Abdomen Updated: 12/10/22 0755     Gram Stain Result Many WBC's      No organisms seen    Fungus culture [[id number]] Collected: 12/09/22 1831    Order Status: Sent Specimen: Abscess from Abdomen Updated: 12/09/22 1905        Latest lactate reviewed, they are-  No results for input(s): LACTATE in the last 72  hours.    Organ dysfunction indicated by Acute kidney injury and Acute liver injury  Source- liver abscess    Source control Achieved by- IR    -Due to hepatic abscess  -s/p IR drainage on 12/09  -ID consulted:  Continue cefazolin and Flagyl  -blood cultures with no growth to date   -abscess culture with pansensitive Klebsiella   -continue IV antibiotics as above   -Leukocytosis improving

## 2022-12-15 NOTE — PROCEDURES
"Vivek Turner is a 47 y.o. male patient.    Temp: 98.3 °F (36.8 °C) (12/15/22 1203)  Pulse: 95 (12/15/22 1203)  Resp: 18 (12/15/22 1203)  BP: (!) 140/79 (12/15/22 1203)  SpO2: (!) 93 % (12/15/22 1203)  Weight: 96.4 kg (212 lb 8.4 oz) (12/15/22 0446)  Height: 5' 6" (167.6 cm) (12/09/22 1951)    PICC  Date/Time: 12/15/2022 2:28 PM  Performed by: Bassam Higgins RN  Consent Done: Yes  Time out: Immediately prior to procedure a time out was called to verify the correct patient, procedure, equipment, support staff and site/side marked as required  Indications: med administration and vascular access  Anesthesia: local infiltration  Local anesthetic: lidocaine 1% without epinephrine  Anesthetic Total (mL): 5  Preparation: skin prepped with ChloraPrep  Skin prep agent dried: skin prep agent completely dried prior to procedure  Sterile barriers: all five maximum sterile barriers used - cap, mask, sterile gown, sterile gloves, and large sterile sheet  Hand hygiene: hand hygiene performed prior to central venous catheter insertion  Location details: left basilic  Catheter type: double lumen  Catheter size: 5 Fr  Catheter Length: 47cm    Ultrasound guidance: yes  Vessel Caliber: medium, compressibility normal  Needle advanced into vessel with real time Ultrasound guidance.  Guidewire confirmed in vessel.  Sterile sheath used.  Number of attempts: 2 (unable to thread past right shoulder)  Post-procedure: blood return through all ports, sterile dressing applied and chlorhexidine patch          Name bassam higgins  12/15/2022    "

## 2022-12-15 NOTE — CLINICAL REVIEW
IP Sepsis Screen (most recent)       Sepsis Screen (IP) - 12/15/22 1223       Is the patient's history or complaint suggestive of a possible infection? Yes  -CB    Are there at least two of the following signs and symptoms present? Yes  -CB    Sepsis signs/symptoms - Tachycardia Tachycardia     >90  -CB    Sepsis signs/symptoms - WBC WBC < 4,000 or WBC > 12,000  -CB    Are any of the following organ dysfunction criteria present and not considered to be due to a chronic condition? Yes  -CB    Organ Dysfunction Criteria Creatinine > 2.0  -CB    Initiate Sepsis Protocol No  -CB    Reason sepsis not considered Pt. receiving appropriate management  -CB              User Key  (r) = Recorded By, (t) = Taken By, (c) = Cosigned By      Initials Name    CB Lyla Luna RN

## 2022-12-15 NOTE — PLAN OF CARE
Problem: Adult Inpatient Plan of Care  Goal: Plan of Care Review  Outcome: Ongoing, Progressing  Goal: Patient-Specific Goal (Individualized)  Outcome: Ongoing, Progressing  Goal: Absence of Hospital-Acquired Illness or Injury  Outcome: Ongoing, Progressing  Goal: Optimal Comfort and Wellbeing  Outcome: Ongoing, Progressing  Goal: Readiness for Transition of Care  Outcome: Ongoing, Progressing     Problem: Adjustment to Illness (Sepsis/Septic Shock)  Goal: Optimal Coping  Outcome: Ongoing, Progressing     Problem: Bleeding (Sepsis/Septic Shock)  Goal: Absence of Bleeding  Outcome: Ongoing, Progressing     Problem: Glycemic Control Impaired (Sepsis/Septic Shock)  Goal: Blood Glucose Level Within Desired Range  Outcome: Ongoing, Progressing     Problem: Infection Progression (Sepsis/Septic Shock)  Goal: Absence of Infection Signs and Symptoms  Outcome: Ongoing, Progressing     Problem: Nutrition Impaired (Sepsis/Septic Shock)  Goal: Optimal Nutrition Intake  Outcome: Ongoing, Progressing     Problem: Fluid and Electrolyte Imbalance (Acute Kidney Injury/Impairment)  Goal: Fluid and Electrolyte Balance  Outcome: Ongoing, Progressing     Problem: Oral Intake Inadequate (Acute Kidney Injury/Impairment)  Goal: Optimal Nutrition Intake  Outcome: Ongoing, Progressing     Problem: Renal Function Impairment (Acute Kidney Injury/Impairment)  Goal: Effective Renal Function  Outcome: Ongoing, Progressing     Problem: Skin Injury Risk Increased  Goal: Skin Health and Integrity  Outcome: Ongoing, Progressing     Problem: Fall Injury Risk  Goal: Absence of Fall and Fall-Related Injury  Outcome: Ongoing, Progressing     Problem: Mobility Impairment  Goal: Optimal Mobility  Outcome: Ongoing, Progressing

## 2022-12-15 NOTE — PLAN OF CARE
Patient may need 4 weeks of IV abx. Patient does not have insurance. AYAKA notified Dr. Noriega and sent a message to Kamila in [facility] to inquire if an application can be completed.     Kamila informed AYAKA that patient made her aware that the representative for the company is responsible for the hospital visit. Kamila informed AYAKA that it is not workman's comp. Kamila stated that patient fell ill whils he was off, but since he is their care ( a 3rd party [place] company) they will assume responisibility for the bill. Kamila gave AYAKA contact info for the representative, Mick Matthew [phone]. AYAKA called rep. There was no answer. AYAKA left a message.       12/15/22 0485   Discharge Reassessment   Assessment Type Discharge Planning Reassessment   Did the patient's condition or plan change since previous assessment? No   Communicated VIVIEN with patient/caregiver Date not available/Unable to determine   Discharge Plan A Home   Discharge Plan B Home   DME Needed Upon Discharge  none   Discharge Barriers Identified Unisured   Why the patient remains in the hospital Requires continued medical care   Post-Acute Status   Discharge Delays None known at this time

## 2022-12-15 NOTE — PROGRESS NOTES
Renal Progress Note    Date of Admission:  12/9/2022  1:40 PM    Length of Stay: 6  Days    Subjective: n/a    Objective:    Current Facility-Administered Medications   Medication    acetaminophen suppository 650 mg    acetaminophen tablet 650 mg    albuterol-ipratropium 2.5 mg-0.5 mg/3 mL nebulizer solution 3 mL    Amino acid 4.25% - dextrose 5% (CLINIMIX-E) solution with additives (1L provides 42.5 gm AA, 50 gm CHO (170 kcal/L dextrose), Na 35, K 30, Mg 5, Ca 4.5, Acetate 70, Cl 39, Phos 15)    cefazolin (ANCEF) 2 gram in dextrose 5% 50 mL IVPB (premix)    dextrose 10% bolus 125 mL    dextrose 10% bolus 250 mL    dextrose 5 % infusion    glucagon (human recombinant) injection 1 mg    glucose chewable tablet 16 g    glucose chewable tablet 24 g    heparin (porcine) injection 5,000 Units    HYDROmorphone injection 1 mg    melatonin tablet 6 mg    metronidazole IVPB 500 mg    naloxone 0.4 mg/mL injection 0.02 mg    nitroGLYCERIN SL tablet 0.4 mg    oxyCODONE-acetaminophen 5-325 mg per tablet 1 tablet    sodium chloride 0.9% flush 10 mL       Vitals:    12/14/22 1920 12/14/22 1953 12/15/22 0023 12/15/22 0446   BP:  (!) 140/83 (!) 141/71 (!) 146/81   BP Location:  Left arm Left arm Left arm   Patient Position:  Lying Lying Lying   Pulse:  87 81 87   Resp: 18 19 18 17   Temp:  99.3 °F (37.4 °C) 98.6 °F (37 °C) 98.8 °F (37.1 °C)   TempSrc:  Oral Oral Oral   SpO2: (!) 90% (!) 90% (!) 93% (!) 91%   Weight:    96.4 kg (212 lb 8.4 oz)   Height:           I/O last 3 completed shifts:  In: -   Out: 3150 [Urine:1850; Drains:1300]  No intake/output data recorded.      Physical Exam: Deferred      Laboratories:    Recent Labs   Lab 12/15/22  0528   WBC 16.86*   RBC 4.39*   HGB 12.2*   HCT 39.5*   *   MCV 90   MCH 27.8   MCHC 30.9*         Recent Labs   Lab 12/15/22  0528   CALCIUM 8.3*   PROT 7.2   *   K 3.9   CO2 38*      BUN 56*   CREATININE 2.1*   ALKPHOS 112   ALT 13   AST 23    BILITOT 2.0*         No results for input(s): COLORU, CLARITYU, SPECGRAV, PHUR, PROTEINUA, GLUCOSEU, BLOODU, WBCU, RBCU, BACTERIA, MUCUS in the last 24 hours.    Invalid input(s):  BILIRUBINCON    Microbiology Results (last 7 days)       Procedure Component Value Units Date/Time    Blood culture [[id number]] Collected: 12/14/22 0205    Order Status: Completed Specimen: Blood from Antecubital, Right Arm Updated: 12/15/22 0303     Blood Culture, Routine No Growth to date      No Growth to date    Narrative:      Blood cx in the AM with AM labs    Blood culture [[id number]] Collected: 12/14/22 0205    Order Status: Completed Specimen: Blood from Antecubital, Right Updated: 12/15/22 0303     Blood Culture, Routine No Growth to date      No Growth to date    Narrative:      Blood cx in the AM with AM labs    Culture, Anaerobe [[id number]] Collected: 12/09/22 1831    Order Status: Completed Specimen: Abscess from Abdomen Updated: 12/13/22 0835     Anaerobic Culture No anaerobes isolated    Aerobic culture [[id number]]  (Abnormal)  (Susceptibility) Collected: 12/09/22 1831    Order Status: Completed Specimen: Abscess from Abdomen Updated: 12/11/22 0915     Aerobic Bacterial Culture KLEBSIELLA PNEUMONIAE  Many      Gram stain [[id number]] Collected: 12/09/22 1831    Order Status: Completed Specimen: Abscess from Abdomen Updated: 12/10/22 0755     Gram Stain Result Many WBC's      No organisms seen    Fungus culture [[id number]] Collected: 12/09/22 1831    Order Status: Sent Specimen: Abscess from Abdomen Updated: 12/09/22 1905              Diagnostic Tests:     MRI MRCP:  Impression:       Persistent heterogeneous collection within the right hepatic lobe compatible with reported abscess noting percutaneous drainage catheter in place.     No significant biliary duct abnormality on MRCP assessment noting limitations from motion artifact.     Distended loops of small bowel in the left mid abdomen with air-fluid levels,  incompletely characterized on today's exam although similar to previous exams.  Recommend clinical correlation, could represent ileus.  Developing obstruction not excluded.     Mesenteric edema, trace ascites, and small pleural effusions.     Additional findings as above.       Electronically signed by: Erasmo Betancourt   Date: 12/13/2022              Assessment:    46 y/o male admitted with:      - Hepatic Klebsiella abscess s/p drainage  - HyperNa+  - Persistent ileus   - Intrinsic MARYURI improving as well as UO  - NAGMA resolved  - Hx. HTN  - Fe++ def. Anemia  - Hypoalbuminemia             Plan:      - Broad spectrum antibiotics per ID  - TPN   - Bladder scan PRN  - NPO + NG suction  - GI following  - Other problems per admitting    No further recommendations renal-wise will sign off the case (re-consult prn)

## 2022-12-15 NOTE — ASSESSMENT & PLAN NOTE
47M with h/o HTN admitted 12/9 with abdominal pain, fount to have liver abscess and MARYURI, s/p IR drainage on 12/9 (CX + pan-sen kleb pna), now complicated by ileus, MARYURI, hypernatremia. BCx ngtd. Was on cefepime/metronidazole, switched to cefazolin 12/11. HIV neg. Flagyl resumed 12/13.    Etiology of abscess is unclear. MRCP 12/12 unremarkable. Showed heterogenous fluid collection at inferior aspect of the right hepatic lobe measuring approximately 8.8 x 6.8 x 9.3 cm (known abscess).    IR drain output has decreased; barely any outpt today per nurse.     Recommendations:   - continue cefazolin 2g IV q 8h    - no new growth; can stop flagyl  - would ask IR to assess drain; not draining much.  - if febrile or has worsening abdominal pain would repeat CT abdomen r/o obstruction   - duration of abx until radiographic resolution of abscess; will need repeat CT in approx 4 wks  - given size of abscess will likely DC on IV abx x atleast 4 wks  - refused colonoscopy; can schedule outpt  - if febrile please repeat BCx

## 2022-12-15 NOTE — PROGRESS NOTES
[facility] Medicine  Progress Note    Patient Name: Vivek Turner  MRN: [id number]  Patient Class: IP- Inpatient   Admission Date: 12/9/2022  Length of Stay: 6 days  Attending Physician: Elmer Noriega DO  Primary Care Provider: Primary Doctor No        Subjective:     Principal Problem:Hepatic abscess        HPI:  47y M w/ hypertension presents with abdominal pain x 2 days. Pt refused  service. He reports that he had fever/rigors three days prior to admission. He then developed worsening abdominal pain from then until admission. He went to an urgent care where he was started on bactrim for UTI. His abdominal pain increased over the next few days. He had a BM yesterday. Denies vomiting, but has had significant nausea and very limited PO intake in the days leading to admission. Aside from the single episode of fever/rigors he denies subsequent fevers.     In the ED he was found to have a R hepatic abscess vs mass. He traveled to the [facility] eight weeks prior to presentation. He denies fevers except for what is listed above. Denies weight loss, cough, night sweats. Denies prior hx of TB. Denies prior hx of cirrhosis      Overview/Hospital Course:  Mr Vivek Turner was admitted with severe sepsis due to R liver mass vs abscess and acute renal failure. IR consulted and drain placed on 12/9. Cultures with Klebsiella. ID consulted-continue cefazolin, and restarted Flagyl on 12/13. Started on bicarb gtt for acute renal failure and Nephrology consulted. He was moved to ICU for instability. He has ileus; NGT placed with copious bilious output. Renal failure improving. GI consulted to assess for source of liver abscess. MRCP showed persistent heterogeneous collection within the right hepatic lobe compatible with reported abscess noting percutaneous drainage catheter in place. No significant biliary duct abnormality on MRCP assessment. Pt refused inpatient colonoscopy. Pt transferred to the  floor on 12/12. PT/OT recommends HH. Monitor renal function and ileus at this time. Still has some output from NGT, GI recommends clamping today and monitor       Interval History: Feeling better today. Wants to really eat.  No abdominal pain at this time. No N/V or diarrhea. Very frustrated that he cannot eat. Will attempt clamping today per GI    Review of Systems   Constitutional:  Positive for fatigue. Negative for chills, diaphoresis and fever.   HENT:  Negative for congestion and trouble swallowing.    Respiratory:  Negative for cough, chest tightness and shortness of breath.    Cardiovascular:  Negative for chest pain, palpitations and leg swelling.   Gastrointestinal:  Positive for abdominal distention (improving). Negative for abdominal pain, constipation, diarrhea, nausea and vomiting.   Musculoskeletal:  Negative for arthralgias and myalgias.   Neurological:  Positive for weakness. Negative for numbness.   Psychiatric/Behavioral:  Negative for confusion.      Objective:     Vital Signs (Most Recent):  Temp: 98.3 °F (36.8 °C) (12/15/22 1203)  Pulse: 95 (12/15/22 1203)  Resp: 18 (12/15/22 1203)  BP: (!) 140/79 (12/15/22 1203)  SpO2: (!) 93 % (12/15/22 1203)   Vital Signs (24h Range):  Temp:  [98.3 °F (36.8 °C)-99.5 °F (37.5 °C)] 98.3 °F (36.8 °C)  Pulse:  [81-97] 95  Resp:  [17-19] 18  SpO2:  [90 %-93 %] 93 %  BP: (134-148)/(64-83) 140/79     Weight: 96.4 kg (212 lb 8.4 oz)  Body mass index is 34.3 kg/m².    Intake/Output Summary (Last 24 hours) at 12/15/2022 1438  Last data filed at 12/15/2022 1225  Gross per 24 hour   Intake --   Output 1955 ml   Net -1955 ml        Physical Exam  Vitals and nursing note reviewed.   Constitutional:       General: He is not in acute distress.     Appearance: He is obese. He is ill-appearing. He is not toxic-appearing or diaphoretic.   HENT:      Head: Normocephalic and atraumatic.      Nose:      Comments: NGT in place with bilious output     Mouth/Throat:      Mouth:  Mucous membranes are moist.   Eyes:      General: No scleral icterus.  Cardiovascular:      Rate and Rhythm: Normal rate and regular rhythm.      Pulses: Normal pulses.      Heart sounds: Normal heart sounds. No murmur heard.    No gallop.   Pulmonary:      Effort: No respiratory distress.      Breath sounds: Normal breath sounds. No wheezing or rales.      Comments: Decreased inspiration. No wheezes on exam. On 2L NC  Abdominal:      General: Bowel sounds are normal. There is distension.      Palpations: Abdomen is soft.      Tenderness: There is no abdominal tenderness. There is no guarding.      Comments: RUQ drain in place with minimal brown/orange fluid   Musculoskeletal:      Right lower leg: No edema.      Left lower leg: No edema.   Skin:     General: Skin is warm.   Neurological:      Mental Status: He is alert and oriented to person, place, and time.   Psychiatric:      Comments: Appears frustrated that he cannot eat at this time        Significant Labs: All pertinent labs within the past 24 hours have been reviewed.    Significant Imaging: I have reviewed all pertinent imaging results/findings within the past 24 hours.      Assessment/Plan:      * Hepatic abscess  Unclear how he got this. IR drain placed 12/9. Cultures with Klebsiella, pansensitive.   - MRCP:persistent heterogeneous collection within the right hepatic lobe compatible with reported abscess noting percutaneous drainage catheter in place. No significant biliary duct abnormality on MRCP assessment.  - Blood cx with NGTD  - Abscess culture with klebsiella. No anaerobes  -IR drain with minimal output. Discussed with IR and IR suspect its not putting out much because of the heterogeneity of the collection and drain may not be sufficient. Recommended repeat US abdomen   - ID consulted: on cefazolin, added flagyl on 12/13  - GI consulted to assess for source of liver abscess. Pt refuses inpatient colonoscopy. Likely plan for outpatient cscope.        Severe sepsis  This patient does have evidence of infective focus. My overall impression is sepsis. Vital signs were reviewed and noted in progress note.  Antibiotics given-   Antibiotics (From admission, onward)    Start     Stop Route Frequency Ordered    12/14/22 0800  cefazolin (ANCEF) 2 gram in dextrose 5% 50 mL IVPB (premix)         -- IV Every 8 hours (non-standard times) 12/14/22 0745    12/13/22 0833  metronidazole IVPB 500 mg         -- IV Every 8 hours (non-standard times) 12/13/22 0833        Cultures were taken-   Microbiology Results (last 7 days)     Procedure Component Value Units Date/Time    Blood culture [[id number]] Collected: 12/14/22 0205    Order Status: Completed Specimen: Blood from Antecubital, Right Arm Updated: 12/15/22 0303     Blood Culture, Routine No Growth to date      No Growth to date    Narrative:      Blood cx in the AM with AM labs    Blood culture [[id number]] Collected: 12/14/22 0205    Order Status: Completed Specimen: Blood from Antecubital, Right Updated: 12/15/22 0303     Blood Culture, Routine No Growth to date      No Growth to date    Narrative:      Blood cx in the AM with AM labs    Culture, Anaerobe [[id number]] Collected: 12/09/22 1831    Order Status: Completed Specimen: Abscess from Abdomen Updated: 12/13/22 0835     Anaerobic Culture No anaerobes isolated    Aerobic culture [[id number]]  (Abnormal)  (Susceptibility) Collected: 12/09/22 1831    Order Status: Completed Specimen: Abscess from Abdomen Updated: 12/11/22 0915     Aerobic Bacterial Culture KLEBSIELLA PNEUMONIAE  Many      Gram stain [[id number]] Collected: 12/09/22 1831    Order Status: Completed Specimen: Abscess from Abdomen Updated: 12/10/22 0755     Gram Stain Result Many WBC's      No organisms seen    Fungus culture [[id number]] Collected: 12/09/22 1831    Order Status: Sent Specimen: Abscess from Abdomen Updated: 12/09/22 1905        Latest lactate reviewed, they are-  No results for input(s):  LACTATE in the last 72 hours.    Organ dysfunction indicated by Acute kidney injury and Acute liver injury  Source- liver abscess    Source control Achieved by- IR    -Due to hepatic abscess  -s/p IR drainage on 12/09  -ID consulted:  Continue cefazolin and Flagyl  -blood cultures with no growth to date   -abscess culture with pansensitive Klebsiella   -continue IV antibiotics as above   -Leukocytosis improving       ATN (acute tubular necrosis)  Creatinine at 6.1 at admission. Unknown baseline with no prior labs to review. FENa 1.4% suggesting intrinsic injury. This may be ATN from sepsis +/- Bactrim pseudotoxicity. No hydro seen on CT or US.   - IVF ordered  - Nephrology consulted  - this is improving     Patient with acute kidney injury likely due to IVVD/dehydration and acute tubular necrosis MARYURI is currently improving. Labs reviewed- Renal function/electrolytes with Estimated Creatinine Clearance: 48.3 mL/min (A) (based on SCr of 2.1 mg/dL (H)). according to latest data. Monitor urine output and serial BMP and adjust therapy as needed. Avoid nephrotoxins and renally dose meds for GFR listed above.       Acute hypoxemic respiratory failure  Patient with Hypoxic Respiratory failure which is Acute.  he is not on home oxygen.   Signs/symptoms of respiratory failure include- tachypnea, increased work of breathing and wheezing. Contributing diagnoses includes - atelectasis Labs and images were reviewed. Patient Has not had a recent ABG.     - O2 by NC PRN  - nebs PRN  - CXR shows atelectasis    Atelectasis  Shortness of breath partly due to atelectasis as noted on CXR  - incentive spirometer  - O2 by NC as needed      Ileus  Patient has Non- operative ileus which is adynamic in etiology which is worsening. Will treat conservatively with bowel rest, IV fluids, serial abdominal exams and avoidance of GI paralytics such as narcotics or anti-spasmodics. Monitor patient closely.     - NGT placed 12/11 with large output--  continue  -GI following: plan for clamping the NGT today and advance to clears if tolerated   -Surgery following   -Nutrition consulted  -Initiated TPN on 12/14 but was unable to get PICC due to severe weather. PICC placed on 12/15, will continue custom TPN. Nutrition consulted       Microcytic anemia  Hgb 10-11, no prior to review.   Iron deficient- TSAT 17, but also significant acute inflammation- ferritin 3633.   B12, folate replete.   Stable        Hyperglycemia  A1c normal.       VTE Risk Mitigation (From admission, onward)         Ordered     heparin (porcine) injection 5,000 Units  Every 8 hours         12/10/22 0838     IP VTE HIGH RISK PATIENT  Once         12/09/22 1421     Place sequential compression device  Until discontinued         12/09/22 1421                Discharge Planning   VIVIEN: 12/16/2022     Code Status: Full Code   Is the patient medically ready for discharge?:     Reason for patient still in hospital (select all that apply): Patient trending condition, Treatment, Imaging and Consult recommendations  Discharge Plan A: Home   Discharge Delays: None known at this time              Elmer Noriega DO  Department of Hospital Medicine   [facility] - Telemetry

## 2022-12-15 NOTE — ASSESSMENT & PLAN NOTE
Unclear how he got this. IR drain placed 12/9. Cultures with Klebsiella, pansensitive.   - MRCP:persistent heterogeneous collection within the right hepatic lobe compatible with reported abscess noting percutaneous drainage catheter in place. No significant biliary duct abnormality on MRCP assessment.  - Blood cx with NGTD  - Abscess culture with klebsiella. No anaerobes  -IR drain with minimal output. Discussed with IR and IR suspect its not putting out much because of the heterogeneity of the collection and drain may not be sufficient. Recommended repeat US abdomen   - ID consulted: on cefazolin, added flagyl on 12/13  - GI consulted to assess for source of liver abscess. Pt refuses inpatient colonoscopy. Likely plan for outpatient cscope.

## 2022-12-15 NOTE — ASSESSMENT & PLAN NOTE
Patient has Non- operative ileus which is adynamic in etiology which is worsening. Will treat conservatively with bowel rest, IV fluids, serial abdominal exams and avoidance of GI paralytics such as narcotics or anti-spasmodics. Monitor patient closely.     - NGT placed 12/11 with large output-- continue  -GI following: plan for clamping the NGT today and advance to clears if tolerated   -Surgery following   -Nutrition consulted  -Initiated TPN on 12/14 but was unable to get PICC due to severe weather. PICC placed on 12/15, will continue custom TPN. Nutrition consulted

## 2022-12-15 NOTE — PROGRESS NOTES
[facility]  Infectious Disease  Progress Note    Patient Name: Vivek Turner  MRN: [id number]  Admission Date: 12/9/2022  Length of Stay: 6 days  Attending Physician: Elmer Noriega DO  Primary Care Provider: Primary Doctor No    Isolation Status: No active isolations  Assessment/Plan:      * Hepatic abscess  47M with h/o HTN admitted 12/9 with abdominal pain, fount to have liver abscess and MARYURI, s/p IR drainage on 12/9 (CX + pan-sen kleb pna), now complicated by ileus, MARYURI, hypernatremia. BCx ngtd. Was on cefepime/metronidazole, switched to cefazolin 12/11. HIV neg. Flagyl resumed 12/13.    Etiology of abscess is unclear. MRCP 12/12 unremarkable. Showed heterogenous fluid collection at inferior aspect of the right hepatic lobe measuring approximately 8.8 x 6.8 x 9.3 cm (known abscess).    IR drain output has decreased; barely any outpt today per nurse.     Recommendations:   - continue cefazolin 2g IV q 8h    - continue flagyl for now   - would ask IR to assess drain  - if febrile or has worsening abdominal pain would repeat CT abdomen r/o obstruction vs worsening abscess  - duration of abx until radiographic resolution of abscess; will need repeat CT in approx 4 wks  - given size of abscess will likely DC on IV abx x atleast 4 wks  - refused colonoscopy; can schedule outpt  - if febrile please repeat BCx              Anticipated Disposition: tbd    Thank you for your consult. I will follow-up with patient. Please contact us if you have any additional questions.    Chelsea Catherine MD  Infectious Disease  [facility]    Subjective:     Principal Problem:Hepatic abscess    HPI:   47M with h/o htn admitted 12/9 with several days of progressively worsening abdominal pain. Reports fever and nausea and decreased po intake.  Denies diarrhea. Denies weight loss. Notably reports traveling to [facility] several months ago.     Had abdominal ct and ultrasound  1. No sonographic abnormality of  "the gallbladder detected.  2. Ill-defined region of decreased echotexture within the posterior aspect of the right hepatic lobe corresponding to the findings seen on recent prior CT.  This could indicate a mass, abscess, or intense fatty infiltration.  3. Diffuse increased hepatic parenchymal echotexture suspected to represent hepatic steatosis.      S/p IR drainage of abscess. Reports pain improving.   KLEBSIELLA PNEUMONIAE   Many      Resulting Agency [facility]        Susceptibility     Klebsiella pneumoniae     CULTURE, AEROBIC  (SPECIFY SOURCE)     Amox/K Clav'ate <=8/4 mcg/mL Sensitive     Amp/Sulbactam <=8/4 mcg/mL Sensitive     Cefazolin <=2 mcg/mL Sensitive     Cefepime <=2 mcg/mL Sensitive     Ceftriaxone <=1 mcg/mL Sensitive     Ciprofloxacin <=1 mcg/mL Sensitive     Ertapenem <=0.5 mcg/mL Sensitive     Gentamicin <=4 mcg/mL Sensitive     Levofloxacin <=2 mcg/mL Sensitive     Meropenem <=1 mcg/mL Sensitive     Minocycline <=4 mcg/mL Sensitive     Piperacillin/Tazo <=16 mcg/mL Sensitive     Tetracycline <=4 mcg/mL Sensitive     Tobramycin <=4 mcg/mL Sensitive     Trimeth/Sulfa <=2/38 mcg/mL Sensitive                 Silvino improving    On cefepime/metronidazole    Hiv ordered, pending      ID consulted for "klebsiella liver abscess"    Interval History: Afebrile overnight. Remains with NG tube to suction for ileus. Denies abdominal pain.     Review of Systems   Constitutional:  Negative for chills, fatigue and fever.   Respiratory:  Negative for cough and shortness of breath.    Gastrointestinal:  Negative for abdominal pain and diarrhea.   Genitourinary:  Negative for difficulty urinating and dysuria.   Skin:  Negative for rash and wound.   Neurological:  Negative for headaches.   All other systems reviewed and are negative.  Objective:     Vital Signs (Most Recent):  Temp: 98.3 °F (36.8 °C) (12/15/22 1203)  Pulse: 95 (12/15/22 1203)  Resp: 18 (12/15/22 1203)  BP: (!) 140/79 (12/15/22 1203)  SpO2: (!) 93 % " (12/15/22 1203)   Vital Signs (24h Range):  Temp:  [98.3 °F (36.8 °C)-99.5 °F (37.5 °C)] 98.3 °F (36.8 °C)  Pulse:  [81-97] 95  Resp:  [17-19] 18  SpO2:  [90 %-93 %] 93 %  BP: (134-148)/(64-83) 140/79     Weight: 96.4 kg (212 lb 8.4 oz)  Body mass index is 34.3 kg/m².    Estimated Creatinine Clearance: 48.3 mL/min (A) (based on SCr of 2.1 mg/dL (H)).    Physical Exam  Vitals reviewed.   Constitutional:       Appearance: He is ill-appearing.   HENT:      Head: Normocephalic and atraumatic.      Nose:      Comments: NGT to suction  Eyes:      Conjunctiva/sclera: Conjunctivae normal.      Pupils: Pupils are equal, round, and reactive to light.   Cardiovascular:      Rate and Rhythm: Normal rate and regular rhythm.   Pulmonary:      Effort: Pulmonary effort is normal. No respiratory distress.      Breath sounds: Normal breath sounds.   Abdominal:      General: Abdomen is flat. Bowel sounds are normal. There is distension.      Palpations: Abdomen is soft.      Tenderness: There is no abdominal tenderness. There is no guarding.   Musculoskeletal:      Cervical back: Normal range of motion.      Right lower leg: No edema.      Left lower leg: No edema.   Skin:     Findings: No erythema or rash.   Neurological:      General: No focal deficit present.      Mental Status: He is alert and oriented to person, place, and time.   Psychiatric:         Behavior: Behavior normal.       Significant Labs: Blood Culture:   Recent Labs   Lab 12/09/22  0401 12/09/22  0423 12/14/22  0205   LABBLOO No growth after 5 days. No growth after 5 days. No Growth to date  No Growth to date  No Growth to date  No Growth to date         Significant Imaging: I have reviewed all pertinent imaging results/findings within the past 24 hours.

## 2022-12-15 NOTE — PROGRESS NOTES
" [facility] Progress Note    Patient Complaint: generalized weakness    PCP:   Primary Doctor No       LOS: 6        Initial History of Present Illness (HPI):  This is a 47 y.o. male consulted to GI service for Klebsiella liver abscess. Interview conducted with [employer] translation services in the language of Tagalog. Patient complaint of generalized weakness with associated fatigue, HA , difficuly urinating and chills that began last week. Patient endorses traveling to the [facility] 8 weeks ago and had not experienced any of these symptoms prior. Reports seeing a provider in the [facility] and informed of having a fatty liver and high cholesterol. Reports after trip, symptoms began and saw another provider and received a "shot", patient was unable to name the shot or the reasoning for it's administration. Reports the symptoms reemerged last week. Denies smoking, heavy drinking and illicit drug use. Denies n/v, abdominal pain, constipation or diarrhea. Reports abdominal distention. Reports he is able to pass flatus and last stool this am.    Interval Hx  Abdominal distention improved with ~ 800ml output in canister from NG. Denies pain.    Review of Systems   Constitutional:  Positive for chills. Negative for activity change, diaphoresis and fever.   HENT:  Negative for congestion, drooling, rhinorrhea, sore throat and trouble swallowing.    Eyes:  Negative for discharge.   Respiratory:  Negative for cough, shortness of breath and wheezing.    Cardiovascular:  Negative for chest pain, palpitations and leg swelling.   Gastrointestinal:  Positive for abdominal distention. Negative for abdominal pain, anal bleeding, blood in stool, constipation, diarrhea, nausea, rectal pain and vomiting.   Endocrine: Negative for cold intolerance and heat intolerance.   Genitourinary:  Positive for difficulty urinating. Negative for frequency, hematuria and urgency.   Musculoskeletal:  Negative for arthralgias. "   Skin:  Negative for color change, pallor and rash.   Neurological:  Positive for weakness and headaches. Negative for syncope, facial asymmetry and numbness.   Psychiatric/Behavioral:  Negative for agitation and confusion. The patient is not nervous/anxious.          Medical History:  has a past medical history of Hypertension.    Surgical History: Patient denies any pertinent past surgical history.    Family History: Patient denies any pertinent family history.    Social History: Denies smoking. Occasional social drinker with wife. Denies illicit drug use.    Review of patient's allergies indicates:  No Known Allergies    No current facility-administered medications on file prior to encounter.     Current Outpatient Medications on File Prior to Encounter   Medication Sig Dispense Refill    NON FORMULARY MEDICATION Losartan/amlodipine 100/10 mg   Take 1 tablet by mouth daily          Objective Findings:    Vital Signs:  Temp:  [98.3 °F (36.8 °C)-99.5 °F (37.5 °C)]   Pulse:  [81-99]   Resp:  [17-19]   BP: (134-148)/(64-84)   SpO2:  [90 %-93 %]   Body mass index is 34.3 kg/m².      Physical Exam  Vitals and nursing note reviewed.   Constitutional:       Appearance: Normal appearance.   HENT:      Head: Normocephalic.      Nose: Nose normal.      Mouth/Throat:      Mouth: Mucous membranes are moist.   Eyes:      Pupils: Pupils are equal, round, and reactive to light.   Cardiovascular:      Heart sounds: Normal heart sounds.   Pulmonary:      Effort: Pulmonary effort is normal.      Breath sounds: Normal breath sounds.   Abdominal:      General: Bowel sounds are normal. There is no distension.      Palpations: Abdomen is soft.      Tenderness: There is no abdominal tenderness.   Musculoskeletal:         General: Normal range of motion.      Cervical back: Normal range of motion.   Skin:     Capillary Refill: Capillary refill takes less than 2 seconds.   Neurological:      General: No focal deficit present.      Mental  Status: He is alert and oriented to person, place, and time.   Psychiatric:         Mood and Affect: Mood normal.         Behavior: Behavior normal.         Thought Content: Thought content normal.         Judgment: Judgment normal.             Labs:  Lab Results   Component Value Date    WBC 16.86 (H) 12/15/2022    HGB 12.2 (L) 12/15/2022    HCT 39.5 (L) 12/15/2022     (H) 12/15/2022    CHOL 110 (L) 2022    TRIG 205 (H) 2022    HDL 9 (L) 2022    ALT 13 12/15/2022    AST 23 12/15/2022     (H) 12/15/2022    K 3.9 12/15/2022     12/15/2022    CREATININE 2.1 (H) 12/15/2022    BUN 56 (H) 12/15/2022    CO2 38 (H) 12/15/2022    INR 1.3 (H) 2022    HGBA1C 5.6 12/10/2022             Imagin/13 MRCP-Persistent heterogeneous collection within the right hepatic lobe compatible with reported abscess noting percutaneous drainage catheter in place.No significant biliary duct abnormality on MRCP assessment noting limitations from motion artifact.Distended loops of small bowel in the left mid abdomen with air-fluid levels.       Xray abdomen-Multiple loops of dilated small bowel are present in the left abdomen that measure up to 6.5 cm.      US abdomen- No detected gallbladder wall thickening, cholelithiasis, or pericholecystic fluid.  Common duct normal in size measuring 4 mm.  Diffuse component of increased hepatic parenchymal echotexture.  There is an ill-defined region of decreased echogenicity within the posterior aspect of the right hepatic lobe.    I have independently reviewed and interpreted the imaging above    Assessment:  Patient is a .47 y.o. y/o .male with  has a past medical history of Hypertension. Consulted to the GI service for klebsiella liver abscess.               Recommendations:  Leukocytosis.Hepatic abscess. Elevated bilirubin. Ileus. ERYN. Anemia of acute infection.  Wbc improving. Liver abscess drained on  with aerobic culture positive for  klebsiella pneumoniae. On cefazolin, ID following. Bilirubin is elevated, improved s/p abscess drainage. Continue to monitor levels. There is the question of what is the possible source of the liver abscess. Plan for MRCP today and colonoscopy TBD patient optimization and patient agreeable to prep and procedure.   Dilated small bowel suspicion of illeus noted on imaging and NG tube with large output per Dr Howell's note on 12/11. Today with 300ml of green output, abdomen still distended. Rec to continue NG. MRI MRCP ordered to further assess ileus.  Iron deficiency anemia with anemia of acute infection noted with labs. No overt bleeding. Continue to monitor hgb.    -- NG wilth ~ 800ml in last 24hr, rec to start clamping NG and assess for toleration. No stool in 2 days, rec start miralax daily when appropriate.    Colonoscopy outpatient     Thank you so much for allowing us to participate in the care of Vivek Turner . Please contact us if you have any additional questions.    Sidra Crawford NP  Gastroenterology  [facility] - Med Surg

## 2022-12-15 NOTE — PT/OT/SLP PROGRESS
Occupational Therapy      Patient Name:  Vivek Turner   MRN:  [id number]    Patient not seen today secondary to Other (Comment) (patient refused in the am and PICC line being placed in the PM). Will follow-up tomorrow.    12/15/2022

## 2022-12-15 NOTE — PLAN OF CARE
Problem: Glycemic Control Impaired (Sepsis/Septic Shock)  Goal: Blood Glucose Level Within Desired Range  Outcome: Ongoing, Progressing     Problem: Nutrition Impaired (Sepsis/Septic Shock)  Goal: Optimal Nutrition Intake  Outcome: Ongoing, Progressing     Problem: Oral Intake Inadequate (Acute Kidney Injury/Impairment)  Goal: Optimal Nutrition Intake  Outcome: Ongoing, Progressing     Problem: Renal Function Impairment (Acute Kidney Injury/Impairment)  Goal: Effective Renal Function  Outcome: Ongoing, Progressing     Problem: Fall Injury Risk  Goal: Absence of Fall and Fall-Related Injury  12/14/2022 1813 by Rob Mcdaniels RN  Outcome: Ongoing, Progressing  12/14/2022 1812 by Rob Mcdaniels RN  Outcome: Ongoing, Progressing

## 2022-12-15 NOTE — SUBJECTIVE & OBJECTIVE
Interval History: Feeling better today. Wants to really eat.  No abdominal pain at this time. No N/V or diarrhea. Very frustrated that he cannot eat. Will attempt clamping today per GI    Review of Systems   Constitutional:  Positive for fatigue. Negative for chills, diaphoresis and fever.   HENT:  Negative for congestion and trouble swallowing.    Respiratory:  Negative for cough, chest tightness and shortness of breath.    Cardiovascular:  Negative for chest pain, palpitations and leg swelling.   Gastrointestinal:  Positive for abdominal distention (improving). Negative for abdominal pain, constipation, diarrhea, nausea and vomiting.   Musculoskeletal:  Negative for arthralgias and myalgias.   Neurological:  Positive for weakness. Negative for numbness.   Psychiatric/Behavioral:  Negative for confusion.      Objective:     Vital Signs (Most Recent):  Temp: 98.3 °F (36.8 °C) (12/15/22 1203)  Pulse: 95 (12/15/22 1203)  Resp: 18 (12/15/22 1203)  BP: (!) 140/79 (12/15/22 1203)  SpO2: (!) 93 % (12/15/22 1203)   Vital Signs (24h Range):  Temp:  [98.3 °F (36.8 °C)-99.5 °F (37.5 °C)] 98.3 °F (36.8 °C)  Pulse:  [81-97] 95  Resp:  [17-19] 18  SpO2:  [90 %-93 %] 93 %  BP: (134-148)/(64-83) 140/79     Weight: 96.4 kg (212 lb 8.4 oz)  Body mass index is 34.3 kg/m².    Intake/Output Summary (Last 24 hours) at 12/15/2022 1438  Last data filed at 12/15/2022 1225  Gross per 24 hour   Intake --   Output 1955 ml   Net -1955 ml        Physical Exam  Vitals and nursing note reviewed.   Constitutional:       General: He is not in acute distress.     Appearance: He is obese. He is ill-appearing. He is not toxic-appearing or diaphoretic.   HENT:      Head: Normocephalic and atraumatic.      Nose:      Comments: NGT in place with bilious output     Mouth/Throat:      Mouth: Mucous membranes are moist.   Eyes:      General: No scleral icterus.  Cardiovascular:      Rate and Rhythm: Normal rate and regular rhythm.      Pulses: Normal  pulses.      Heart sounds: Normal heart sounds. No murmur heard.    No gallop.   Pulmonary:      Effort: No respiratory distress.      Breath sounds: Normal breath sounds. No wheezing or rales.      Comments: Decreased inspiration. No wheezes on exam. On 2L NC  Abdominal:      General: Bowel sounds are normal. There is distension.      Palpations: Abdomen is soft.      Tenderness: There is no abdominal tenderness. There is no guarding.      Comments: RUQ drain in place with minimal brown/orange fluid   Musculoskeletal:      Right lower leg: No edema.      Left lower leg: No edema.   Skin:     General: Skin is warm.   Neurological:      Mental Status: He is alert and oriented to person, place, and time.   Psychiatric:      Comments: Appears frustrated that he cannot eat at this time        Significant Labs: All pertinent labs within the past 24 hours have been reviewed.    Significant Imaging: I have reviewed all pertinent imaging results/findings within the past 24 hours.

## 2022-12-16 DIAGNOSIS — K75.0 HEPATIC ABSCESS: Primary | ICD-10-CM

## 2022-12-16 LAB
ALBUMIN SERPL BCP-MCNC: 1.9 G/DL (ref 3.5–5.2)
ALP SERPL-CCNC: 107 U/L (ref 55–135)
ALT SERPL W/O P-5'-P-CCNC: 10 U/L (ref 10–44)
ANION GAP SERPL CALC-SCNC: 9 MMOL/L (ref 8–16)
AST SERPL-CCNC: 19 U/L (ref 10–40)
BASOPHILS # BLD AUTO: 0.04 K/UL (ref 0–0.2)
BASOPHILS NFR BLD: 0.2 % (ref 0–1.9)
BILIRUB SERPL-MCNC: 1.2 MG/DL (ref 0.1–1)
BUN SERPL-MCNC: 49 MG/DL (ref 6–20)
CALCIUM SERPL-MCNC: 7.8 MG/DL (ref 8.7–10.5)
CHLORIDE SERPL-SCNC: 108 MMOL/L (ref 95–110)
CO2 SERPL-SCNC: 34 MMOL/L (ref 23–29)
CREAT SERPL-MCNC: 1.7 MG/DL (ref 0.5–1.4)
DIFFERENTIAL METHOD: ABNORMAL
EOSINOPHIL # BLD AUTO: 0.1 K/UL (ref 0–0.5)
EOSINOPHIL NFR BLD: 0.5 % (ref 0–8)
ERYTHROCYTE [DISTWIDTH] IN BLOOD BY AUTOMATED COUNT: 14.3 % (ref 11.5–14.5)
EST. GFR  (NO RACE VARIABLE): 50 ML/MIN/1.73 M^2
GLUCOSE SERPL-MCNC: 168 MG/DL (ref 70–110)
HCT VFR BLD AUTO: 33.2 % (ref 40–54)
HGB BLD-MCNC: 10.5 G/DL (ref 14–18)
IMM GRANULOCYTES # BLD AUTO: 0.26 K/UL (ref 0–0.04)
IMM GRANULOCYTES NFR BLD AUTO: 1.2 % (ref 0–0.5)
LYMPHOCYTES # BLD AUTO: 1.8 K/UL (ref 1–4.8)
LYMPHOCYTES NFR BLD: 8.1 % (ref 18–48)
MAGNESIUM SERPL-MCNC: 2.2 MG/DL (ref 1.6–2.6)
MCH RBC QN AUTO: 28.2 PG (ref 27–31)
MCHC RBC AUTO-ENTMCNC: 31.6 G/DL (ref 32–36)
MCV RBC AUTO: 89 FL (ref 82–98)
MONOCYTES # BLD AUTO: 1.1 K/UL (ref 0.3–1)
MONOCYTES NFR BLD: 4.9 % (ref 4–15)
NEUTROPHILS # BLD AUTO: 18.7 K/UL (ref 1.8–7.7)
NEUTROPHILS NFR BLD: 85.1 % (ref 38–73)
NRBC BLD-RTO: 0 /100 WBC
PHOSPHATE SERPL-MCNC: 3.8 MG/DL (ref 2.7–4.5)
PLATELET # BLD AUTO: 520 K/UL (ref 150–450)
PMV BLD AUTO: 10.1 FL (ref 9.2–12.9)
POCT GLUCOSE: 149 MG/DL (ref 70–110)
POCT GLUCOSE: 158 MG/DL (ref 70–110)
POCT GLUCOSE: 161 MG/DL (ref 70–110)
POCT GLUCOSE: 165 MG/DL (ref 70–110)
POCT GLUCOSE: 183 MG/DL (ref 70–110)
POTASSIUM SERPL-SCNC: 3.6 MMOL/L (ref 3.5–5.1)
PROT SERPL-MCNC: 6.3 G/DL (ref 6–8.4)
RBC # BLD AUTO: 3.72 M/UL (ref 4.6–6.2)
SODIUM SERPL-SCNC: 151 MMOL/L (ref 136–145)
WBC # BLD AUTO: 21.97 K/UL (ref 3.9–12.7)

## 2022-12-16 PROCEDURE — 25000003 PHARM REV CODE 250: Performed by: INTERNAL MEDICINE

## 2022-12-16 PROCEDURE — A4217 STERILE WATER/SALINE, 500 ML: HCPCS | Performed by: STUDENT IN AN ORGANIZED HEALTH CARE EDUCATION/TRAINING PROGRAM

## 2022-12-16 PROCEDURE — 97530 THERAPEUTIC ACTIVITIES: CPT | Mod: CQ

## 2022-12-16 PROCEDURE — 63600175 PHARM REV CODE 636 W HCPCS: Performed by: INTERNAL MEDICINE

## 2022-12-16 PROCEDURE — 25000003 PHARM REV CODE 250: Performed by: STUDENT IN AN ORGANIZED HEALTH CARE EDUCATION/TRAINING PROGRAM

## 2022-12-16 PROCEDURE — 99233 SBSQ HOSP IP/OBS HIGH 50: CPT | Mod: ,,, | Performed by: INTERNAL MEDICINE

## 2022-12-16 PROCEDURE — 21400001 HC TELEMETRY ROOM

## 2022-12-16 PROCEDURE — S0030 INJECTION, METRONIDAZOLE: HCPCS | Performed by: INTERNAL MEDICINE

## 2022-12-16 PROCEDURE — A4216 STERILE WATER/SALINE, 10 ML: HCPCS | Performed by: STUDENT IN AN ORGANIZED HEALTH CARE EDUCATION/TRAINING PROGRAM

## 2022-12-16 PROCEDURE — 99233 PR SUBSEQUENT HOSPITAL CARE,LEVL III: ICD-10-PCS | Mod: ,,, | Performed by: INTERNAL MEDICINE

## 2022-12-16 PROCEDURE — 84100 ASSAY OF PHOSPHORUS: CPT | Performed by: STUDENT IN AN ORGANIZED HEALTH CARE EDUCATION/TRAINING PROGRAM

## 2022-12-16 PROCEDURE — 63600175 PHARM REV CODE 636 W HCPCS: Performed by: STUDENT IN AN ORGANIZED HEALTH CARE EDUCATION/TRAINING PROGRAM

## 2022-12-16 PROCEDURE — 99233 PR SUBSEQUENT HOSPITAL CARE,LEVL III: ICD-10-PCS | Mod: ,,, | Performed by: NURSE PRACTITIONER

## 2022-12-16 PROCEDURE — B4185 PARENTERAL SOL 10 GM LIPIDS: HCPCS | Performed by: STUDENT IN AN ORGANIZED HEALTH CARE EDUCATION/TRAINING PROGRAM

## 2022-12-16 PROCEDURE — 97116 GAIT TRAINING THERAPY: CPT | Mod: CQ

## 2022-12-16 PROCEDURE — 83735 ASSAY OF MAGNESIUM: CPT | Performed by: STUDENT IN AN ORGANIZED HEALTH CARE EDUCATION/TRAINING PROGRAM

## 2022-12-16 PROCEDURE — 25000003 PHARM REV CODE 250: Performed by: HOSPITALIST

## 2022-12-16 PROCEDURE — 85025 COMPLETE CBC W/AUTO DIFF WBC: CPT | Performed by: STUDENT IN AN ORGANIZED HEALTH CARE EDUCATION/TRAINING PROGRAM

## 2022-12-16 PROCEDURE — 99233 SBSQ HOSP IP/OBS HIGH 50: CPT | Mod: ,,, | Performed by: NURSE PRACTITIONER

## 2022-12-16 PROCEDURE — 63600175 PHARM REV CODE 636 W HCPCS: Performed by: HOSPITALIST

## 2022-12-16 PROCEDURE — 97110 THERAPEUTIC EXERCISES: CPT | Mod: CQ

## 2022-12-16 PROCEDURE — 80053 COMPREHEN METABOLIC PANEL: CPT | Performed by: STUDENT IN AN ORGANIZED HEALTH CARE EDUCATION/TRAINING PROGRAM

## 2022-12-16 PROCEDURE — A4216 STERILE WATER/SALINE, 10 ML: HCPCS | Performed by: HOSPITALIST

## 2022-12-16 RX ORDER — HYDROMORPHONE HYDROCHLORIDE 1 MG/ML
0.5 INJECTION, SOLUTION INTRAMUSCULAR; INTRAVENOUS; SUBCUTANEOUS
Status: DISCONTINUED | OUTPATIENT
Start: 2022-12-16 | End: 2022-12-28 | Stop reason: HOSPADM

## 2022-12-16 RX ORDER — DEXTROSE MONOHYDRATE 50 MG/ML
INJECTION, SOLUTION INTRAVENOUS CONTINUOUS
Status: ACTIVE | OUTPATIENT
Start: 2022-12-16 | End: 2022-12-17

## 2022-12-16 RX ORDER — CEFEPIME HYDROCHLORIDE 1 G/50ML
2 INJECTION, SOLUTION INTRAVENOUS
Status: DISCONTINUED | OUTPATIENT
Start: 2022-12-16 | End: 2022-12-18

## 2022-12-16 RX ADMIN — DEXTROSE: 5 SOLUTION INTRAVENOUS at 10:12

## 2022-12-16 RX ADMIN — CEFAZOLIN SODIUM 2 G: 2 SOLUTION INTRAVENOUS at 08:12

## 2022-12-16 RX ADMIN — Medication 10 ML: at 12:12

## 2022-12-16 RX ADMIN — HEPARIN SODIUM 5000 UNITS: 5000 INJECTION INTRAVENOUS; SUBCUTANEOUS at 06:12

## 2022-12-16 RX ADMIN — DEXTROSE: 5 SOLUTION INTRAVENOUS at 06:12

## 2022-12-16 RX ADMIN — METRONIDAZOLE 500 MG: 500 INJECTION, SOLUTION INTRAVENOUS at 04:12

## 2022-12-16 RX ADMIN — CEFEPIME 2 G: 2 INJECTION, POWDER, FOR SOLUTION INTRAVENOUS at 10:12

## 2022-12-16 RX ADMIN — Medication 10 ML: at 10:12

## 2022-12-16 RX ADMIN — SOYBEAN OIL 250 ML: 20 INJECTION, SOLUTION INTRAVENOUS at 10:12

## 2022-12-16 RX ADMIN — METRONIDAZOLE 500 MG: 500 INJECTION, SOLUTION INTRAVENOUS at 08:12

## 2022-12-16 RX ADMIN — HEPARIN SODIUM 5000 UNITS: 5000 INJECTION INTRAVENOUS; SUBCUTANEOUS at 02:12

## 2022-12-16 RX ADMIN — CEFAZOLIN SODIUM 2 G: 2 SOLUTION INTRAVENOUS at 12:12

## 2022-12-16 RX ADMIN — Medication 10 ML: at 06:12

## 2022-12-16 RX ADMIN — HEPARIN SODIUM 5000 UNITS: 5000 INJECTION INTRAVENOUS; SUBCUTANEOUS at 10:12

## 2022-12-16 RX ADMIN — METRONIDAZOLE 500 MG: 500 INJECTION, SOLUTION INTRAVENOUS at 01:12

## 2022-12-16 RX ADMIN — MAGNESIUM SULFATE HEPTAHYDRATE: 500 INJECTION, SOLUTION INTRAMUSCULAR; INTRAVENOUS at 10:12

## 2022-12-16 RX ADMIN — Medication 10 ML: at 02:12

## 2022-12-16 RX ADMIN — DEXTROSE: 5 SOLUTION INTRAVENOUS at 12:12

## 2022-12-16 RX ADMIN — Medication 10 ML: at 05:12

## 2022-12-16 NOTE — SUBJECTIVE & OBJECTIVE
Interval History: Leukocytosis uptrending today. LIver abscess drain remains with poor output.     Review of Systems   Constitutional:  Negative for chills, fatigue and fever.   Respiratory:  Negative for cough and shortness of breath.    Gastrointestinal:  Negative for abdominal pain and diarrhea.   Genitourinary:  Negative for difficulty urinating and dysuria.   Skin:  Negative for rash and wound.   Neurological:  Negative for headaches.   All other systems reviewed and are negative.  Objective:     Vital Signs (Most Recent):  Temp: 98.2 °F (36.8 °C) (12/16/22 1201)  Pulse: 96 (12/16/22 1201)  Resp: 18 (12/16/22 1201)  BP: 137/88 (12/16/22 1201)  SpO2: 96 % (12/16/22 1201) Vital Signs (24h Range):  Temp:  [98.2 °F (36.8 °C)-99.5 °F (37.5 °C)] 98.2 °F (36.8 °C)  Pulse:  [78-96] 96  Resp:  [16-19] 18  SpO2:  [92 %-96 %] 96 %  BP: (129-143)/(65-88) 137/88     Weight: 96.6 kg (212 lb 15.4 oz)  Body mass index is 34.37 kg/m².    Estimated Creatinine Clearance: 59.7 mL/min (A) (based on SCr of 1.7 mg/dL (H)).    Physical Exam  Vitals reviewed.   Constitutional:       Appearance: He is ill-appearing.   HENT:      Head: Normocephalic and atraumatic.      Nose:      Comments: NGT to suction  Eyes:      Conjunctiva/sclera: Conjunctivae normal.      Pupils: Pupils are equal, round, and reactive to light.   Cardiovascular:      Rate and Rhythm: Normal rate and regular rhythm.   Pulmonary:      Effort: Pulmonary effort is normal. No respiratory distress.      Breath sounds: Normal breath sounds.   Abdominal:      General: Abdomen is flat. Bowel sounds are normal. There is distension.      Palpations: Abdomen is soft.      Tenderness: There is no abdominal tenderness. There is no guarding.   Musculoskeletal:      Cervical back: Normal range of motion.      Right lower leg: No edema.      Left lower leg: No edema.   Skin:     Findings: No erythema or rash.   Neurological:      General: No focal deficit present.      Mental  Status: He is alert and oriented to person, place, and time.   Psychiatric:         Behavior: Behavior normal.       Significant Labs: All pertinent labs within the past 24 hours have been reviewed.    Significant Imaging: I have reviewed all pertinent imaging results/findings within the past 24 hours.

## 2022-12-16 NOTE — ASSESSMENT & PLAN NOTE
"47M with h/o HTN admitted 12/9 with abdominal pain, fount to have liver abscess and MARYURI, s/p IR drainage on 12/9 (CX + pan-sen kleb pna), now complicated by ileus, MARYURI, hypernatremia. BCx ngtd. Was on cefepime/metronidazole, switched to cefazolin 12/11. HIV neg. Flagyl resumed 12/13.    Etiology of abscess is unclear. MRCP 12/12 unremarkable. Showed heterogenous fluid collection at inferior aspect of the right hepatic lobe measuring approximately 8.8 x 6.8 x 9.3 cm (known abscess).    US 12/16: "echogenicity w/n the right lobe of the liver, measuring approximately 7.1 x 7.5 x 5.3 cm. There is mildly complex fluid adjacent to the right lobe of the liver, measuring 3 x 10.8 x 11.2 cm. "    Recommendations:   - broadened to cefepime / flagyl  - IR reviewed US and no focal fluid seen amenable to drainage.  - duration of abx until radiographic resolution of abscess; will need repeat CT in approx 4 wks  - given size of abscess will likely DC on IV abx x atleast 4 wks  - refused colonoscopy; can schedule outpt  - if febrile please repeat BCx        "

## 2022-12-16 NOTE — PT/OT/SLP PROGRESS
Physical Therapy Treatment    Patient Name:  Vivek Turner   MRN:  [id number]    Recommendations:     Discharge Recommendations: home health PT  Discharge Equipment Recommendations:  (TBD)  Barriers to discharge: None    Assessment:     Vivek Turner is a 47 y.o. male admitted with a medical diagnosis of Hepatic abscess.  He presents with the following impairments/functional limitations: weakness, impaired endurance, impaired balance, gait instability, decreased lower extremity function, decreased ROM, impaired functional mobility, pain, decreased safety awareness .    Rehab Prognosis: Good; patient would benefit from acute skilled PT services to address these deficits and reach maximum level of function.    Recent Surgery: * No surgery found *      Plan:     During this hospitalization, patient to be seen 5 x/week to address the identified rehab impairments via gait training, therapeutic activities, therapeutic exercises and progress toward the following goals:    Plan of Care Expires:  12/20/22    Subjective     Chief Complaint: weakness and pain   Patient/Family Comments/goals: pt is pleasant and agreeable to therapy   Pain/Comfort:  Location - Side 1: Right  Location 1: flank  Pain Addressed 1: Pre-medicate for activity, Reposition, Nurse notified      Objective:     Communicated with nurse Richardson prior to session.  Patient found HOB elevated with peripheral IV, telemetry, NG tube(clamped ), bed alarm, MARLEY drain upon PT entry to room.     General Precautions: Standard, fall  Orthopedic Precautions: N/A  Braces: N/A  Respiratory Status: Room air   SpO2: 92%-96% on RA on exertions   Functional Mobility:  Bed Mobility:     Rolling Left:  stand by assistance  Scooting: stand by assistance  Supine to Sit: contact guard assistance and minimum assistance, HOB elevated, bedside rail   Transfers:     Sit to Stand:  stand by assistance and contact guard assistance with no AD and rolling walker  Bed to Chair: stand by  assistance with  rolling walker  using  Step Transfer  Toilet Transfer: stand by assistance and contact guard assistance with  no AD  using  Stand Pivot  Gait:  pt ambulated 80 ft with RW , CGA . Noted with decreased reese,decreased step length, min body shakiness probably from weakness, no LOB. VC's for safety, proper technique and walker management. VC's for pursed lip breathing technique.     Balance:  good in sitting, fair in standing       AM-PAC 6 CLICK MOBILITY  Turning over in bed (including adjusting bedclothes, sheets and blankets)?: 4  Sitting down on and standing up from a chair with arms (e.g., wheelchair, bedside commode, etc.): 3  Moving from lying on back to sitting on the side of the bed?: 3  Moving to and from a bed to a chair (including a wheelchair)?: 3  Need to walk in hospital room?: 3  Climbing 3-5 steps with a railing?: 3  Basic Mobility Total Score: 19       Treatment & Education:  Pt able to wipe posteriorly post BM in sitting with set up assistance.   Lower Extremity Exercises.   Patient educated on the purpose of therapeutic exercise.    Patient verbalized acceptance/understanding of instructions, expectations, and limitations(for safety).  Patient performed: 2 sets of 10 reps (each) of B LE There Ex: AP, LAQ, Hip abd/add  while sitting up on EOB.       Patient required  verbal cues/tactile cues to ensure correct sequence, to maintain proper form, and to allow for self-correction.  BLE HEP given(within abdominal precautions)  with instructions and demonstrations (pt verbalized understanding). Encouraged pt to perform BLE ex's per handout throughout the day.      Patient left up in chair with all lines intact, call button in reach, nurse notified, and PCT  present..    GOALS:   Multidisciplinary Problems       Physical Therapy Goals          Problem: Physical Therapy    Goal Priority Disciplines Outcome Goal Variances Interventions   Physical Therapy Goal     PT, PT/OT Ongoing,  Progressing     Description: Goals to be met by: 22     Patient will increase functional independence with mobility by performin. Pt to mod I with bed mobility.  2. Pt to transfer with supervision/mod I.  3. Pt to ambulate 150' /c or /s AD and supervision.                         Time Tracking:     PT Received On: 22  PT Start Time: 1116     PT Stop Time: 1155  PT Total Time (min): 39 min     Billable Minutes: Gait Training 16, Therapeutic Activity 12, and Therapeutic Exercise 11    Treatment Type: Treatment  PT/PTA: PTA     PTA Visit Number: 2     2022

## 2022-12-16 NOTE — CONSULTS
Radiology Consult    Vivek Turner is a 47 y.o. male with a history of hepatic abscess s/p IR drain palcement on 12/9. IR re-consulted for evaluation of updated imaging and further management.  Past Medical History:   Diagnosis Date    Hypertension      No past surgical history on file.      Scheduled Meds:    ceFEPime (MAXIPIME) IVPB  2 g Intravenous Q12H    heparin (porcine)  5,000 Units Subcutaneous Q8H    metronidazole  500 mg Intravenous Q8H    polyethylene glycol  17 g Oral Daily    sodium chloride 0.9%  10 mL Intravenous Q8H    sodium chloride 0.9%  10 mL Intravenous Q6H     Continuous Infusions:    dextrose 5 % 100 mL/hr at 12/16/22 1043    TPN ADULT CENTRAL LINE CUSTOM 50 mL/hr at 12/15/22 2249     PRN Meds:acetaminophen, acetaminophen, albuterol-ipratropium, dextrose 10%, dextrose 10%, glucagon (human recombinant), glucose, glucose, HYDROmorphone, melatonin, naloxone, nitroGLYCERIN, oxyCODONE-acetaminophen, Flushing PICC Protocol **AND** sodium chloride 0.9% **AND** sodium chloride 0.9%    Allergies: Review of patient's allergies indicates:  No Known Allergies    Labs:  No results for input(s): INR in the last 168 hours.    Invalid input(s):  PT,  PTT    Recent Labs   Lab 12/16/22  0446   WBC 21.97*   HGB 10.5*   HCT 33.2*   MCV 89   *      Recent Labs   Lab 12/10/22  1147 12/11/22  0134 12/16/22  0446   GLU  --    < > 168*   NA  --    < > 151*   K  --    < > 3.6   CL  --    < > 108   CO2  --    < > 34*   BUN  --    < > 49*   CREATININE  --    < > 1.7*   CALCIUM  --    < > 7.8*   MG  --    < > 2.2   ALT  --    < > 10   AST  --    < > 19   ALBUMIN  --    < > 1.9*   BILITOT  --    < > 1.2*   BILIDIR 3.3*  --   --     < > = values in this interval not displayed.     MRI 12/12: Heterogenous T2 hyperintensity within the posterior right hepatic lobe    US 12/15: Heterogenous hepatic parenchyma corresponding to the findings on the MRI. No focal drainable fluid collection.     Vitals (Most Recent):  Temp:  98.5 °F (36.9 °C) (12/16/22 0810)  Pulse: 80 (12/16/22 0810)  Resp: 18 (12/16/22 0810)  BP: (!) 142/85 (12/16/22 0810)  SpO2: 95 % (12/16/22 0810)    Plan:   1. Recent imaging demonstrates no focal drainable fluid collection within the liver. Recommend removing existing drain when drainage drops below 10mL/day. If patient's clinical status is not improving, consider repeat future imaging with US to evaluate for development of new drainable fluid collection.       Brando Tinoco MD  Interventional Radiology  Department of Radiology

## 2022-12-16 NOTE — PROGRESS NOTES
" [facility] Progress Note    Patient Complaint: generalized weakness    PCP:   Primary Doctor No       LOS: 7        Initial History of Present Illness (HPI):  This is a 47 y.o. male consulted to GI service for Klebsiella liver abscess. Interview conducted with [employer] translation services in the language of Tagalog. Patient complaint of generalized weakness with associated fatigue, HA , difficuly urinating and chills that began last week. Patient endorses traveling to the [facility] 8 weeks ago and had not experienced any of these symptoms prior. Reports seeing a provider in the [facility] and informed of having a fatty liver and high cholesterol. Reports after trip, symptoms began and saw another provider and received a "shot", patient was unable to name the shot or the reasoning for it's administration. Reports the symptoms reemerged last week. Denies smoking, heavy drinking and illicit drug use. Denies n/v, abdominal pain, constipation or diarrhea. Reports abdominal distention. Reports he is able to pass flatus and last stool this am.    Interval Hx  Tolerated NG clamp and tolerated clear liquids.     Review of Systems   Constitutional:  Positive for chills. Negative for activity change, diaphoresis and fever.   HENT:  Negative for congestion, drooling, rhinorrhea, sore throat and trouble swallowing.    Eyes:  Negative for discharge.   Respiratory:  Negative for cough, shortness of breath and wheezing.    Cardiovascular:  Negative for chest pain, palpitations and leg swelling.   Gastrointestinal:  Positive for abdominal distention. Negative for abdominal pain, anal bleeding, blood in stool, constipation, diarrhea, nausea, rectal pain and vomiting.   Endocrine: Negative for cold intolerance and heat intolerance.   Genitourinary:  Positive for difficulty urinating. Negative for frequency, hematuria and urgency.   Musculoskeletal:  Negative for arthralgias.   Skin:  Negative for color change, " pallor and rash.   Neurological:  Positive for weakness and headaches. Negative for syncope, facial asymmetry and numbness.   Psychiatric/Behavioral:  Negative for agitation and confusion. The patient is not nervous/anxious.          Medical History:  has a past medical history of Hypertension.    Surgical History: Patient denies any pertinent past surgical history.    Family History: Patient denies any pertinent family history.    Social History: Denies smoking. Occasional social drinker with wife. Denies illicit drug use.    Review of patient's allergies indicates:  No Known Allergies    No current facility-administered medications on file prior to encounter.     Current Outpatient Medications on File Prior to Encounter   Medication Sig Dispense Refill    NON FORMULARY MEDICATION Losartan/amlodipine 100/10 mg   Take 1 tablet by mouth daily          Objective Findings:    Vital Signs:  Temp:  [98.3 °F (36.8 °C)-99.5 °F (37.5 °C)]   Pulse:  [78-96]   Resp:  [16-19]   BP: (129-143)/(65-85)   SpO2:  [92 %-96 %]   Body mass index is 34.37 kg/m².      Physical Exam  Vitals and nursing note reviewed.   Constitutional:       Appearance: Normal appearance.   HENT:      Head: Normocephalic.      Nose: Nose normal.      Mouth/Throat:      Mouth: Mucous membranes are moist.   Eyes:      Pupils: Pupils are equal, round, and reactive to light.   Cardiovascular:      Heart sounds: Normal heart sounds.   Pulmonary:      Effort: Pulmonary effort is normal.      Breath sounds: Normal breath sounds.   Abdominal:      General: Bowel sounds are normal. There is no distension.      Palpations: Abdomen is soft.      Tenderness: There is no abdominal tenderness.   Musculoskeletal:         General: Normal range of motion.      Cervical back: Normal range of motion.   Skin:     Capillary Refill: Capillary refill takes less than 2 seconds.   Neurological:      General: No focal deficit present.      Mental Status: He is alert and oriented  to person, place, and time.   Psychiatric:         Mood and Affect: Mood normal.         Behavior: Behavior normal.         Thought Content: Thought content normal.         Judgment: Judgment normal.             Labs:  Lab Results   Component Value Date    WBC 21.97 (H) 2022    HGB 10.5 (L) 2022    HCT 33.2 (L) 2022     (H) 2022    CHOL 110 (L) 2022    TRIG 205 (H) 2022    HDL 9 (L) 2022    ALT 10 2022    AST 19 2022     (H) 2022    K 3.6 2022     2022    CREATININE 1.7 (H) 2022    BUN 49 (H) 2022    CO2 34 (H) 2022    INR 1.3 (H) 2022    HGBA1C 5.6 12/10/2022             Imagin/13 MRCP-Persistent heterogeneous collection within the right hepatic lobe compatible with reported abscess noting percutaneous drainage catheter in place.No significant biliary duct abnormality on MRCP assessment noting limitations from motion artifact.Distended loops of small bowel in the left mid abdomen with air-fluid levels.       Xray abdomen-Multiple loops of dilated small bowel are present in the left abdomen that measure up to 6.5 cm.      US abdomen- No detected gallbladder wall thickening, cholelithiasis, or pericholecystic fluid.  Common duct normal in size measuring 4 mm.  Diffuse component of increased hepatic parenchymal echotexture.  There is an ill-defined region of decreased echogenicity within the posterior aspect of the right hepatic lobe.    I have independently reviewed and interpreted the imaging above    Assessment:  Patient is a .47 y.o. y/o .male with  has a past medical history of Hypertension. Consulted to the GI service for klebsiella liver abscess.               Recommendations:  Leukocytosis.Hepatic abscess. Elevated bilirubin. Ileus. ERYN. Anemia of acute infection.  Wbc improving. Liver abscess drained on  with aerobic culture positive for klebsiella pneumoniae. On cefazolin, ID  following. Bilirubin is elevated, improved s/p abscess drainage. Continue to monitor levels. There is the question of what is the possible source of the liver abscess. Plan for MRCP today and colonoscopy TBD patient optimization and patient agreeable to prep and procedure.   Dilated small bowel suspicion of illeus noted on imaging and NG tube with large output per Dr Howell's note on 12/11. Today with 300ml of green output, abdomen still distended. Rec to continue NG. MRI MRCP ordered to further assess ileus.  Iron deficiency anemia with anemia of acute infection noted with labs. No overt bleeding. Continue to monitor hgb.    NG wilth ~ 800ml in last 24hr, rec to start clamping NG and assess for toleration. No stool in 2 days, rec start miralax daily when appropriate.    --Rec to Remove NG if not already. Tolerating diet.  Continue stool softeners at home.  Colonoscopy outpatient desk will call patient with appt.    Will sign off  Thank you so much for allowing us to participate in the care of Vivek Turner . Please contact us if you have any additional questions.    Sidra Crawford NP  Gastroenterology  [facility] Med [facility]urg

## 2022-12-16 NOTE — ASSESSMENT & PLAN NOTE
Patient has Non- operative ileus which is adynamic in etiology which is worsening. Will treat conservatively with bowel rest, IV fluids, serial abdominal exams and avoidance of GI paralytics such as narcotics or anti-spasmodics. Monitor patient closely.     -NGT placed 12/11 with large output-- continue  -GI following: plan for clamping the NGT today and advance to clears if tolerated   -Surgery following   -Nutrition consulted  -Initiated TPN on 12/14 but was unable to get PICC due to severe weather.   -PICC placed on 12/15, will continue custom TPN.

## 2022-12-16 NOTE — PT/OT/SLP PROGRESS
Occupational Therapy      Patient Name:  Vivek Turner   MRN:  [id number]    Patient not seen today secondary to Other (Comment) (working with PT, s/p [facility] transfer). Will follow-up later as able.    12/16/2022

## 2022-12-16 NOTE — PLAN OF CARE
Problem: Physical Therapy  Goal: Physical Therapy Goal  Description: Goals to be met by: 22     Patient will increase functional independence with mobility by performin. Pt to mod I with bed mobility.  2. Pt to transfer with supervision/mod I.  3. Pt to ambulate 150' /c or /s AD and supervision.    Outcome: Ongoing, Progressing

## 2022-12-16 NOTE — PLAN OF CARE
AYAKA spoke with Mick Matthew, representative from patient's job. AYAKA inquired if Mick is responsible for patient's medical bills. Mick said yes. Mick stated that he works for a company that contracts with [employer]. Mick stated that they work for 6 months tour of duty (offshore). Brando stated that when they are off, they live a a logistics facility where there is a staff physician that takes care of all of their medical issues. AYAKA notified Mick that patient will need IV abx for 4 weeks. AYAKA also informed that patient was evaluated by PT/OT and they are recommending home health. AYAKA explained the benefits of home health with IV abx and the option of going to infusion suite instead. Brando stated that he would prefer that the hospitalist speak with their staff physician about patient's current medical condition. Brando stated that he is on the road and will be with the staff physician on Monday. Bradno inquired if he can call AYAKA Monday with Staff Physician to schedule a time to speak with the hospitalist. AYAKA informed Mick that she will keep him updated if anything changes.       12/16/22 1145   Discharge Reassessment   Assessment Type Discharge Planning Reassessment   Did the patient's condition or plan change since previous assessment? No   Discharge Plan discussed with:   (Company Representative)   Communicated VIVIEN with patient/caregiver Date not available/Unable to determine   Discharge Plan A Home Health   Discharge Plan B Home   DME Needed Upon Discharge  other (see comments)  (TBD)   Discharge Barriers Identified Unisured   Why the patient remains in the hospital Requires continued medical care   Post-Acute Status   Discharge Delays None known at this time

## 2022-12-16 NOTE — PROGRESS NOTES
"[facility]  Infectious Disease  Progress Note    Patient Name: Vivek Turner  MRN: [id number]  Admission Date: 12/9/2022  Length of Stay: 7 days  Attending Physician: Sean Myers MD  Primary Care Provider: Primary Doctor No    Isolation Status: No active isolations  Assessment/Plan:      * Hepatic abscess  47M with h/o HTN admitted 12/9 with abdominal pain, fount to have liver abscess and MARYURI, s/p IR drainage on 12/9 (CX + pan-sen kleb pna), now complicated by ileus, MARYURI, hypernatremia. BCx ngtd. Was on cefepime/metronidazole, switched to cefazolin 12/11. HIV neg. Flagyl resumed 12/13.    Etiology of abscess is unclear. MRCP 12/12 unremarkable. Showed heterogenous fluid collection at inferior aspect of the right hepatic lobe measuring approximately 8.8 x 6.8 x 9.3 cm (known abscess).    US 12/16: "echogenicity w/n the right lobe of the liver, measuring approximately 7.1 x 7.5 x 5.3 cm. There is mildly complex fluid adjacent to the right lobe of the liver, measuring 3 x 10.8 x 11.2 cm. "    Recommendations:   - broadened to cefepime / flagyl  - IR reviewed US and no focal fluid seen amenable to drainage.  - duration of abx until radiographic resolution of abscess; will need repeat CT in approx 4 wks  - given size of abscess will likely DC on IV abx x atleast 4 wks  - refused colonoscopy; can schedule outpt  - if febrile please repeat BCx              Anticipated Disposition: tbd    Thank you for your consult. I will follow-up with patient. Please contact us if you have any additional questions.    Chelsea Catherine MD  Infectious Disease  [facility]    Subjective:     Principal Problem:Hepatic abscess    HPI:   47M with h/o htn admitted 12/9 with several days of progressively worsening abdominal pain. Reports fever and nausea and decreased po intake.  Denies diarrhea. Denies weight loss. Notably reports traveling to [facility] several months ago.     Had abdominal ct and ultrasound  1. " "No sonographic abnormality of the gallbladder detected.  2. Ill-defined region of decreased echotexture within the posterior aspect of the right hepatic lobe corresponding to the findings seen on recent prior CT.  This could indicate a mass, abscess, or intense fatty infiltration.  3. Diffuse increased hepatic parenchymal echotexture suspected to represent hepatic steatosis.      S/p IR drainage of abscess. Reports pain improving.   KLEBSIELLA PNEUMONIAE   Many      Resulting Agency [facility]        Susceptibility     Klebsiella pneumoniae     CULTURE, AEROBIC  (SPECIFY SOURCE)     Amox/K Clav'ate <=8/4 mcg/mL Sensitive     Amp/Sulbactam <=8/4 mcg/mL Sensitive     Cefazolin <=2 mcg/mL Sensitive     Cefepime <=2 mcg/mL Sensitive     Ceftriaxone <=1 mcg/mL Sensitive     Ciprofloxacin <=1 mcg/mL Sensitive     Ertapenem <=0.5 mcg/mL Sensitive     Gentamicin <=4 mcg/mL Sensitive     Levofloxacin <=2 mcg/mL Sensitive     Meropenem <=1 mcg/mL Sensitive     Minocycline <=4 mcg/mL Sensitive     Piperacillin/Tazo <=16 mcg/mL Sensitive     Tetracycline <=4 mcg/mL Sensitive     Tobramycin <=4 mcg/mL Sensitive     Trimeth/Sulfa <=2/38 mcg/mL Sensitive                 Silvino improving    On cefepime/metronidazole    Hiv ordered, pending      ID consulted for "klebsiella liver abscess"    Interval History: Leukocytosis uptrending today. LIver abscess drain remains with poor output.     Review of Systems   Constitutional:  Negative for chills, fatigue and fever.   Respiratory:  Negative for cough and shortness of breath.    Gastrointestinal:  Negative for abdominal pain and diarrhea.   Genitourinary:  Negative for difficulty urinating and dysuria.   Skin:  Negative for rash and wound.   Neurological:  Negative for headaches.   All other systems reviewed and are negative.  Objective:     Vital Signs (Most Recent):  Temp: 98.2 °F (36.8 °C) (12/16/22 1201)  Pulse: 96 (12/16/22 1201)  Resp: 18 (12/16/22 1201)  BP: 137/88 (12/16/22 1201)  SpO2: " 96 % (12/16/22 1201) Vital Signs (24h Range):  Temp:  [98.2 °F (36.8 °C)-99.5 °F (37.5 °C)] 98.2 °F (36.8 °C)  Pulse:  [78-96] 96  Resp:  [16-19] 18  SpO2:  [92 %-96 %] 96 %  BP: (129-143)/(65-88) 137/88     Weight: 96.6 kg (212 lb 15.4 oz)  Body mass index is 34.37 kg/m².    Estimated Creatinine Clearance: 59.7 mL/min (A) (based on SCr of 1.7 mg/dL (H)).    Physical Exam  Vitals reviewed.   Constitutional:       Appearance: He is ill-appearing.   HENT:      Head: Normocephalic and atraumatic.      Nose:      Comments: NGT to suction  Eyes:      Conjunctiva/sclera: Conjunctivae normal.      Pupils: Pupils are equal, round, and reactive to light.   Cardiovascular:      Rate and Rhythm: Normal rate and regular rhythm.   Pulmonary:      Effort: Pulmonary effort is normal. No respiratory distress.      Breath sounds: Normal breath sounds.   Abdominal:      General: Abdomen is flat. Bowel sounds are normal. There is distension.      Palpations: Abdomen is soft.      Tenderness: There is no abdominal tenderness. There is no guarding.   Musculoskeletal:      Cervical back: Normal range of motion.      Right lower leg: No edema.      Left lower leg: No edema.   Skin:     Findings: No erythema or rash.   Neurological:      General: No focal deficit present.      Mental Status: He is alert and oriented to person, place, and time.   Psychiatric:         Behavior: Behavior normal.       Significant Labs: All pertinent labs within the past 24 hours have been reviewed.    Significant Imaging: I have reviewed all pertinent imaging results/findings within the past 24 hours.

## 2022-12-16 NOTE — PROGRESS NOTES
[facility] Medicine  Progress Note    Patient Name: Vivek Turner  MRN: [id number]  Patient Class: IP- Inpatient   Admission Date: 12/9/2022  Length of Stay: 7 days  Attending Physician: Sean Myers MD  Primary Care Provider: Primary Doctor No        Subjective:     Principal Problem:Hepatic abscess        HPI:  47y M w/ hypertension presents with abdominal pain x 2 days. Pt refused  service. He reports that he had fever/rigors three days prior to admission. He then developed worsening abdominal pain from then until admission. He went to an urgent care where he was started on bactrim for UTI. His abdominal pain increased over the next few days. He had a BM yesterday. Denies vomiting, but has had significant nausea and very limited PO intake in the days leading to admission. Aside from the single episode of fever/rigors he denies subsequent fevers.     In the ED he was found to have a R hepatic abscess vs mass. He traveled to the [facility] eight weeks prior to presentation. He denies fevers except for what is listed above. Denies weight loss, cough, night sweats. Denies prior hx of TB. Denies prior hx of cirrhosis      Overview/Hospital Course:  Mr Vivek Turner was admitted with severe sepsis due to R liver mass vs abscess and acute renal failure. IR consulted and drain placed on 12/9. Cultures with Klebsiella. ID consulted-continue cefazolin, and restarted Flagyl on 12/13. Started on bicarb gtt for acute renal failure and Nephrology consulted. He was moved to ICU for instability. He has ileus; NGT placed with copious bilious output. Renal failure improving. GI consulted to assess for source of liver abscess. MRCP showed persistent heterogeneous collection within the right hepatic lobe compatible with reported abscess noting percutaneous drainage catheter in place. No significant biliary duct abnormality on MRCP assessment. Pt refused inpatient colonoscopy. Pt transferred to the  floor on 12/12. PT/OT recommends HH. Monitor renal function and ileus at this time. Still has some output from NGT, GI recommends clamping today and monitor. Cr improving with fluids and TPN. Na still high, will continue D5 for now. wbc uptrending and looks like new fluid collection next to liver, will ask IR to drain and send for cultures, labs placed. Will change to Cefepime for broader coverage until those cultures result.       Interval History:   No abdominal pain at this time. No N/V or diarrhea.  CLD advanced    Review of Systems   Constitutional:  Positive for fatigue. Negative for chills, diaphoresis and fever.   HENT:  Negative for congestion and trouble swallowing.    Eyes:  Negative for photophobia and visual disturbance.   Respiratory:  Negative for cough, chest tightness and shortness of breath.    Cardiovascular:  Negative for chest pain, palpitations and leg swelling.   Gastrointestinal:  Positive for abdominal distention (improving). Negative for abdominal pain, constipation, diarrhea, nausea and vomiting.   Endocrine: Negative for polydipsia and polyuria.   Musculoskeletal:  Negative for arthralgias and myalgias.   Neurological:  Positive for weakness. Negative for numbness.   Psychiatric/Behavioral:  Negative for confusion.      Objective:     Vital Signs (Most Recent):  Temp: 98.5 °F (36.9 °C) (12/16/22 0810)  Pulse: 80 (12/16/22 0810)  Resp: 18 (12/16/22 0810)  BP: (!) 142/85 (12/16/22 0810)  SpO2: 95 % (12/16/22 0810)   Vital Signs (24h Range):  Temp:  [98.3 °F (36.8 °C)-99.5 °F (37.5 °C)] 98.5 °F (36.9 °C)  Pulse:  [78-96] 80  Resp:  [16-19] 18  SpO2:  [92 %-96 %] 95 %  BP: (129-146)/(65-92) 142/85     Weight: 96.6 kg (212 lb 15.4 oz)  Body mass index is 34.37 kg/m².    Intake/Output Summary (Last 24 hours) at 12/16/2022 0838  Last data filed at 12/16/2022 0600  Gross per 24 hour   Intake 240 ml   Output 2175 ml   Net -1935 ml        Physical Exam  Vitals and nursing note reviewed.    Constitutional:       General: He is not in acute distress.     Appearance: He is well-developed. He is obese. He is ill-appearing. He is not toxic-appearing or diaphoretic.   HENT:      Head: Normocephalic and atraumatic.      Nose:      Comments: NGT in place with bilious output     Mouth/Throat:      Mouth: Mucous membranes are moist.   Eyes:      General: No scleral icterus.     Pupils: Pupils are equal, round, and reactive to light.   Neck:      Thyroid: No thyromegaly.   Cardiovascular:      Rate and Rhythm: Normal rate and regular rhythm.      Pulses: Normal pulses.      Heart sounds: Normal heart sounds. No murmur heard.    No gallop.   Pulmonary:      Effort: Pulmonary effort is normal. No respiratory distress.      Breath sounds: Normal breath sounds. No stridor. No wheezing or rales.      Comments: Decreased inspiration. No wheezes on exam. On 2L NC  Abdominal:      General: Bowel sounds are normal. There is distension.      Palpations: Abdomen is soft.      Tenderness: There is no abdominal tenderness. There is no guarding.      Comments: RUQ drain in place with minimal brown/orange fluid   Musculoskeletal:         General: No deformity. Normal range of motion.      Cervical back: Normal range of motion. No rigidity.      Right lower leg: No edema.      Left lower leg: No edema.   Lymphadenopathy:      Cervical: No cervical adenopathy.   Skin:     General: Skin is warm.   Neurological:      Mental Status: He is alert and oriented to person, place, and time.   Psychiatric:         Behavior: Behavior normal.      Comments: Appears frustrated that he cannot eat at this time            Recent Results (from the past 24 hour(s))   POCT glucose    Collection Time: 12/15/22 12:01 PM   Result Value Ref Range    POCT Glucose 133 (H) 70 - 110 mg/dL   POCT glucose    Collection Time: 12/15/22  6:11 PM   Result Value Ref Range    POCT Glucose 113 (H) 70 - 110 mg/dL   POCT glucose    Collection Time: 12/16/22 12:18  AM   Result Value Ref Range    POCT Glucose 158 (H) 70 - 110 mg/dL   CBC Auto Differential    Collection Time: 12/16/22  4:46 AM   Result Value Ref Range    WBC 21.97 (H) 3.90 - 12.70 K/uL    RBC 3.72 (L) 4.60 - 6.20 M/uL    Hemoglobin 10.5 (L) 14.0 - 18.0 g/dL    Hematocrit 33.2 (L) 40.0 - 54.0 %    MCV 89 82 - 98 fL    MCH 28.2 27.0 - 31.0 pg    MCHC 31.6 (L) 32.0 - 36.0 g/dL    RDW 14.3 11.5 - 14.5 %    Platelets 520 (H) 150 - 450 K/uL    MPV 10.1 9.2 - 12.9 fL    Immature Granulocytes 1.2 (H) 0.0 - 0.5 %    Gran # (ANC) 18.7 (H) 1.8 - 7.7 K/uL    Immature Grans (Abs) 0.26 (H) 0.00 - 0.04 K/uL    Lymph # 1.8 1.0 - 4.8 K/uL    Mono # 1.1 (H) 0.3 - 1.0 K/uL    Eos # 0.1 0.0 - 0.5 K/uL    Baso # 0.04 0.00 - 0.20 K/uL    nRBC 0 0 /100 WBC    Gran % 85.1 (H) 38.0 - 73.0 %    Lymph % 8.1 (L) 18.0 - 48.0 %    Mono % 4.9 4.0 - 15.0 %    Eosinophil % 0.5 0.0 - 8.0 %    Basophil % 0.2 0.0 - 1.9 %    Differential Method Automated    Comprehensive Metabolic Panel    Collection Time: 12/16/22  4:46 AM   Result Value Ref Range    Sodium 151 (H) 136 - 145 mmol/L    Potassium 3.6 3.5 - 5.1 mmol/L    Chloride 108 95 - 110 mmol/L    CO2 34 (H) 23 - 29 mmol/L    Glucose 168 (H) 70 - 110 mg/dL    BUN 49 (H) 6 - 20 mg/dL    Creatinine 1.7 (H) 0.5 - 1.4 mg/dL    Calcium 7.8 (L) 8.7 - 10.5 mg/dL    Total Protein 6.3 6.0 - 8.4 g/dL    Albumin 1.9 (L) 3.5 - 5.2 g/dL    Total Bilirubin 1.2 (H) 0.1 - 1.0 mg/dL    Alkaline Phosphatase 107 55 - 135 U/L    AST 19 10 - 40 U/L    ALT 10 10 - 44 U/L    Anion Gap 9 8 - 16 mmol/L    eGFR 50 (A) >60 mL/min/1.73 m^2   Magnesium    Collection Time: 12/16/22  4:46 AM   Result Value Ref Range    Magnesium 2.2 1.6 - 2.6 mg/dL   Phosphorus    Collection Time: 12/16/22  4:46 AM   Result Value Ref Range    Phosphorus 3.8 2.7 - 4.5 mg/dL   POCT glucose    Collection Time: 12/16/22  6:48 AM   Result Value Ref Range    POCT Glucose 165 (H) 70 - 110 mg/dL       Microbiology Results (last 7 days)        Procedure Component Value Units Date/Time    Blood culture [[id number]] Collected: 12/14/22 0205    Order Status: Completed Specimen: Blood from Antecubital, Right Arm Updated: 12/16/22 0303     Blood Culture, Routine No Growth to date      No Growth to date      No Growth to date    Narrative:      Blood cx in the AM with AM labs    Blood culture [[id number]] Collected: 12/14/22 0205    Order Status: Completed Specimen: Blood from Antecubital, Right Updated: 12/16/22 0303     Blood Culture, Routine No Growth to date      No Growth to date      No Growth to date    Narrative:      Blood cx in the AM with AM labs    Culture, Anaerobe [[id number]] Collected: 12/09/22 1831    Order Status: Completed Specimen: Abscess from Abdomen Updated: 12/13/22 0835     Anaerobic Culture No anaerobes isolated    Aerobic culture [[id number]]  (Abnormal)  (Susceptibility) Collected: 12/09/22 1831    Order Status: Completed Specimen: Abscess from Abdomen Updated: 12/11/22 0915     Aerobic Bacterial Culture KLEBSIELLA PNEUMONIAE  Many      Gram stain [[id number]] Collected: 12/09/22 1831    Order Status: Completed Specimen: Abscess from Abdomen Updated: 12/10/22 0755     Gram Stain Result Many WBC's      No organisms seen    Fungus culture [[id number]] Collected: 12/09/22 1831    Order Status: Sent Specimen: Abscess from Abdomen Updated: 12/09/22 1905                       Assessment/Plan:      * Hepatic abscess  Unclear how he got this. IR drain placed 12/9. Cultures with Klebsiella, pansensitive.   - MRCP:persistent heterogeneous collection within the right hepatic lobe compatible with reported abscess noting percutaneous drainage catheter in place. No significant biliary duct abnormality on MRCP assessment.  - Blood cx with NGTD  - Abscess culture with klebsiella. No anaerobes  -IR drain with minimal output. Discussed with IR and IR suspect its not putting out much because of the heterogeneity of the collection and drain may not be  sufficient. Recommended repeat US abdomen   - ID consulted: on cefazolin, added flagyl on 12/13  - GI consulted to assess for source of liver abscess. Pt refuses inpatient colonoscopy. Likely plan for outpatient cscope.       Ileus  Patient has Non- operative ileus which is adynamic in etiology which is worsening. Will treat conservatively with bowel rest, IV fluids, serial abdominal exams and avoidance of GI paralytics such as narcotics or anti-spasmodics. Monitor patient closely.     -NGT placed 12/11 with large output-- continue  -GI following: plan for clamping the NGT today and advance to clears if tolerated   -Surgery following   -Nutrition consulted  -Initiated TPN on 12/14 but was unable to get PICC due to severe weather.   -PICC placed on 12/15, will continue custom TPN.       Acute hypoxemic respiratory failure  Patient with Hypoxic Respiratory failure which is Acute.  he is not on home oxygen.   Signs/symptoms of respiratory failure include- tachypnea, increased work of breathing and wheezing. Contributing diagnoses includes - atelectasis Labs and images were reviewed. Patient Has not had a recent ABG.     - O2 by NC PRN  - nebs PRN  - CXR shows atelectasis    Hyperglycemia  A1c normal.     Microcytic anemia  Hgb 10-11, no prior to review.   Iron deficient- TSAT 17, but also significant acute inflammation- ferritin 3633.   B12, folate replete.   Stable        Atelectasis  Shortness of breath partly due to atelectasis as noted on CXR  - incentive spirometer  - O2 by NC as needed      Severe sepsis  This patient does have evidence of infective focus. My overall impression is sepsis. Vital signs were reviewed and noted in progress note.  Antibiotics given-   Antibiotics (From admission, onward)      Start     Stop Route Frequency Ordered    12/14/22 0800  cefazolin (ANCEF) 2 gram in dextrose 5% 50 mL IVPB (premix)         -- IV Every 8 hours (non-standard times) 12/14/22 0745    12/13/22 0833  metronidazole  IVPB 500 mg         -- IV Every 8 hours (non-standard times) 12/13/22 0833          Cultures were taken-   Microbiology Results (last 7 days)       Procedure Component Value Units Date/Time    Blood culture [[id number]] Collected: 12/14/22 0205    Order Status: Completed Specimen: Blood from Antecubital, Right Arm Updated: 12/15/22 0303     Blood Culture, Routine No Growth to date      No Growth to date    Narrative:      Blood cx in the AM with AM labs    Blood culture [[id number]] Collected: 12/14/22 0205    Order Status: Completed Specimen: Blood from Antecubital, Right Updated: 12/15/22 0303     Blood Culture, Routine No Growth to date      No Growth to date    Narrative:      Blood cx in the AM with AM labs    Culture, Anaerobe [[id number]] Collected: 12/09/22 1831    Order Status: Completed Specimen: Abscess from Abdomen Updated: 12/13/22 0835     Anaerobic Culture No anaerobes isolated    Aerobic culture [[id number]]  (Abnormal)  (Susceptibility) Collected: 12/09/22 1831    Order Status: Completed Specimen: Abscess from Abdomen Updated: 12/11/22 0915     Aerobic Bacterial Culture KLEBSIELLA PNEUMONIAE  Many      Gram stain [[id number]] Collected: 12/09/22 1831    Order Status: Completed Specimen: Abscess from Abdomen Updated: 12/10/22 0755     Gram Stain Result Many WBC's      No organisms seen    Fungus culture [[id number]] Collected: 12/09/22 1831    Order Status: Sent Specimen: Abscess from Abdomen Updated: 12/09/22 1905          Latest lactate reviewed, they are-  No results for input(s): LACTATE in the last 72 hours.    Organ dysfunction indicated by Acute kidney injury and Acute liver injury  Source- liver abscess    Source control Achieved by- IR    -Due to hepatic abscess  -s/p IR drainage on 12/09  -ID consulted:  Continue cefazolin and Flagyl  -blood cultures with no growth to date   -abscess culture with pansensitive Klebsiella   -continue IV antibiotics as above   -Leukocytosis improving       ATN  (acute tubular necrosis)  Creatinine at 6.1 at admission. Unknown baseline with no prior labs to review. FENa 1.4% suggesting intrinsic injury. This may be ATN from sepsis +/- Bactrim pseudotoxicity. No hydro seen on CT or US.   - IVF ordered  - Nephrology consulted  - this is improving     Patient with acute kidney injury likely due to IVVD/dehydration and acute tubular necrosis MARYURI is currently improving. Labs reviewed- Renal function/electrolytes with Estimated Creatinine Clearance: 48.3 mL/min (A) (based on SCr of 2.1 mg/dL (H)). according to latest data. Monitor urine output and serial BMP and adjust therapy as needed. Avoid nephrotoxins and renally dose meds for GFR listed above.         VTE Risk Mitigation (From admission, onward)           Ordered     heparin (porcine) injection 5,000 Units  Every 8 hours         12/10/22 0838     IP VTE HIGH RISK PATIENT  Once         12/09/22 1421     Place sequential compression device  Until discontinued         12/09/22 1421                    Discharge Planning   VIVIEN: 12/16/2022     Code Status: Full Code   Is the patient medically ready for discharge?:     Reason for patient still in hospital (select all that apply): Patient trending condition and Treatment  Discharge Plan A: Home   Discharge Delays: None known at this time              Sean Myers MD  Department of Hospital Medicine   [facility] - Telemetry

## 2022-12-16 NOTE — SUBJECTIVE & OBJECTIVE
Interval History:   No abdominal pain at this time. No N/V or diarrhea.  CLD advanced    Review of Systems   Constitutional:  Positive for fatigue. Negative for chills, diaphoresis and fever.   HENT:  Negative for congestion and trouble swallowing.    Eyes:  Negative for photophobia and visual disturbance.   Respiratory:  Negative for cough, chest tightness and shortness of breath.    Cardiovascular:  Negative for chest pain, palpitations and leg swelling.   Gastrointestinal:  Positive for abdominal distention (improving). Negative for abdominal pain, constipation, diarrhea, nausea and vomiting.   Endocrine: Negative for polydipsia and polyuria.   Musculoskeletal:  Negative for arthralgias and myalgias.   Neurological:  Positive for weakness. Negative for numbness.   Psychiatric/Behavioral:  Negative for confusion.      Objective:     Vital Signs (Most Recent):  Temp: 98.5 °F (36.9 °C) (12/16/22 0810)  Pulse: 80 (12/16/22 0810)  Resp: 18 (12/16/22 0810)  BP: (!) 142/85 (12/16/22 0810)  SpO2: 95 % (12/16/22 0810)   Vital Signs (24h Range):  Temp:  [98.3 °F (36.8 °C)-99.5 °F (37.5 °C)] 98.5 °F (36.9 °C)  Pulse:  [78-96] 80  Resp:  [16-19] 18  SpO2:  [92 %-96 %] 95 %  BP: (129-146)/(65-92) 142/85     Weight: 96.6 kg (212 lb 15.4 oz)  Body mass index is 34.37 kg/m².    Intake/Output Summary (Last 24 hours) at 12/16/2022 0838  Last data filed at 12/16/2022 0600  Gross per 24 hour   Intake 240 ml   Output 2175 ml   Net -1935 ml        Physical Exam  Vitals and nursing note reviewed.   Constitutional:       General: He is not in acute distress.     Appearance: He is well-developed. He is obese. He is ill-appearing. He is not toxic-appearing or diaphoretic.   HENT:      Head: Normocephalic and atraumatic.      Nose:      Comments: NGT in place with bilious output     Mouth/Throat:      Mouth: Mucous membranes are moist.   Eyes:      General: No scleral icterus.     Pupils: Pupils are equal, round, and reactive to light.    Neck:      Thyroid: No thyromegaly.   Cardiovascular:      Rate and Rhythm: Normal rate and regular rhythm.      Pulses: Normal pulses.      Heart sounds: Normal heart sounds. No murmur heard.    No gallop.   Pulmonary:      Effort: Pulmonary effort is normal. No respiratory distress.      Breath sounds: Normal breath sounds. No stridor. No wheezing or rales.      Comments: Decreased inspiration. No wheezes on exam. On 2L NC  Abdominal:      General: Bowel sounds are normal. There is distension.      Palpations: Abdomen is soft.      Tenderness: There is no abdominal tenderness. There is no guarding.      Comments: RUQ drain in place with minimal brown/orange fluid   Musculoskeletal:         General: No deformity. Normal range of motion.      Cervical back: Normal range of motion. No rigidity.      Right lower leg: No edema.      Left lower leg: No edema.   Lymphadenopathy:      Cervical: No cervical adenopathy.   Skin:     General: Skin is warm.   Neurological:      Mental Status: He is alert and oriented to person, place, and time.   Psychiatric:         Behavior: Behavior normal.      Comments: Appears frustrated that he cannot eat at this time            Recent Results (from the past 24 hour(s))   POCT glucose    Collection Time: 12/15/22 12:01 PM   Result Value Ref Range    POCT Glucose 133 (H) 70 - 110 mg/dL   POCT glucose    Collection Time: 12/15/22  6:11 PM   Result Value Ref Range    POCT Glucose 113 (H) 70 - 110 mg/dL   POCT glucose    Collection Time: 12/16/22 12:18 AM   Result Value Ref Range    POCT Glucose 158 (H) 70 - 110 mg/dL   CBC Auto Differential    Collection Time: 12/16/22  4:46 AM   Result Value Ref Range    WBC 21.97 (H) 3.90 - 12.70 K/uL    RBC 3.72 (L) 4.60 - 6.20 M/uL    Hemoglobin 10.5 (L) 14.0 - 18.0 g/dL    Hematocrit 33.2 (L) 40.0 - 54.0 %    MCV 89 82 - 98 fL    MCH 28.2 27.0 - 31.0 pg    MCHC 31.6 (L) 32.0 - 36.0 g/dL    RDW 14.3 11.5 - 14.5 %    Platelets 520 (H) 150 - 450 K/uL     MPV 10.1 9.2 - 12.9 fL    Immature Granulocytes 1.2 (H) 0.0 - 0.5 %    Gran # (ANC) 18.7 (H) 1.8 - 7.7 K/uL    Immature Grans (Abs) 0.26 (H) 0.00 - 0.04 K/uL    Lymph # 1.8 1.0 - 4.8 K/uL    Mono # 1.1 (H) 0.3 - 1.0 K/uL    Eos # 0.1 0.0 - 0.5 K/uL    Baso # 0.04 0.00 - 0.20 K/uL    nRBC 0 0 /100 WBC    Gran % 85.1 (H) 38.0 - 73.0 %    Lymph % 8.1 (L) 18.0 - 48.0 %    Mono % 4.9 4.0 - 15.0 %    Eosinophil % 0.5 0.0 - 8.0 %    Basophil % 0.2 0.0 - 1.9 %    Differential Method Automated    Comprehensive Metabolic Panel    Collection Time: 12/16/22  4:46 AM   Result Value Ref Range    Sodium 151 (H) 136 - 145 mmol/L    Potassium 3.6 3.5 - 5.1 mmol/L    Chloride 108 95 - 110 mmol/L    CO2 34 (H) 23 - 29 mmol/L    Glucose 168 (H) 70 - 110 mg/dL    BUN 49 (H) 6 - 20 mg/dL    Creatinine 1.7 (H) 0.5 - 1.4 mg/dL    Calcium 7.8 (L) 8.7 - 10.5 mg/dL    Total Protein 6.3 6.0 - 8.4 g/dL    Albumin 1.9 (L) 3.5 - 5.2 g/dL    Total Bilirubin 1.2 (H) 0.1 - 1.0 mg/dL    Alkaline Phosphatase 107 55 - 135 U/L    AST 19 10 - 40 U/L    ALT 10 10 - 44 U/L    Anion Gap 9 8 - 16 mmol/L    eGFR 50 (A) >60 mL/min/1.73 m^2   Magnesium    Collection Time: 12/16/22  4:46 AM   Result Value Ref Range    Magnesium 2.2 1.6 - 2.6 mg/dL   Phosphorus    Collection Time: 12/16/22  4:46 AM   Result Value Ref Range    Phosphorus 3.8 2.7 - 4.5 mg/dL   POCT glucose    Collection Time: 12/16/22  6:48 AM   Result Value Ref Range    POCT Glucose 165 (H) 70 - 110 mg/dL       Microbiology Results (last 7 days)       Procedure Component Value Units Date/Time    Blood culture [[id number]] Collected: 12/14/22 0205    Order Status: Completed Specimen: Blood from Antecubital, Right Arm Updated: 12/16/22 0303     Blood Culture, Routine No Growth to date      No Growth to date      No Growth to date    Narrative:      Blood cx in the AM with AM labs    Blood culture [[id number]] Collected: 12/14/22 0205    Order Status: Completed Specimen: Blood from Antecubital,  Right Updated: 12/16/22 0303     Blood Culture, Routine No Growth to date      No Growth to date      No Growth to date    Narrative:      Blood cx in the AM with AM labs    Culture, Anaerobe [[id number]] Collected: 12/09/22 1831    Order Status: Completed Specimen: Abscess from Abdomen Updated: 12/13/22 0835     Anaerobic Culture No anaerobes isolated    Aerobic culture [[id number]]  (Abnormal)  (Susceptibility) Collected: 12/09/22 1831    Order Status: Completed Specimen: Abscess from Abdomen Updated: 12/11/22 0915     Aerobic Bacterial Culture KLEBSIELLA PNEUMONIAE  Many      Gram stain [[id number]] Collected: 12/09/22 1831    Order Status: Completed Specimen: Abscess from Abdomen Updated: 12/10/22 0755     Gram Stain Result Many WBC's      No organisms seen    Fungus culture [[id number]] Collected: 12/09/22 1831    Order Status: Sent Specimen: Abscess from Abdomen Updated: 12/09/22 1909

## 2022-12-17 LAB
ALBUMIN SERPL BCP-MCNC: 1.9 G/DL (ref 3.5–5.2)
ALP SERPL-CCNC: 92 U/L (ref 55–135)
ALT SERPL W/O P-5'-P-CCNC: 9 U/L (ref 10–44)
ANION GAP SERPL CALC-SCNC: 7 MMOL/L (ref 8–16)
AST SERPL-CCNC: 21 U/L (ref 10–40)
BASOPHILS # BLD AUTO: 0.03 K/UL (ref 0–0.2)
BASOPHILS NFR BLD: 0.1 % (ref 0–1.9)
BILIRUB SERPL-MCNC: 1.1 MG/DL (ref 0.1–1)
BUN SERPL-MCNC: 38 MG/DL (ref 6–20)
CALCIUM SERPL-MCNC: 7.6 MG/DL (ref 8.7–10.5)
CHLORIDE SERPL-SCNC: 104 MMOL/L (ref 95–110)
CO2 SERPL-SCNC: 32 MMOL/L (ref 23–29)
CREAT SERPL-MCNC: 1.5 MG/DL (ref 0.5–1.4)
DIFFERENTIAL METHOD: ABNORMAL
EOSINOPHIL # BLD AUTO: 0.1 K/UL (ref 0–0.5)
EOSINOPHIL NFR BLD: 0.7 % (ref 0–8)
ERYTHROCYTE [DISTWIDTH] IN BLOOD BY AUTOMATED COUNT: 14.2 % (ref 11.5–14.5)
EST. GFR  (NO RACE VARIABLE): 58 ML/MIN/1.73 M^2
GLUCOSE SERPL-MCNC: 167 MG/DL (ref 70–110)
HCT VFR BLD AUTO: 30.5 % (ref 40–54)
HGB BLD-MCNC: 9.9 G/DL (ref 14–18)
IMM GRANULOCYTES # BLD AUTO: 0.17 K/UL (ref 0–0.04)
IMM GRANULOCYTES NFR BLD AUTO: 0.8 % (ref 0–0.5)
LYMPHOCYTES # BLD AUTO: 1.6 K/UL (ref 1–4.8)
LYMPHOCYTES NFR BLD: 7.6 % (ref 18–48)
MAGNESIUM SERPL-MCNC: 2 MG/DL (ref 1.6–2.6)
MCH RBC QN AUTO: 28.9 PG (ref 27–31)
MCHC RBC AUTO-ENTMCNC: 32.5 G/DL (ref 32–36)
MCV RBC AUTO: 89 FL (ref 82–98)
MONOCYTES # BLD AUTO: 1.1 K/UL (ref 0.3–1)
MONOCYTES NFR BLD: 5 % (ref 4–15)
NEUTROPHILS # BLD AUTO: 18.2 K/UL (ref 1.8–7.7)
NEUTROPHILS NFR BLD: 85.8 % (ref 38–73)
NRBC BLD-RTO: 0 /100 WBC
PHOSPHATE SERPL-MCNC: 3.5 MG/DL (ref 2.7–4.5)
PLATELET # BLD AUTO: 486 K/UL (ref 150–450)
PMV BLD AUTO: 10.1 FL (ref 9.2–12.9)
POCT GLUCOSE: 141 MG/DL (ref 70–110)
POCT GLUCOSE: 149 MG/DL (ref 70–110)
POCT GLUCOSE: 161 MG/DL (ref 70–110)
POCT GLUCOSE: 181 MG/DL (ref 70–110)
POTASSIUM SERPL-SCNC: 3.4 MMOL/L (ref 3.5–5.1)
PROT SERPL-MCNC: 6 G/DL (ref 6–8.4)
RBC # BLD AUTO: 3.43 M/UL (ref 4.6–6.2)
SODIUM SERPL-SCNC: 143 MMOL/L (ref 136–145)
WBC # BLD AUTO: 21.26 K/UL (ref 3.9–12.7)

## 2022-12-17 PROCEDURE — 99233 PR SUBSEQUENT HOSPITAL CARE,LEVL III: ICD-10-PCS | Mod: ,,, | Performed by: INTERNAL MEDICINE

## 2022-12-17 PROCEDURE — 25000003 PHARM REV CODE 250: Performed by: STUDENT IN AN ORGANIZED HEALTH CARE EDUCATION/TRAINING PROGRAM

## 2022-12-17 PROCEDURE — 84100 ASSAY OF PHOSPHORUS: CPT | Performed by: STUDENT IN AN ORGANIZED HEALTH CARE EDUCATION/TRAINING PROGRAM

## 2022-12-17 PROCEDURE — 83735 ASSAY OF MAGNESIUM: CPT | Performed by: STUDENT IN AN ORGANIZED HEALTH CARE EDUCATION/TRAINING PROGRAM

## 2022-12-17 PROCEDURE — 21400001 HC TELEMETRY ROOM

## 2022-12-17 PROCEDURE — 25000003 PHARM REV CODE 250: Performed by: INTERNAL MEDICINE

## 2022-12-17 PROCEDURE — 99233 SBSQ HOSP IP/OBS HIGH 50: CPT | Mod: ,,, | Performed by: INTERNAL MEDICINE

## 2022-12-17 PROCEDURE — 25000003 PHARM REV CODE 250: Performed by: HOSPITALIST

## 2022-12-17 PROCEDURE — S0030 INJECTION, METRONIDAZOLE: HCPCS | Performed by: INTERNAL MEDICINE

## 2022-12-17 PROCEDURE — A4216 STERILE WATER/SALINE, 10 ML: HCPCS | Performed by: STUDENT IN AN ORGANIZED HEALTH CARE EDUCATION/TRAINING PROGRAM

## 2022-12-17 PROCEDURE — A4216 STERILE WATER/SALINE, 10 ML: HCPCS | Performed by: HOSPITALIST

## 2022-12-17 PROCEDURE — 63600175 PHARM REV CODE 636 W HCPCS: Performed by: STUDENT IN AN ORGANIZED HEALTH CARE EDUCATION/TRAINING PROGRAM

## 2022-12-17 PROCEDURE — 63600175 PHARM REV CODE 636 W HCPCS: Performed by: HOSPITALIST

## 2022-12-17 PROCEDURE — 97530 THERAPEUTIC ACTIVITIES: CPT | Mod: CQ

## 2022-12-17 PROCEDURE — 85025 COMPLETE CBC W/AUTO DIFF WBC: CPT | Performed by: STUDENT IN AN ORGANIZED HEALTH CARE EDUCATION/TRAINING PROGRAM

## 2022-12-17 PROCEDURE — 97116 GAIT TRAINING THERAPY: CPT | Mod: CQ

## 2022-12-17 PROCEDURE — 80053 COMPREHEN METABOLIC PANEL: CPT | Performed by: STUDENT IN AN ORGANIZED HEALTH CARE EDUCATION/TRAINING PROGRAM

## 2022-12-17 RX ADMIN — Medication 10 ML: at 06:12

## 2022-12-17 RX ADMIN — Medication 10 ML: at 10:12

## 2022-12-17 RX ADMIN — CEFEPIME 2 G: 2 INJECTION, POWDER, FOR SOLUTION INTRAVENOUS at 10:12

## 2022-12-17 RX ADMIN — HEPARIN SODIUM 5000 UNITS: 5000 INJECTION INTRAVENOUS; SUBCUTANEOUS at 11:12

## 2022-12-17 RX ADMIN — Medication 10 ML: at 12:12

## 2022-12-17 RX ADMIN — CEFEPIME 2 G: 2 INJECTION, POWDER, FOR SOLUTION INTRAVENOUS at 11:12

## 2022-12-17 RX ADMIN — METRONIDAZOLE 500 MG: 500 INJECTION, SOLUTION INTRAVENOUS at 11:12

## 2022-12-17 RX ADMIN — Medication 10 ML: at 11:12

## 2022-12-17 RX ADMIN — POTASSIUM BICARBONATE 40 MEQ: 391 TABLET, EFFERVESCENT ORAL at 08:12

## 2022-12-17 RX ADMIN — METRONIDAZOLE 500 MG: 500 INJECTION, SOLUTION INTRAVENOUS at 12:12

## 2022-12-17 RX ADMIN — METRONIDAZOLE 500 MG: 500 INJECTION, SOLUTION INTRAVENOUS at 04:12

## 2022-12-17 RX ADMIN — Medication 10 ML: at 02:12

## 2022-12-17 RX ADMIN — Medication 10 ML: at 05:12

## 2022-12-17 RX ADMIN — HEPARIN SODIUM 5000 UNITS: 5000 INJECTION INTRAVENOUS; SUBCUTANEOUS at 02:12

## 2022-12-17 RX ADMIN — HEPARIN SODIUM 5000 UNITS: 5000 INJECTION INTRAVENOUS; SUBCUTANEOUS at 06:12

## 2022-12-17 RX ADMIN — METRONIDAZOLE 500 MG: 500 INJECTION, SOLUTION INTRAVENOUS at 08:12

## 2022-12-17 NOTE — PT/OT/SLP PROGRESS
Physical Therapy Treatment    Patient Name:  Vivek Turner   MRN:  [id number]    Recommendations:     Discharge Recommendations: home health PT  Discharge Equipment Recommendations:  (TBD)  Barriers to discharge: None    Assessment:     Vivek Turner is a 47 y.o. male admitted with a medical diagnosis of Hepatic abscess.  He presents with the following impairments/functional limitations: weakness, impaired endurance, gait instability, impaired balance, decreased lower extremity function, decreased ROM, decreased safety awareness, pain, impaired functional mobility .  Progressive Mobility Level 3: Recommend patient be assisted out of bed to chair, toilet, and/or bedside commode with </= minimum assistance.    Rehab Prognosis: Good; patient would benefit from acute skilled PT services to address these deficits and reach maximum level of function.    Recent Surgery: * No surgery found *      Plan:     During this hospitalization, patient to be seen 5 x/week to address the identified rehab impairments via gait training, therapeutic activities, therapeutic exercises and progress toward the following goals:    Plan of Care Expires:  12/20/22    Subjective     Chief Complaint: pain however declined pain med   Patient/Family Comments/goals: pt is agreeable to therapy   Pain/Comfort:  Location - Side 1: Right  Location 1: flank  Pain Addressed 1: Pre-medicate for activity, Nurse notified, Reposition      Objective:     Communicated with nurse Jones  prior to session.  Patient found HOB elevated with bed alarm, telemetry, peripheral IV, MARLEY drain, PICC line upon PT entry to room.     General Precautions: Standard, fall  Orthopedic Precautions: N/A  Braces: N/A  Respiratory Status: Room air   SpO2: 89%-98% on RA on exertions, HR :  bpm   VC's for pursed lip breathing technique.   Functional Mobility:  Bed Mobility:     Rolling Left:  stand by assistance  Scooting: stand by assistance  Supine to Sit: contact guard  assistance / minimum assistance, HOB elevated, bedside rail   Transfers:     Sit to Stand: 3 trials from bed and 1 trial from bedside commode  stand by assistance  with no AD   Toilet Transfer( bed<>bedside commode) : stand by assistance with  no AD  using  Steps transfer .   Gait:  pt ambulated 20 ft  w/o AD , CGA/ min A  , pt with 1 LOB required min/MOD A to recover and 250 ft x 2 trials with RW , CGA/SBA  . Pt required 2 standing rest breaks 2* to pain and fatigue.  Noted with decreased reese,decreased step length, no LOB. VC's for safety, proper technique and walker management. VC's for pursed lip breathing technique.     Balance:  good in sitting, fair in standing       AM-PAC 6 CLICK MOBILITY  Turning over in bed (including adjusting bedclothes, sheets and blankets)?: 4  Sitting down on and standing up from a chair with arms (e.g., wheelchair, bedside commode, etc.): 4  Moving from lying on back to sitting on the side of the bed?: 3  Moving to and from a bed to a chair (including a wheelchair)?: 4  Need to walk in hospital room?: 3  Climbing 3-5 steps with a railing?: 3  Basic Mobility Total Score: 21       Treatment & Education:  Encouraged pt to perform BLE ex's per handout throughout the day. Pt verbalized understanding.  Pt able to wipe posteriorly post BM in sitting with set up assistance.     Patient left up in chair  dinner tray table set up with all lines intact, call button in reach, and nurse notified..    GOALS:   Multidisciplinary Problems       Physical Therapy Goals          Problem: Physical Therapy    Goal Priority Disciplines Outcome Goal Variances Interventions   Physical Therapy Goal     PT, PT/OT Ongoing, Progressing     Description: Goals to be met by: 22     Patient will increase functional independence with mobility by performin. Pt to mod I with bed mobility.  2. Pt to transfer with supervision/mod I.  3. Pt to ambulate 150' /c or /s AD and supervision.                          Time Tracking:     PT Received On: 12/17/22  PT Start Time: 1620     PT Stop Time: 1658  PT Total Time (min): 38 min     Billable Minutes: Gait Training 23 and Therapeutic Activity 15    Treatment Type: Treatment  PT/PTA: PTA     PTA Visit Number: 3     12/17/2022

## 2022-12-17 NOTE — SUBJECTIVE & OBJECTIVE
Interval History:   No abdominal pain at this time. No N/V or diarrhea.  CLD advanced    Review of Systems   Constitutional:  Positive for fatigue. Negative for chills, diaphoresis and fever.   HENT:  Negative for congestion and trouble swallowing.    Eyes:  Negative for photophobia and visual disturbance.   Respiratory:  Negative for cough, chest tightness and shortness of breath.    Cardiovascular:  Negative for chest pain, palpitations and leg swelling.   Gastrointestinal:  Positive for abdominal distention (improving). Negative for abdominal pain, constipation, diarrhea, nausea and vomiting.   Endocrine: Negative for polydipsia and polyuria.   Musculoskeletal:  Negative for arthralgias and myalgias.   Neurological:  Positive for weakness. Negative for numbness.   Psychiatric/Behavioral:  Negative for confusion.      Objective:     Vital Signs (Most Recent):  Temp: 98.6 °F (37 °C) (12/17/22 0758)  Pulse: 86 (12/17/22 0758)  Resp: 20 (12/17/22 0758)  BP: 136/78 (12/17/22 0758)  SpO2: 96 % (12/17/22 0758)   Vital Signs (24h Range):  Temp:  [97.4 °F (36.3 °C)-99.1 °F (37.3 °C)] 98.6 °F (37 °C)  Pulse:  [84-96] 86  Resp:  [18-20] 20  SpO2:  [91 %-96 %] 96 %  BP: (129-137)/(71-88) 136/78     Weight: 96.6 kg (212 lb 15.4 oz)  Body mass index is 34.37 kg/m².    Intake/Output Summary (Last 24 hours) at 12/17/2022 0810  Last data filed at 12/16/2022 2249  Gross per 24 hour   Intake 300 ml   Output 300 ml   Net 0 ml        Physical Exam  Vitals and nursing note reviewed.   Constitutional:       General: He is not in acute distress.     Appearance: He is well-developed. He is obese. He is ill-appearing. He is not toxic-appearing or diaphoretic.   HENT:      Head: Normocephalic and atraumatic.      Nose:      Comments: NGT in place with bilious output     Mouth/Throat:      Mouth: Mucous membranes are moist.   Eyes:      General: No scleral icterus.     Pupils: Pupils are equal, round, and reactive to light.   Neck:       Thyroid: No thyromegaly.   Cardiovascular:      Rate and Rhythm: Normal rate and regular rhythm.      Pulses: Normal pulses.      Heart sounds: Normal heart sounds. No murmur heard.    No gallop.   Pulmonary:      Effort: Pulmonary effort is normal. No respiratory distress.      Breath sounds: Normal breath sounds. No stridor. No wheezing or rales.      Comments: Decreased inspiration. No wheezes on exam. On 2L NC  Abdominal:      General: Bowel sounds are normal. There is distension.      Palpations: Abdomen is soft.      Tenderness: There is no abdominal tenderness. There is no guarding.      Comments: RUQ drain in place with minimal brown/orange fluid   Musculoskeletal:         General: No deformity. Normal range of motion.      Cervical back: Normal range of motion. No rigidity.      Right lower leg: No edema.      Left lower leg: No edema.   Lymphadenopathy:      Cervical: No cervical adenopathy.   Skin:     General: Skin is warm.   Neurological:      Mental Status: He is alert and oriented to person, place, and time.   Psychiatric:         Behavior: Behavior normal.      Comments: Appears frustrated that he cannot eat at this time            Recent Results (from the past 24 hour(s))   POCT glucose    Collection Time: 12/16/22 11:57 AM   Result Value Ref Range    POCT Glucose 183 (H) 70 - 110 mg/dL   POCT glucose    Collection Time: 12/16/22  6:19 PM   Result Value Ref Range    POCT Glucose 149 (H) 70 - 110 mg/dL   POCT glucose    Collection Time: 12/16/22  8:25 PM   Result Value Ref Range    POCT Glucose 161 (H) 70 - 110 mg/dL   POCT glucose    Collection Time: 12/17/22 12:25 AM   Result Value Ref Range    POCT Glucose 149 (H) 70 - 110 mg/dL   CBC Auto Differential    Collection Time: 12/17/22  4:56 AM   Result Value Ref Range    WBC 21.26 (H) 3.90 - 12.70 K/uL    RBC 3.43 (L) 4.60 - 6.20 M/uL    Hemoglobin 9.9 (L) 14.0 - 18.0 g/dL    Hematocrit 30.5 (L) 40.0 - 54.0 %    MCV 89 82 - 98 fL    MCH 28.9 27.0 -  31.0 pg    MCHC 32.5 32.0 - 36.0 g/dL    RDW 14.2 11.5 - 14.5 %    Platelets 486 (H) 150 - 450 K/uL    MPV 10.1 9.2 - 12.9 fL    Immature Granulocytes 0.8 (H) 0.0 - 0.5 %    Gran # (ANC) 18.2 (H) 1.8 - 7.7 K/uL    Immature Grans (Abs) 0.17 (H) 0.00 - 0.04 K/uL    Lymph # 1.6 1.0 - 4.8 K/uL    Mono # 1.1 (H) 0.3 - 1.0 K/uL    Eos # 0.1 0.0 - 0.5 K/uL    Baso # 0.03 0.00 - 0.20 K/uL    nRBC 0 0 /100 WBC    Gran % 85.8 (H) 38.0 - 73.0 %    Lymph % 7.6 (L) 18.0 - 48.0 %    Mono % 5.0 4.0 - 15.0 %    Eosinophil % 0.7 0.0 - 8.0 %    Basophil % 0.1 0.0 - 1.9 %    Differential Method Automated    Comprehensive Metabolic Panel    Collection Time: 12/17/22  4:56 AM   Result Value Ref Range    Sodium 143 136 - 145 mmol/L    Potassium 3.4 (L) 3.5 - 5.1 mmol/L    Chloride 104 95 - 110 mmol/L    CO2 32 (H) 23 - 29 mmol/L    Glucose 167 (H) 70 - 110 mg/dL    BUN 38 (H) 6 - 20 mg/dL    Creatinine 1.5 (H) 0.5 - 1.4 mg/dL    Calcium 7.6 (L) 8.7 - 10.5 mg/dL    Total Protein 6.0 6.0 - 8.4 g/dL    Albumin 1.9 (L) 3.5 - 5.2 g/dL    Total Bilirubin 1.1 (H) 0.1 - 1.0 mg/dL    Alkaline Phosphatase 92 55 - 135 U/L    AST 21 10 - 40 U/L    ALT 9 (L) 10 - 44 U/L    Anion Gap 7 (L) 8 - 16 mmol/L    eGFR 58 (A) >60 mL/min/1.73 m^2   Magnesium    Collection Time: 12/17/22  4:56 AM   Result Value Ref Range    Magnesium 2.0 1.6 - 2.6 mg/dL   Phosphorus    Collection Time: 12/17/22  4:56 AM   Result Value Ref Range    Phosphorus 3.5 2.7 - 4.5 mg/dL   POCT glucose    Collection Time: 12/17/22  6:36 AM   Result Value Ref Range    POCT Glucose 181 (H) 70 - 110 mg/dL       Microbiology Results (last 7 days)       Procedure Component Value Units Date/Time    Blood culture [[id number]] Collected: 12/14/22 0205    Order Status: Completed Specimen: Blood from Antecubital, Right Arm Updated: 12/17/22 0303     Blood Culture, Routine No Growth to date      No Growth to date      No Growth to date      No Growth to date    Narrative:      Blood cx in the AM  with AM labs    Blood culture [[id number]] Collected: 12/14/22 0205    Order Status: Completed Specimen: Blood from Antecubital, Right Updated: 12/17/22 0303     Blood Culture, Routine No Growth to date      No Growth to date      No Growth to date      No Growth to date    Narrative:      Blood cx in the AM with AM labs    Gram stain [[id number]]     Order Status: No result Specimen: Ascites     Aerobic culture [[id number]]     Order Status: No result Specimen: Abdominal     Culture, Anaerobic [[id number]]     Order Status: No result Specimen: Ascites     Culture, Anaerobe [[id number]] Collected: 12/09/22 1831    Order Status: Completed Specimen: Abscess from Abdomen Updated: 12/13/22 0835     Anaerobic Culture No anaerobes isolated    Aerobic culture [[id number]]  (Abnormal)  (Susceptibility) Collected: 12/09/22 1831    Order Status: Completed Specimen: Abscess from Abdomen Updated: 12/11/22 0915     Aerobic Bacterial Culture KLEBSIELLA PNEUMONIAE  Many

## 2022-12-17 NOTE — PROGRESS NOTES
"[facility]  Infectious Disease  Progress Note    Patient Name: Vivek Turner  MRN: [id number]  Admission Date: 12/9/2022  Length of Stay: 8 days  Attending Physician: Sean Myers MD  Primary Care Provider: Primary Doctor No    Isolation Status: No active isolations  Assessment/Plan:      * Hepatic abscess  47M with h/o HTN admitted 12/9 with abdominal pain, fount to have liver abscess and MARYURI, s/p IR drainage on 12/9 (CX + pan-sen kleb pna), now complicated by ileus, MARYURI, hypernatremia. BCx ngtd. Was on cefepime/metronidazole, switched to cefazolin 12/11. HIV neg. Hx course c/b ileus and uptrending leukocytosis. Febrile 12/12 and flagyl resumed 12/13.    Etiology of abscess is unclear. MRCP 12/12 unremarkable. Showed heterogenous fluid collection at inferior aspect of the right hepatic lobe measuring approximately 8.8 x 6.8 x 9.3 cm (known abscess).    US 12/16: "echogenicity w/n the right lobe of the liver, measuring approximately 7.1 x 7.5 x 5.3 cm. There is mildly complex fluid adjacent to the right lobe of the liver, measuring 3 x 10.8 x 11.2 cm. " IR reviewed US and no focal fluid seen amenable to drainage.     Unclear if there is a 2nd collection (seen on recent US, not on MRCP) or if it is connected to liver abscess.    Recommendations:   - repeat CT abdomen for better visualization of collections  - duration of abx until radiographic resolution of abscess  - given size of abscess will likely DC on IV abx x atleast 4 wks  - plan for outpatient colonoscopy  - if febrile please repeat BCx  - on cefepime/ flagyl for now pending clinical improvement              Anticipated Disposition: tbd    Thank you for your consult. I will follow-up with patient. Please contact us if you have any additional questions.    Chelsea Catherine MD  Infectious Disease  [facility]    Subjective:     Principal Problem:Hepatic abscess    HPI:   47M with h/o htn admitted 12/9 with several days of " "progressively worsening abdominal pain. Reports fever and nausea and decreased po intake.  Denies diarrhea. Denies weight loss. Notably reports traveling to [facility] several months ago.     Had abdominal ct and ultrasound  1. No sonographic abnormality of the gallbladder detected.  2. Ill-defined region of decreased echotexture within the posterior aspect of the right hepatic lobe corresponding to the findings seen on recent prior CT.  This could indicate a mass, abscess, or intense fatty infiltration.  3. Diffuse increased hepatic parenchymal echotexture suspected to represent hepatic steatosis.      S/p IR drainage of abscess. Reports pain improving.   KLEBSIELLA PNEUMONIAE   Many      Resulting Agency [facility]        Susceptibility     Klebsiella pneumoniae     CULTURE, AEROBIC  (SPECIFY SOURCE)     Amox/K Clav'ate <=8/4 mcg/mL Sensitive     Amp/Sulbactam <=8/4 mcg/mL Sensitive     Cefazolin <=2 mcg/mL Sensitive     Cefepime <=2 mcg/mL Sensitive     Ceftriaxone <=1 mcg/mL Sensitive     Ciprofloxacin <=1 mcg/mL Sensitive     Ertapenem <=0.5 mcg/mL Sensitive     Gentamicin <=4 mcg/mL Sensitive     Levofloxacin <=2 mcg/mL Sensitive     Meropenem <=1 mcg/mL Sensitive     Minocycline <=4 mcg/mL Sensitive     Piperacillin/Tazo <=16 mcg/mL Sensitive     Tetracycline <=4 mcg/mL Sensitive     Tobramycin <=4 mcg/mL Sensitive     Trimeth/Sulfa <=2/38 mcg/mL Sensitive                 Silvino improving    On cefepime/metronidazole    Hiv ordered, pending      ID consulted for "klebsiella liver abscess"    Interval History: No acute events overnight.     Review of Systems   Constitutional:  Negative for chills, fatigue and fever.   Respiratory:  Negative for cough and shortness of breath.    Gastrointestinal:  Negative for abdominal pain and diarrhea.   Genitourinary:  Negative for difficulty urinating and dysuria.   Skin:  Negative for rash and wound.   Neurological:  Negative for headaches.   All other systems reviewed and are " negative.  Objective:     Vital Signs (Most Recent):  Temp: 98.6 °F (37 °C) (12/17/22 0758)  Pulse: 86 (12/17/22 0758)  Resp: 20 (12/17/22 0758)  BP: 136/78 (12/17/22 0758)  SpO2: 96 % (12/17/22 0758) Vital Signs (24h Range):  Temp:  [97.4 °F (36.3 °C)-99.1 °F (37.3 °C)] 98.6 °F (37 °C)  Pulse:  [84-96] 86  Resp:  [18-20] 20  SpO2:  [91 %-96 %] 96 %  BP: (129-137)/(71-88) 136/78     Weight: 96.6 kg (212 lb 15.4 oz)  Body mass index is 34.37 kg/m².    Estimated Creatinine Clearance: 67.6 mL/min (A) (based on SCr of 1.5 mg/dL (H)).    Physical Exam  Vitals reviewed.   Constitutional:       Appearance: He is ill-appearing.   HENT:      Head: Normocephalic and atraumatic.      Nose:      Comments: NGT to suction  Eyes:      Conjunctiva/sclera: Conjunctivae normal.      Pupils: Pupils are equal, round, and reactive to light.   Cardiovascular:      Rate and Rhythm: Normal rate and regular rhythm.   Pulmonary:      Effort: Pulmonary effort is normal. No respiratory distress.      Breath sounds: Normal breath sounds.   Abdominal:      General: Abdomen is flat. Bowel sounds are normal. There is distension.      Palpations: Abdomen is soft.      Tenderness: There is no abdominal tenderness. There is no guarding.   Musculoskeletal:      Cervical back: Normal range of motion.      Right lower leg: No edema.      Left lower leg: No edema.   Skin:     Findings: No erythema or rash.   Neurological:      General: No focal deficit present.      Mental Status: He is alert and oriented to person, place, and time.   Psychiatric:         Behavior: Behavior normal.       Significant Labs: All pertinent labs within the past 24 hours have been reviewed.    Significant Imaging: I have reviewed all pertinent imaging results/findings within the past 24 hours.

## 2022-12-17 NOTE — PROGRESS NOTES
[facility] Medicine  Progress Note    Patient Name: Vivek Turner  MRN: [id number]  Patient Class: IP- Inpatient   Admission Date: 12/9/2022  Length of Stay: 8 days  Attending Physician: Sean Myers MD  Primary Care Provider: Primary Doctor No        Subjective:     Principal Problem:Hepatic abscess        HPI:  47y M w/ hypertension presents with abdominal pain x 2 days. Pt refused  service. He reports that he had fever/rigors three days prior to admission. He then developed worsening abdominal pain from then until admission. He went to an urgent care where he was started on bactrim for UTI. His abdominal pain increased over the next few days. He had a BM yesterday. Denies vomiting, but has had significant nausea and very limited PO intake in the days leading to admission. Aside from the single episode of fever/rigors he denies subsequent fevers.     In the ED he was found to have a R hepatic abscess vs mass. He traveled to the [facility] eight weeks prior to presentation. He denies fevers except for what is listed above. Denies weight loss, cough, night sweats. Denies prior hx of TB. Denies prior hx of cirrhosis      Overview/Hospital Course:  Mr Vivek Turner was admitted with severe sepsis due to R liver mass vs abscess and acute renal failure. IR consulted and drain placed on 12/9. Cultures with Klebsiella. ID consulted-continue cefazolin, and restarted Flagyl on 12/13. Started on bicarb gtt for acute renal failure and Nephrology consulted. He was moved to ICU for instability. He has ileus; NGT placed with copious bilious output. Renal failure improving. GI consulted to assess for source of liver abscess. MRCP showed persistent heterogeneous collection within the right hepatic lobe compatible with reported abscess noting percutaneous drainage catheter in place. No significant biliary duct abnormality on MRCP assessment. Pt refused inpatient colonoscopy. Pt transferred to the  floor on 12/12. PT/OT recommends HH. Monitor renal function and ileus at this time. Still has some output from NGT, GI recommends clamping today and monitor.     Cr improving with fluids and TPN. Na still high, will continue D5 for now. wbc uptrending and looks like new fluid collection next to liver, will ask IR to drain and send for cultures, labs placed. Will change to Cefepime for broader coverage until those cultures result. WBC# still elevated. IR did not see another fluid pocket that was attainable, Cr improving now at 1.5, may have to re-image/CT in a couple days when clearance is back to normal to confirm source control. Na 151, gave D5 at 100 mL for 10 hours and Na corrected to 143, D5 stopped. Still getting TPN fluids.       Interval History:   No abdominal pain at this time. No N/V or diarrhea.  CLD advanced    Review of Systems   Constitutional:  Positive for fatigue. Negative for chills, diaphoresis and fever.   HENT:  Negative for congestion and trouble swallowing.    Eyes:  Negative for photophobia and visual disturbance.   Respiratory:  Negative for cough, chest tightness and shortness of breath.    Cardiovascular:  Negative for chest pain, palpitations and leg swelling.   Gastrointestinal:  Positive for abdominal distention (improving). Negative for abdominal pain, constipation, diarrhea, nausea and vomiting.   Endocrine: Negative for polydipsia and polyuria.   Musculoskeletal:  Negative for arthralgias and myalgias.   Neurological:  Positive for weakness. Negative for numbness.   Psychiatric/Behavioral:  Negative for confusion.      Objective:     Vital Signs (Most Recent):  Temp: 98.6 °F (37 °C) (12/17/22 0758)  Pulse: 86 (12/17/22 0758)  Resp: 20 (12/17/22 0758)  BP: 136/78 (12/17/22 0758)  SpO2: 96 % (12/17/22 0758)   Vital Signs (24h Range):  Temp:  [97.4 °F (36.3 °C)-99.1 °F (37.3 °C)] 98.6 °F (37 °C)  Pulse:  [84-96] 86  Resp:  [18-20] 20  SpO2:  [91 %-96 %] 96 %  BP: (129-137)/(71-88)  136/78     Weight: 96.6 kg (212 lb 15.4 oz)  Body mass index is 34.37 kg/m².    Intake/Output Summary (Last 24 hours) at 12/17/2022 0810  Last data filed at 12/16/2022 2249  Gross per 24 hour   Intake 300 ml   Output 300 ml   Net 0 ml        Physical Exam  Vitals and nursing note reviewed.   Constitutional:       General: He is not in acute distress.     Appearance: He is well-developed. He is obese. He is ill-appearing. He is not toxic-appearing or diaphoretic.   HENT:      Head: Normocephalic and atraumatic.      Nose:      Comments: NGT in place with bilious output     Mouth/Throat:      Mouth: Mucous membranes are moist.   Eyes:      General: No scleral icterus.     Pupils: Pupils are equal, round, and reactive to light.   Neck:      Thyroid: No thyromegaly.   Cardiovascular:      Rate and Rhythm: Normal rate and regular rhythm.      Pulses: Normal pulses.      Heart sounds: Normal heart sounds. No murmur heard.    No gallop.   Pulmonary:      Effort: Pulmonary effort is normal. No respiratory distress.      Breath sounds: Normal breath sounds. No stridor. No wheezing or rales.      Comments: Decreased inspiration. No wheezes on exam. On 2L NC  Abdominal:      General: Bowel sounds are normal. There is distension.      Palpations: Abdomen is soft.      Tenderness: There is no abdominal tenderness. There is no guarding.      Comments: RUQ drain in place with minimal brown/orange fluid   Musculoskeletal:         General: No deformity. Normal range of motion.      Cervical back: Normal range of motion. No rigidity.      Right lower leg: No edema.      Left lower leg: No edema.   Lymphadenopathy:      Cervical: No cervical adenopathy.   Skin:     General: Skin is warm.   Neurological:      Mental Status: He is alert and oriented to person, place, and time.   Psychiatric:         Behavior: Behavior normal.      Comments: Appears frustrated that he cannot eat at this time            Recent Results (from the past 24  hour(s))   POCT glucose    Collection Time: 12/16/22 11:57 AM   Result Value Ref Range    POCT Glucose 183 (H) 70 - 110 mg/dL   POCT glucose    Collection Time: 12/16/22  6:19 PM   Result Value Ref Range    POCT Glucose 149 (H) 70 - 110 mg/dL   POCT glucose    Collection Time: 12/16/22  8:25 PM   Result Value Ref Range    POCT Glucose 161 (H) 70 - 110 mg/dL   POCT glucose    Collection Time: 12/17/22 12:25 AM   Result Value Ref Range    POCT Glucose 149 (H) 70 - 110 mg/dL   CBC Auto Differential    Collection Time: 12/17/22  4:56 AM   Result Value Ref Range    WBC 21.26 (H) 3.90 - 12.70 K/uL    RBC 3.43 (L) 4.60 - 6.20 M/uL    Hemoglobin 9.9 (L) 14.0 - 18.0 g/dL    Hematocrit 30.5 (L) 40.0 - 54.0 %    MCV 89 82 - 98 fL    MCH 28.9 27.0 - 31.0 pg    MCHC 32.5 32.0 - 36.0 g/dL    RDW 14.2 11.5 - 14.5 %    Platelets 486 (H) 150 - 450 K/uL    MPV 10.1 9.2 - 12.9 fL    Immature Granulocytes 0.8 (H) 0.0 - 0.5 %    Gran # (ANC) 18.2 (H) 1.8 - 7.7 K/uL    Immature Grans (Abs) 0.17 (H) 0.00 - 0.04 K/uL    Lymph # 1.6 1.0 - 4.8 K/uL    Mono # 1.1 (H) 0.3 - 1.0 K/uL    Eos # 0.1 0.0 - 0.5 K/uL    Baso # 0.03 0.00 - 0.20 K/uL    nRBC 0 0 /100 WBC    Gran % 85.8 (H) 38.0 - 73.0 %    Lymph % 7.6 (L) 18.0 - 48.0 %    Mono % 5.0 4.0 - 15.0 %    Eosinophil % 0.7 0.0 - 8.0 %    Basophil % 0.1 0.0 - 1.9 %    Differential Method Automated    Comprehensive Metabolic Panel    Collection Time: 12/17/22  4:56 AM   Result Value Ref Range    Sodium 143 136 - 145 mmol/L    Potassium 3.4 (L) 3.5 - 5.1 mmol/L    Chloride 104 95 - 110 mmol/L    CO2 32 (H) 23 - 29 mmol/L    Glucose 167 (H) 70 - 110 mg/dL    BUN 38 (H) 6 - 20 mg/dL    Creatinine 1.5 (H) 0.5 - 1.4 mg/dL    Calcium 7.6 (L) 8.7 - 10.5 mg/dL    Total Protein 6.0 6.0 - 8.4 g/dL    Albumin 1.9 (L) 3.5 - 5.2 g/dL    Total Bilirubin 1.1 (H) 0.1 - 1.0 mg/dL    Alkaline Phosphatase 92 55 - 135 U/L    AST 21 10 - 40 U/L    ALT 9 (L) 10 - 44 U/L    Anion Gap 7 (L) 8 - 16 mmol/L    eGFR 58 (A)  >60 mL/min/1.73 m^2   Magnesium    Collection Time: 12/17/22  4:56 AM   Result Value Ref Range    Magnesium 2.0 1.6 - 2.6 mg/dL   Phosphorus    Collection Time: 12/17/22  4:56 AM   Result Value Ref Range    Phosphorus 3.5 2.7 - 4.5 mg/dL   POCT glucose    Collection Time: 12/17/22  6:36 AM   Result Value Ref Range    POCT Glucose 181 (H) 70 - 110 mg/dL       Microbiology Results (last 7 days)       Procedure Component Value Units Date/Time    Blood culture [[id number]] Collected: 12/14/22 0205    Order Status: Completed Specimen: Blood from Antecubital, Right Arm Updated: 12/17/22 0303     Blood Culture, Routine No Growth to date      No Growth to date      No Growth to date      No Growth to date    Narrative:      Blood cx in the AM with AM labs    Blood culture [[id number]] Collected: 12/14/22 0205    Order Status: Completed Specimen: Blood from Antecubital, Right Updated: 12/17/22 0303     Blood Culture, Routine No Growth to date      No Growth to date      No Growth to date      No Growth to date    Narrative:      Blood cx in the AM with AM labs    Gram stain [[id number]]     Order Status: No result Specimen: Ascites     Aerobic culture [[id number]]     Order Status: No result Specimen: Abdominal     Culture, Anaerobic [[id number]]     Order Status: No result Specimen: Ascites     Culture, Anaerobe [[id number]] Collected: 12/09/22 1831    Order Status: Completed Specimen: Abscess from Abdomen Updated: 12/13/22 0835     Anaerobic Culture No anaerobes isolated    Aerobic culture [[id number]]  (Abnormal)  (Susceptibility) Collected: 12/09/22 1831    Order Status: Completed Specimen: Abscess from Abdomen Updated: 12/11/22 0915     Aerobic Bacterial Culture KLEBSIELLA PNEUMONIAE  Many                         Assessment/Plan:      * Hepatic abscess  Unclear how he got this. IR drain placed 12/9. Cultures with Klebsiella, pansensitive.   - MRCP:persistent heterogeneous collection within the right hepatic lobe compatible  with reported abscess noting percutaneous drainage catheter in place. No significant biliary duct abnormality on MRCP assessment.  - Blood cx with NGTD  - Abscess culture with klebsiella. No anaerobes  -IR drain with minimal output. Discussed with IR and IR suspect its not putting out much because of the heterogeneity of the collection and drain may not be sufficient. Recommended repeat US abdomen   - ID consulted: on cefazolin, added flagyl on 12/13  - GI consulted to assess for source of liver abscess. Pt refuses inpatient colonoscopy. Likely plan for outpatient cscope.       Ileus  Patient has Non- operative ileus which is adynamic in etiology which is worsening. Will treat conservatively with bowel rest, IV fluids, serial abdominal exams and avoidance of GI paralytics such as narcotics or anti-spasmodics. Monitor patient closely.     -NGT placed 12/11 with large output-- continue  -GI following: plan for clamping the NGT today and advance to clears if tolerated   -Surgery following   -Nutrition consulted  -Initiated TPN on 12/14 but was unable to get PICC due to severe weather.   -PICC placed on 12/15, will continue custom TPN.       Acute hypoxemic respiratory failure  Patient with Hypoxic Respiratory failure which is Acute.  he is not on home oxygen.   Signs/symptoms of respiratory failure include- tachypnea, increased work of breathing and wheezing. Contributing diagnoses includes - atelectasis Labs and images were reviewed. Patient Has not had a recent ABG.     - O2 by NC PRN  - nebs PRN  - CXR shows atelectasis    Hyperglycemia  A1c normal.     Microcytic anemia  Hgb 10-11, no prior to review.   Iron deficient- TSAT 17, but also significant acute inflammation- ferritin 3633.   B12, folate replete.   Stable        Atelectasis  Shortness of breath partly due to atelectasis as noted on CXR  - incentive spirometer  - O2 by NC as needed      Severe sepsis  This patient does have evidence of infective focus. My  overall impression is sepsis. Vital signs were reviewed and noted in progress note.  Antibiotics given-   Antibiotics (From admission, onward)      Start     Stop Route Frequency Ordered    12/14/22 0800  cefazolin (ANCEF) 2 gram in dextrose 5% 50 mL IVPB (premix)         -- IV Every 8 hours (non-standard times) 12/14/22 0745    12/13/22 0833  metronidazole IVPB 500 mg         -- IV Every 8 hours (non-standard times) 12/13/22 0833          Cultures were taken-   Microbiology Results (last 7 days)       Procedure Component Value Units Date/Time    Blood culture [[id number]] Collected: 12/14/22 0205    Order Status: Completed Specimen: Blood from Antecubital, Right Arm Updated: 12/15/22 0303     Blood Culture, Routine No Growth to date      No Growth to date    Narrative:      Blood cx in the AM with AM labs    Blood culture [[id number]] Collected: 12/14/22 0205    Order Status: Completed Specimen: Blood from Antecubital, Right Updated: 12/15/22 0303     Blood Culture, Routine No Growth to date      No Growth to date    Narrative:      Blood cx in the AM with AM labs    Culture, Anaerobe [[id number]] Collected: 12/09/22 1831    Order Status: Completed Specimen: Abscess from Abdomen Updated: 12/13/22 0835     Anaerobic Culture No anaerobes isolated    Aerobic culture [[id number]]  (Abnormal)  (Susceptibility) Collected: 12/09/22 1831    Order Status: Completed Specimen: Abscess from Abdomen Updated: 12/11/22 0915     Aerobic Bacterial Culture KLEBSIELLA PNEUMONIAE  Many      Gram stain [[id number]] Collected: 12/09/22 1831    Order Status: Completed Specimen: Abscess from Abdomen Updated: 12/10/22 0755     Gram Stain Result Many WBC's      No organisms seen    Fungus culture [[id number]] Collected: 12/09/22 1831    Order Status: Sent Specimen: Abscess from Abdomen Updated: 12/09/22 1905          Latest lactate reviewed, they are-  No results for input(s): LACTATE in the last 72 hours.    Organ dysfunction indicated by Acute  kidney injury and Acute liver injury  Source- liver abscess    Source control Achieved by- IR    -Due to hepatic abscess  -s/p IR drainage on 12/09  -ID consulted:  Continue cefazolin and Flagyl  -blood cultures with no growth to date   -abscess culture with pansensitive Klebsiella   -continue IV antibiotics as above   -Leukocytosis improving       ATN (acute tubular necrosis)  Creatinine at 6.1 at admission. Unknown baseline with no prior labs to review. FENa 1.4% suggesting intrinsic injury. This may be ATN from sepsis +/- Bactrim pseudotoxicity. No hydro seen on CT or US.   - IVF ordered  - Nephrology consulted  - this is improving     Patient with acute kidney injury likely due to IVVD/dehydration and acute tubular necrosis MARYURI is currently improving. Labs reviewed- Renal function/electrolytes with Estimated Creatinine Clearance: 48.3 mL/min (A) (based on SCr of 2.1 mg/dL (H)). according to latest data. Monitor urine output and serial BMP and adjust therapy as needed. Avoid nephrotoxins and renally dose meds for GFR listed above.         VTE Risk Mitigation (From admission, onward)           Ordered     heparin (porcine) injection 5,000 Units  Every 8 hours         12/10/22 0838     IP VTE HIGH RISK PATIENT  Once         12/09/22 1421     Place sequential compression device  Until discontinued         12/09/22 1421                    Discharge Planning   VIVIEN: 12/16/2022     Code Status: Full Code   Is the patient medically ready for discharge?:     Reason for patient still in hospital (select all that apply): Patient trending condition and Treatment  Discharge Plan A: Home Health   Discharge Delays: None known at this time              Sean Myers MD  Department of Hospital Medicine   [facility] - Telemetry

## 2022-12-17 NOTE — ASSESSMENT & PLAN NOTE
"47M with h/o HTN admitted 12/9 with abdominal pain, fount to have liver abscess and MARYURI, s/p IR drainage on 12/9 (CX + pan-sen kleb pna), now complicated by ileus, MARYURI, hypernatremia. BCx ngtd. Was on cefepime/metronidazole, switched to cefazolin 12/11. HIV neg. Hx course c/b ileus and uptrending leukocytosis. Febrile 12/12 and flagyl resumed 12/13.    Etiology of abscess is unclear. MRCP 12/12 unremarkable. Showed heterogenous fluid collection at inferior aspect of the right hepatic lobe measuring approximately 8.8 x 6.8 x 9.3 cm (known abscess).    US 12/16: "echogenicity w/n the right lobe of the liver, measuring approximately 7.1 x 7.5 x 5.3 cm. There is mildly complex fluid adjacent to the right lobe of the liver, measuring 3 x 10.8 x 11.2 cm. " IR reviewed US and no focal fluid seen amenable to drainage.     Unclear if there is a 2nd collection (seen on recent US, not on MRCP) or if it is connected to liver abscess.    Recommendations:   - repeat CT abdomen for better visualization of collections  - duration of abx until radiographic resolution of abscess  - given size of abscess will likely DC on IV abx x atleast 4 wks  - plan for outpatient colonoscopy  - if febrile please repeat BCx  - on cefepime/ flagyl for now pending clinical improvement        "

## 2022-12-17 NOTE — SUBJECTIVE & OBJECTIVE
Interval History: No acute events overnight.     Review of Systems   Constitutional:  Negative for chills, fatigue and fever.   Respiratory:  Negative for cough and shortness of breath.    Gastrointestinal:  Negative for abdominal pain and diarrhea.   Genitourinary:  Negative for difficulty urinating and dysuria.   Skin:  Negative for rash and wound.   Neurological:  Negative for headaches.   All other systems reviewed and are negative.  Objective:     Vital Signs (Most Recent):  Temp: 98.6 °F (37 °C) (12/17/22 0758)  Pulse: 86 (12/17/22 0758)  Resp: 20 (12/17/22 0758)  BP: 136/78 (12/17/22 0758)  SpO2: 96 % (12/17/22 0758) Vital Signs (24h Range):  Temp:  [97.4 °F (36.3 °C)-99.1 °F (37.3 °C)] 98.6 °F (37 °C)  Pulse:  [84-96] 86  Resp:  [18-20] 20  SpO2:  [91 %-96 %] 96 %  BP: (129-137)/(71-88) 136/78     Weight: 96.6 kg (212 lb 15.4 oz)  Body mass index is 34.37 kg/m².    Estimated Creatinine Clearance: 67.6 mL/min (A) (based on SCr of 1.5 mg/dL (H)).    Physical Exam  Vitals reviewed.   Constitutional:       Appearance: He is ill-appearing.   HENT:      Head: Normocephalic and atraumatic.      Nose:      Comments: NGT to suction  Eyes:      Conjunctiva/sclera: Conjunctivae normal.      Pupils: Pupils are equal, round, and reactive to light.   Cardiovascular:      Rate and Rhythm: Normal rate and regular rhythm.   Pulmonary:      Effort: Pulmonary effort is normal. No respiratory distress.      Breath sounds: Normal breath sounds.   Abdominal:      General: Abdomen is flat. Bowel sounds are normal. There is distension.      Palpations: Abdomen is soft.      Tenderness: There is no abdominal tenderness. There is no guarding.   Musculoskeletal:      Cervical back: Normal range of motion.      Right lower leg: No edema.      Left lower leg: No edema.   Skin:     Findings: No erythema or rash.   Neurological:      General: No focal deficit present.      Mental Status: He is alert and oriented to person, place, and  time.   Psychiatric:         Behavior: Behavior normal.       Significant Labs: All pertinent labs within the past 24 hours have been reviewed.    Significant Imaging: I have reviewed all pertinent imaging results/findings within the past 24 hours.

## 2022-12-18 LAB
ALBUMIN SERPL BCP-MCNC: 1.9 G/DL (ref 3.5–5.2)
ALP SERPL-CCNC: 84 U/L (ref 55–135)
ALT SERPL W/O P-5'-P-CCNC: 9 U/L (ref 10–44)
ANION GAP SERPL CALC-SCNC: 7 MMOL/L (ref 8–16)
AST SERPL-CCNC: 21 U/L (ref 10–40)
BACTERIA BLD CULT: NORMAL
BACTERIA BLD CULT: NORMAL
BASOPHILS # BLD AUTO: 0.03 K/UL (ref 0–0.2)
BASOPHILS NFR BLD: 0.2 % (ref 0–1.9)
BILIRUB SERPL-MCNC: 1.2 MG/DL (ref 0.1–1)
BNP SERPL-MCNC: 31 PG/ML (ref 0–99)
BUN SERPL-MCNC: 32 MG/DL (ref 6–20)
CALCIUM SERPL-MCNC: 7.6 MG/DL (ref 8.7–10.5)
CHLORIDE SERPL-SCNC: 104 MMOL/L (ref 95–110)
CO2 SERPL-SCNC: 31 MMOL/L (ref 23–29)
CREAT SERPL-MCNC: 1.4 MG/DL (ref 0.5–1.4)
DIFFERENTIAL METHOD: ABNORMAL
EOSINOPHIL # BLD AUTO: 0.1 K/UL (ref 0–0.5)
EOSINOPHIL NFR BLD: 0.6 % (ref 0–8)
ERYTHROCYTE [DISTWIDTH] IN BLOOD BY AUTOMATED COUNT: 13.9 % (ref 11.5–14.5)
EST. GFR  (NO RACE VARIABLE): >60 ML/MIN/1.73 M^2
GLUCOSE SERPL-MCNC: 113 MG/DL (ref 70–110)
HCT VFR BLD AUTO: 28.5 % (ref 40–54)
HGB BLD-MCNC: 9.4 G/DL (ref 14–18)
IMM GRANULOCYTES # BLD AUTO: 0.21 K/UL (ref 0–0.04)
IMM GRANULOCYTES NFR BLD AUTO: 1.1 % (ref 0–0.5)
LYMPHOCYTES # BLD AUTO: 1.7 K/UL (ref 1–4.8)
LYMPHOCYTES NFR BLD: 8.5 % (ref 18–48)
MAGNESIUM SERPL-MCNC: 1.9 MG/DL (ref 1.6–2.6)
MCH RBC QN AUTO: 29.6 PG (ref 27–31)
MCHC RBC AUTO-ENTMCNC: 33 G/DL (ref 32–36)
MCV RBC AUTO: 90 FL (ref 82–98)
MONOCYTES # BLD AUTO: 1.2 K/UL (ref 0.3–1)
MONOCYTES NFR BLD: 6.3 % (ref 4–15)
NEUTROPHILS # BLD AUTO: 16.4 K/UL (ref 1.8–7.7)
NEUTROPHILS NFR BLD: 83.3 % (ref 38–73)
NRBC BLD-RTO: 0 /100 WBC
PHOSPHATE SERPL-MCNC: 3.1 MG/DL (ref 2.7–4.5)
PLATELET # BLD AUTO: 487 K/UL (ref 150–450)
PMV BLD AUTO: 10.1 FL (ref 9.2–12.9)
POCT GLUCOSE: 109 MG/DL (ref 70–110)
POCT GLUCOSE: 112 MG/DL (ref 70–110)
POCT GLUCOSE: 127 MG/DL (ref 70–110)
POCT GLUCOSE: 127 MG/DL (ref 70–110)
POCT GLUCOSE: 98 MG/DL (ref 70–110)
POTASSIUM SERPL-SCNC: 3.8 MMOL/L (ref 3.5–5.1)
PROT SERPL-MCNC: 6.1 G/DL (ref 6–8.4)
RBC # BLD AUTO: 3.18 M/UL (ref 4.6–6.2)
SODIUM SERPL-SCNC: 142 MMOL/L (ref 136–145)
WBC # BLD AUTO: 19.61 K/UL (ref 3.9–12.7)

## 2022-12-18 PROCEDURE — 25000003 PHARM REV CODE 250: Performed by: HOSPITALIST

## 2022-12-18 PROCEDURE — 25000003 PHARM REV CODE 250: Performed by: INTERNAL MEDICINE

## 2022-12-18 PROCEDURE — 84100 ASSAY OF PHOSPHORUS: CPT | Performed by: STUDENT IN AN ORGANIZED HEALTH CARE EDUCATION/TRAINING PROGRAM

## 2022-12-18 PROCEDURE — 25000003 PHARM REV CODE 250: Performed by: STUDENT IN AN ORGANIZED HEALTH CARE EDUCATION/TRAINING PROGRAM

## 2022-12-18 PROCEDURE — 83880 ASSAY OF NATRIURETIC PEPTIDE: CPT | Performed by: STUDENT IN AN ORGANIZED HEALTH CARE EDUCATION/TRAINING PROGRAM

## 2022-12-18 PROCEDURE — 21400001 HC TELEMETRY ROOM

## 2022-12-18 PROCEDURE — A4216 STERILE WATER/SALINE, 10 ML: HCPCS | Performed by: HOSPITALIST

## 2022-12-18 PROCEDURE — 36415 COLL VENOUS BLD VENIPUNCTURE: CPT | Performed by: STUDENT IN AN ORGANIZED HEALTH CARE EDUCATION/TRAINING PROGRAM

## 2022-12-18 PROCEDURE — 85025 COMPLETE CBC W/AUTO DIFF WBC: CPT | Performed by: STUDENT IN AN ORGANIZED HEALTH CARE EDUCATION/TRAINING PROGRAM

## 2022-12-18 PROCEDURE — 63600175 PHARM REV CODE 636 W HCPCS: Performed by: STUDENT IN AN ORGANIZED HEALTH CARE EDUCATION/TRAINING PROGRAM

## 2022-12-18 PROCEDURE — 63600175 PHARM REV CODE 636 W HCPCS: Performed by: INTERNAL MEDICINE

## 2022-12-18 PROCEDURE — 83735 ASSAY OF MAGNESIUM: CPT | Performed by: STUDENT IN AN ORGANIZED HEALTH CARE EDUCATION/TRAINING PROGRAM

## 2022-12-18 PROCEDURE — 99900035 HC TECH TIME PER 15 MIN (STAT)

## 2022-12-18 PROCEDURE — A4216 STERILE WATER/SALINE, 10 ML: HCPCS | Performed by: STUDENT IN AN ORGANIZED HEALTH CARE EDUCATION/TRAINING PROGRAM

## 2022-12-18 PROCEDURE — S0030 INJECTION, METRONIDAZOLE: HCPCS | Performed by: INTERNAL MEDICINE

## 2022-12-18 PROCEDURE — 80053 COMPREHEN METABOLIC PANEL: CPT | Performed by: STUDENT IN AN ORGANIZED HEALTH CARE EDUCATION/TRAINING PROGRAM

## 2022-12-18 PROCEDURE — 94760 N-INVAS EAR/PLS OXIMETRY 1: CPT

## 2022-12-18 PROCEDURE — 63600175 PHARM REV CODE 636 W HCPCS: Performed by: HOSPITALIST

## 2022-12-18 RX ORDER — CEFEPIME HYDROCHLORIDE 1 G/50ML
2 INJECTION, SOLUTION INTRAVENOUS
Status: DISCONTINUED | OUTPATIENT
Start: 2022-12-18 | End: 2022-12-22

## 2022-12-18 RX ADMIN — Medication 10 ML: at 01:12

## 2022-12-18 RX ADMIN — HEPARIN SODIUM 5000 UNITS: 5000 INJECTION INTRAVENOUS; SUBCUTANEOUS at 01:12

## 2022-12-18 RX ADMIN — Medication 10 ML: at 05:12

## 2022-12-18 RX ADMIN — Medication 10 ML: at 10:12

## 2022-12-18 RX ADMIN — METRONIDAZOLE 500 MG: 500 INJECTION, SOLUTION INTRAVENOUS at 04:12

## 2022-12-18 RX ADMIN — CEFEPIME 2 G: 2 INJECTION, POWDER, FOR SOLUTION INTRAVENOUS at 10:12

## 2022-12-18 RX ADMIN — METRONIDAZOLE 500 MG: 500 INJECTION, SOLUTION INTRAVENOUS at 08:12

## 2022-12-18 RX ADMIN — HEPARIN SODIUM 5000 UNITS: 5000 INJECTION INTRAVENOUS; SUBCUTANEOUS at 11:12

## 2022-12-18 RX ADMIN — Medication 10 ML: at 06:12

## 2022-12-18 RX ADMIN — Medication 10 ML: at 11:12

## 2022-12-18 RX ADMIN — HEPARIN SODIUM 5000 UNITS: 5000 INJECTION INTRAVENOUS; SUBCUTANEOUS at 05:12

## 2022-12-18 RX ADMIN — CEFEPIME 2 G: 2 INJECTION, POWDER, FOR SOLUTION INTRAVENOUS at 06:12

## 2022-12-18 NOTE — SUBJECTIVE & OBJECTIVE
Interval History:   No abdominal pain at this time. No N/V or diarrhea.  CLD advanced    Review of Systems   Constitutional:  Positive for fatigue. Negative for chills, diaphoresis and fever.   HENT:  Negative for congestion and trouble swallowing.    Eyes:  Negative for photophobia and visual disturbance.   Respiratory:  Negative for cough, chest tightness and shortness of breath.    Cardiovascular:  Negative for chest pain, palpitations and leg swelling.   Gastrointestinal:  Positive for abdominal distention (improving). Negative for abdominal pain, constipation, diarrhea, nausea and vomiting.   Endocrine: Negative for polydipsia and polyuria.   Musculoskeletal:  Negative for arthralgias and myalgias.   Neurological:  Positive for weakness. Negative for numbness.   Psychiatric/Behavioral:  Negative for confusion.      Objective:     Vital Signs (Most Recent):  Temp: 99 °F (37.2 °C) (12/18/22 0722)  Pulse: 90 (12/18/22 0722)  Resp: 20 (12/18/22 0722)  BP: 135/86 (12/18/22 0722)  SpO2: (!) 93 % (12/18/22 0722)   Vital Signs (24h Range):  Temp:  [98 °F (36.7 °C)-99 °F (37.2 °C)] 99 °F (37.2 °C)  Pulse:  [89-97] 90  Resp:  [18-20] 20  SpO2:  [93 %-98 %] 93 %  BP: (114-144)/(68-86) 135/86     Weight: 96.6 kg (212 lb 15.4 oz)  Body mass index is 34.37 kg/m².    Intake/Output Summary (Last 24 hours) at 12/18/2022 1028  Last data filed at 12/18/2022 0723  Gross per 24 hour   Intake 480 ml   Output 1245 ml   Net -765 ml        Physical Exam  Vitals and nursing note reviewed.   Constitutional:       General: He is not in acute distress.     Appearance: He is well-developed. He is obese. He is ill-appearing. He is not toxic-appearing or diaphoretic.   HENT:      Head: Normocephalic and atraumatic.      Nose:      Comments: NGT in place with bilious output     Mouth/Throat:      Mouth: Mucous membranes are moist.   Eyes:      General: No scleral icterus.     Pupils: Pupils are equal, round, and reactive to light.   Neck:       Thyroid: No thyromegaly.   Cardiovascular:      Rate and Rhythm: Normal rate and regular rhythm.      Pulses: Normal pulses.      Heart sounds: Normal heart sounds. No murmur heard.    No gallop.   Pulmonary:      Effort: Pulmonary effort is normal. No respiratory distress.      Breath sounds: Normal breath sounds. No stridor. No wheezing or rales.      Comments: Decreased inspiration. No wheezes on exam. On 2L NC  Abdominal:      General: Bowel sounds are normal. There is distension.      Palpations: Abdomen is soft.      Tenderness: There is no abdominal tenderness. There is no guarding.      Comments: RUQ drain in place with minimal brown/orange fluid   Musculoskeletal:         General: No deformity. Normal range of motion.      Cervical back: Normal range of motion. No rigidity.      Right lower leg: No edema.      Left lower leg: No edema.   Lymphadenopathy:      Cervical: No cervical adenopathy.   Skin:     General: Skin is warm.   Neurological:      Mental Status: He is alert and oriented to person, place, and time.   Psychiatric:         Behavior: Behavior normal.      Comments: Appears frustrated that he cannot eat at this time            Recent Results (from the past 24 hour(s))   POCT glucose    Collection Time: 12/17/22 11:12 AM   Result Value Ref Range    POCT Glucose 161 (H) 70 - 110 mg/dL   POCT glucose    Collection Time: 12/17/22  4:20 PM   Result Value Ref Range    POCT Glucose 141 (H) 70 - 110 mg/dL   POCT glucose    Collection Time: 12/18/22  1:06 AM   Result Value Ref Range    POCT Glucose 127 (H) 70 - 110 mg/dL   CBC Auto Differential    Collection Time: 12/18/22  4:16 AM   Result Value Ref Range    WBC 19.61 (H) 3.90 - 12.70 K/uL    RBC 3.18 (L) 4.60 - 6.20 M/uL    Hemoglobin 9.4 (L) 14.0 - 18.0 g/dL    Hematocrit 28.5 (L) 40.0 - 54.0 %    MCV 90 82 - 98 fL    MCH 29.6 27.0 - 31.0 pg    MCHC 33.0 32.0 - 36.0 g/dL    RDW 13.9 11.5 - 14.5 %    Platelets 487 (H) 150 - 450 K/uL    MPV 10.1 9.2 -  12.9 fL    Immature Granulocytes 1.1 (H) 0.0 - 0.5 %    Gran # (ANC) 16.4 (H) 1.8 - 7.7 K/uL    Immature Grans (Abs) 0.21 (H) 0.00 - 0.04 K/uL    Lymph # 1.7 1.0 - 4.8 K/uL    Mono # 1.2 (H) 0.3 - 1.0 K/uL    Eos # 0.1 0.0 - 0.5 K/uL    Baso # 0.03 0.00 - 0.20 K/uL    nRBC 0 0 /100 WBC    Gran % 83.3 (H) 38.0 - 73.0 %    Lymph % 8.5 (L) 18.0 - 48.0 %    Mono % 6.3 4.0 - 15.0 %    Eosinophil % 0.6 0.0 - 8.0 %    Basophil % 0.2 0.0 - 1.9 %    Differential Method Automated    Comprehensive Metabolic Panel    Collection Time: 12/18/22  4:16 AM   Result Value Ref Range    Sodium 142 136 - 145 mmol/L    Potassium 3.8 3.5 - 5.1 mmol/L    Chloride 104 95 - 110 mmol/L    CO2 31 (H) 23 - 29 mmol/L    Glucose 113 (H) 70 - 110 mg/dL    BUN 32 (H) 6 - 20 mg/dL    Creatinine 1.4 0.5 - 1.4 mg/dL    Calcium 7.6 (L) 8.7 - 10.5 mg/dL    Total Protein 6.1 6.0 - 8.4 g/dL    Albumin 1.9 (L) 3.5 - 5.2 g/dL    Total Bilirubin 1.2 (H) 0.1 - 1.0 mg/dL    Alkaline Phosphatase 84 55 - 135 U/L    AST 21 10 - 40 U/L    ALT 9 (L) 10 - 44 U/L    Anion Gap 7 (L) 8 - 16 mmol/L    eGFR >60 >60 mL/min/1.73 m^2   Magnesium    Collection Time: 12/18/22  4:16 AM   Result Value Ref Range    Magnesium 1.9 1.6 - 2.6 mg/dL   Phosphorus    Collection Time: 12/18/22  4:16 AM   Result Value Ref Range    Phosphorus 3.1 2.7 - 4.5 mg/dL   POCT glucose    Collection Time: 12/18/22  7:23 AM   Result Value Ref Range    POCT Glucose 112 (H) 70 - 110 mg/dL       Microbiology Results (last 7 days)       Procedure Component Value Units Date/Time    Blood culture [[id number]] Collected: 12/14/22 0205    Order Status: Completed Specimen: Blood from Antecubital, Right Arm Updated: 12/18/22 0303     Blood Culture, Routine No Growth after 4 days.    Narrative:      Blood cx in the AM with AM labs    Blood culture [[id number]] Collected: 12/14/22 0205    Order Status: Completed Specimen: Blood from Antecubital, Right Updated: 12/18/22 0303     Blood Culture, Routine No Growth  after 4 days.    Narrative:      Blood cx in the AM with AM labs    Gram stain [[id number]]     Order Status: No result Specimen: Ascites     Aerobic culture [[id number]]     Order Status: No result Specimen: Abdominal     Culture, Anaerobic [[id number]]     Order Status: No result Specimen: Ascites     Culture, Anaerobe [[id number]] Collected: 12/09/22 1831    Order Status: Completed Specimen: Abscess from Abdomen Updated: 12/13/22 0835     Anaerobic Culture No anaerobes isolated

## 2022-12-18 NOTE — PLAN OF CARE
Chart reviewed. Remained afebrile overnight. Leukocytosis downtrending. NGT removed.    * Hepatic abscess  47M with h/o HTN [id number] with liver abscess and MARYURI, s/p IR drainage on 12/9 (CX + pan-sen kleb pna), now complicated by ileus, MARYURI, hypernatremia, worsening leukocytosis. BCx ngtd. Was on cefepime/metronidazole, switched to cefazolin 12/11. Febrile 12/12 and flagyl resumed 12/13. Broadened with cefepime 12/16 given persistently elevated WBC in the setting of poor drain output and a possible new 2nd fluid collection (unclear if communicates with liver abscess) on US from 12/16.     Recommendations:   - repeat CT abdomen for better visualization of collections (awaiting renal recovery)  - duration of abx until radiographic resolution of abscess  - plan for outpatient colonoscopy  - if febrile please repeat BCx  - on cefepime/ flagyl for now pending repeat imaging. If shows reduction in size will likely transition back to cefazolin.     ID will follow.  Chelsea Catherine MD  Infectious Disease

## 2022-12-18 NOTE — NURSING
Report given to nurse Ambar who will assume the patient's care. He is resting comfortably. Completed chart check.

## 2022-12-18 NOTE — PROGRESS NOTES
[facility] Medicine  Progress Note    Patient Name: Vivek Turner  MRN: [id number]  Patient Class: IP- Inpatient   Admission Date: 12/9/2022  Length of Stay: 9 days  Attending Physician: Sean Myers MD  Primary Care Provider: Primary Doctor No        Subjective:     Principal Problem:Hepatic abscess        HPI:  47y M w/ hypertension presents with abdominal pain x 2 days. Pt refused  service. He reports that he had fever/rigors three days prior to admission. He then developed worsening abdominal pain from then until admission. He went to an urgent care where he was started on bactrim for UTI. His abdominal pain increased over the next few days. He had a BM yesterday. Denies vomiting, but has had significant nausea and very limited PO intake in the days leading to admission. Aside from the single episode of fever/rigors he denies subsequent fevers.     In the ED he was found to have a R hepatic abscess vs mass. He traveled to the [facility] eight weeks prior to presentation. He denies fevers except for what is listed above. Denies weight loss, cough, night sweats. Denies prior hx of TB. Denies prior hx of cirrhosis      Overview/Hospital Course:  Mr Vivek uTrner was admitted with severe sepsis due to R liver mass vs abscess and acute renal failure. IR consulted and drain placed on 12/9. Cultures with Klebsiella. ID consulted-continue cefazolin, and restarted Flagyl on 12/13. Started on bicarb gtt for acute renal failure and Nephrology consulted. He was moved to ICU for instability. He has ileus; NGT placed with copious bilious output. Renal failure improving. GI consulted to assess for source of liver abscess. MRCP showed persistent heterogeneous collection within the right hepatic lobe compatible with reported abscess noting percutaneous drainage catheter in place. No significant biliary duct abnormality on MRCP assessment. Pt refused inpatient colonoscopy. Pt transferred to the  floor on 12/12. PT/OT recommends HH. Monitor renal function and ileus at this time. Still has some output from NGT, GI recommends clamping today and monitor.     Cr improving with fluids and TPN. Na still high, will continue D5 for now. wbc uptrending and looks like new fluid collection next to liver, will ask IR to drain and send for cultures, labs placed. Will change to Cefepime for broader coverage until those cultures result. WBC# still elevated. IR did not see another fluid pocket that was attainable, Cr improving now at 1.5, may have to re-image/CT in a couple days when clearance is back to normal to confirm source control. Na 151, gave D5 at 100 mL for 10 hours and Na corrected to 143, D5 stopped. Still getting TPN fluids, but will attempt to advance diet, NGT removed.         Interval History:   No abdominal pain at this time. No N/V or diarrhea.  CLD advanced    Review of Systems   Constitutional:  Positive for fatigue. Negative for chills, diaphoresis and fever.   HENT:  Negative for congestion and trouble swallowing.    Eyes:  Negative for photophobia and visual disturbance.   Respiratory:  Negative for cough, chest tightness and shortness of breath.    Cardiovascular:  Negative for chest pain, palpitations and leg swelling.   Gastrointestinal:  Positive for abdominal distention (improving). Negative for abdominal pain, constipation, diarrhea, nausea and vomiting.   Endocrine: Negative for polydipsia and polyuria.   Musculoskeletal:  Negative for arthralgias and myalgias.   Neurological:  Positive for weakness. Negative for numbness.   Psychiatric/Behavioral:  Negative for confusion.      Objective:     Vital Signs (Most Recent):  Temp: 99 °F (37.2 °C) (12/18/22 0722)  Pulse: 90 (12/18/22 0722)  Resp: 20 (12/18/22 0722)  BP: 135/86 (12/18/22 0722)  SpO2: (!) 93 % (12/18/22 0722)   Vital Signs (24h Range):  Temp:  [98 °F (36.7 °C)-99 °F (37.2 °C)] 99 °F (37.2 °C)  Pulse:  [89-97] 90  Resp:  [18-20]  20  SpO2:  [93 %-98 %] 93 %  BP: (114-144)/(68-86) 135/86     Weight: 96.6 kg (212 lb 15.4 oz)  Body mass index is 34.37 kg/m².    Intake/Output Summary (Last 24 hours) at 12/18/2022 1028  Last data filed at 12/18/2022 0723  Gross per 24 hour   Intake 480 ml   Output 1245 ml   Net -765 ml        Physical Exam  Vitals and nursing note reviewed.   Constitutional:       General: He is not in acute distress.     Appearance: He is well-developed. He is obese. He is ill-appearing. He is not toxic-appearing or diaphoretic.   HENT:      Head: Normocephalic and atraumatic.      Nose:      Comments: NGT in place with bilious output     Mouth/Throat:      Mouth: Mucous membranes are moist.   Eyes:      General: No scleral icterus.     Pupils: Pupils are equal, round, and reactive to light.   Neck:      Thyroid: No thyromegaly.   Cardiovascular:      Rate and Rhythm: Normal rate and regular rhythm.      Pulses: Normal pulses.      Heart sounds: Normal heart sounds. No murmur heard.    No gallop.   Pulmonary:      Effort: Pulmonary effort is normal. No respiratory distress.      Breath sounds: Normal breath sounds. No stridor. No wheezing or rales.      Comments: Decreased inspiration. No wheezes on exam. On 2L NC  Abdominal:      General: Bowel sounds are normal. There is distension.      Palpations: Abdomen is soft.      Tenderness: There is no abdominal tenderness. There is no guarding.      Comments: RUQ drain in place with minimal brown/orange fluid   Musculoskeletal:         General: No deformity. Normal range of motion.      Cervical back: Normal range of motion. No rigidity.      Right lower leg: No edema.      Left lower leg: No edema.   Lymphadenopathy:      Cervical: No cervical adenopathy.   Skin:     General: Skin is warm.   Neurological:      Mental Status: He is alert and oriented to person, place, and time.   Psychiatric:         Behavior: Behavior normal.      Comments: Appears frustrated that he cannot eat at  this time            Recent Results (from the past 24 hour(s))   POCT glucose    Collection Time: 12/17/22 11:12 AM   Result Value Ref Range    POCT Glucose 161 (H) 70 - 110 mg/dL   POCT glucose    Collection Time: 12/17/22  4:20 PM   Result Value Ref Range    POCT Glucose 141 (H) 70 - 110 mg/dL   POCT glucose    Collection Time: 12/18/22  1:06 AM   Result Value Ref Range    POCT Glucose 127 (H) 70 - 110 mg/dL   CBC Auto Differential    Collection Time: 12/18/22  4:16 AM   Result Value Ref Range    WBC 19.61 (H) 3.90 - 12.70 K/uL    RBC 3.18 (L) 4.60 - 6.20 M/uL    Hemoglobin 9.4 (L) 14.0 - 18.0 g/dL    Hematocrit 28.5 (L) 40.0 - 54.0 %    MCV 90 82 - 98 fL    MCH 29.6 27.0 - 31.0 pg    MCHC 33.0 32.0 - 36.0 g/dL    RDW 13.9 11.5 - 14.5 %    Platelets 487 (H) 150 - 450 K/uL    MPV 10.1 9.2 - 12.9 fL    Immature Granulocytes 1.1 (H) 0.0 - 0.5 %    Gran # (ANC) 16.4 (H) 1.8 - 7.7 K/uL    Immature Grans (Abs) 0.21 (H) 0.00 - 0.04 K/uL    Lymph # 1.7 1.0 - 4.8 K/uL    Mono # 1.2 (H) 0.3 - 1.0 K/uL    Eos # 0.1 0.0 - 0.5 K/uL    Baso # 0.03 0.00 - 0.20 K/uL    nRBC 0 0 /100 WBC    Gran % 83.3 (H) 38.0 - 73.0 %    Lymph % 8.5 (L) 18.0 - 48.0 %    Mono % 6.3 4.0 - 15.0 %    Eosinophil % 0.6 0.0 - 8.0 %    Basophil % 0.2 0.0 - 1.9 %    Differential Method Automated    Comprehensive Metabolic Panel    Collection Time: 12/18/22  4:16 AM   Result Value Ref Range    Sodium 142 136 - 145 mmol/L    Potassium 3.8 3.5 - 5.1 mmol/L    Chloride 104 95 - 110 mmol/L    CO2 31 (H) 23 - 29 mmol/L    Glucose 113 (H) 70 - 110 mg/dL    BUN 32 (H) 6 - 20 mg/dL    Creatinine 1.4 0.5 - 1.4 mg/dL    Calcium 7.6 (L) 8.7 - 10.5 mg/dL    Total Protein 6.1 6.0 - 8.4 g/dL    Albumin 1.9 (L) 3.5 - 5.2 g/dL    Total Bilirubin 1.2 (H) 0.1 - 1.0 mg/dL    Alkaline Phosphatase 84 55 - 135 U/L    AST 21 10 - 40 U/L    ALT 9 (L) 10 - 44 U/L    Anion Gap 7 (L) 8 - 16 mmol/L    eGFR >60 >60 mL/min/1.73 m^2   Magnesium    Collection Time: 12/18/22  4:16 AM    Result Value Ref Range    Magnesium 1.9 1.6 - 2.6 mg/dL   Phosphorus    Collection Time: 12/18/22  4:16 AM   Result Value Ref Range    Phosphorus 3.1 2.7 - 4.5 mg/dL   POCT glucose    Collection Time: 12/18/22  7:23 AM   Result Value Ref Range    POCT Glucose 112 (H) 70 - 110 mg/dL       Microbiology Results (last 7 days)       Procedure Component Value Units Date/Time    Blood culture [[id number]] Collected: 12/14/22 0205    Order Status: Completed Specimen: Blood from Antecubital, Right Arm Updated: 12/18/22 0303     Blood Culture, Routine No Growth after 4 days.    Narrative:      Blood cx in the AM with AM labs    Blood culture [[id number]] Collected: 12/14/22 0205    Order Status: Completed Specimen: Blood from Antecubital, Right Updated: 12/18/22 0303     Blood Culture, Routine No Growth after 4 days.    Narrative:      Blood cx in the AM with AM labs    Gram stain [[id number]]     Order Status: No result Specimen: Ascites     Aerobic culture [[id number]]     Order Status: No result Specimen: Abdominal     Culture, Anaerobic [[id number]]     Order Status: No result Specimen: Ascites     Culture, Anaerobe [[id number]] Collected: 12/09/22 1831    Order Status: Completed Specimen: Abscess from Abdomen Updated: 12/13/22 0835     Anaerobic Culture No anaerobes isolated                       Assessment/Plan:      * Hepatic abscess  Unclear how he got this. IR drain placed 12/9. Cultures with Klebsiella, pansensitive.   - MRCP:persistent heterogeneous collection within the right hepatic lobe compatible with reported abscess noting percutaneous drainage catheter in place. No significant biliary duct abnormality on MRCP assessment.  - Blood cx with NGTD  - Abscess culture with klebsiella. No anaerobes  -IR drain with minimal output. Discussed with IR and IR suspect its not putting out much because of the heterogeneity of the collection and drain may not be sufficient. Recommended repeat US abdomen   - ID consulted: on  cefazolin, added flagyl on 12/13  - GI consulted to assess for source of liver abscess. Pt refuses inpatient colonoscopy. Likely plan for outpatient cscope.       Ileus  Patient has Non- operative ileus which is adynamic in etiology which is worsening. Will treat conservatively with bowel rest, IV fluids, serial abdominal exams and avoidance of GI paralytics such as narcotics or anti-spasmodics. Monitor patient closely.     -NGT placed 12/11 with large output-- continue  -GI following: plan for clamping the NGT today and advance to clears if tolerated   -Surgery following   -Nutrition consulted  -Initiated TPN on 12/14 but was unable to get PICC due to severe weather.   -PICC placed on 12/15, will continue custom TPN.       Acute hypoxemic respiratory failure  Patient with Hypoxic Respiratory failure which is Acute.  he is not on home oxygen.   Signs/symptoms of respiratory failure include- tachypnea, increased work of breathing and wheezing. Contributing diagnoses includes - atelectasis Labs and images were reviewed. Patient Has not had a recent ABG.     - O2 by NC PRN  - nebs PRN  - CXR shows atelectasis    Hyperglycemia  A1c normal.     Microcytic anemia  Hgb 10-11, no prior to review.   Iron deficient- TSAT 17, but also significant acute inflammation- ferritin 3633.   B12, folate replete.   Stable        Atelectasis  Shortness of breath partly due to atelectasis as noted on CXR  - incentive spirometer  - O2 by NC as needed      Severe sepsis  This patient does have evidence of infective focus. My overall impression is sepsis. Vital signs were reviewed and noted in progress note.  Antibiotics given-   Antibiotics (From admission, onward)    Start     Stop Route Frequency Ordered    12/14/22 0800  cefazolin (ANCEF) 2 gram in dextrose 5% 50 mL IVPB (premix)         -- IV Every 8 hours (non-standard times) 12/14/22 0745    12/13/22 0833  metronidazole IVPB 500 mg         -- IV Every 8 hours (non-standard times)  12/13/22 0833        Cultures were taken-   Microbiology Results (last 7 days)     Procedure Component Value Units Date/Time    Blood culture [[id number]] Collected: 12/14/22 0205    Order Status: Completed Specimen: Blood from Antecubital, Right Arm Updated: 12/15/22 0303     Blood Culture, Routine No Growth to date      No Growth to date    Narrative:      Blood cx in the AM with AM labs    Blood culture [[id number]] Collected: 12/14/22 0205    Order Status: Completed Specimen: Blood from Antecubital, Right Updated: 12/15/22 0303     Blood Culture, Routine No Growth to date      No Growth to date    Narrative:      Blood cx in the AM with AM labs    Culture, Anaerobe [[id number]] Collected: 12/09/22 1831    Order Status: Completed Specimen: Abscess from Abdomen Updated: 12/13/22 0835     Anaerobic Culture No anaerobes isolated    Aerobic culture [[id number]]  (Abnormal)  (Susceptibility) Collected: 12/09/22 1831    Order Status: Completed Specimen: Abscess from Abdomen Updated: 12/11/22 0915     Aerobic Bacterial Culture KLEBSIELLA PNEUMONIAE  Many      Gram stain [[id number]] Collected: 12/09/22 1831    Order Status: Completed Specimen: Abscess from Abdomen Updated: 12/10/22 0755     Gram Stain Result Many WBC's      No organisms seen    Fungus culture [[id number]] Collected: 12/09/22 1831    Order Status: Sent Specimen: Abscess from Abdomen Updated: 12/09/22 1905        Latest lactate reviewed, they are-  No results for input(s): LACTATE in the last 72 hours.    Organ dysfunction indicated by Acute kidney injury and Acute liver injury  Source- liver abscess    Source control Achieved by- IR    -Due to hepatic abscess  -s/p IR drainage on 12/09  -ID consulted:  Continue cefazolin and Flagyl  -blood cultures with no growth to date   -abscess culture with pansensitive Klebsiella   -continue IV antibiotics as above   -Leukocytosis improving       ATN (acute tubular necrosis)  Creatinine at 6.1 at admission. Unknown  baseline with no prior labs to review. FENa 1.4% suggesting intrinsic injury. This may be ATN from sepsis +/- Bactrim pseudotoxicity. No hydro seen on CT or US.   - IVF ordered  - Nephrology consulted  - this is improving     Patient with acute kidney injury likely due to IVVD/dehydration and acute tubular necrosis MARYURI is currently improving. Labs reviewed- Renal function/electrolytes with Estimated Creatinine Clearance: 48.3 mL/min (A) (based on SCr of 2.1 mg/dL (H)). according to latest data. Monitor urine output and serial BMP and adjust therapy as needed. Avoid nephrotoxins and renally dose meds for GFR listed above.         VTE Risk Mitigation (From admission, onward)         Ordered     heparin (porcine) injection 5,000 Units  Every 8 hours         12/10/22 0838     IP VTE HIGH RISK PATIENT  Once         12/09/22 1421     Place sequential compression device  Until discontinued         12/09/22 1421                Discharge Planning   VIVIEN: 12/16/2022     Code Status: Full Code   Is the patient medically ready for discharge?:     Reason for patient still in hospital (select all that apply): Patient trending condition and Treatment  Discharge Plan A: Home Health   Discharge Delays: None known at this time              Sean Myers MD  Department of Hospital Medicine   [facility] [place]

## 2022-12-18 NOTE — CLINICAL REVIEW
IP Sepsis Screen (most recent)       Sepsis Screen (IP) - 12/18/22 2683       Is the patient's history or complaint suggestive of a possible infection? Yes  -LW    Are there at least two of the following signs and symptoms present? Yes  -LW    Sepsis signs/symptoms - Tachycardia Tachycardia     >90  -LW    Sepsis signs/symptoms - WBC WBC < 4,000 or WBC > 12,000  -LW    Are any of the following organ dysfunction criteria present and not considered to be due to a chronic condition? Yes  -LW    Organ Dysfunction Criteria - O2 O2 Saturation < 95% on room air  -LW    Initiate Sepsis Protocol No  -LW    Reason sepsis not considered Pt. receiving appropriate management  -LW              User Key  (r) = Recorded By, (t) = Taken By, (c) = Cosigned By      Initials Name    ERIKA Park RN

## 2022-12-18 NOTE — PLAN OF CARE
Problem: Adult Inpatient Plan of Care  Goal: Plan of Care Review  Outcome: Ongoing, Progressing  Goal: Patient-Specific Goal (Individualized)  Outcome: Ongoing, Progressing  Goal: Absence of Hospital-Acquired Illness or Injury  Outcome: Ongoing, Progressing  Goal: Optimal Comfort and Wellbeing  Outcome: Ongoing, Progressing  Goal: Readiness for Transition of Care  Outcome: Ongoing, Progressing     Problem: Adjustment to Illness (Sepsis/Septic Shock)  Goal: Optimal Coping  Outcome: Ongoing, Progressing     Problem: Bleeding (Sepsis/Septic Shock)  Goal: Absence of Bleeding  Outcome: Ongoing, Progressing     Problem: Glycemic Control Impaired (Sepsis/Septic Shock)  Goal: Blood Glucose Level Within Desired Range  Outcome: Ongoing, Progressing     Problem: Infection Progression (Sepsis/Septic Shock)  Goal: Absence of Infection Signs and Symptoms  Outcome: Ongoing, Progressing     Problem: Nutrition Impaired (Sepsis/Septic Shock)  Goal: Optimal Nutrition Intake  Outcome: Ongoing, Progressing     Problem: Fluid and Electrolyte Imbalance (Acute Kidney Injury/Impairment)  Goal: Fluid and Electrolyte Balance  Outcome: Ongoing, Progressing     Problem: Oral Intake Inadequate (Acute Kidney Injury/Impairment)  Goal: Optimal Nutrition Intake  Outcome: Ongoing, Progressing     Problem: Renal Function Impairment (Acute Kidney Injury/Impairment)  Goal: Effective Renal Function  Outcome: Ongoing, Progressing     Problem: Skin Injury Risk Increased  Goal: Skin Health and Integrity  Outcome: Ongoing, Progressing     Problem: Fall Injury Risk  Goal: Absence of Fall and Fall-Related Injury  Outcome: Ongoing, Progressing     Problem: Mobility Impairment  Goal: Optimal Mobility  Outcome: Ongoing, Progressing     Problem: Infection  Goal: Absence of Infection Signs and Symptoms  Outcome: Ongoing, Progressing

## 2022-12-19 LAB
ALBUMIN SERPL BCP-MCNC: 1.9 G/DL (ref 3.5–5.2)
ALP SERPL-CCNC: 90 U/L (ref 55–135)
ALT SERPL W/O P-5'-P-CCNC: 6 U/L (ref 10–44)
ANION GAP SERPL CALC-SCNC: 6 MMOL/L (ref 8–16)
AST SERPL-CCNC: 20 U/L (ref 10–40)
BASOPHILS # BLD AUTO: 0.03 K/UL (ref 0–0.2)
BASOPHILS NFR BLD: 0.2 % (ref 0–1.9)
BILIRUB SERPL-MCNC: 1 MG/DL (ref 0.1–1)
BUN SERPL-MCNC: 30 MG/DL (ref 6–20)
CALCIUM SERPL-MCNC: 7.6 MG/DL (ref 8.7–10.5)
CHLORIDE SERPL-SCNC: 104 MMOL/L (ref 95–110)
CO2 SERPL-SCNC: 29 MMOL/L (ref 23–29)
CREAT SERPL-MCNC: 1.3 MG/DL (ref 0.5–1.4)
DIFFERENTIAL METHOD: ABNORMAL
EOSINOPHIL # BLD AUTO: 0.1 K/UL (ref 0–0.5)
EOSINOPHIL NFR BLD: 0.7 % (ref 0–8)
ERYTHROCYTE [DISTWIDTH] IN BLOOD BY AUTOMATED COUNT: 13.9 % (ref 11.5–14.5)
EST. GFR  (NO RACE VARIABLE): >60 ML/MIN/1.73 M^2
GLUCOSE SERPL-MCNC: 108 MG/DL (ref 70–110)
HCT VFR BLD AUTO: 27.4 % (ref 40–54)
HGB BLD-MCNC: 8.8 G/DL (ref 14–18)
IMM GRANULOCYTES # BLD AUTO: 0.18 K/UL (ref 0–0.04)
IMM GRANULOCYTES NFR BLD AUTO: 1 % (ref 0–0.5)
LYMPHOCYTES # BLD AUTO: 1.3 K/UL (ref 1–4.8)
LYMPHOCYTES NFR BLD: 7.3 % (ref 18–48)
MAGNESIUM SERPL-MCNC: 1.9 MG/DL (ref 1.6–2.6)
MCH RBC QN AUTO: 28.7 PG (ref 27–31)
MCHC RBC AUTO-ENTMCNC: 32.1 G/DL (ref 32–36)
MCV RBC AUTO: 89 FL (ref 82–98)
MONOCYTES # BLD AUTO: 1.2 K/UL (ref 0.3–1)
MONOCYTES NFR BLD: 6.9 % (ref 4–15)
NEUTROPHILS # BLD AUTO: 14.6 K/UL (ref 1.8–7.7)
NEUTROPHILS NFR BLD: 83.9 % (ref 38–73)
NRBC BLD-RTO: 0 /100 WBC
PHOSPHATE SERPL-MCNC: 3.1 MG/DL (ref 2.7–4.5)
PLATELET # BLD AUTO: 438 K/UL (ref 150–450)
PMV BLD AUTO: 10.2 FL (ref 9.2–12.9)
POCT GLUCOSE: 102 MG/DL (ref 70–110)
POCT GLUCOSE: 94 MG/DL (ref 70–110)
POCT GLUCOSE: 97 MG/DL (ref 70–110)
POTASSIUM SERPL-SCNC: 3.7 MMOL/L (ref 3.5–5.1)
PROT SERPL-MCNC: 5.9 G/DL (ref 6–8.4)
RBC # BLD AUTO: 3.07 M/UL (ref 4.6–6.2)
SODIUM SERPL-SCNC: 139 MMOL/L (ref 136–145)
WBC # BLD AUTO: 17.38 K/UL (ref 3.9–12.7)

## 2022-12-19 PROCEDURE — 63600175 PHARM REV CODE 636 W HCPCS: Performed by: INTERNAL MEDICINE

## 2022-12-19 PROCEDURE — A4216 STERILE WATER/SALINE, 10 ML: HCPCS | Performed by: STUDENT IN AN ORGANIZED HEALTH CARE EDUCATION/TRAINING PROGRAM

## 2022-12-19 PROCEDURE — 25000003 PHARM REV CODE 250: Performed by: STUDENT IN AN ORGANIZED HEALTH CARE EDUCATION/TRAINING PROGRAM

## 2022-12-19 PROCEDURE — 25000003 PHARM REV CODE 250: Performed by: HOSPITALIST

## 2022-12-19 PROCEDURE — 83735 ASSAY OF MAGNESIUM: CPT | Performed by: STUDENT IN AN ORGANIZED HEALTH CARE EDUCATION/TRAINING PROGRAM

## 2022-12-19 PROCEDURE — 97116 GAIT TRAINING THERAPY: CPT

## 2022-12-19 PROCEDURE — 99232 PR SUBSEQUENT HOSPITAL CARE,LEVL II: ICD-10-PCS | Mod: ,,, | Performed by: STUDENT IN AN ORGANIZED HEALTH CARE EDUCATION/TRAINING PROGRAM

## 2022-12-19 PROCEDURE — 21400001 HC TELEMETRY ROOM

## 2022-12-19 PROCEDURE — 25000003 PHARM REV CODE 250: Performed by: INTERNAL MEDICINE

## 2022-12-19 PROCEDURE — 63600175 PHARM REV CODE 636 W HCPCS: Performed by: HOSPITALIST

## 2022-12-19 PROCEDURE — 84100 ASSAY OF PHOSPHORUS: CPT | Performed by: STUDENT IN AN ORGANIZED HEALTH CARE EDUCATION/TRAINING PROGRAM

## 2022-12-19 PROCEDURE — A4216 STERILE WATER/SALINE, 10 ML: HCPCS | Performed by: HOSPITALIST

## 2022-12-19 PROCEDURE — 97535 SELF CARE MNGMENT TRAINING: CPT

## 2022-12-19 PROCEDURE — S0030 INJECTION, METRONIDAZOLE: HCPCS | Performed by: INTERNAL MEDICINE

## 2022-12-19 PROCEDURE — 99232 SBSQ HOSP IP/OBS MODERATE 35: CPT | Mod: ,,, | Performed by: STUDENT IN AN ORGANIZED HEALTH CARE EDUCATION/TRAINING PROGRAM

## 2022-12-19 PROCEDURE — 25500020 PHARM REV CODE 255: Performed by: STUDENT IN AN ORGANIZED HEALTH CARE EDUCATION/TRAINING PROGRAM

## 2022-12-19 PROCEDURE — 85025 COMPLETE CBC W/AUTO DIFF WBC: CPT | Performed by: STUDENT IN AN ORGANIZED HEALTH CARE EDUCATION/TRAINING PROGRAM

## 2022-12-19 PROCEDURE — 80053 COMPREHEN METABOLIC PANEL: CPT | Performed by: STUDENT IN AN ORGANIZED HEALTH CARE EDUCATION/TRAINING PROGRAM

## 2022-12-19 PROCEDURE — 97110 THERAPEUTIC EXERCISES: CPT

## 2022-12-19 RX ORDER — SODIUM CHLORIDE 9 MG/ML
INJECTION, SOLUTION INTRAVENOUS CONTINUOUS
Status: ACTIVE | OUTPATIENT
Start: 2022-12-19 | End: 2022-12-20

## 2022-12-19 RX ADMIN — SODIUM CHLORIDE: 0.9 INJECTION, SOLUTION INTRAVENOUS at 09:12

## 2022-12-19 RX ADMIN — IOHEXOL 100 ML: 350 INJECTION, SOLUTION INTRAVENOUS at 03:12

## 2022-12-19 RX ADMIN — METRONIDAZOLE 500 MG: 500 INJECTION, SOLUTION INTRAVENOUS at 08:12

## 2022-12-19 RX ADMIN — CEFEPIME 2 G: 2 INJECTION, POWDER, FOR SOLUTION INTRAVENOUS at 09:12

## 2022-12-19 RX ADMIN — METRONIDAZOLE 500 MG: 500 INJECTION, SOLUTION INTRAVENOUS at 02:12

## 2022-12-19 RX ADMIN — CEFEPIME 2 G: 2 INJECTION, POWDER, FOR SOLUTION INTRAVENOUS at 10:12

## 2022-12-19 RX ADMIN — HEPARIN SODIUM 5000 UNITS: 5000 INJECTION INTRAVENOUS; SUBCUTANEOUS at 09:12

## 2022-12-19 RX ADMIN — HEPARIN SODIUM 5000 UNITS: 5000 INJECTION INTRAVENOUS; SUBCUTANEOUS at 02:12

## 2022-12-19 RX ADMIN — CEFEPIME 2 G: 2 INJECTION, POWDER, FOR SOLUTION INTRAVENOUS at 03:12

## 2022-12-19 RX ADMIN — Medication 10 ML: at 04:12

## 2022-12-19 RX ADMIN — HEPARIN SODIUM 5000 UNITS: 5000 INJECTION INTRAVENOUS; SUBCUTANEOUS at 06:12

## 2022-12-19 RX ADMIN — METRONIDAZOLE 500 MG: 500 INJECTION, SOLUTION INTRAVENOUS at 03:12

## 2022-12-19 RX ADMIN — Medication 10 ML: at 12:12

## 2022-12-19 RX ADMIN — Medication 10 ML: at 02:12

## 2022-12-19 RX ADMIN — Medication 10 ML: at 05:12

## 2022-12-19 RX ADMIN — METRONIDAZOLE 500 MG: 500 INJECTION, SOLUTION INTRAVENOUS at 11:12

## 2022-12-19 RX ADMIN — Medication 10 ML: at 09:12

## 2022-12-19 NOTE — SUBJECTIVE & OBJECTIVE
Interval History:   No abdominal pain at this time. No N/V or diarrhea.  CLD advanced    Review of Systems   Constitutional:  Positive for fatigue. Negative for chills, diaphoresis and fever.   HENT:  Negative for congestion and trouble swallowing.    Eyes:  Negative for photophobia and visual disturbance.   Respiratory:  Negative for cough, chest tightness and shortness of breath.    Cardiovascular:  Negative for chest pain, palpitations and leg swelling.   Gastrointestinal:  Positive for abdominal distention (improving). Negative for abdominal pain, constipation, diarrhea, nausea and vomiting.   Endocrine: Negative for polydipsia and polyuria.   Musculoskeletal:  Negative for arthralgias and myalgias.   Neurological:  Positive for weakness. Negative for numbness.   Psychiatric/Behavioral:  Negative for confusion.      Objective:     Vital Signs (Most Recent):  Temp: 97.2 °F (36.2 °C) (12/19/22 0804)  Pulse: 87 (12/19/22 0804)  Resp: 18 (12/19/22 0804)  BP: 129/75 (12/19/22 0804)  SpO2: (!) 94 % (12/19/22 0804)   Vital Signs (24h Range):  Temp:  [97.2 °F (36.2 °C)-98.7 °F (37.1 °C)] 97.2 °F (36.2 °C)  Pulse:  [] 87  Resp:  [18-20] 18  SpO2:  [93 %-94 %] 94 %  BP: (123-140)/(66-81) 129/75     Weight: 96.6 kg (212 lb 15.4 oz)  Body mass index is 34.37 kg/m².    Intake/Output Summary (Last 24 hours) at 12/19/2022 0915  Last data filed at 12/19/2022 0912  Gross per 24 hour   Intake 480 ml   Output 1000 ml   Net -520 ml        Physical Exam  Vitals and nursing note reviewed.   Constitutional:       General: He is not in acute distress.     Appearance: He is well-developed. He is obese. He is ill-appearing. He is not toxic-appearing or diaphoretic.   HENT:      Head: Normocephalic and atraumatic.      Nose:      Comments: NGT in place with bilious output     Mouth/Throat:      Mouth: Mucous membranes are moist.   Eyes:      General: No scleral icterus.     Pupils: Pupils are equal, round, and reactive to light.    Neck:      Thyroid: No thyromegaly.   Cardiovascular:      Rate and Rhythm: Normal rate and regular rhythm.      Pulses: Normal pulses.      Heart sounds: Normal heart sounds. No murmur heard.    No gallop.   Pulmonary:      Effort: Pulmonary effort is normal. No respiratory distress.      Breath sounds: Normal breath sounds. No stridor. No wheezing or rales.      Comments: Decreased inspiration. No wheezes on exam. On 2L NC  Abdominal:      General: Bowel sounds are normal. There is distension.      Palpations: Abdomen is soft.      Tenderness: There is no abdominal tenderness. There is no guarding.      Comments: RUQ drain in place with minimal brown/orange fluid   Musculoskeletal:         General: No deformity. Normal range of motion.      Cervical back: Normal range of motion. No rigidity.      Right lower leg: No edema.      Left lower leg: No edema.   Lymphadenopathy:      Cervical: No cervical adenopathy.   Skin:     General: Skin is warm.   Neurological:      Mental Status: He is alert and oriented to person, place, and time.   Psychiatric:         Behavior: Behavior normal.      Comments: Appears frustrated that he cannot eat at this time            Recent Results (from the past 24 hour(s))   POCT glucose    Collection Time: 12/18/22 11:38 AM   Result Value Ref Range    POCT Glucose 127 (H) 70 - 110 mg/dL   Brain natriuretic peptide    Collection Time: 12/18/22  2:26 PM   Result Value Ref Range    BNP 31 0 - 99 pg/mL   POCT glucose    Collection Time: 12/18/22  3:50 PM   Result Value Ref Range    POCT Glucose 109 70 - 110 mg/dL   POCT glucose    Collection Time: 12/18/22 11:12 PM   Result Value Ref Range    POCT Glucose 98 70 - 110 mg/dL   CBC Auto Differential    Collection Time: 12/19/22  4:27 AM   Result Value Ref Range    WBC 17.38 (H) 3.90 - 12.70 K/uL    RBC 3.07 (L) 4.60 - 6.20 M/uL    Hemoglobin 8.8 (L) 14.0 - 18.0 g/dL    Hematocrit 27.4 (L) 40.0 - 54.0 %    MCV 89 82 - 98 fL    MCH 28.7 27.0 -  31.0 pg    MCHC 32.1 32.0 - 36.0 g/dL    RDW 13.9 11.5 - 14.5 %    Platelets 438 150 - 450 K/uL    MPV 10.2 9.2 - 12.9 fL    Immature Granulocytes 1.0 (H) 0.0 - 0.5 %    Gran # (ANC) 14.6 (H) 1.8 - 7.7 K/uL    Immature Grans (Abs) 0.18 (H) 0.00 - 0.04 K/uL    Lymph # 1.3 1.0 - 4.8 K/uL    Mono # 1.2 (H) 0.3 - 1.0 K/uL    Eos # 0.1 0.0 - 0.5 K/uL    Baso # 0.03 0.00 - 0.20 K/uL    nRBC 0 0 /100 WBC    Gran % 83.9 (H) 38.0 - 73.0 %    Lymph % 7.3 (L) 18.0 - 48.0 %    Mono % 6.9 4.0 - 15.0 %    Eosinophil % 0.7 0.0 - 8.0 %    Basophil % 0.2 0.0 - 1.9 %    Differential Method Automated    Comprehensive Metabolic Panel    Collection Time: 12/19/22  4:27 AM   Result Value Ref Range    Sodium 139 136 - 145 mmol/L    Potassium 3.7 3.5 - 5.1 mmol/L    Chloride 104 95 - 110 mmol/L    CO2 29 23 - 29 mmol/L    Glucose 108 70 - 110 mg/dL    BUN 30 (H) 6 - 20 mg/dL    Creatinine 1.3 0.5 - 1.4 mg/dL    Calcium 7.6 (L) 8.7 - 10.5 mg/dL    Total Protein 5.9 (L) 6.0 - 8.4 g/dL    Albumin 1.9 (L) 3.5 - 5.2 g/dL    Total Bilirubin 1.0 0.1 - 1.0 mg/dL    Alkaline Phosphatase 90 55 - 135 U/L    AST 20 10 - 40 U/L    ALT 6 (L) 10 - 44 U/L    Anion Gap 6 (L) 8 - 16 mmol/L    eGFR >60 >60 mL/min/1.73 m^2   Magnesium    Collection Time: 12/19/22  4:27 AM   Result Value Ref Range    Magnesium 1.9 1.6 - 2.6 mg/dL   Phosphorus    Collection Time: 12/19/22  4:27 AM   Result Value Ref Range    Phosphorus 3.1 2.7 - 4.5 mg/dL   POCT glucose    Collection Time: 12/19/22  6:51 AM   Result Value Ref Range    POCT Glucose 97 70 - 110 mg/dL       Microbiology Results (last 7 days)       Procedure Component Value Units Date/Time    Blood culture [[id number]] Collected: 12/14/22 0205    Order Status: Completed Specimen: Blood from Antecubital, Right Arm Updated: 12/18/22 0303     Blood Culture, Routine No Growth after 4 days.    Narrative:      Blood cx in the AM with AM labs    Blood culture [[id number]] Collected: 12/14/22 0205    Order Status: Completed  Specimen: Blood from Antecubital, Right Updated: 12/18/22 0303     Blood Culture, Routine No Growth after 4 days.    Narrative:      Blood cx in the AM with AM labs    Gram stain [[id number]]     Order Status: No result Specimen: Ascites     Aerobic culture [[id number]]     Order Status: No result Specimen: Abdominal     Culture, Anaerobic [[id number]]     Order Status: No result Specimen: Ascites     Culture, Anaerobe [[id number]] Collected: 12/09/22 1831    Order Status: Completed Specimen: Abscess from Abdomen Updated: 12/13/22 0835     Anaerobic Culture No anaerobes isolated

## 2022-12-19 NOTE — ASSESSMENT & PLAN NOTE
"47M with h/o HTN admitted 12/9 with abdominal pain, found to have a liver abscess and MARYURI, s/p IR drainage on 12/9 (CX + pan-sen kleb pna), complicated by ileus/MARYURI. Blcx ngtd. Etiology of abscess is unclear. MRCP 12/12 unremarkable. Showed heterogenous fluid collection at inferior aspect of the right hepatic lobe measuring approximately 8.8 x 6.8 x 9.3 cm (known abscess). Recent US 12/16: "echogenicity w/n the right lobe of the liver, measuring approximately 7.1 x 7.5 x 5.3 cm. There is mildly complex fluid adjacent to the right lobe of the liver, measuring 3 x 10.8 x 11.2 cm. " IR reviewed US and no focal fluid seen amenable to drainage.     Unclear if there is a 2nd collection (seen on recent US, not on MRCP) or if it is connected to liver abscess. Plan for repeat CT scan today to evaluate 2nd collection further.     Recommendations:   - agree with repeat CT abdomen for better visualization of collections; if evidence of drainable fluid collection, recommend cx  - duration of abx until radiographic resolution of abscess  - continue cefepime/flagyl pending cx data  - plan for outpatient colonoscopy  - if febrile please repeat BCx          "

## 2022-12-19 NOTE — PLAN OF CARE
Problem: Occupational Therapy  Goal: Occupational Therapy Goal  Description: Goals to be met by: 12/27/22     Patient will increase functional independence with ADLs by performing:    UE Dressing with [place].  LE Dressing with [place].  Grooming while standing at sink with Modified [place] and Assistive Devices as needed.  Toileting from toilet with Modified [place] and Assistive Devices as needed for hygiene and clothing management.   Supine to sit with Modified [place].  Step transfer with Modified [place]  Toilet transfer to toilet with Modified [place].  Upper extremity exercise program x15 reps per handout, with independence.    Outcome: Adequate for Care Transition   The patient is able to perform self care and functional mobility with SBA/(S). The patient was able to perform UE therex x15 reps while seated in the chair. The patient was encouraged to sit in the chair ~6 hours/day as tolerated.   OT will D/C. Nursing should encourage the patient to participate in self care as able.

## 2022-12-19 NOTE — SUBJECTIVE & OBJECTIVE
Interval History: No fevers documented overnight. Offered  during consultation - pt deferred. Tolerating abx without issues. Plan for repeat CT scan today.     Review of Systems   Constitutional:  Negative for chills and fever.   Gastrointestinal:  Negative for abdominal pain.   All other systems reviewed and are negative.  Objective:     Vital Signs (Most Recent):  Temp: 97.2 °F (36.2 °C) (12/19/22 0804)  Pulse: 87 (12/19/22 0804)  Resp: 18 (12/19/22 0804)  BP: 129/75 (12/19/22 0804)  SpO2: (!) 94 % (12/19/22 0804)   Vital Signs (24h Range):  Temp:  [97.2 °F (36.2 °C)-98.7 °F (37.1 °C)] 97.2 °F (36.2 °C)  Pulse:  [] 87  Resp:  [18-20] 18  SpO2:  [93 %-94 %] 94 %  BP: (123-140)/(66-81) 129/75     Weight: 96.6 kg (212 lb 15.4 oz)  Body mass index is 34.37 kg/m².    Estimated Creatinine Clearance: 78.1 mL/min (based on SCr of 1.3 mg/dL).    Physical Exam  Constitutional:       General: He is not in acute distress.     Appearance: He is not ill-appearing, toxic-appearing or diaphoretic.   HENT:      Head: Normocephalic and atraumatic.      Right Ear: External ear normal.      Left Ear: External ear normal.   Eyes:      General: No scleral icterus.        Right eye: No discharge.         Left eye: No discharge.   Cardiovascular:      Rate and Rhythm: Normal rate and regular rhythm.   Abdominal:      General: There is no distension.      Palpations: Abdomen is soft.      Tenderness: There is no abdominal tenderness. There is no guarding.      Comments: R abdo drain - purulent fluid present   Musculoskeletal:      Right lower leg: No edema.      Left lower leg: No edema.   Skin:     General: Skin is warm and dry.      Coloration: Skin is not jaundiced.      Findings: No bruising or erythema.      Comments: L picc   Neurological:      Mental Status: He is alert and oriented to person, place, and time. Mental status is at baseline.      Motor: No weakness.   Psychiatric:         Mood and Affect: Mood  normal.         Behavior: Behavior normal.       Significant Labs:   Microbiology Results (last 7 days)       Procedure Component Value Units Date/Time    Blood culture [[id number]] Collected: 12/14/22 0205    Order Status: Completed Specimen: Blood from Antecubital, Right Arm Updated: 12/18/22 0303     Blood Culture, Routine No Growth after 4 days.    Narrative:      Blood cx in the AM with AM labs    Blood culture [[id number]] Collected: 12/14/22 0205    Order Status: Completed Specimen: Blood from Antecubital, Right Updated: 12/18/22 0303     Blood Culture, Routine No Growth after 4 days.    Narrative:      Blood cx in the AM with AM labs    Gram stain [[id number]]     Order Status: No result Specimen: Ascites     Aerobic culture [[id number]]     Order Status: No result Specimen: Abdominal     Culture, Anaerobic [[id number]]     Order Status: No result Specimen: Ascites     Culture, Anaerobe [[id number]] Collected: 12/09/22 1831    Order Status: Completed Specimen: Abscess from Abdomen Updated: 12/13/22 0835     Anaerobic Culture No anaerobes isolated            Significant Imaging: I have reviewed all pertinent imaging results/findings within the past 24 hours.

## 2022-12-19 NOTE — PLAN OF CARE
Problem: Adult Inpatient Plan of Care  Goal: Plan of Care Review  Outcome: Ongoing, Progressing  Goal: Patient-Specific Goal (Individualized)  Outcome: Ongoing, Progressing  Goal: Absence of Hospital-Acquired Illness or Injury  Outcome: Ongoing, Progressing  Goal: Optimal Comfort and Wellbeing  Outcome: Ongoing, Progressing  Goal: Readiness for Transition of Care  Outcome: Ongoing, Progressing     Problem: Adjustment to Illness (Sepsis/Septic Shock)  Goal: Optimal Coping  Outcome: Ongoing, Progressing     Problem: Bleeding (Sepsis/Septic Shock)  Goal: Absence of Bleeding  Outcome: Ongoing, Progressing     Problem: Glycemic Control Impaired (Sepsis/Septic Shock)  Goal: Blood Glucose Level Within Desired Range  Outcome: Ongoing, Progressing     Problem: Infection Progression (Sepsis/Septic Shock)  Goal: Absence of Infection Signs and Symptoms  Outcome: Ongoing, Progressing     Problem: Nutrition Impaired (Sepsis/Septic Shock)  Goal: Optimal Nutrition Intake  Outcome: Ongoing, Progressing     Problem: Fluid and Electrolyte Imbalance (Acute Kidney Injury/Impairment)  Goal: Fluid and Electrolyte Balance  Outcome: Ongoing, Progressing     Problem: Oral Intake Inadequate (Acute Kidney Injury/Impairment)  Goal: Optimal Nutrition Intake  Outcome: Ongoing, Progressing     Problem: Skin Injury Risk Increased  Goal: Skin Health and Integrity  Outcome: Ongoing, Progressing     Problem: Fall Injury Risk  Goal: Absence of Fall and Fall-Related Injury  Outcome: Ongoing, Progressing

## 2022-12-19 NOTE — NURSING
Report received from off going Nurse . Pt is awake and alert . Has visitors at this time . Bed down SR up x 2 HOB. Call bell in reach.

## 2022-12-19 NOTE — NURSING
J-P drain was emptied 3x on the night shift . Each time it was 60cc of Thick Tan drainage . A total of 180cc .

## 2022-12-19 NOTE — PT/OT/SLP PROGRESS
Occupational Therapy   Treatment/Discharge    Name: Vivek Turner  MRN: [id number]  Admitting Diagnosis:  Hepatic abscess       Recommendations:     Discharge Recommendations: home  Discharge Equipment Recommendations:  none  Barriers to discharge:   (patient lives alone)    Assessment:     Vivek Turner is a 47 y.o. male with a medical diagnosis of Hepatic abscess.   Performance deficits affecting function are impaired self care skills, impaired functional mobility, impaired endurance, impaired skin.    The patient is able to perform self care and functional mobility with SBA/(S). The patient was able to perform UE therex x15 reps while seated in the chair. The patient was encouraged to sit in the chair ~6 hours/day as tolerated.   OT will D/C.    Rehab Prognosis:  Good; patient would benefit from acute skilled OT services to address these deficits and reach maximum level of function.       Plan:     Patient to be seen  (N/A OT will D/C) to address the above listed problems via self-care/home management, therapeutic exercises  Plan of Care Expires: 12/27/22  Plan of Care Reviewed with: patient    Subjective     Pain/Comfort:  Pain Rating 1: 0/10    Objective:     Communicated with: nurseLana prior to session.  Patient found HOB elevated with PICC line, MARLEY drain upon OT entry to room.    General Precautions: Standard, fall    Orthopedic Precautions:N/A  Braces: N/A  Respiratory Status: Room air     Occupational Performance:     Bed Mobility:    Patient completed Scooting/Bridging with modified independence and supervision  Patient completed Supine to Sit with modified independence and supervision     Functional Mobility/Transfers:  Patient completed Sit <> Stand Transfer with supervision  with  rolling walker   Functional Mobility: The patient amb using a RW to the sink and chair with (S).     Activities of Daily Living:  Grooming: supervision to stand at the sink to wash his face, brush teeth and comb hair    Upper Body Dressing: contact guard assistance (limited by IV line)      [facility] 6 Click ADL: 23    Treatment & Education:  Participated in self care and functional mobility as noted above  Educated the patient re: benefit of sitting in the chair. The patient was encouraged to sit in the chair ~6 hours/day as tolerated.   Educated the patient re: UE therex. Pt completed 15 x  BUE AROM in seated position:  Digits I-V flexion/extension   Wrist flexion/extension  Forearm pronation/supination  Elbow flexion/extension  Shoulder flexion/extension  Shoulder elevation/depression   Forward punches  Shoulder ab/dduction   Handout placed on table  Pt encouraged to complete 1-2x/15-20 reps a day     Patient left up in chair with all lines intact, call button in reach, and nurseLana notified    GOALS:   Multidisciplinary Problems       Occupational Therapy Goals          Problem: Occupational Therapy    Goal Priority Disciplines Outcome Interventions   Occupational Therapy Goal     OT, PT/OT Adequate for Care Transition    Description: Goals to be met by: 12/27/22     Patient will increase functional independence with ADLs by performing:    UE Dressing with [place].  LE Dressing with [place].  Grooming while standing at sink with Modified [place] and Assistive Devices as needed.  Toileting from toilet with Modified [place] and Assistive Devices as needed for hygiene and clothing management.   Supine to sit with Modified [place].  Step transfer with Modified [place]  Toilet transfer to toilet with Modified [place].  Upper extremity exercise program x15 reps per handout, with independence.                         Time Tracking:     OT Date of Treatment: 12/19/22  OT Start Time: 1126  OT Stop Time: 1151  OT Total Time (min): 25 min    Billable Minutes:Self Care/Home Management 15  Therapeutic Exercise 10  Total Time 25    OT/NIRALI: OT          12/19/2022

## 2022-12-19 NOTE — PLAN OF CARE
Problem: Physical Therapy  Goal: Physical Therapy Goal  Description: Goals to be met by: 22     Patient will increase functional independence with mobility by performin. Pt to mod I with bed mobility.  2. Pt to transfer with supervision/mod I.  3. Pt to ambulate 150' /c or /s AD and supervision.    Outcome: Ongoing, Progressing   Pt met all goals set today, PT to update goals.

## 2022-12-19 NOTE — PROGRESS NOTES
[facility] Medicine  Progress Note    Patient Name: Vivek Turnre  MRN: [id number]  Patient Class: IP- Inpatient   Admission Date: 12/9/2022  Length of Stay: 10 days  Attending Physician: Sean Myers MD  Primary Care Provider: Primary Doctor No        Subjective:     Principal Problem:Hepatic abscess        HPI:  47y M w/ hypertension presents with abdominal pain x 2 days. Pt refused  service. He reports that he had fever/rigors three days prior to admission. He then developed worsening abdominal pain from then until admission. He went to an urgent care where he was started on bactrim for UTI. His abdominal pain increased over the next few days. He had a BM yesterday. Denies vomiting, but has had significant nausea and very limited PO intake in the days leading to admission. Aside from the single episode of fever/rigors he denies subsequent fevers.     In the ED he was found to have a R hepatic abscess vs mass. He traveled to the [facility] eight weeks prior to presentation. He denies fevers except for what is listed above. Denies weight loss, cough, night sweats. Denies prior hx of TB. Denies prior hx of cirrhosis      Overview/Hospital Course:  Mr Vivek Turner was admitted with severe sepsis due to R liver mass vs abscess and acute renal failure. IR consulted and drain placed on 12/9. Cultures with Klebsiella. ID consulted-continue cefazolin, and restarted Flagyl on 12/13. Started on bicarb gtt for acute renal failure and Nephrology consulted. He was moved to ICU for instability. He has ileus; NGT placed with copious bilious output. Renal failure improving. GI consulted to assess for source of liver abscess. MRCP showed persistent heterogeneous collection within the right hepatic lobe compatible with reported abscess noting percutaneous drainage catheter in place. No significant biliary duct abnormality on MRCP assessment. Pt refused inpatient colonoscopy. Pt transferred to the  floor on 12/12. PT/OT recommends HH. Monitor renal function and ileus at this time. Still has some output from NGT, GI recommends clamping today and monitor.     Cr improving with fluids and TPN. Na still high, will continue D5 for now. wbc uptrending and looks like new fluid collection next to liver, will ask IR to drain and send for cultures, labs placed. Will change to Cefepime for broader coverage until those cultures result. WBC# still elevated. IR did not see another fluid pocket that was attainable, Cr improving now at 1.5, may have to re-image/CT in a couple days when clearance is back to normal to confirm source control. Na 151, gave D5 at 100 mL for 10 hours and Na corrected to 143, D5 stopped. Still getting TPN fluids, but will attempt to advance diet, NGT removed.     CrCl back up to 78, will order CT a/p w contrast to assess for resolution or further intervention. Low dose fluid ordered to protect kidneys from contrast for 20 hours ordered given tenuous state.       Interval History:   No abdominal pain at this time. No N/V or diarrhea.  CLD advanced    Review of Systems   Constitutional:  Positive for fatigue. Negative for chills, diaphoresis and fever.   HENT:  Negative for congestion and trouble swallowing.    Eyes:  Negative for photophobia and visual disturbance.   Respiratory:  Negative for cough, chest tightness and shortness of breath.    Cardiovascular:  Negative for chest pain, palpitations and leg swelling.   Gastrointestinal:  Positive for abdominal distention (improving). Negative for abdominal pain, constipation, diarrhea, nausea and vomiting.   Endocrine: Negative for polydipsia and polyuria.   Musculoskeletal:  Negative for arthralgias and myalgias.   Neurological:  Positive for weakness. Negative for numbness.   Psychiatric/Behavioral:  Negative for confusion.      Objective:     Vital Signs (Most Recent):  Temp: 97.2 °F (36.2 °C) (12/19/22 0804)  Pulse: 87 (12/19/22 0804)  Resp: 18  (12/19/22 0804)  BP: 129/75 (12/19/22 0804)  SpO2: (!) 94 % (12/19/22 0804)   Vital Signs (24h Range):  Temp:  [97.2 °F (36.2 °C)-98.7 °F (37.1 °C)] 97.2 °F (36.2 °C)  Pulse:  [] 87  Resp:  [18-20] 18  SpO2:  [93 %-94 %] 94 %  BP: (123-140)/(66-81) 129/75     Weight: 96.6 kg (212 lb 15.4 oz)  Body mass index is 34.37 kg/m².    Intake/Output Summary (Last 24 hours) at 12/19/2022 0915  Last data filed at 12/19/2022 0912  Gross per 24 hour   Intake 480 ml   Output 1000 ml   Net -520 ml        Physical Exam  Vitals and nursing note reviewed.   Constitutional:       General: He is not in acute distress.     Appearance: He is well-developed. He is obese. He is ill-appearing. He is not toxic-appearing or diaphoretic.   HENT:      Head: Normocephalic and atraumatic.      Nose:      Comments: NGT in place with bilious output     Mouth/Throat:      Mouth: Mucous membranes are moist.   Eyes:      General: No scleral icterus.     Pupils: Pupils are equal, round, and reactive to light.   Neck:      Thyroid: No thyromegaly.   Cardiovascular:      Rate and Rhythm: Normal rate and regular rhythm.      Pulses: Normal pulses.      Heart sounds: Normal heart sounds. No murmur heard.    No gallop.   Pulmonary:      Effort: Pulmonary effort is normal. No respiratory distress.      Breath sounds: Normal breath sounds. No stridor. No wheezing or rales.      Comments: Decreased inspiration. No wheezes on exam. On 2L NC  Abdominal:      General: Bowel sounds are normal. There is distension.      Palpations: Abdomen is soft.      Tenderness: There is no abdominal tenderness. There is no guarding.      Comments: RUQ drain in place with minimal brown/orange fluid   Musculoskeletal:         General: No deformity. Normal range of motion.      Cervical back: Normal range of motion. No rigidity.      Right lower leg: No edema.      Left lower leg: No edema.   Lymphadenopathy:      Cervical: No cervical adenopathy.   Skin:     General: Skin  is warm.   Neurological:      Mental Status: He is alert and oriented to person, place, and time.   Psychiatric:         Behavior: Behavior normal.      Comments: Appears frustrated that he cannot eat at this time            Recent Results (from the past 24 hour(s))   POCT glucose    Collection Time: 12/18/22 11:38 AM   Result Value Ref Range    POCT Glucose 127 (H) 70 - 110 mg/dL   Brain natriuretic peptide    Collection Time: 12/18/22  2:26 PM   Result Value Ref Range    BNP 31 0 - 99 pg/mL   POCT glucose    Collection Time: 12/18/22  3:50 PM   Result Value Ref Range    POCT Glucose 109 70 - 110 mg/dL   POCT glucose    Collection Time: 12/18/22 11:12 PM   Result Value Ref Range    POCT Glucose 98 70 - 110 mg/dL   CBC Auto Differential    Collection Time: 12/19/22  4:27 AM   Result Value Ref Range    WBC 17.38 (H) 3.90 - 12.70 K/uL    RBC 3.07 (L) 4.60 - 6.20 M/uL    Hemoglobin 8.8 (L) 14.0 - 18.0 g/dL    Hematocrit 27.4 (L) 40.0 - 54.0 %    MCV 89 82 - 98 fL    MCH 28.7 27.0 - 31.0 pg    MCHC 32.1 32.0 - 36.0 g/dL    RDW 13.9 11.5 - 14.5 %    Platelets 438 150 - 450 K/uL    MPV 10.2 9.2 - 12.9 fL    Immature Granulocytes 1.0 (H) 0.0 - 0.5 %    Gran # (ANC) 14.6 (H) 1.8 - 7.7 K/uL    Immature Grans (Abs) 0.18 (H) 0.00 - 0.04 K/uL    Lymph # 1.3 1.0 - 4.8 K/uL    Mono # 1.2 (H) 0.3 - 1.0 K/uL    Eos # 0.1 0.0 - 0.5 K/uL    Baso # 0.03 0.00 - 0.20 K/uL    nRBC 0 0 /100 WBC    Gran % 83.9 (H) 38.0 - 73.0 %    Lymph % 7.3 (L) 18.0 - 48.0 %    Mono % 6.9 4.0 - 15.0 %    Eosinophil % 0.7 0.0 - 8.0 %    Basophil % 0.2 0.0 - 1.9 %    Differential Method Automated    Comprehensive Metabolic Panel    Collection Time: 12/19/22  4:27 AM   Result Value Ref Range    Sodium 139 136 - 145 mmol/L    Potassium 3.7 3.5 - 5.1 mmol/L    Chloride 104 95 - 110 mmol/L    CO2 29 23 - 29 mmol/L    Glucose 108 70 - 110 mg/dL    BUN 30 (H) 6 - 20 mg/dL    Creatinine 1.3 0.5 - 1.4 mg/dL    Calcium 7.6 (L) 8.7 - 10.5 mg/dL    Total Protein 5.9  (L) 6.0 - 8.4 g/dL    Albumin 1.9 (L) 3.5 - 5.2 g/dL    Total Bilirubin 1.0 0.1 - 1.0 mg/dL    Alkaline Phosphatase 90 55 - 135 U/L    AST 20 10 - 40 U/L    ALT 6 (L) 10 - 44 U/L    Anion Gap 6 (L) 8 - 16 mmol/L    eGFR >60 >60 mL/min/1.73 m^2   Magnesium    Collection Time: 12/19/22  4:27 AM   Result Value Ref Range    Magnesium 1.9 1.6 - 2.6 mg/dL   Phosphorus    Collection Time: 12/19/22  4:27 AM   Result Value Ref Range    Phosphorus 3.1 2.7 - 4.5 mg/dL   POCT glucose    Collection Time: 12/19/22  6:51 AM   Result Value Ref Range    POCT Glucose 97 70 - 110 mg/dL       Microbiology Results (last 7 days)       Procedure Component Value Units Date/Time    Blood culture [[id number]] Collected: 12/14/22 0205    Order Status: Completed Specimen: Blood from Antecubital, Right Arm Updated: 12/18/22 0303     Blood Culture, Routine No Growth after 4 days.    Narrative:      Blood cx in the AM with AM labs    Blood culture [[id number]] Collected: 12/14/22 0205    Order Status: Completed Specimen: Blood from Antecubital, Right Updated: 12/18/22 0303     Blood Culture, Routine No Growth after 4 days.    Narrative:      Blood cx in the AM with AM labs    Gram stain [[id number]]     Order Status: No result Specimen: Ascites     Aerobic culture [[id number]]     Order Status: No result Specimen: Abdominal     Culture, Anaerobic [[id number]]     Order Status: No result Specimen: Ascites     Culture, Anaerobe [[id number]] Collected: 12/09/22 1831    Order Status: Completed Specimen: Abscess from Abdomen Updated: 12/13/22 0835     Anaerobic Culture No anaerobes isolated                       Assessment/Plan:      * Hepatic abscess  Unclear how he got this. IR drain placed 12/9. Cultures with Klebsiella, pansensitive.   - MRCP:persistent heterogeneous collection within the right hepatic lobe compatible with reported abscess noting percutaneous drainage catheter in place. No significant biliary duct abnormality on MRCP assessment.  -  Blood cx with NGTD  - Abscess culture with klebsiella. No anaerobes  -IR drain with minimal output. Discussed with IR and IR suspect its not putting out much because of the heterogeneity of the collection and drain may not be sufficient. Recommended repeat US abdomen   - ID consulted: on cefazolin, added flagyl on 12/13  - GI consulted to assess for source of liver abscess. Pt refuses inpatient colonoscopy. Likely plan for outpatient cscope.       Ileus  Patient has Non- operative ileus which is adynamic in etiology which is worsening. Will treat conservatively with bowel rest, IV fluids, serial abdominal exams and avoidance of GI paralytics such as narcotics or anti-spasmodics. Monitor patient closely.     -NGT placed 12/11 with large output-- continue  -GI following: plan for clamping the NGT today and advance to clears if tolerated   -Surgery following   -Nutrition consulted  -Initiated TPN on 12/14 but was unable to get PICC due to severe weather.   -PICC placed on 12/15, will continue custom TPN.       Acute hypoxemic respiratory failure  Patient with Hypoxic Respiratory failure which is Acute.  he is not on home oxygen.   Signs/symptoms of respiratory failure include- tachypnea, increased work of breathing and wheezing. Contributing diagnoses includes - atelectasis Labs and images were reviewed. Patient Has not had a recent ABG.     - O2 by NC PRN  - nebs PRN  - CXR shows atelectasis    Hyperglycemia  A1c normal.     Microcytic anemia  Hgb 10-11, no prior to review.   Iron deficient- TSAT 17, but also significant acute inflammation- ferritin 3633.   B12, folate replete.   Stable        Atelectasis  Shortness of breath partly due to atelectasis as noted on CXR  - incentive spirometer  - O2 by NC as needed      Severe sepsis  This patient does have evidence of infective focus. My overall impression is sepsis. Vital signs were reviewed and noted in progress note.  Antibiotics given-   Antibiotics (From admission,  onward)    Start     Stop Route Frequency Ordered    12/14/22 0800  cefazolin (ANCEF) 2 gram in dextrose 5% 50 mL IVPB (premix)         -- IV Every 8 hours (non-standard times) 12/14/22 0745    12/13/22 0833  metronidazole IVPB 500 mg         -- IV Every 8 hours (non-standard times) 12/13/22 0833        Cultures were taken-   Microbiology Results (last 7 days)     Procedure Component Value Units Date/Time    Blood culture [[id number]] Collected: 12/14/22 0205    Order Status: Completed Specimen: Blood from Antecubital, Right Arm Updated: 12/15/22 0303     Blood Culture, Routine No Growth to date      No Growth to date    Narrative:      Blood cx in the AM with AM labs    Blood culture [[id number]] Collected: 12/14/22 0205    Order Status: Completed Specimen: Blood from Antecubital, Right Updated: 12/15/22 0303     Blood Culture, Routine No Growth to date      No Growth to date    Narrative:      Blood cx in the AM with AM labs    Culture, Anaerobe [[id number]] Collected: 12/09/22 1831    Order Status: Completed Specimen: Abscess from Abdomen Updated: 12/13/22 0835     Anaerobic Culture No anaerobes isolated    Aerobic culture [[id number]]  (Abnormal)  (Susceptibility) Collected: 12/09/22 1831    Order Status: Completed Specimen: Abscess from Abdomen Updated: 12/11/22 0915     Aerobic Bacterial Culture KLEBSIELLA PNEUMONIAE  Many      Gram stain [[id number]] Collected: 12/09/22 1831    Order Status: Completed Specimen: Abscess from Abdomen Updated: 12/10/22 0755     Gram Stain Result Many WBC's      No organisms seen    Fungus culture [[id number]] Collected: 12/09/22 1831    Order Status: Sent Specimen: Abscess from Abdomen Updated: 12/09/22 1905        Latest lactate reviewed, they are-  No results for input(s): LACTATE in the last 72 hours.    Organ dysfunction indicated by Acute kidney injury and Acute liver injury  Source- liver abscess    Source control Achieved by- IR    -Due to hepatic abscess  -s/p IR drainage on  12/09  -ID consulted:  Continue cefazolin and Flagyl  -blood cultures with no growth to date   -abscess culture with pansensitive Klebsiella   -continue IV antibiotics as above   -Leukocytosis improving       ATN (acute tubular necrosis)  Creatinine at 6.1 at admission. Unknown baseline with no prior labs to review. FENa 1.4% suggesting intrinsic injury. This may be ATN from sepsis +/- Bactrim pseudotoxicity. No hydro seen on CT or US.   - IVF ordered  - Nephrology consulted  - this is improving     Patient with acute kidney injury likely due to IVVD/dehydration and acute tubular necrosis MARYURI is currently improving. Labs reviewed- Renal function/electrolytes with Estimated Creatinine Clearance: 48.3 mL/min (A) (based on SCr of 2.1 mg/dL (H)). according to latest data. Monitor urine output and serial BMP and adjust therapy as needed. Avoid nephrotoxins and renally dose meds for GFR listed above.         VTE Risk Mitigation (From admission, onward)         Ordered     heparin (porcine) injection 5,000 Units  Every 8 hours         12/10/22 0838     IP VTE HIGH RISK PATIENT  Once         12/09/22 1421     Place sequential compression device  Until discontinued         12/09/22 1421                Discharge Planning   VIVIEN: 12/16/2022     Code Status: Full Code   Is the patient medically ready for discharge?:     Reason for patient still in hospital (select all that apply): Patient trending condition and Treatment  Discharge Plan A: Home Health   Discharge Delays: None known at this time              Sean Myers MD  Department of Hospital Medicine   [facility] - Telemetry

## 2022-12-19 NOTE — PLAN OF CARE
AYAKA informed Dr. Myers that physician at patient's job would like to speak with him about patient's current condition. Dr. Myers informed AYAKA that he would like to speak to Physician in a couple of days after second scans are done and read.

## 2022-12-19 NOTE — PROGRESS NOTES
"[facility]  Infectious Disease  Progress Note    Patient Name: Vivek Turner  MRN: [id number]  Admission Date: 12/9/2022  Length of Stay: 10 days  Attending Physician: Sean Myers MD  Primary Care Provider: Primary Doctor No    Isolation Status: No active isolations  Assessment/Plan:      * Hepatic abscess  47M with h/o HTN admitted 12/9 with abdominal pain, found to have a liver abscess and MARYURI, s/p IR drainage on 12/9 (CX + pan-sen kleb pna), complicated by ileus/MARYURI. Blcx ngtd. Etiology of abscess is unclear. MRCP 12/12 unremarkable. Showed heterogenous fluid collection at inferior aspect of the right hepatic lobe measuring approximately 8.8 x 6.8 x 9.3 cm (known abscess). Recent US 12/16: "echogenicity w/n the right lobe of the liver, measuring approximately 7.1 x 7.5 x 5.3 cm. There is mildly complex fluid adjacent to the right lobe of the liver, measuring 3 x 10.8 x 11.2 cm. " IR reviewed US and no focal fluid seen amenable to drainage.     Unclear if there is a 2nd collection (seen on recent US, not on MRCP) or if it is connected to liver abscess. Plan for repeat CT scan today to evaluate 2nd collection further.     Recommendations:   - agree with repeat CT abdomen for better visualization of collections; if evidence of drainable fluid collection, recommend cx  - duration of abx until radiographic resolution of abscess  - continue cefepime/flagyl pending cx data  - plan for outpatient colonoscopy  - if febrile please repeat BCx            Ileus  See hepatic abscess    Leukocytosis  -improving  -see hepatic abscess    MARYURI  -cr downtrending        Thank you for your consult. I will follow-up with patient. Please contact us if you have any additional questions. Above d/w primary team.     Time: 35 minutes   50% of time spent on face-to-face counseling and coordination of care. Counseling included review of test results, diagnosis, and treatment plan with patient and/or family.        Tejal MORE" "MD Kandy  Infectious Disease  [facility] - Telemetry    Subjective:     Principal Problem:Hepatic abscess    HPI:   47M with h/o htn admitted 12/9 with several days of progressively worsening abdominal pain. Reports fever and nausea and decreased po intake.  Denies diarrhea. Denies weight loss. Notably reports traveling to [facility] several months ago.     Had abdominal ct and ultrasound  1. No sonographic abnormality of the gallbladder detected.  2. Ill-defined region of decreased echotexture within the posterior aspect of the right hepatic lobe corresponding to the findings seen on recent prior CT.  This could indicate a mass, abscess, or intense fatty infiltration.  3. Diffuse increased hepatic parenchymal echotexture suspected to represent hepatic steatosis.      S/p IR drainage of abscess. Reports pain improving.   KLEBSIELLA PNEUMONIAE   Many      Resulting Agency [facility]        Susceptibility     Klebsiella pneumoniae     CULTURE, AEROBIC  (SPECIFY SOURCE)     Amox/K Clav'ate <=8/4 mcg/mL Sensitive     Amp/Sulbactam <=8/4 mcg/mL Sensitive     Cefazolin <=2 mcg/mL Sensitive     Cefepime <=2 mcg/mL Sensitive     Ceftriaxone <=1 mcg/mL Sensitive     Ciprofloxacin <=1 mcg/mL Sensitive     Ertapenem <=0.5 mcg/mL Sensitive     Gentamicin <=4 mcg/mL Sensitive     Levofloxacin <=2 mcg/mL Sensitive     Meropenem <=1 mcg/mL Sensitive     Minocycline <=4 mcg/mL Sensitive     Piperacillin/Tazo <=16 mcg/mL Sensitive     Tetracycline <=4 mcg/mL Sensitive     Tobramycin <=4 mcg/mL Sensitive     Trimeth/Sulfa <=2/38 mcg/mL Sensitive                 Silvino improving    On cefepime/metronidazole    Hiv ordered, pending      ID consulted for "klebsiella liver abscess"    Interval History: No fevers documented overnight. Offered  during consultation - pt deferred. Tolerating abx without issues. Plan for repeat CT scan today.     Review of Systems   Constitutional:  Negative for chills and fever.   Gastrointestinal:  " Negative for abdominal pain.   All other systems reviewed and are negative.  Objective:     Vital Signs (Most Recent):  Temp: 97.2 °F (36.2 °C) (12/19/22 0804)  Pulse: 87 (12/19/22 0804)  Resp: 18 (12/19/22 0804)  BP: 129/75 (12/19/22 0804)  SpO2: (!) 94 % (12/19/22 0804)   Vital Signs (24h Range):  Temp:  [97.2 °F (36.2 °C)-98.7 °F (37.1 °C)] 97.2 °F (36.2 °C)  Pulse:  [] 87  Resp:  [18-20] 18  SpO2:  [93 %-94 %] 94 %  BP: (123-140)/(66-81) 129/75     Weight: 96.6 kg (212 lb 15.4 oz)  Body mass index is 34.37 kg/m².    Estimated Creatinine Clearance: 78.1 mL/min (based on SCr of 1.3 mg/dL).    Physical Exam  Constitutional:       General: He is not in acute distress.     Appearance: He is not ill-appearing, toxic-appearing or diaphoretic.   HENT:      Head: Normocephalic and atraumatic.      Right Ear: External ear normal.      Left Ear: External ear normal.   Eyes:      General: No scleral icterus.        Right eye: No discharge.         Left eye: No discharge.   Cardiovascular:      Rate and Rhythm: Normal rate and regular rhythm.   Abdominal:      General: There is no distension.      Palpations: Abdomen is soft.      Tenderness: There is no abdominal tenderness. There is no guarding.      Comments: R abdo drain - purulent fluid present   Musculoskeletal:      Right lower leg: No edema.      Left lower leg: No edema.   Skin:     General: Skin is warm and dry.      Coloration: Skin is not jaundiced.      Findings: No bruising or erythema.      Comments: L picc   Neurological:      Mental Status: He is alert and oriented to person, place, and time. Mental status is at baseline.      Motor: No weakness.   Psychiatric:         Mood and Affect: Mood normal.         Behavior: Behavior normal.       Significant Labs:   Microbiology Results (last 7 days)       Procedure Component Value Units Date/Time    Blood culture [[id number]] Collected: 12/14/22 0205    Order Status: Completed Specimen: Blood from Antecubital,  Right Arm Updated: 12/18/22 0303     Blood Culture, Routine No Growth after 4 days.    Narrative:      Blood cx in the AM with AM labs    Blood culture [[id number]] Collected: 12/14/22 0205    Order Status: Completed Specimen: Blood from Antecubital, Right Updated: 12/18/22 0303     Blood Culture, Routine No Growth after 4 days.    Narrative:      Blood cx in the AM with AM labs    Gram stain [[id number]]     Order Status: No result Specimen: Ascites     Aerobic culture [[id number]]     Order Status: No result Specimen: Abdominal     Culture, Anaerobic [[id number]]     Order Status: No result Specimen: Ascites     Culture, Anaerobe [[id number]] Collected: 12/09/22 1831    Order Status: Completed Specimen: Abscess from Abdomen Updated: 12/13/22 0835     Anaerobic Culture No anaerobes isolated            Significant Imaging: I have reviewed all pertinent imaging results/findings within the past 24 hours.

## 2022-12-19 NOTE — PLAN OF CARE
Nutrition Plan of Care:    Recommendations     1) Continue with oral intake as tolerated     2) Monitor I/O's, Daily weight, Nutrition related Labs                3)Collaboration with medical providers         Goals: Patient to consume adequate oral intake   Nutrition Goal Status: revised and progressing   Communication of RD Recs:  (POC)     Assessment and Plan  Nutrition Problem  Inadequate Oral intake     Related to (etiology):  Condition associated with diagnosis     Signs and Symptoms (as evidenced by):   Decreased po intake and tolerance issues      Interventions/Recommendations (treatment strategy):  Diet advanced   Collaboration of care with other providers     Nutrition Diagnosis Status:   Revised and progressed to oral diet     Helen Villaseñor, MS, RDN, LDN

## 2022-12-19 NOTE — PROGRESS NOTES
[facility] [place] [facility]  Adult Nutrition  Consult Note    SUMMARY     Recommendations    1) Continue with oral intake as tolerated    2) Monitor I/O's, Daily weight, Nutrition related Labs     3)Collaboration with medical providers       Goals: Patient to consume adequate oral intake   Nutrition Goal Status: revised and progressing   Communication of RD Recs:  (POC)    Assessment and Plan  Nutrition Problem  Inadequate Oral intake    Related to (etiology):  Condition associated with diagnosis    Signs and Symptoms (as evidenced by):   Decreased po intake and tolerance issues     Interventions/Recommendations (treatment strategy):  Diet advanced   Collaboration of care with other providers    Nutrition Diagnosis Status:   Revised and progressed to oral diet          Malnutrition Assessment  NFPE not performed 2' remote consult. Will continue to monitor.                                       Reason for Assessment    Reason For Assessment: follow up.   Diagnosis:  (Hepatic abcess)  Relevant Medical History:   Patient Active Problem List   Diagnosis    Hepatic abscess    ATN (acute tubular necrosis)    Severe sepsis    Atelectasis    Microcytic anemia    Hyperglycemia    Acute hypoxemic respiratory failure    Ileus      General Information Comments:     12/19:  Patient now advanced to a soft textured diet.  PPN discontinued.  PO intake progressing.  Meal po intake documented at 50%.  Labs reviewed.  NKFA.  Last Bowel Movement: 12/17/22 (RD remote)    12/13 - Pt consulted for New TPN recs - per chart review pt Hepatic abbess now with RUG drain and NGT in place to assist with abscess grainage and abdominal distention. Stating feeling better though upeset he is unable to eat.  Nutrition Discharge Planning: Pending Medical Course    Nutrition Risk Screen    Nutrition Risk Screen: no indicators present    Nutrition/Diet History    Spiritual, Cultural Beliefs, [facility] Practices, Values that Affect Care: no  Food Allergies:  "NKFA  Factors Affecting Nutritional Intake: NPO, nausea/vomiting, constipation, abdominal distension    Anthropometrics    Temp: 98.6 °F (37 °C)  Height Method: Stated  Height: 5' 6" (167.6 cm)  Height (inches): 66 in  Weight Method: Bed Scale  Weight: 96.6 kg (212 lb 15.4 oz)  Weight (lb): 212.97 lb  Ideal Body Weight (IBW), Male: 142 lb  % Ideal Body Weight, Male (lb): 160.85 %  BMI (Calculated): 34.4  BMI Grade: 35 - 39.9 - obesity - grade II  Weight Loss:  (INEZ)  Usual Body Weight (UBW), kg:  (INEZ)       Wt Readings from Last 10 Encounters:   12/16/22 96.6 kg (212 lb 15.4 oz)   12/09/22 106 kg (233 lb 9.6 oz)   12/09/22 106 kg (233 lb 11 oz)     Lab/Procedures/Meds    Pertinent Labs Reviewed: reviewed  CBC:  Recent Labs   Lab 12/19/22  0427   WBC 17.38*   HGB 8.8*   HCT 27.4*        CMP:  Recent Labs   Lab 12/19/22  0427   CALCIUM 7.6*   ALBUMIN 1.9*   PROT 5.9*      K 3.7   CO2 29      BUN 30*   CREATININE 1.3   ALKPHOS 90   ALT 6*   AST 20   BILITOT 1.0     Pertinent Medications Reviewed: reviewed  Scheduled Meds:   ceFEPime (MAXIPIME) IVPB  2 g Intravenous Q8H    heparin (porcine)  5,000 Units Subcutaneous Q8H    metronidazole  500 mg Intravenous Q8H    polyethylene glycol  17 g Oral Daily    sodium chloride 0.9%  10 mL Intravenous Q8H    sodium chloride 0.9%  10 mL Intravenous Q6H     Continuous Infusions:   sodium chloride 0.9% 75 mL/hr at 12/19/22 0940     PRN Meds:.acetaminophen, acetaminophen, albuterol-ipratropium, dextrose 10%, dextrose 10%, glucagon (human recombinant), glucose, glucose, HYDROmorphone, melatonin, naloxone, nitroGLYCERIN, oxyCODONE-acetaminophen, Flushing PICC Protocol **AND** sodium chloride 0.9% **AND** sodium chloride 0.9%    Physical Findings/Assessment  RUQ abdominal drain placed    Estimated/Assessed Needs    Weight Used For Calorie Calculations: 103.6 kg (228 lb 6.3 oz)  Energy Calorie Requirements (kcal): 2072 - 2590  Energy Need Method: Kcal/kg (20 - 25 per " hypermetabolic status)  Protein Requirements: 77 - 97g (1.2 - 1.5 g/kg IBW per BMI > 30, MARYURI/ATN)  Weight Used For Protein Calculations: 64.4 kg (142 lb)  Fluid Requirements (mL): 2.07(1mL/kcal) - 3.6 L (3g mL/kg IBW) (1 mL/kcal - or 35 mL / kg depending rate of drain output and dehydration status)  Estimated Fluid Requirement Method:  (or per MD)  RDA Method (mL): 2072  CHO Requirement: 259g      Nutrition Prescription Ordered    Current Diet Order: soft texture oral diet     Evaluation of Received Nutrient/Fluid Intake      Energy Calories Required: not meeting needs  Protein Required: not meeting needs  Fluid Required: not meeting needs  Comments: LBM 12/17  % Intake of Estimated Energy Needs: 25 - 50 %  % Meal Intake: 25 - 50 %    Intake/Output - Last 3 Shifts         12/17 0700  12/18 0659 12/18 0700  12/19 0659 12/19 0700  12/20 0659    P.O. 840 600 240    Total Intake(mL/kg) 840 (8.7) 600 (6.2) 240 (2.5)    Urine (mL/kg/hr) 1025 (0.4) 670 (0.3) 403 (0.6)    Drains 0 230     Other 0 120     Stool 0 0     Total Output 1025 1020 403    Net -185 -420 -163           Urine Occurrence 1 x 1 x     Stool Occurrence 1 x 1 x           Nutrition Risk    Level of Risk/Frequency of Follow-up: high     Monitor and Evaluation    Food and Nutrient Intake: energy intake, parenteral nutrition intake  Food and Nutrient Adminstration: diet order, enteral and parenteral nutrition administration  Knowledge/Beliefs/Attitudes: beliefs and attitudes  Physical Activity and Function: nutrition-related ADLs and IADLs  Anthropometric Measurements: weight, weight change  Biochemical Data, Medical Tests and Procedures: electrolyte and renal panel, gastrointestinal profile, glucose/endocrine profile, inflammatory profile, lipid profile  Nutrition-Focused Physical Findings: overall appearance       Nutrition Follow-Up    RD Follow-up?: Yes  Helen Villaseñor, MS, RDN, LDN

## 2022-12-19 NOTE — PT/OT/SLP PROGRESS
Physical Therapy Treatment    Patient Name:  Vivek Turner   MRN:  [id number]    Recommendations:     Discharge Recommendations: home health PT  Discharge Equipment Recommendations:  (TBD)      Assessment:     Vivek Turner is a 47 y.o. male admitted with a medical diagnosis of Hepatic abscess.  He presents with the following impairments/functional limitations: weakness, impaired endurance, gait instability, impaired balance, decreased lower extremity function, decreased ROM, decreased safety awareness, pain, impaired functional mobility .    Rehab Prognosis: Good; patient would benefit from acute skilled PT services to address these deficits and reach maximum level of function.    Recent Surgery: * No surgery found *      Plan:     During this hospitalization, patient to be seen 5 x/week to address the identified rehab impairments via gait training, therapeutic activities and progress toward the following goals:    Plan of Care Expires:  12/20/22    Subjective     Chief Complaint: Pain (R) lower abdomen  Patient/Family Comments/goals: Pt agreeable to PT.  Pain/Comfort:  Pain Rating 1: 2/10  Location - Side 1: Right  Location - Orientation 1: lower  Location 1: abdomen  Pain Addressed 1: Reposition      Objective:     Communicated with nurseLana prior to session.  Patient found supine with peripheral IV, telemetry, MARLEY drain upon PT entry to room.     General Precautions: Standard, fall  Orthopedic Precautions: N/A  Braces: N/A  Respiratory Status: Room air     Functional Mobility:  Bed Mobility:     Supine to Sit: modified independence  Sit to Supine: modified independence  Transfers:     Sit to Stand:  supervision with rolling walker  Gait: 250' w/RW (S)  Balance: Good static/Fair+ dynamic standing      AM-PAC 6 CLICK MOBILITY  Turning over in bed (including adjusting bedclothes, sheets and blankets)?: 4  Sitting down on and standing up from a chair with arms (e.g., wheelchair, bedside commode, etc.): 4  Moving  from lying on back to sitting on the side of the bed?: 4  Moving to and from a bed to a chair (including a wheelchair)?: 4  Need to walk in hospital room?: 3  Climbing 3-5 steps with a railing?: 3  Basic Mobility Total Score: 22       Treatment & Education:  Transfer from bed, ambulated in hallway then returned to supine in bed.  Pt educated on need to sit OOB and encouraged to sit up for meals.    Patient left supine with all lines intact and call button in reach..    GOALS:   Multidisciplinary Problems       Physical Therapy Goals          Problem: Physical Therapy    Goal Priority Disciplines Outcome Goal Variances Interventions   Physical Therapy Goal     PT, PT/OT Ongoing, Progressing     Description: Goals to be met by: 22     Patient will increase functional independence with mobility by performin. Pt to mod I with bed mobility. Met 22  2. Pt to transfer with supervision/mod I. Met 22  3. Pt to ambulate 150' /c or /s AD and supervision. Met 22    Updated goals:  1. Pt to transfer mod I.  2. Pt to ambulate 200' /s AD (I).                         Time Tracking:     PT Received On: 22  PT Start Time: 1403     PT Stop Time: 1413  PT Total Time (min): 10 min     Billable Minutes: Gait Training 10    Treatment Type: Treatment  PT/PTA: PT     PTA Visit Number: 3     2022

## 2022-12-20 ENCOUNTER — ANESTHESIA EVENT (OUTPATIENT)
Dept: SURGERY | Facility: HOSPITAL | Age: 47
DRG: 853 | End: 2022-12-20

## 2022-12-20 PROBLEM — J90 PLEURAL EFFUSION: Status: ACTIVE | Noted: 2022-12-20

## 2022-12-20 PROBLEM — N41.2 PROSTATIC ABSCESS: Status: ACTIVE | Noted: 2022-12-20

## 2022-12-20 LAB
ALBUMIN SERPL BCP-MCNC: 1.9 G/DL (ref 3.5–5.2)
ALP SERPL-CCNC: 80 U/L (ref 55–135)
ALT SERPL W/O P-5'-P-CCNC: 7 U/L (ref 10–44)
ANION GAP SERPL CALC-SCNC: 5 MMOL/L (ref 8–16)
APPEARANCE FLD: NORMAL
AST SERPL-CCNC: 25 U/L (ref 10–40)
BASOPHILS # BLD AUTO: 0.03 K/UL (ref 0–0.2)
BASOPHILS NFR BLD: 0.2 % (ref 0–1.9)
BASOPHILS NFR FLD MANUAL: 1 %
BILIRUB SERPL-MCNC: 0.9 MG/DL (ref 0.1–1)
BODY FLD TYPE: NORMAL
BUN SERPL-MCNC: 23 MG/DL (ref 6–20)
CALCIUM SERPL-MCNC: 7.6 MG/DL (ref 8.7–10.5)
CHLORIDE SERPL-SCNC: 108 MMOL/L (ref 95–110)
CO2 SERPL-SCNC: 26 MMOL/L (ref 23–29)
COLOR FLD: NORMAL
CREAT SERPL-MCNC: 1.1 MG/DL (ref 0.5–1.4)
DIFFERENTIAL METHOD: ABNORMAL
EOSINOPHIL # BLD AUTO: 0.1 K/UL (ref 0–0.5)
EOSINOPHIL NFR BLD: 0.8 % (ref 0–8)
EOSINOPHIL NFR FLD MANUAL: 1 %
ERYTHROCYTE [DISTWIDTH] IN BLOOD BY AUTOMATED COUNT: 14.3 % (ref 11.5–14.5)
EST. GFR  (NO RACE VARIABLE): >60 ML/MIN/1.73 M^2
GLUCOSE SERPL-MCNC: 100 MG/DL (ref 70–110)
GRAM STN SPEC: NORMAL
HCT VFR BLD AUTO: 27.8 % (ref 40–54)
HGB BLD-MCNC: 9 G/DL (ref 14–18)
IMM GRANULOCYTES # BLD AUTO: 0.11 K/UL (ref 0–0.04)
IMM GRANULOCYTES NFR BLD AUTO: 0.7 % (ref 0–0.5)
LYMPHOCYTES # BLD AUTO: 1.2 K/UL (ref 1–4.8)
LYMPHOCYTES NFR BLD: 7.9 % (ref 18–48)
LYMPHOCYTES NFR FLD MANUAL: 19 %
MAGNESIUM SERPL-MCNC: 1.9 MG/DL (ref 1.6–2.6)
MCH RBC QN AUTO: 28.8 PG (ref 27–31)
MCHC RBC AUTO-ENTMCNC: 32.4 G/DL (ref 32–36)
MCV RBC AUTO: 89 FL (ref 82–98)
MONOCYTES # BLD AUTO: 1.3 K/UL (ref 0.3–1)
MONOCYTES NFR BLD: 8.7 % (ref 4–15)
MONOS+MACROS NFR FLD MANUAL: 24 %
NEUTROPHILS # BLD AUTO: 12.2 K/UL (ref 1.8–7.7)
NEUTROPHILS NFR BLD: 81.7 % (ref 38–73)
NEUTROPHILS NFR FLD MANUAL: 55 %
NRBC BLD-RTO: 0 /100 WBC
PHOSPHATE SERPL-MCNC: 3.1 MG/DL (ref 2.7–4.5)
PLATELET # BLD AUTO: 437 K/UL (ref 150–450)
PMV BLD AUTO: 10.2 FL (ref 9.2–12.9)
POCT GLUCOSE: 104 MG/DL (ref 70–110)
POCT GLUCOSE: 120 MG/DL (ref 70–110)
POCT GLUCOSE: 92 MG/DL (ref 70–110)
POCT GLUCOSE: 93 MG/DL (ref 70–110)
POTASSIUM SERPL-SCNC: 3.8 MMOL/L (ref 3.5–5.1)
PROT SERPL-MCNC: 5.9 G/DL (ref 6–8.4)
RBC # BLD AUTO: 3.12 M/UL (ref 4.6–6.2)
SODIUM SERPL-SCNC: 139 MMOL/L (ref 136–145)
WBC # BLD AUTO: 14.89 K/UL (ref 3.9–12.7)
WBC # FLD: 1832 /CU MM

## 2022-12-20 PROCEDURE — 25000003 PHARM REV CODE 250: Performed by: HOSPITALIST

## 2022-12-20 PROCEDURE — 88112 CYTOPATH CELL ENHANCE TECH: CPT | Performed by: PATHOLOGY

## 2022-12-20 PROCEDURE — 99223 1ST HOSP IP/OBS HIGH 75: CPT | Mod: 57,,, | Performed by: UROLOGY

## 2022-12-20 PROCEDURE — 63600175 PHARM REV CODE 636 W HCPCS: Performed by: HOSPITALIST

## 2022-12-20 PROCEDURE — 87075 CULTR BACTERIA EXCEPT BLOOD: CPT | Mod: 59 | Performed by: STUDENT IN AN ORGANIZED HEALTH CARE EDUCATION/TRAINING PROGRAM

## 2022-12-20 PROCEDURE — 88112 CYTOPATH CELL ENHANCE TECH: CPT | Mod: 26,,, | Performed by: PATHOLOGY

## 2022-12-20 PROCEDURE — 87186 SC STD MICRODIL/AGAR DIL: CPT | Performed by: STUDENT IN AN ORGANIZED HEALTH CARE EDUCATION/TRAINING PROGRAM

## 2022-12-20 PROCEDURE — 99232 SBSQ HOSP IP/OBS MODERATE 35: CPT | Mod: ,,, | Performed by: STUDENT IN AN ORGANIZED HEALTH CARE EDUCATION/TRAINING PROGRAM

## 2022-12-20 PROCEDURE — 87205 SMEAR GRAM STAIN: CPT | Mod: 59 | Performed by: STUDENT IN AN ORGANIZED HEALTH CARE EDUCATION/TRAINING PROGRAM

## 2022-12-20 PROCEDURE — 80053 COMPREHEN METABOLIC PANEL: CPT | Performed by: STUDENT IN AN ORGANIZED HEALTH CARE EDUCATION/TRAINING PROGRAM

## 2022-12-20 PROCEDURE — 93010 ELECTROCARDIOGRAM REPORT: CPT | Mod: ,,, | Performed by: INTERNAL MEDICINE

## 2022-12-20 PROCEDURE — 99900035 HC TECH TIME PER 15 MIN (STAT)

## 2022-12-20 PROCEDURE — 25000003 PHARM REV CODE 250: Performed by: STUDENT IN AN ORGANIZED HEALTH CARE EDUCATION/TRAINING PROGRAM

## 2022-12-20 PROCEDURE — 85025 COMPLETE CBC W/AUTO DIFF WBC: CPT | Performed by: STUDENT IN AN ORGANIZED HEALTH CARE EDUCATION/TRAINING PROGRAM

## 2022-12-20 PROCEDURE — A4216 STERILE WATER/SALINE, 10 ML: HCPCS | Performed by: STUDENT IN AN ORGANIZED HEALTH CARE EDUCATION/TRAINING PROGRAM

## 2022-12-20 PROCEDURE — A4216 STERILE WATER/SALINE, 10 ML: HCPCS | Performed by: HOSPITALIST

## 2022-12-20 PROCEDURE — 82042 OTHER SOURCE ALBUMIN QUAN EA: CPT | Performed by: STUDENT IN AN ORGANIZED HEALTH CARE EDUCATION/TRAINING PROGRAM

## 2022-12-20 PROCEDURE — 87070 CULTURE OTHR SPECIMN AEROBIC: CPT | Mod: 59 | Performed by: STUDENT IN AN ORGANIZED HEALTH CARE EDUCATION/TRAINING PROGRAM

## 2022-12-20 PROCEDURE — 63600175 PHARM REV CODE 636 W HCPCS: Performed by: INTERNAL MEDICINE

## 2022-12-20 PROCEDURE — 84157 ASSAY OF PROTEIN OTHER: CPT | Performed by: STUDENT IN AN ORGANIZED HEALTH CARE EDUCATION/TRAINING PROGRAM

## 2022-12-20 PROCEDURE — 93005 ELECTROCARDIOGRAM TRACING: CPT

## 2022-12-20 PROCEDURE — 83615 LACTATE (LD) (LDH) ENZYME: CPT | Performed by: STUDENT IN AN ORGANIZED HEALTH CARE EDUCATION/TRAINING PROGRAM

## 2022-12-20 PROCEDURE — 84100 ASSAY OF PHOSPHORUS: CPT | Performed by: STUDENT IN AN ORGANIZED HEALTH CARE EDUCATION/TRAINING PROGRAM

## 2022-12-20 PROCEDURE — 83735 ASSAY OF MAGNESIUM: CPT | Performed by: STUDENT IN AN ORGANIZED HEALTH CARE EDUCATION/TRAINING PROGRAM

## 2022-12-20 PROCEDURE — 89051 BODY FLUID CELL COUNT: CPT | Performed by: STUDENT IN AN ORGANIZED HEALTH CARE EDUCATION/TRAINING PROGRAM

## 2022-12-20 PROCEDURE — S0030 INJECTION, METRONIDAZOLE: HCPCS | Performed by: INTERNAL MEDICINE

## 2022-12-20 PROCEDURE — 99223 PR INITIAL HOSPITAL CARE,LEVL III: ICD-10-PCS | Mod: 57,,, | Performed by: UROLOGY

## 2022-12-20 PROCEDURE — 99232 PR SUBSEQUENT HOSPITAL CARE,LEVL II: ICD-10-PCS | Mod: ,,, | Performed by: STUDENT IN AN ORGANIZED HEALTH CARE EDUCATION/TRAINING PROGRAM

## 2022-12-20 PROCEDURE — 87077 CULTURE AEROBIC IDENTIFY: CPT | Performed by: STUDENT IN AN ORGANIZED HEALTH CARE EDUCATION/TRAINING PROGRAM

## 2022-12-20 PROCEDURE — 21400001 HC TELEMETRY ROOM

## 2022-12-20 PROCEDURE — 94761 N-INVAS EAR/PLS OXIMETRY MLT: CPT

## 2022-12-20 PROCEDURE — 93010 EKG 12-LEAD: ICD-10-PCS | Mod: ,,, | Performed by: INTERNAL MEDICINE

## 2022-12-20 PROCEDURE — 63600175 PHARM REV CODE 636 W HCPCS: Performed by: RADIOLOGY

## 2022-12-20 PROCEDURE — 25000003 PHARM REV CODE 250: Performed by: RADIOLOGY

## 2022-12-20 PROCEDURE — 88112 PR  CYTOPATH, CELL ENHANCE TECH: ICD-10-PCS | Mod: 26,,, | Performed by: PATHOLOGY

## 2022-12-20 PROCEDURE — 82945 GLUCOSE OTHER FLUID: CPT | Performed by: STUDENT IN AN ORGANIZED HEALTH CARE EDUCATION/TRAINING PROGRAM

## 2022-12-20 PROCEDURE — 25000003 PHARM REV CODE 250: Performed by: INTERNAL MEDICINE

## 2022-12-20 RX ORDER — FENTANYL CITRATE 50 UG/ML
INJECTION, SOLUTION INTRAMUSCULAR; INTRAVENOUS
Status: COMPLETED | OUTPATIENT
Start: 2022-12-20 | End: 2022-12-20

## 2022-12-20 RX ORDER — SODIUM CHLORIDE 9 MG/ML
INJECTION, SOLUTION INTRAVENOUS CONTINUOUS
Status: ACTIVE | OUTPATIENT
Start: 2022-12-20 | End: 2022-12-23

## 2022-12-20 RX ORDER — LIDOCAINE HYDROCHLORIDE 10 MG/ML
INJECTION INFILTRATION; PERINEURAL
Status: COMPLETED | OUTPATIENT
Start: 2022-12-20 | End: 2022-12-20

## 2022-12-20 RX ADMIN — CEFEPIME 2 G: 2 INJECTION, POWDER, FOR SOLUTION INTRAVENOUS at 06:12

## 2022-12-20 RX ADMIN — HEPARIN SODIUM 5000 UNITS: 5000 INJECTION INTRAVENOUS; SUBCUTANEOUS at 06:12

## 2022-12-20 RX ADMIN — Medication 10 ML: at 09:12

## 2022-12-20 RX ADMIN — LIDOCAINE HYDROCHLORIDE 3 ML: 10 INJECTION, SOLUTION INFILTRATION; PERINEURAL at 11:12

## 2022-12-20 RX ADMIN — CEFEPIME 2 G: 2 INJECTION, POWDER, FOR SOLUTION INTRAVENOUS at 02:12

## 2022-12-20 RX ADMIN — Medication 10 ML: at 06:12

## 2022-12-20 RX ADMIN — CEFEPIME 2 G: 2 INJECTION, POWDER, FOR SOLUTION INTRAVENOUS at 10:12

## 2022-12-20 RX ADMIN — METRONIDAZOLE 500 MG: 500 INJECTION, SOLUTION INTRAVENOUS at 08:12

## 2022-12-20 RX ADMIN — FENTANYL CITRATE 25 MCG: 50 INJECTION INTRAMUSCULAR; INTRAVENOUS at 11:12

## 2022-12-20 RX ADMIN — METRONIDAZOLE 500 MG: 500 INJECTION, SOLUTION INTRAVENOUS at 11:12

## 2022-12-20 RX ADMIN — Medication 10 ML: at 03:12

## 2022-12-20 RX ADMIN — HEPARIN SODIUM 5000 UNITS: 5000 INJECTION INTRAVENOUS; SUBCUTANEOUS at 09:12

## 2022-12-20 RX ADMIN — METRONIDAZOLE 500 MG: 500 INJECTION, SOLUTION INTRAVENOUS at 03:12

## 2022-12-20 RX ADMIN — SODIUM CHLORIDE: 0.9 INJECTION, SOLUTION INTRAVENOUS at 11:12

## 2022-12-20 RX ADMIN — Medication 10 ML: at 11:12

## 2022-12-20 NOTE — SUBJECTIVE & OBJECTIVE
Interval History:   No abdominal pain at this time. No N/V or diarrhea.  CLD advanced    Review of Systems   Constitutional:  Positive for fatigue. Negative for chills, diaphoresis and fever.   HENT:  Negative for congestion and trouble swallowing.    Eyes:  Negative for photophobia and visual disturbance.   Respiratory:  Negative for cough, chest tightness and shortness of breath.    Cardiovascular:  Negative for chest pain, palpitations and leg swelling.   Gastrointestinal:  Positive for abdominal distention (improving). Negative for abdominal pain, constipation, diarrhea, nausea and vomiting.   Endocrine: Negative for polydipsia and polyuria.   Musculoskeletal:  Negative for arthralgias and myalgias.   Neurological:  Positive for weakness. Negative for numbness.   Psychiatric/Behavioral:  Negative for confusion.      Objective:     Vital Signs (Most Recent):  Temp: 98.3 °F (36.8 °C) (12/20/22 0724)  Pulse: 92 (12/20/22 0724)  Resp: 18 (12/20/22 0724)  BP: 138/81 (12/20/22 0724)  SpO2: 95 % (12/20/22 0724)   Vital Signs (24h Range):  Temp:  [98.3 °F (36.8 °C)-99.5 °F (37.5 °C)] 98.3 °F (36.8 °C)  Pulse:  [92-98] 92  Resp:  [18] 18  SpO2:  [93 %-97 %] 95 %  BP: (133-159)/(67-96) 138/81     Weight: 96.6 kg (212 lb 15.4 oz)  Body mass index is 34.37 kg/m².    Intake/Output Summary (Last 24 hours) at 12/20/2022 0827  Last data filed at 12/20/2022 0805  Gross per 24 hour   Intake 360 ml   Output 2003 ml   Net -1643 ml        Physical Exam  Vitals and nursing note reviewed.   Constitutional:       General: He is not in acute distress.     Appearance: He is well-developed. He is obese. He is ill-appearing. He is not toxic-appearing or diaphoretic.   HENT:      Head: Normocephalic and atraumatic.      Nose:      Comments: NGT in place with bilious output     Mouth/Throat:      Mouth: Mucous membranes are moist.   Eyes:      General: No scleral icterus.     Pupils: Pupils are equal, round, and reactive to light.   Neck:       Thyroid: No thyromegaly.   Cardiovascular:      Rate and Rhythm: Normal rate and regular rhythm.      Pulses: Normal pulses.      Heart sounds: Normal heart sounds. No murmur heard.    No gallop.   Pulmonary:      Effort: Pulmonary effort is normal. No respiratory distress.      Breath sounds: Normal breath sounds. No stridor. No wheezing or rales.      Comments: Decreased inspiration. No wheezes on exam. On 2L NC  Abdominal:      General: Bowel sounds are normal. There is distension.      Palpations: Abdomen is soft.      Tenderness: There is no abdominal tenderness. There is no guarding.      Comments: RUQ drain in place with minimal brown/orange fluid   Musculoskeletal:         General: No deformity. Normal range of motion.      Cervical back: Normal range of motion. No rigidity.      Right lower leg: No edema.      Left lower leg: No edema.   Lymphadenopathy:      Cervical: No cervical adenopathy.   Skin:     General: Skin is warm.   Neurological:      Mental Status: He is alert and oriented to person, place, and time.   Psychiatric:         Behavior: Behavior normal.      Comments: Appears frustrated that he cannot eat at this time            Recent Results (from the past 24 hour(s))   POCT glucose    Collection Time: 12/19/22 11:52 AM   Result Value Ref Range    POCT Glucose 102 70 - 110 mg/dL   POCT glucose    Collection Time: 12/19/22  4:54 PM   Result Value Ref Range    POCT Glucose 94 70 - 110 mg/dL   POCT glucose    Collection Time: 12/19/22 11:51 PM   Result Value Ref Range    POCT Glucose 93 70 - 110 mg/dL   POCT glucose    Collection Time: 12/20/22  6:28 AM   Result Value Ref Range    POCT Glucose 104 70 - 110 mg/dL   CBC Auto Differential    Collection Time: 12/20/22  6:30 AM   Result Value Ref Range    WBC 14.89 (H) 3.90 - 12.70 K/uL    RBC 3.12 (L) 4.60 - 6.20 M/uL    Hemoglobin 9.0 (L) 14.0 - 18.0 g/dL    Hematocrit 27.8 (L) 40.0 - 54.0 %    MCV 89 82 - 98 fL    MCH 28.8 27.0 - 31.0 pg    MCHC  32.4 32.0 - 36.0 g/dL    RDW 14.3 11.5 - 14.5 %    Platelets 437 150 - 450 K/uL    MPV 10.2 9.2 - 12.9 fL    Immature Granulocytes 0.7 (H) 0.0 - 0.5 %    Gran # (ANC) 12.2 (H) 1.8 - 7.7 K/uL    Immature Grans (Abs) 0.11 (H) 0.00 - 0.04 K/uL    Lymph # 1.2 1.0 - 4.8 K/uL    Mono # 1.3 (H) 0.3 - 1.0 K/uL    Eos # 0.1 0.0 - 0.5 K/uL    Baso # 0.03 0.00 - 0.20 K/uL    nRBC 0 0 /100 WBC    Gran % 81.7 (H) 38.0 - 73.0 %    Lymph % 7.9 (L) 18.0 - 48.0 %    Mono % 8.7 4.0 - 15.0 %    Eosinophil % 0.8 0.0 - 8.0 %    Basophil % 0.2 0.0 - 1.9 %    Differential Method Automated    Comprehensive Metabolic Panel    Collection Time: 12/20/22  6:30 AM   Result Value Ref Range    Sodium 139 136 - 145 mmol/L    Potassium 3.8 3.5 - 5.1 mmol/L    Chloride 108 95 - 110 mmol/L    CO2 26 23 - 29 mmol/L    Glucose 100 70 - 110 mg/dL    BUN 23 (H) 6 - 20 mg/dL    Creatinine 1.1 0.5 - 1.4 mg/dL    Calcium 7.6 (L) 8.7 - 10.5 mg/dL    Total Protein 5.9 (L) 6.0 - 8.4 g/dL    Albumin 1.9 (L) 3.5 - 5.2 g/dL    Total Bilirubin 0.9 0.1 - 1.0 mg/dL    AST 25 10 - 40 U/L    ALT 7 (L) 10 - 44 U/L    Anion Gap 5 (L) 8 - 16 mmol/L    eGFR >60 >60 mL/min/1.73 m^2   Magnesium    Collection Time: 12/20/22  6:30 AM   Result Value Ref Range    Magnesium 1.9 1.6 - 2.6 mg/dL   Phosphorus    Collection Time: 12/20/22  6:30 AM   Result Value Ref Range    Phosphorus 3.1 2.7 - 4.5 mg/dL       Microbiology Results (last 7 days)       Procedure Component Value Units Date/Time    Gram stain [[id number]]     Order Status: No result Specimen: Pleural Fluid     Aerobic culture [[id number]]     Order Status: No result Specimen: Pleural Fluid     Culture, Anaerobic [[id number]]     Order Status: No result Specimen: Pleural Fluid     Fungus culture [[id number]] Collected: 12/09/22 1831    Order Status: Completed Specimen: Abscess from Abdomen Updated: 12/19/22 1409     Fungus (Mycology) Culture No fungal growth to date    Blood culture [[id number]] Collected: 12/14/22 0203     Order Status: Completed Specimen: Blood from Antecubital, Right Arm Updated: 12/18/22 0303     Blood Culture, Routine No Growth after 4 days.    Narrative:      Blood cx in the AM with AM labs    Blood culture [[id number]] Collected: 12/14/22 0205    Order Status: Completed Specimen: Blood from Antecubital, Right Updated: 12/18/22 0303     Blood Culture, Routine No Growth after 4 days.    Narrative:      Blood cx in the AM with AM labs    Gram stain [[id number]]     Order Status: No result Specimen: Ascites     Aerobic culture [[id number]]     Order Status: No result Specimen: Abdominal     Culture, Anaerobic [[id number]]     Order Status: No result Specimen: Ascites     Culture, Anaerobe [[id number]] Collected: 12/09/22 1831    Order Status: Completed Specimen: Abscess from Abdomen Updated: 12/13/22 0835     Anaerobic Culture No anaerobes isolated

## 2022-12-20 NOTE — PROGRESS NOTES
"[facility]  Infectious Disease  Progress Note    Patient Name: Vivek Turner  MRN: [id number]  Admission Date: 12/9/2022  Length of Stay: 11 days  Attending Physician: Sean Myers MD  Primary Care Provider: Primary Doctor No    Isolation Status: No active isolations  Assessment/Plan:      Pleural effusion  See hepatic abscess    Prostatic abscess  See hepatic abscess    Hepatic abscess  47M with h/o HTN admitted 12/9 with abdominal pain, found to have a liver abscess and MARYURI, s/p IR drainage on 12/9 (CX + pan-sen kleb pna), complicated by ileus/MARYURI. Blcx ngtd. Etiology of abscess is unclear. MRCP 12/12 unremarkable. Showed heterogenous fluid collection at inferior aspect of the right hepatic lobe measuring approximately 8.8 x 6.8 x 9.3 cm (known abscess). Recent US 12/16: "echogenicity w/n the right lobe of the liver, measuring approximately 7.1 x 7.5 x 5.3 cm. There is mildly complex fluid adjacent to the right lobe of the liver, measuring 3 x 10.8 x 11.2 cm. " Repeat CT scan with multiple collections c/f  abscesses (subcapsular hepatic collection, prostatic hypodensity, and abdominal fluid collections). Also ntoed to have R pleural effusion.  Multiple services consulted for potential drainage.     Recommendations:   -agree with potential drainage/source control of multiple fluid collections seen on CT scan   -recommend cultures, fluid studies  - duration of abx until radiographic resolution of abscess  - continue cefepime/flagyl pending cx data, may be able to de-escalate pending repeat cx   - plan for outpatient colonoscopy                      Thank you for your consult. I will follow-up with patient. Please contact us if you have any additional questions.     Time: 35 minutes   50% of time spent on face-to-face counseling and coordination of care. Counseling included review of test results, diagnosis, and treatment plan with patient and/or family.        Tejal Gaitan MD  Infectious " "Disease  [facility] - Telemetry    Subjective:     Principal Problem:Severe sepsis    HPI:   47M with h/o htn admitted 12/9 with several days of progressively worsening abdominal pain. Reports fever and nausea and decreased po intake.  Denies diarrhea. Denies weight loss. Notably reports traveling to [facility] several months ago.     Had abdominal ct and ultrasound  1. No sonographic abnormality of the gallbladder detected.  2. Ill-defined region of decreased echotexture within the posterior aspect of the right hepatic lobe corresponding to the findings seen on recent prior CT.  This could indicate a mass, abscess, or intense fatty infiltration.  3. Diffuse increased hepatic parenchymal echotexture suspected to represent hepatic steatosis.      S/p IR drainage of abscess. Reports pain improving.   KLEBSIELLA PNEUMONIAE   Many      Resulting Agency [facility]        Susceptibility     Klebsiella pneumoniae     CULTURE, AEROBIC  (SPECIFY SOURCE)     Amox/K Clav'ate <=8/4 mcg/mL Sensitive     Amp/Sulbactam <=8/4 mcg/mL Sensitive     Cefazolin <=2 mcg/mL Sensitive     Cefepime <=2 mcg/mL Sensitive     Ceftriaxone <=1 mcg/mL Sensitive     Ciprofloxacin <=1 mcg/mL Sensitive     Ertapenem <=0.5 mcg/mL Sensitive     Gentamicin <=4 mcg/mL Sensitive     Levofloxacin <=2 mcg/mL Sensitive     Meropenem <=1 mcg/mL Sensitive     Minocycline <=4 mcg/mL Sensitive     Piperacillin/Tazo <=16 mcg/mL Sensitive     Tetracycline <=4 mcg/mL Sensitive     Tobramycin <=4 mcg/mL Sensitive     Trimeth/Sulfa <=2/38 mcg/mL Sensitive                 Silvino improving    On cefepime/metronidazole    Hiv ordered, pending      ID consulted for "klebsiella liver abscess"    Interval History: No fevers documented overnight.   CT scan completed - pending potential drainage.  Pt reported tolerating abx without issues - denies n/v/d or abdo pain. NPO.    Review of Systems   Constitutional:  Negative for chills and fever.   Gastrointestinal:  Negative for " abdominal pain.   All other systems reviewed and are negative.  Objective:     Vital Signs (Most Recent):  Temp: 98.3 °F (36.8 °C) (12/20/22 0724)  Pulse: 92 (12/20/22 0724)  Resp: 18 (12/20/22 0724)  BP: 138/81 (12/20/22 0724)  SpO2: 95 % (12/20/22 0724)   Vital Signs (24h Range):  Temp:  [98.3 °F (36.8 °C)-99.5 °F (37.5 °C)] 98.3 °F (36.8 °C)  Pulse:  [92-98] 92  Resp:  [18] 18  SpO2:  [93 %-97 %] 95 %  BP: (133-159)/(67-96) 138/81     Weight: 96.6 kg (212 lb 15.4 oz)  Body mass index is 34.37 kg/m².    Estimated Creatinine Clearance: 92.2 mL/min (based on SCr of 1.1 mg/dL).    Physical Exam  Constitutional:       General: He is not in acute distress.     Appearance: He is not ill-appearing, toxic-appearing or diaphoretic.   HENT:      Head: Normocephalic and atraumatic.      Right Ear: External ear normal.      Left Ear: External ear normal.   Eyes:      General: No scleral icterus.        Right eye: No discharge.         Left eye: No discharge.   Cardiovascular:      Rate and Rhythm: Normal rate and regular rhythm.   Abdominal:      General: There is no distension.      Palpations: Abdomen is soft.      Tenderness: There is no abdominal tenderness. There is no guarding.      Comments: R abdo drain - purulent fluid present   Musculoskeletal:      Right lower leg: No edema.      Left lower leg: No edema.   Skin:     General: Skin is warm and dry.      Coloration: Skin is not jaundiced.      Findings: No bruising or erythema.      Comments: L picc   Neurological:      Mental Status: He is alert and oriented to person, place, and time. Mental status is at baseline.      Motor: No weakness.   Psychiatric:         Mood and Affect: Mood normal.         Behavior: Behavior normal.       Significant Labs:   Microbiology Results (last 7 days)       Procedure Component Value Units Date/Time    Gram stain [[id number]]     Order Status: No result Specimen: Pleural Fluid     Aerobic culture [[id number]]     Order Status: No  result Specimen: Pleural Fluid     Culture, Anaerobic [[id number]]     Order Status: No result Specimen: Pleural Fluid     Fungus culture [[id number]] Collected: 12/09/22 1831    Order Status: Completed Specimen: Abscess from Abdomen Updated: 12/19/22 1409     Fungus (Mycology) Culture No fungal growth to date    Blood culture [[id number]] Collected: 12/14/22 0205    Order Status: Completed Specimen: Blood from Antecubital, Right Arm Updated: 12/18/22 0303     Blood Culture, Routine No Growth after 4 days.    Narrative:      Blood cx in the AM with AM labs    Blood culture [[id number]] Collected: 12/14/22 0205    Order Status: Completed Specimen: Blood from Antecubital, Right Updated: 12/18/22 0303     Blood Culture, Routine No Growth after 4 days.    Narrative:      Blood cx in the AM with AM labs    Gram stain [[id number]]     Order Status: No result Specimen: Ascites     Aerobic culture [[id number]]     Order Status: No result Specimen: Abdominal     Culture, Anaerobic [[id number]]     Order Status: No result Specimen: Ascites     Culture, Anaerobe [[id number]] Collected: 12/09/22 1831    Order Status: Completed Specimen: Abscess from Abdomen Updated: 12/13/22 0835     Anaerobic Culture No anaerobes isolated            Significant Imaging: I have reviewed all pertinent imaging results/findings within the past 24 hours.

## 2022-12-20 NOTE — ASSESSMENT & PLAN NOTE
This patient does have evidence of infective focus. My overall impression is sepsis. Vital signs were reviewed and noted in progress note.  Antibiotics given-   Antibiotics (From admission, onward)    Start     Stop Route Frequency Ordered    12/18/22 1851  cefepime in dextrose 5 % IVPB 2 g         -- IV Every 8 hours (non-standard times) 12/18/22 1424    12/13/22 0833  metronidazole IVPB 500 mg         -- IV Every 8 hours (non-standard times) 12/13/22 0833        Cultures were taken-   Microbiology Results (last 7 days)     Procedure Component Value Units Date/Time    Gram stain [[id number]]     Order Status: No result Specimen: Pleural Fluid     Aerobic culture [[id number]]     Order Status: No result Specimen: Pleural Fluid     Culture, Anaerobic [[id number]]     Order Status: No result Specimen: Pleural Fluid     Fungus culture [[id number]] Collected: 12/09/22 1831    Order Status: Completed Specimen: Abscess from Abdomen Updated: 12/19/22 1409     Fungus (Mycology) Culture No fungal growth to date    Blood culture [[id number]] Collected: 12/14/22 0205    Order Status: Completed Specimen: Blood from Antecubital, Right Arm Updated: 12/18/22 0303     Blood Culture, Routine No Growth after 4 days.    Narrative:      Blood cx in the AM with AM labs    Blood culture [[id number]] Collected: 12/14/22 0205    Order Status: Completed Specimen: Blood from Antecubital, Right Updated: 12/18/22 0303     Blood Culture, Routine No Growth after 4 days.    Narrative:      Blood cx in the AM with AM labs    Gram stain [[id number]]     Order Status: No result Specimen: Ascites     Aerobic culture [[id number]]     Order Status: No result Specimen: Abdominal     Culture, Anaerobic [[id number]]     Order Status: No result Specimen: Ascites     Culture, Anaerobe [[id number]] Collected: 12/09/22 1831    Order Status: Completed Specimen: Abscess from Abdomen Updated: 12/13/22 0835     Anaerobic Culture No anaerobes isolated        Latest lactate  reviewed, they are-  No results for input(s): LACTATE in the last 72 hours.    Organ dysfunction indicated by Acute kidney injury and Acute liver injury  Source- liver abscess    Source control Achieved by- IR    -Due to hepatic abscess  -s/p IR drainage on 12/09  -ID consulted:  Continue cefazolin and Flagyl  -blood cultures with no growth to date   -abscess culture with pansensitive Klebsiella   -continue IV antibiotics as above   -Leukocytosis improving

## 2022-12-20 NOTE — NURSING
Received report from CHACE Donovan in IR. Right thoracentesis was performed. 1.5L of clear juan drainage was removed. Patient has 1 drainage in RUQ and 1 drainage in RLQ. /71, O2 98 on 4L R 22, and HR 98. Awaiting patient's arrival to floor.

## 2022-12-20 NOTE — SEDATION DOCUMENTATION
Flexima APDL [id number], [id number], use by 2025-02-15, inserted into left upper quadrant. 5ml serous drainage seen upon insertion.

## 2022-12-20 NOTE — SEDATION DOCUMENTATION
1.5L clear juan colored drainage removed from right-sided thoracentesis. Access site covered with vaseline gauze and secured with a transparent dressing.

## 2022-12-20 NOTE — SUBJECTIVE & OBJECTIVE
Interval History: No fevers documented overnight.   CT scan completed - pending potential drainage.  Pt reported tolerating abx without issues - denies n/v/d or abdo pain. NPO.    Review of Systems   Constitutional:  Negative for chills and fever.   Gastrointestinal:  Negative for abdominal pain.   All other systems reviewed and are negative.  Objective:     Vital Signs (Most Recent):  Temp: 98.3 °F (36.8 °C) (12/20/22 0724)  Pulse: 92 (12/20/22 0724)  Resp: 18 (12/20/22 0724)  BP: 138/81 (12/20/22 0724)  SpO2: 95 % (12/20/22 0724)   Vital Signs (24h Range):  Temp:  [98.3 °F (36.8 °C)-99.5 °F (37.5 °C)] 98.3 °F (36.8 °C)  Pulse:  [92-98] 92  Resp:  [18] 18  SpO2:  [93 %-97 %] 95 %  BP: (133-159)/(67-96) 138/81     Weight: 96.6 kg (212 lb 15.4 oz)  Body mass index is 34.37 kg/m².    Estimated Creatinine Clearance: 92.2 mL/min (based on SCr of 1.1 mg/dL).    Physical Exam  Constitutional:       General: He is not in acute distress.     Appearance: He is not ill-appearing, toxic-appearing or diaphoretic.   HENT:      Head: Normocephalic and atraumatic.      Right Ear: External ear normal.      Left Ear: External ear normal.   Eyes:      General: No scleral icterus.        Right eye: No discharge.         Left eye: No discharge.   Cardiovascular:      Rate and Rhythm: Normal rate and regular rhythm.   Abdominal:      General: There is no distension.      Palpations: Abdomen is soft.      Tenderness: There is no abdominal tenderness. There is no guarding.      Comments: R abdo drain - purulent fluid present   Musculoskeletal:      Right lower leg: No edema.      Left lower leg: No edema.   Skin:     General: Skin is warm and dry.      Coloration: Skin is not jaundiced.      Findings: No bruising or erythema.      Comments: L picc   Neurological:      Mental Status: He is alert and oriented to person, place, and time. Mental status is at baseline.      Motor: No weakness.   Psychiatric:         Mood and Affect: Mood  normal.         Behavior: Behavior normal.       Significant Labs:   Microbiology Results (last 7 days)       Procedure Component Value Units Date/Time    Gram stain [[id number]]     Order Status: No result Specimen: Pleural Fluid     Aerobic culture [[id number]]     Order Status: No result Specimen: Pleural Fluid     Culture, Anaerobic [[id number]]     Order Status: No result Specimen: Pleural Fluid     Fungus culture [[id number]] Collected: 12/09/22 1831    Order Status: Completed Specimen: Abscess from Abdomen Updated: 12/19/22 1409     Fungus (Mycology) Culture No fungal growth to date    Blood culture [[id number]] Collected: 12/14/22 0205    Order Status: Completed Specimen: Blood from Antecubital, Right Arm Updated: 12/18/22 0303     Blood Culture, Routine No Growth after 4 days.    Narrative:      Blood cx in the AM with AM labs    Blood culture [[id number]] Collected: 12/14/22 0205    Order Status: Completed Specimen: Blood from Antecubital, Right Updated: 12/18/22 0303     Blood Culture, Routine No Growth after 4 days.    Narrative:      Blood cx in the AM with AM labs    Gram stain [[id number]]     Order Status: No result Specimen: Ascites     Aerobic culture [[id number]]     Order Status: No result Specimen: Abdominal     Culture, Anaerobic [[id number]]     Order Status: No result Specimen: Ascites     Culture, Anaerobe [[id number]] Collected: 12/09/22 1831    Order Status: Completed Specimen: Abscess from Abdomen Updated: 12/13/22 0835     Anaerobic Culture No anaerobes isolated            Significant Imaging: I have reviewed all pertinent imaging results/findings within the past 24 hours.

## 2022-12-20 NOTE — PT/OT/SLP PROGRESS
Physical Therapy      Patient Name:  Vivek Turner   MRN:  [id number]    Patient not seen today secondary to Off the floor to IR .  Will follow-up as able later hour/day .  Second attempt, pt still have an active bedrest order post procedure. Will follow up later hour/day .

## 2022-12-20 NOTE — PROGRESS NOTES
[facility] Medicine  Progress Note    Patient Name: Vivek Turner  MRN: [id number]  Patient Class: IP- Inpatient   Admission Date: 12/9/2022  Length of Stay: 11 days  Attending Physician: Sean Myers MD  Primary Care Provider: Primary Doctor No        Subjective:     Principal Problem:Severe sepsis        HPI:  47y M w/ hypertension presents with abdominal pain x 2 days. Pt refused  service. He reports that he had fever/rigors three days prior to admission. He then developed worsening abdominal pain from then until admission. He went to an urgent care where he was started on bactrim for UTI. His abdominal pain increased over the next few days. He had a BM yesterday. Denies vomiting, but has had significant nausea and very limited PO intake in the days leading to admission. Aside from the single episode of fever/rigors he denies subsequent fevers.     In the ED he was found to have a R hepatic abscess vs mass. He traveled to the [facility] eight weeks prior to presentation. He denies fevers except for what is listed above. Denies weight loss, cough, night sweats. Denies prior hx of TB. Denies prior hx of cirrhosis      Overview/Hospital Course:  Mr Vivek Turner was admitted with severe sepsis due to R liver mass vs abscess and acute renal failure. IR consulted and drain placed on 12/9. Cultures with Klebsiella. ID consulted-continue cefazolin, and restarted Flagyl on 12/13. Started on bicarb gtt for acute renal failure and Nephrology consulted. He was moved to ICU for instability. He has ileus; NGT placed with copious bilious output. Renal failure improving. GI consulted to assess for source of liver abscess. MRCP showed persistent heterogeneous collection within the right hepatic lobe compatible with reported abscess noting percutaneous drainage catheter in place. No significant biliary duct abnormality on MRCP assessment. Pt refused inpatient colonoscopy. Pt transferred to the  floor on 12/12. PT/OT recommends HH. Monitor renal function and ileus at this time. Still has some output from NGT, GI recommends clamping today and monitor.     Cr improving with fluids and TPN. Na still high, will continue D5 for now. wbc uptrending and looks like new fluid collection next to liver, will ask IR to drain and send for cultures, labs placed. Will change to Cefepime for broader coverage until those cultures result. WBC# still elevated. IR did not see another fluid pocket that was attainable, Cr improving now at 1.5, may have to re-image/CT in a couple days when clearance is back to normal to confirm source control. Na 151, gave D5 at 100 mL for 10 hours and Na corrected to 143, D5 stopped. Still getting TPN fluids, but will attempt to advance diet, NGT removed.     CrCl back up to 78, will order CT a/p w contrast to assess for resolution or further intervention. Low dose fluid ordered to protect kidneys from contrast for 20 hours ordered given tenuous state.     Suspected that we did not have source control given persistent WBC#, repeat CT showing multiple abdominal abscesses in the abdomen persists, as well as a large R pleural effusion, and prostatic abscess. IR consulted for thoracentesis and potentially placing another drain(s), given multiple abscesses and locations, will ask GenSx to review CT and see if a washout is more appropriate. Urology consulted for prostatic abscess.       Interval History:   No abdominal pain at this time. No N/V or diarrhea.  CLD advanced    Review of Systems   Constitutional:  Positive for fatigue. Negative for chills, diaphoresis and fever.   HENT:  Negative for congestion and trouble swallowing.    Eyes:  Negative for photophobia and visual disturbance.   Respiratory:  Negative for cough, chest tightness and shortness of breath.    Cardiovascular:  Negative for chest pain, palpitations and leg swelling.   Gastrointestinal:  Positive for abdominal distention  (improving). Negative for abdominal pain, constipation, diarrhea, nausea and vomiting.   Endocrine: Negative for polydipsia and polyuria.   Musculoskeletal:  Negative for arthralgias and myalgias.   Neurological:  Positive for weakness. Negative for numbness.   Psychiatric/Behavioral:  Negative for confusion.      Objective:     Vital Signs (Most Recent):  Temp: 98.3 °F (36.8 °C) (12/20/22 0724)  Pulse: 92 (12/20/22 0724)  Resp: 18 (12/20/22 0724)  BP: 138/81 (12/20/22 0724)  SpO2: 95 % (12/20/22 0724)   Vital Signs (24h Range):  Temp:  [98.3 °F (36.8 °C)-99.5 °F (37.5 °C)] 98.3 °F (36.8 °C)  Pulse:  [92-98] 92  Resp:  [18] 18  SpO2:  [93 %-97 %] 95 %  BP: (133-159)/(67-96) 138/81     Weight: 96.6 kg (212 lb 15.4 oz)  Body mass index is 34.37 kg/m².    Intake/Output Summary (Last 24 hours) at 12/20/2022 0827  Last data filed at 12/20/2022 0805  Gross per 24 hour   Intake 360 ml   Output 2003 ml   Net -1643 ml        Physical Exam  Vitals and nursing note reviewed.   Constitutional:       General: He is not in acute distress.     Appearance: He is well-developed. He is obese. He is ill-appearing. He is not toxic-appearing or diaphoretic.   HENT:      Head: Normocephalic and atraumatic.      Nose:      Comments: NGT in place with bilious output     Mouth/Throat:      Mouth: Mucous membranes are moist.   Eyes:      General: No scleral icterus.     Pupils: Pupils are equal, round, and reactive to light.   Neck:      Thyroid: No thyromegaly.   Cardiovascular:      Rate and Rhythm: Normal rate and regular rhythm.      Pulses: Normal pulses.      Heart sounds: Normal heart sounds. No murmur heard.    No gallop.   Pulmonary:      Effort: Pulmonary effort is normal. No respiratory distress.      Breath sounds: Normal breath sounds. No stridor. No wheezing or rales.      Comments: Decreased inspiration. No wheezes on exam. On 2L NC  Abdominal:      General: Bowel sounds are normal. There is distension.      Palpations:  Abdomen is soft.      Tenderness: There is no abdominal tenderness. There is no guarding.      Comments: RUQ drain in place with minimal brown/orange fluid   Musculoskeletal:         General: No deformity. Normal range of motion.      Cervical back: Normal range of motion. No rigidity.      Right lower leg: No edema.      Left lower leg: No edema.   Lymphadenopathy:      Cervical: No cervical adenopathy.   Skin:     General: Skin is warm.   Neurological:      Mental Status: He is alert and oriented to person, place, and time.   Psychiatric:         Behavior: Behavior normal.      Comments: Appears frustrated that he cannot eat at this time            Recent Results (from the past 24 hour(s))   POCT glucose    Collection Time: 12/19/22 11:52 AM   Result Value Ref Range    POCT Glucose 102 70 - 110 mg/dL   POCT glucose    Collection Time: 12/19/22  4:54 PM   Result Value Ref Range    POCT Glucose 94 70 - 110 mg/dL   POCT glucose    Collection Time: 12/19/22 11:51 PM   Result Value Ref Range    POCT Glucose 93 70 - 110 mg/dL   POCT glucose    Collection Time: 12/20/22  6:28 AM   Result Value Ref Range    POCT Glucose 104 70 - 110 mg/dL   CBC Auto Differential    Collection Time: 12/20/22  6:30 AM   Result Value Ref Range    WBC 14.89 (H) 3.90 - 12.70 K/uL    RBC 3.12 (L) 4.60 - 6.20 M/uL    Hemoglobin 9.0 (L) 14.0 - 18.0 g/dL    Hematocrit 27.8 (L) 40.0 - 54.0 %    MCV 89 82 - 98 fL    MCH 28.8 27.0 - 31.0 pg    MCHC 32.4 32.0 - 36.0 g/dL    RDW 14.3 11.5 - 14.5 %    Platelets 437 150 - 450 K/uL    MPV 10.2 9.2 - 12.9 fL    Immature Granulocytes 0.7 (H) 0.0 - 0.5 %    Gran # (ANC) 12.2 (H) 1.8 - 7.7 K/uL    Immature Grans (Abs) 0.11 (H) 0.00 - 0.04 K/uL    Lymph # 1.2 1.0 - 4.8 K/uL    Mono # 1.3 (H) 0.3 - 1.0 K/uL    Eos # 0.1 0.0 - 0.5 K/uL    Baso # 0.03 0.00 - 0.20 K/uL    nRBC 0 0 /100 WBC    Gran % 81.7 (H) 38.0 - 73.0 %    Lymph % 7.9 (L) 18.0 - 48.0 %    Mono % 8.7 4.0 - 15.0 %    Eosinophil % 0.8 0.0 - 8.0 %     Basophil % 0.2 0.0 - 1.9 %    Differential Method Automated    Comprehensive Metabolic Panel    Collection Time: 12/20/22  6:30 AM   Result Value Ref Range    Sodium 139 136 - 145 mmol/L    Potassium 3.8 3.5 - 5.1 mmol/L    Chloride 108 95 - 110 mmol/L    CO2 26 23 - 29 mmol/L    Glucose 100 70 - 110 mg/dL    BUN 23 (H) 6 - 20 mg/dL    Creatinine 1.1 0.5 - 1.4 mg/dL    Calcium 7.6 (L) 8.7 - 10.5 mg/dL    Total Protein 5.9 (L) 6.0 - 8.4 g/dL    Albumin 1.9 (L) 3.5 - 5.2 g/dL    Total Bilirubin 0.9 0.1 - 1.0 mg/dL    AST 25 10 - 40 U/L    ALT 7 (L) 10 - 44 U/L    Anion Gap 5 (L) 8 - 16 mmol/L    eGFR >60 >60 mL/min/1.73 m^2   Magnesium    Collection Time: 12/20/22  6:30 AM   Result Value Ref Range    Magnesium 1.9 1.6 - 2.6 mg/dL   Phosphorus    Collection Time: 12/20/22  6:30 AM   Result Value Ref Range    Phosphorus 3.1 2.7 - 4.5 mg/dL       Microbiology Results (last 7 days)       Procedure Component Value Units Date/Time    Gram stain [[id number]]     Order Status: No result Specimen: Pleural Fluid     Aerobic culture [[id number]]     Order Status: No result Specimen: Pleural Fluid     Culture, Anaerobic [[id number]]     Order Status: No result Specimen: Pleural Fluid     Fungus culture [[id number]] Collected: 12/09/22 1831    Order Status: Completed Specimen: Abscess from Abdomen Updated: 12/19/22 1409     Fungus (Mycology) Culture No fungal growth to date    Blood culture [[id number]] Collected: 12/14/22 0205    Order Status: Completed Specimen: Blood from Antecubital, Right Arm Updated: 12/18/22 0303     Blood Culture, Routine No Growth after 4 days.    Narrative:      Blood cx in the AM with AM labs    Blood culture [[id number]] Collected: 12/14/22 0205    Order Status: Completed Specimen: Blood from Antecubital, Right Updated: 12/18/22 0303     Blood Culture, Routine No Growth after 4 days.    Narrative:      Blood cx in the AM with AM labs    Gram stain [[id number]]     Order Status: No result Specimen: Ascites      Aerobic culture [[id number]]     Order Status: No result Specimen: Abdominal     Culture, Anaerobic [[id number]]     Order Status: No result Specimen: Ascites     Culture, Anaerobe [[id number]] Collected: 12/09/22 1831    Order Status: Completed Specimen: Abscess from Abdomen Updated: 12/13/22 0835     Anaerobic Culture No anaerobes isolated                       Assessment/Plan:      * Severe sepsis  This patient does have evidence of infective focus. My overall impression is sepsis. Vital signs were reviewed and noted in progress note.  Antibiotics given-   Antibiotics (From admission, onward)      Start     Stop Route Frequency Ordered    12/18/22 1851  cefepime in dextrose 5 % IVPB 2 g         -- IV Every 8 hours (non-standard times) 12/18/22 1424    12/13/22 0833  metronidazole IVPB 500 mg         -- IV Every 8 hours (non-standard times) 12/13/22 0833          Cultures were taken-   Microbiology Results (last 7 days)       Procedure Component Value Units Date/Time    Gram stain [[id number]]     Order Status: No result Specimen: Pleural Fluid     Aerobic culture [[id number]]     Order Status: No result Specimen: Pleural Fluid     Culture, Anaerobic [[id number]]     Order Status: No result Specimen: Pleural Fluid     Fungus culture [[id number]] Collected: 12/09/22 1831    Order Status: Completed Specimen: Abscess from Abdomen Updated: 12/19/22 1409     Fungus (Mycology) Culture No fungal growth to date    Blood culture [[id number]] Collected: 12/14/22 0205    Order Status: Completed Specimen: Blood from Antecubital, Right Arm Updated: 12/18/22 0303     Blood Culture, Routine No Growth after 4 days.    Narrative:      Blood cx in the AM with AM labs    Blood culture [[id number]] Collected: 12/14/22 0205    Order Status: Completed Specimen: Blood from Antecubital, Right Updated: 12/18/22 0303     Blood Culture, Routine No Growth after 4 days.    Narrative:      Blood cx in the AM with AM labs    Gram stain [[id number]]      Order Status: No result Specimen: Ascites     Aerobic culture [[id number]]     Order Status: No result Specimen: Abdominal     Culture, Anaerobic [[id number]]     Order Status: No result Specimen: Ascites     Culture, Anaerobe [[id number]] Collected: 12/09/22 1831    Order Status: Completed Specimen: Abscess from Abdomen Updated: 12/13/22 0835     Anaerobic Culture No anaerobes isolated          Latest lactate reviewed, they are-  No results for input(s): LACTATE in the last 72 hours.    Organ dysfunction indicated by Acute kidney injury and Acute liver injury  Source- liver abscess    Source control Achieved by- IR    -Due to hepatic abscess  -s/p IR drainage on 12/09  -ID consulted:  Continue cefazolin and Flagyl  -blood cultures with no growth to date   -abscess culture with pansensitive Klebsiella   -continue IV antibiotics as above   -Leukocytosis improving       Ileus  Patient has Non- operative ileus which is adynamic in etiology which is worsening. Will treat conservatively with bowel rest, IV fluids, serial abdominal exams and avoidance of GI paralytics such as narcotics or anti-spasmodics. Monitor patient closely.     -NGT placed 12/11 with large output-- continue  -GI following: plan for clamping the NGT today and advance to clears if tolerated   -Surgery following   -Nutrition consulted  -Initiated TPN on 12/14 but was unable to get PICC due to severe weather.   -PICC placed on 12/15, will continue custom TPN.   -NGT removed and tolerating diet, TPN stopped       Hepatic abscess  Unclear how he got this. IR drain placed 12/9. Cultures with Klebsiella, pansensitive.   - MRCP:persistent heterogeneous collection within the right hepatic lobe compatible with reported abscess noting percutaneous drainage catheter in place. No significant biliary duct abnormality on MRCP assessment.  - Blood cx with NGTD  - Abscess culture with klebsiella. No anaerobes  -IR drain with minimal output. Discussed with IR and IR  suspect its not putting out much because of the heterogeneity of the collection and drain may not be sufficient. Recommended repeat US abdomen   - ID consulted: on cefazolin, added flagyl on 12/13  - GI consulted to assess for source of liver abscess. Pt refuses inpatient colonoscopy. Likely plan for outpatient cscope.   -Suspected that we did not have source control given persistent WBC#, multiple abdominal abscesses in the abdomen persists, as well as a large R pleural effusion, and prostatic abscess.    IR consulted for thoracentesis and potentially placing another drain(s), given multiple abscesses and locations   will ask GenSx to review CT and see if a washout is more appropriate.     Pleural effusion  Large R pleural effusion   -Thoracentesis requested      Prostatic abscess  Urology consulted for prostatic abscess.   See hepatic abscess      Acute hypoxemic respiratory failure  Patient with Hypoxic Respiratory failure which is Acute.  he is not on home oxygen.   Signs/symptoms of respiratory failure include- tachypnea, increased work of breathing and wheezing. Contributing diagnoses includes - atelectasis Labs and images were reviewed. Patient Has not had a recent ABG.     - O2 by NC PRN  - nebs PRN  - CXR shows atelectasis    Hyperglycemia  A1c normal.     Microcytic anemia  Hgb 10-11, no prior to review.   Iron deficient- TSAT 17, but also significant acute inflammation- ferritin 3633.   B12, folate replete.   Stable        Atelectasis  Shortness of breath partly due to atelectasis as noted on CXR  - incentive spirometer  - O2 by NC as needed      ATN (acute tubular necrosis)  Creatinine at 6.1 at admission. Unknown baseline with no prior labs to review. FENa 1.4% suggesting intrinsic injury. This may be ATN from sepsis +/- Bactrim pseudotoxicity. No hydro seen on CT or US.   - IVF ordered  - Nephrology consulted  - this is improving     Patient with acute kidney injury likely due to IVVD/dehydration and  acute tubular necrosis MARYURI is currently improving. Labs reviewed- Renal function/electrolytes with Estimated Creatinine Clearance: 48.3 mL/min (A) (based on SCr of 2.1 mg/dL (H)). according to latest data. Monitor urine output and serial BMP and adjust therapy as needed. Avoid nephrotoxins and renally dose meds for GFR listed above.         VTE Risk Mitigation (From admission, onward)           Ordered     heparin (porcine) injection 5,000 Units  Every 8 hours         12/10/22 0838     IP VTE HIGH RISK PATIENT  Once         12/09/22 1421     Place sequential compression device  Until discontinued         12/09/22 1421                    Discharge Planning   VIVIEN: 12/16/2022     Code Status: Full Code   Is the patient medically ready for discharge?:     Reason for patient still in hospital (select all that apply): Patient trending condition and Treatment  Discharge Plan A: Home Health   Discharge Delays: None known at this time              Sean Myers MD  Department of Hospital Medicine   [facility]

## 2022-12-20 NOTE — ASSESSMENT & PLAN NOTE
Patient has Non- operative ileus which is adynamic in etiology which is worsening. Will treat conservatively with bowel rest, IV fluids, serial abdominal exams and avoidance of GI paralytics such as narcotics or anti-spasmodics. Monitor patient closely.     -NGT placed 12/11 with large output-- continue  -GI following: plan for clamping the NGT today and advance to clears if tolerated   -Surgery following   -Nutrition consulted  -Initiated TPN on 12/14 but was unable to get PICC due to severe weather.   -PICC placed on 12/15, will continue custom TPN.   -NGT removed and tolerating diet, TPN stopped

## 2022-12-20 NOTE — PROCEDURES
Radiology Post-Procedure Note    Pre Op Diagnosis: hepatic abscess, multiple abdominal abscesses, right pleural effusion  Post Op Diagnosis: Same    Procedure: Right thoracentesis    Procedure performed by: Que Olivera MD    Written Informed Consent Obtained: Yes  Specimen Removed: YES 1.5 L thin yellow fluid  Estimated Blood Loss: Minimal    Findings:   Successful right thoracentesis.      Patient tolerated procedure well.    Que Olivera MD  Staff Radiologist  Department of Radiology  Pager: [phone]

## 2022-12-20 NOTE — HPI
Prostatic Abscess  Vivek Turner is a 47 y.o. male who has been feeling poorly for a few weeks.  He currently has no fever but did previously.  He denies hematuria, dysuria, or difficulty voiding.

## 2022-12-20 NOTE — PROCEDURES
Radiology Post-Procedure Note    Pre Op Diagnosis: right perihepatic and RLQ abscess    Post Op Diagnosis: same    Procedure:right perihepatic and RLQ abscess drainage    Procedure performed by: Que Olivera MD    Written Informed Consent Obtained: Yes    Specimen Removed: YES pus from perihepatic collection and serous fluid from RLQ collection    Estimated Blood Loss: Minimal    Findings: CT was used for localization of abnormal fluid collection. A needle was inserted into each fluid collection and purulent fluid was aspirated from the perihepatic collection and serous fluid from the RLQ collection.  A wire was inserted into each collection and the tract was dilated.  A 10 Turkmen all-purpose drainage catheter was inserted and a pigtail loop of the distal end was formed in the perihepatic collection.  An 8 F pigtail drain was placed in the RLQ collection.  The catheters were sutured into place and approximately  5 mL fluid was removed from each collection for C/S.     A specimen from each collection was sent to the lab for further analysis and culture.    The patient tolerated procedure well and there were no complications. Please see procedure report under Imaging for further details.    Que Olivera MD  Staff Radiologist  Department of Radiology  Pager: [phone]

## 2022-12-20 NOTE — ASSESSMENT & PLAN NOTE
Plan on Cystoscopy with prostatic unroofing of abscess tomorrow 12/21/2022  NPO after midnight for procedure Wednesday 12/21/2022

## 2022-12-20 NOTE — ASSESSMENT & PLAN NOTE
"47M with h/o HTN admitted 12/9 with abdominal pain, found to have a liver abscess and MARYURI, s/p IR drainage on 12/9 (CX + pan-sen kleb pna), complicated by ileus/MARYURI. Blcx ngtd. Etiology of abscess is unclear. MRCP 12/12 unremarkable. Showed heterogenous fluid collection at inferior aspect of the right hepatic lobe measuring approximately 8.8 x 6.8 x 9.3 cm (known abscess). Recent US 12/16: "echogenicity w/n the right lobe of the liver, measuring approximately 7.1 x 7.5 x 5.3 cm. There is mildly complex fluid adjacent to the right lobe of the liver, measuring 3 x 10.8 x 11.2 cm. " Repeat CT scan with multiple collections c/f  abscesses (subcapsular hepatic collection, prostatic hypodensity, and abdominal fluid collections). Also ntoed to have R pleural effusion.  Multiple services consulted for potential drainage.     Recommendations:   -agree with potential drainage/source control of multiple fluid collections seen on CT scan   -recommend cultures, fluid studies  - duration of abx until radiographic resolution of abscess  - continue cefepime/flagyl pending cx data, may be able to de-escalate pending repeat cx   - plan for outpatient colonoscopy            "

## 2022-12-20 NOTE — PLAN OF CARE
Problem: Adult Inpatient Plan of Care  Goal: Plan of Care Review  Outcome: Ongoing, Progressing  Goal: Patient-Specific Goal (Individualized)  Outcome: Ongoing, Progressing  Goal: Absence of Hospital-Acquired Illness or Injury  Outcome: Ongoing, Progressing  Goal: Optimal Comfort and Wellbeing  Outcome: Ongoing, Progressing     Problem: Adjustment to Illness (Sepsis/Septic Shock)  Goal: Optimal Coping  Outcome: Ongoing, Progressing     Problem: Bleeding (Sepsis/Septic Shock)  Goal: Absence of Bleeding  Outcome: Ongoing, Progressing     Problem: Glycemic Control Impaired (Sepsis/Septic Shock)  Goal: Blood Glucose Level Within Desired Range  Outcome: Ongoing, Progressing     Problem: Infection Progression (Sepsis/Septic Shock)  Goal: Absence of Infection Signs and Symptoms  Outcome: Ongoing, Progressing     Problem: Fluid and Electrolyte Imbalance (Acute Kidney Injury/Impairment)  Goal: Fluid and Electrolyte Balance  Outcome: Ongoing, Progressing     Problem: Oral Intake Inadequate (Acute Kidney Injury/Impairment)  Goal: Optimal Nutrition Intake  Outcome: Ongoing, Progressing     Problem: Skin Injury Risk Increased  Goal: Skin Health and Integrity  Outcome: Ongoing, Progressing     Problem: Fall Injury Risk  Goal: Absence of Fall and Fall-Related Injury  Outcome: Ongoing, Progressing     Problem: Infection  Goal: Absence of Infection Signs and Symptoms  Outcome: Ongoing, Progressing

## 2022-12-20 NOTE — NURSING
Report received from night nurse CHACE Sanchez. Visualized patient and assessed patient's overall condition and appearance. No acute distress noted. Will continue to monitor

## 2022-12-20 NOTE — CONSULTS
[facility] - Telemetry  Urology  Consult Note    Patient Name: Vivek Turner  MRN: [id number]  Admission Date: 12/9/2022  Hospital Length of Stay: 11   Code Status: Full Code   Attending Provider: Sean Myers MD   Consulting Provider: WILDER Buckley MD  Primary Care Physician: Primary Doctor No  Principal Problem:Severe sepsis    Inpatient consult to Urology  Consult performed by: OSWALDO Buckley MD  Consult ordered by: Sean Myers MD          Subjective:     HPI:  Prostatic Abscess  Vivek Turner is a 47 y.o. male who has been feeling poorly for a few weeks.  He currently has no fever but did previously.  He denies hematuria, dysuria, or difficulty voiding.      Past Medical History:   Diagnosis Date    Hypertension     Prostatic abscess 12/20/2022       No past surgical history on file.    Review of patient's allergies indicates:  No Known Allergies    Family History    None         Tobacco Use    Smoking status: Not on file    Smokeless tobacco: Not on file   Substance and Sexual Activity    Alcohol use: Not on file    Drug use: Not on file    Sexual activity: Not on file       Review of Systems   Constitutional: Negative.  Negative for fever.   HENT: Negative.     Eyes: Negative.    Respiratory:  Negative for cough, chest tightness and shortness of breath.    Cardiovascular:  Negative for chest pain.   Gastrointestinal: Negative.  Negative for constipation, diarrhea and nausea.   Genitourinary:  Negative for difficulty urinating, dysuria, flank pain and hematuria.   Musculoskeletal: Negative.    Neurological: Negative.    Psychiatric/Behavioral: Negative.       Objective:     Temp:  [98.3 °F (36.8 °C)-99.5 °F (37.5 °C)] 98.3 °F (36.8 °C)  Pulse:  [92-98] 92  Resp:  [18] 18  SpO2:  [93 %-97 %] 96 %  BP: (133-159)/(67-96) 138/81     Body mass index is 34.37 kg/m².    Date 12/20/22 0700 - 12/21/22 0659   Shift 1730-7735 7260-5967 8411-3998 24 Hour Total   INTAKE   Shift Total(mL/kg)       OUTPUT    Urine(mL/kg/hr) 200   200   Drains 20   20   Shift Total(mL/kg) 220(2.3)   220(2.3)   Weight (kg) 96.6 96.6 96.6 96.6     Bladder Scan Volume (mL): 23 mL (12/10/22 1030)    Drains       Drain  Duration                  Closed/Suction Drain 12/09/22 1748 Lateral RLQ Bulb 10 days                    Physical Exam  Vitals and nursing note reviewed.   Constitutional:       Appearance: He is well-developed.   HENT:      Head: Normocephalic.   Eyes:      Conjunctiva/sclera: Conjunctivae normal.   Neck:      Thyroid: No thyromegaly.      Trachea: No tracheal deviation.   Cardiovascular:      Rate and Rhythm: Normal rate.      Heart sounds: Normal heart sounds.   Pulmonary:      Effort: Pulmonary effort is normal. No respiratory distress.      Breath sounds: Normal breath sounds. No wheezing.   Abdominal:      General: Bowel sounds are normal.      Palpations: Abdomen is soft.      Tenderness: There is no abdominal tenderness. There is no rebound.      Hernia: No hernia is present.   Genitourinary:     Prostate: Enlarged. Not tender.      Comments: Right prostate is enlarged and fluctuant, not tender.  Musculoskeletal:         General: No tenderness. Normal range of motion.      Cervical back: Normal range of motion and neck supple.   Lymphadenopathy:      Cervical: No cervical adenopathy.   Skin:     General: Skin is warm and dry.      Findings: No erythema or rash.   Neurological:      Mental Status: He is alert and oriented to person, place, and time.   Psychiatric:         Behavior: Behavior normal.         Thought Content: Thought content normal.         Judgment: Judgment normal.       Significant Labs:    BMP:  Recent Labs   Lab 12/18/22 0416 12/19/22  0427 12/20/22  0630    139 139   K 3.8 3.7 3.8    104 108   CO2 31* 29 26   BUN 32* 30* 23*   CREATININE 1.4 1.3 1.1   CALCIUM 7.6* 7.6* 7.6*       CBC:  Recent Labs   Lab 12/18/22 0416 12/19/22  0427 12/20/22  0630   WBC 19.61* 17.38* 14.89*   HGB 9.4*  8.8* 9.0*   HCT 28.5* 27.4* 27.8*   * 438 437       Blood Culture:   Recent Labs   Lab 12/14/22  0205   LABBLOO No Growth after 4 days.  No Growth after 4 days.     Urine Culture: No results for input(s): LABURIN in the last 168 hours.    Significant Imaging:  CT: I have reviewed all results within the past 24 hours and my personal findings are:  Hepatic Abscess with drain, abdominal fluid collections, prostate with hypo attenuating area in the right side                       Assessment and Plan:     Prostatic abscess  Plan on Cystoscopy with prostatic unroofing of abscess tomorrow 12/21/2022  NPO after midnight for procedure Wednesday 12/21/2022        VTE Risk Mitigation (From admission, onward)         Ordered     heparin (porcine) injection 5,000 Units  Every 8 hours         12/10/22 0838     IP VTE HIGH RISK PATIENT  Once         12/09/22 1421     Place sequential compression device  Until discontinued         12/09/22 1421                Thank you for your consult. I will follow-up with patient. Please contact us if you have any additional questions.    WILDER Buckley MD  Urology  [facility] [place] [facility]

## 2022-12-20 NOTE — SUBJECTIVE & OBJECTIVE
Past Medical History:   Diagnosis Date    Hypertension     Prostatic abscess 12/20/2022       No past surgical history on file.    Review of patient's allergies indicates:  No Known Allergies    Family History    None         Tobacco Use    Smoking status: Not on file    Smokeless tobacco: Not on file   Substance and Sexual Activity    Alcohol use: Not on file    Drug use: Not on file    Sexual activity: Not on file       Review of Systems   Constitutional: Negative.  Negative for fever.   HENT: Negative.     Eyes: Negative.    Respiratory:  Negative for cough, chest tightness and shortness of breath.    Cardiovascular:  Negative for chest pain.   Gastrointestinal: Negative.  Negative for constipation, diarrhea and nausea.   Genitourinary:  Negative for difficulty urinating, dysuria, flank pain and hematuria.   Musculoskeletal: Negative.    Neurological: Negative.    Psychiatric/Behavioral: Negative.       Objective:     Temp:  [98.3 °F (36.8 °C)-99.5 °F (37.5 °C)] 98.3 °F (36.8 °C)  Pulse:  [92-98] 92  Resp:  [18] 18  SpO2:  [93 %-97 %] 96 %  BP: (133-159)/(67-96) 138/81     Body mass index is 34.37 kg/m².    Date 12/20/22 0700 - 12/21/22 0659   Shift 0837-7265 0340-3953 6860-5577 24 Hour Total   INTAKE   Shift Total(mL/kg)       OUTPUT   Urine(mL/kg/hr) 200   200   Drains 20   20   Shift Total(mL/kg) 220(2.3)   220(2.3)   Weight (kg) 96.6 96.6 96.6 96.6     Bladder Scan Volume (mL): 23 mL (12/10/22 1030)    Drains       Drain  Duration                  Closed/Suction Drain 12/09/22 1748 Lateral RLQ Bulb 10 days                    Physical Exam  Vitals and nursing note reviewed.   Constitutional:       Appearance: He is well-developed.   HENT:      Head: Normocephalic.   Eyes:      Conjunctiva/sclera: Conjunctivae normal.   Neck:      Thyroid: No thyromegaly.      Trachea: No tracheal deviation.   Cardiovascular:      Rate and Rhythm: Normal rate.      Heart sounds: Normal heart sounds.   Pulmonary:      Effort:  Pulmonary effort is normal. No respiratory distress.      Breath sounds: Normal breath sounds. No wheezing.   Abdominal:      General: Bowel sounds are normal.      Palpations: Abdomen is soft.      Tenderness: There is no abdominal tenderness. There is no rebound.      Hernia: No hernia is present.   Genitourinary:     Prostate: Enlarged. Not tender.      Comments: Right prostate is enlarged and fluctuant, not tender.  Musculoskeletal:         General: No tenderness. Normal range of motion.      Cervical back: Normal range of motion and neck supple.   Lymphadenopathy:      Cervical: No cervical adenopathy.   Skin:     General: Skin is warm and dry.      Findings: No erythema or rash.   Neurological:      Mental Status: He is alert and oriented to person, place, and time.   Psychiatric:         Behavior: Behavior normal.         Thought Content: Thought content normal.         Judgment: Judgment normal.       Significant Labs:    BMP:  Recent Labs   Lab 12/18/22  0416 12/19/22  0427 12/20/22  0630    139 139   K 3.8 3.7 3.8    104 108   CO2 31* 29 26   BUN 32* 30* 23*   CREATININE 1.4 1.3 1.1   CALCIUM 7.6* 7.6* 7.6*       CBC:  Recent Labs   Lab 12/18/22  0416 12/19/22  0427 12/20/22  0630   WBC 19.61* 17.38* 14.89*   HGB 9.4* 8.8* 9.0*   HCT 28.5* 27.4* 27.8*   * 438 437       Blood Culture:   Recent Labs   Lab 12/14/22  0205   LABBLOO No Growth after 4 days.  No Growth after 4 days.     Urine Culture: No results for input(s): LABURIN in the last 168 hours.    Significant Imaging:  CT: I have reviewed all results within the past 24 hours and my personal findings are:  Hepatic Abscess with drain, abdominal fluid collections, prostate with hypo attenuating area in the right side

## 2022-12-20 NOTE — ASSESSMENT & PLAN NOTE
Unclear how he got this. IR drain placed 12/9. Cultures with Klebsiella, pansensitive.   - MRCP:persistent heterogeneous collection within the right hepatic lobe compatible with reported abscess noting percutaneous drainage catheter in place. No significant biliary duct abnormality on MRCP assessment.  - Blood cx with NGTD  - Abscess culture with klebsiella. No anaerobes  -IR drain with minimal output. Discussed with IR and IR suspect its not putting out much because of the heterogeneity of the collection and drain may not be sufficient. Recommended repeat US abdomen   - ID consulted: on cefazolin, added flagyl on 12/13  - GI consulted to assess for source of liver abscess. Pt refuses inpatient colonoscopy. Likely plan for outpatient cscope.   -Suspected that we did not have source control given persistent WBC#, multiple abdominal abscesses in the abdomen persists, as well as a large R pleural effusion, and prostatic abscess.    IR consulted for thoracentesis and potentially placing another drain(s), given multiple abscesses and locations   will ask [facility] to review CT and see if a washout is more appropriate.

## 2022-12-20 NOTE — SEDATION DOCUMENTATION
Flexima [id number], ref [id number], use by 2225[birth year]-02, inserted into RUQ. 5ml purulent drainage seen upon insertion.

## 2022-12-20 NOTE — H&P
Inpatient Radiology Pre-procedure Note    History of Present Illness:  Vivek Turner is a 47 y.o. male who presents for right thoracentesis and abdominal abscess drainage x 2.  Admission H&P reviewed.  Past Medical History:   Diagnosis Date    Hypertension     Prostatic abscess 12/20/2022     No past surgical history on file.    Review of Systems:   As documented in primary team H&P    Home Meds:   Prior to Admission medications    Medication Sig Start Date End Date Taking? Authorizing Provider   NON FORMULARY MEDICATION Losartan/amlodipine 100/10 mg   Take 1 tablet by mouth daily    Historical Provider     Scheduled Meds:    ceFEPime (MAXIPIME) IVPB  2 g Intravenous Q8H    heparin (porcine)  5,000 Units Subcutaneous Q8H    metronidazole  500 mg Intravenous Q8H    polyethylene glycol  17 g Oral Daily    sodium chloride 0.9%  10 mL Intravenous Q8H    sodium chloride 0.9%  10 mL Intravenous Q6H     Continuous Infusions:    sodium chloride 0.9% 50 mL/hr at 12/20/22 0846     PRN Meds:acetaminophen, acetaminophen, albuterol-ipratropium, dextrose 10%, dextrose 10%, glucagon (human recombinant), glucose, glucose, HYDROmorphone, melatonin, naloxone, nitroGLYCERIN, oxyCODONE-acetaminophen, Flushing PICC Protocol **AND** sodium chloride 0.9% **AND** sodium chloride 0.9%  Anticoagulants/Antiplatelets: SQ heparin    Allergies: Review of patient's allergies indicates:  No Known Allergies  Sedation Hx: have not been any systemic reactions    Labs:  No results for input(s): INR in the last 168 hours.    Invalid input(s):  PT,  PTT    Recent Labs   Lab 12/20/22  0630   WBC 14.89*   HGB 9.0*   HCT 27.8*   MCV 89         Recent Labs   Lab 12/20/22  0630         K 3.8      CO2 26   BUN 23*   CREATININE 1.1   CALCIUM 7.6*   MG 1.9   ALT 7*   AST 25   ALBUMIN 1.9*   BILITOT 0.9         Vitals:  Temp: 98.3 °F (36.8 °C) (12/20/22 0724)  Pulse: 92 (12/20/22 0724)  Resp: 18 (12/20/22 0724)  BP: 138/81 (12/20/22  0724)  SpO2: 96 % (12/20/22 0919)     Physical Exam:  ASA: 2  Mallampati: 2    General: no acute distress, somewhat lethargic  Mental Status: alert and oriented to person, place and time  HEENT: normocephalic, atraumatic  Chest: unlabored breathing  Heart: regular heart rate  Abdomen: nondistended  Extremity: moves all extremities    Plan: proceed with right thoracentesis and abdominal abscess drainage x 2  Sedation Plan: up to moderate for abscess drainages    Que Olivera MD  Staff Radiologist  Department of Radiology  Pager: [phone]

## 2022-12-21 ENCOUNTER — ANESTHESIA (OUTPATIENT)
Dept: SURGERY | Facility: HOSPITAL | Age: 47
DRG: 853 | End: 2022-12-21

## 2022-12-21 LAB
ALBUMIN FLD-MCNC: 1.4 G/DL
BODY FLUID SOURCE, LDH: NORMAL
GLUCOSE FLD-MCNC: 89 MG/DL
LDH FLD L TO P-CCNC: 324 U/L
POCT GLUCOSE: 101 MG/DL (ref 70–110)
PROT FLD-MCNC: 3.6 G/DL
SPECIMEN SOURCE: NORMAL

## 2022-12-21 PROCEDURE — 52601 PROSTATECTOMY (TURP): CPT | Mod: ,,, | Performed by: UROLOGY

## 2022-12-21 PROCEDURE — 25000003 PHARM REV CODE 250: Performed by: UROLOGY

## 2022-12-21 PROCEDURE — 36000707: Performed by: UROLOGY

## 2022-12-21 PROCEDURE — 99232 SBSQ HOSP IP/OBS MODERATE 35: CPT | Mod: ,,, | Performed by: UROLOGY

## 2022-12-21 PROCEDURE — D9220A PRA ANESTHESIA: Mod: ,,, | Performed by: ANESTHESIOLOGY

## 2022-12-21 PROCEDURE — 88344 IMHCHEM/IMCYTCHM EA MLT ANTB: CPT | Mod: 26,,, | Performed by: STUDENT IN AN ORGANIZED HEALTH CARE EDUCATION/TRAINING PROGRAM

## 2022-12-21 PROCEDURE — 63600175 PHARM REV CODE 636 W HCPCS: Performed by: INTERNAL MEDICINE

## 2022-12-21 PROCEDURE — 71000033 HC RECOVERY, INTIAL HOUR: Performed by: UROLOGY

## 2022-12-21 PROCEDURE — A4216 STERILE WATER/SALINE, 10 ML: HCPCS | Performed by: UROLOGY

## 2022-12-21 PROCEDURE — 37000008 HC ANESTHESIA 1ST 15 MINUTES: Performed by: UROLOGY

## 2022-12-21 PROCEDURE — 25000003 PHARM REV CODE 250: Performed by: INTERNAL MEDICINE

## 2022-12-21 PROCEDURE — A4216 STERILE WATER/SALINE, 10 ML: HCPCS | Performed by: HOSPITALIST

## 2022-12-21 PROCEDURE — S0030 INJECTION, METRONIDAZOLE: HCPCS | Performed by: INTERNAL MEDICINE

## 2022-12-21 PROCEDURE — 36000706: Performed by: UROLOGY

## 2022-12-21 PROCEDURE — 25000003 PHARM REV CODE 250: Performed by: NURSE ANESTHETIST, CERTIFIED REGISTERED

## 2022-12-21 PROCEDURE — 94799 UNLISTED PULMONARY SVC/PX: CPT

## 2022-12-21 PROCEDURE — D9220A PRA ANESTHESIA: ICD-10-PCS | Mod: ,,, | Performed by: ANESTHESIOLOGY

## 2022-12-21 PROCEDURE — 21400001 HC TELEMETRY ROOM

## 2022-12-21 PROCEDURE — 88305 TISSUE EXAM BY PATHOLOGIST: CPT | Mod: 26,,, | Performed by: STUDENT IN AN ORGANIZED HEALTH CARE EDUCATION/TRAINING PROGRAM

## 2022-12-21 PROCEDURE — 52601 PR TRANSURETHRAL ELEC-SURG PROSTATECTOM: ICD-10-PCS | Mod: ,,, | Performed by: UROLOGY

## 2022-12-21 PROCEDURE — 63600175 PHARM REV CODE 636 W HCPCS: Performed by: UROLOGY

## 2022-12-21 PROCEDURE — 63600175 PHARM REV CODE 636 W HCPCS: Performed by: HOSPITALIST

## 2022-12-21 PROCEDURE — 25000003 PHARM REV CODE 250: Performed by: STUDENT IN AN ORGANIZED HEALTH CARE EDUCATION/TRAINING PROGRAM

## 2022-12-21 PROCEDURE — 88305 TISSUE EXAM BY PATHOLOGIST: CPT | Performed by: STUDENT IN AN ORGANIZED HEALTH CARE EDUCATION/TRAINING PROGRAM

## 2022-12-21 PROCEDURE — 99232 PR SUBSEQUENT HOSPITAL CARE,LEVL II: ICD-10-PCS | Mod: ,,, | Performed by: UROLOGY

## 2022-12-21 PROCEDURE — 37000009 HC ANESTHESIA EA ADD 15 MINS: Performed by: UROLOGY

## 2022-12-21 PROCEDURE — 99900035 HC TECH TIME PER 15 MIN (STAT)

## 2022-12-21 PROCEDURE — A4216 STERILE WATER/SALINE, 10 ML: HCPCS | Performed by: STUDENT IN AN ORGANIZED HEALTH CARE EDUCATION/TRAINING PROGRAM

## 2022-12-21 PROCEDURE — 25000003 PHARM REV CODE 250: Performed by: HOSPITALIST

## 2022-12-21 PROCEDURE — 88305 TISSUE EXAM BY PATHOLOGIST: ICD-10-PCS | Mod: 26,,, | Performed by: STUDENT IN AN ORGANIZED HEALTH CARE EDUCATION/TRAINING PROGRAM

## 2022-12-21 PROCEDURE — S0030 INJECTION, METRONIDAZOLE: HCPCS | Performed by: UROLOGY

## 2022-12-21 PROCEDURE — 63600175 PHARM REV CODE 636 W HCPCS: Performed by: NURSE ANESTHETIST, CERTIFIED REGISTERED

## 2022-12-21 PROCEDURE — 88344 PR IHC OR ICC EACH MULTIPLEX ANTIBODY STAIN PROCEDURE: ICD-10-PCS | Mod: 26,,, | Performed by: STUDENT IN AN ORGANIZED HEALTH CARE EDUCATION/TRAINING PROGRAM

## 2022-12-21 RX ORDER — FENTANYL CITRATE 50 UG/ML
INJECTION, SOLUTION INTRAMUSCULAR; INTRAVENOUS
Status: DISCONTINUED | OUTPATIENT
Start: 2022-12-21 | End: 2022-12-21

## 2022-12-21 RX ORDER — ONDANSETRON 2 MG/ML
INJECTION INTRAMUSCULAR; INTRAVENOUS
Status: DISCONTINUED | OUTPATIENT
Start: 2022-12-21 | End: 2022-12-21

## 2022-12-21 RX ORDER — LIDOCAINE HYDROCHLORIDE 20 MG/ML
INJECTION INTRAVENOUS
Status: DISCONTINUED | OUTPATIENT
Start: 2022-12-21 | End: 2022-12-21

## 2022-12-21 RX ORDER — HYDROMORPHONE HYDROCHLORIDE 2 MG/ML
0.2 INJECTION, SOLUTION INTRAMUSCULAR; INTRAVENOUS; SUBCUTANEOUS EVERY 5 MIN PRN
Status: DISCONTINUED | OUTPATIENT
Start: 2022-12-21 | End: 2022-12-28 | Stop reason: HOSPADM

## 2022-12-21 RX ORDER — ROCURONIUM BROMIDE 10 MG/ML
INJECTION, SOLUTION INTRAVENOUS
Status: DISCONTINUED | OUTPATIENT
Start: 2022-12-21 | End: 2022-12-21

## 2022-12-21 RX ORDER — MIDAZOLAM HYDROCHLORIDE 1 MG/ML
INJECTION, SOLUTION INTRAMUSCULAR; INTRAVENOUS
Status: DISCONTINUED | OUTPATIENT
Start: 2022-12-21 | End: 2022-12-21

## 2022-12-21 RX ORDER — PROPOFOL 10 MG/ML
VIAL (ML) INTRAVENOUS
Status: DISCONTINUED | OUTPATIENT
Start: 2022-12-21 | End: 2022-12-21

## 2022-12-21 RX ORDER — SODIUM CHLORIDE 0.9 % (FLUSH) 0.9 %
3 SYRINGE (ML) INJECTION
Status: DISCONTINUED | OUTPATIENT
Start: 2022-12-21 | End: 2022-12-28 | Stop reason: HOSPADM

## 2022-12-21 RX ORDER — ACETAMINOPHEN 10 MG/ML
1000 INJECTION, SOLUTION INTRAVENOUS ONCE
Status: ACTIVE | OUTPATIENT
Start: 2022-12-21 | End: 2022-12-22

## 2022-12-21 RX ORDER — FENTANYL CITRATE 50 UG/ML
25 INJECTION, SOLUTION INTRAMUSCULAR; INTRAVENOUS EVERY 5 MIN PRN
Status: DISCONTINUED | OUTPATIENT
Start: 2022-12-21 | End: 2022-12-28 | Stop reason: HOSPADM

## 2022-12-21 RX ADMIN — Medication 10 ML: at 09:12

## 2022-12-21 RX ADMIN — SUGAMMADEX 200 MG: 100 INJECTION, SOLUTION INTRAVENOUS at 10:12

## 2022-12-21 RX ADMIN — Medication 10 ML: at 06:12

## 2022-12-21 RX ADMIN — CEFEPIME 2 G: 2 INJECTION, POWDER, FOR SOLUTION INTRAVENOUS at 02:12

## 2022-12-21 RX ADMIN — Medication 10 ML: at 05:12

## 2022-12-21 RX ADMIN — Medication 10 ML: at 02:12

## 2022-12-21 RX ADMIN — ROCURONIUM BROMIDE 40 MG: 10 INJECTION, SOLUTION INTRAVENOUS at 10:12

## 2022-12-21 RX ADMIN — SODIUM CHLORIDE: 0.9 INJECTION, SOLUTION INTRAVENOUS at 01:12

## 2022-12-21 RX ADMIN — HEPARIN SODIUM 5000 UNITS: 5000 INJECTION INTRAVENOUS; SUBCUTANEOUS at 09:12

## 2022-12-21 RX ADMIN — HEPARIN SODIUM 5000 UNITS: 5000 INJECTION INTRAVENOUS; SUBCUTANEOUS at 05:12

## 2022-12-21 RX ADMIN — FENTANYL CITRATE 100 MCG: 50 INJECTION, SOLUTION INTRAMUSCULAR; INTRAVENOUS at 10:12

## 2022-12-21 RX ADMIN — PROPOFOL 80 MG: 10 INJECTION, EMULSION INTRAVENOUS at 10:12

## 2022-12-21 RX ADMIN — METRONIDAZOLE 500 MG: 500 INJECTION, SOLUTION INTRAVENOUS at 05:12

## 2022-12-21 RX ADMIN — MIDAZOLAM HYDROCHLORIDE 2 MG: 1 INJECTION, SOLUTION INTRAMUSCULAR; INTRAVENOUS at 10:12

## 2022-12-21 RX ADMIN — CEFEPIME 2 G: 2 INJECTION, POWDER, FOR SOLUTION INTRAVENOUS at 06:12

## 2022-12-21 RX ADMIN — ONDANSETRON 4 MG: 2 INJECTION, SOLUTION INTRAMUSCULAR; INTRAVENOUS at 10:12

## 2022-12-21 RX ADMIN — LIDOCAINE HYDROCHLORIDE 50 MG: 20 INJECTION, SOLUTION INTRAVENOUS at 10:12

## 2022-12-21 RX ADMIN — CEFEPIME 2 G: 2 INJECTION, POWDER, FOR SOLUTION INTRAVENOUS at 10:12

## 2022-12-21 RX ADMIN — SODIUM CHLORIDE, SODIUM LACTATE, POTASSIUM CHLORIDE, AND CALCIUM CHLORIDE: .6; .31; .03; .02 INJECTION, SOLUTION INTRAVENOUS at 09:12

## 2022-12-21 RX ADMIN — HEPARIN SODIUM 5000 UNITS: 5000 INJECTION INTRAVENOUS; SUBCUTANEOUS at 01:12

## 2022-12-21 RX ADMIN — METRONIDAZOLE 500 MG: 500 INJECTION, SOLUTION INTRAVENOUS at 10:12

## 2022-12-21 RX ADMIN — METRONIDAZOLE 500 MG: 500 INJECTION, SOLUTION INTRAVENOUS at 08:12

## 2022-12-21 NOTE — NURSING
Patient has an increase in HR from lower 100s to 120s. Dr. Myers notified and ordered EKG. EKG completed. Patient symptomatic and current HR is 116.

## 2022-12-21 NOTE — ANESTHESIA PREPROCEDURE EVALUATION
12/20/2022  Vivek Turner is a 47 y.o., male.  Pre-operative evaluation for Procedure(s) (LRB):  TURP (TRANSURETHRAL RESECTION OF PROSTATE) (N/A)    NPO >8 instruction  METS >4    Speaks and understands English.    Vitals:    12/20/22 1143 12/20/22 1308 12/20/22 1335 12/20/22 1536   BP: 124/72 (!) 143/74 136/80 122/81   BP Location:  Left arm  Left arm   Patient Position:  Lying  Lying   Pulse: 103 98 105 (!) 116   Resp: (!) 29 17  18   Temp:  36.8 °C (98.3 °F)  36.8 °C (98.2 °F)   TempSrc:  Oral  Oral   SpO2: (!) 91% 98% 95% 97%   Weight:       Height:           Patient Active Problem List   Diagnosis    Hepatic abscess    ATN (acute tubular necrosis)    Severe sepsis    Atelectasis    Microcytic anemia    Hyperglycemia    Acute hypoxemic respiratory failure    Ileus    Prostatic abscess    Pleural effusion       Review of patient's allergies indicates:  No Known Allergies    No current facility-administered medications on file prior to encounter.     Current Outpatient Medications on File Prior to Encounter   Medication Sig Dispense Refill    NON FORMULARY MEDICATION Losartan/amlodipine 100/10 mg   Take 1 tablet by mouth daily         History reviewed. No pertinent surgical history.    Social History     Socioeconomic History    Marital status:          CBC:   Recent Labs     12/19/22 0427 12/20/22  0630   WBC 17.38* 14.89*   RBC 3.07* 3.12*   HGB 8.8* 9.0*   HCT 27.4* 27.8*    437   MCV 89 89   MCH 28.7 28.8   MCHC 32.1 32.4       CMP:   Recent Labs     12/19/22  0427 12/20/22  0630    139   K 3.7 3.8    108   CO2 29 26   BUN 30* 23*   CREATININE 1.3 1.1    100   MG 1.9 1.9   PHOS 3.1 3.1   CALCIUM 7.6* 7.6*   ALBUMIN 1.9* 1.9*   PROT 5.9* 5.9*   ALKPHOS 90 80   ALT 6* 7*   AST 20 25   BILITOT 1.0 0.9       INR  No results for input(s): PT, INR, PROTIME, APTT  in the last 72 hours.        Diagnostic Studies:    CT abd/pelvis  Impression:     1.  The patient is status post percutaneous drainage of an ill-defined collection at the inferior aspect of the right hepatic lobe on 12/09/2022.  EMR indicates cultures positive for Klebsiella pneumoniae.  There has been some interval decrease in the size of the complex collection at the inferior aspect of the right hepatic lobe, currently approximately 6.5 cm as compared to 8 cm previously.  Interval development of a bilobed subcapsular collection at the inferior aspect of the right hepatic lobe with mild wall thickening.  Diffuse mesenteric stranding with interval development of multiple thick walled fluid collections in the abdomen and pelvis ranging in size from 1.1 cm to 5.4 x 3.6 cm.  Overall findings suggest multiple abscess collections.     2.  Interval development of a large right-sided pleural effusion with associated volume loss of the right lung.  Mild atelectatic changes on the left with perhaps a minimal amount of pleural fluid on that side.     3.  Enlarged prostate, similar in size to prior exam.  The current examination demonstrates an area of hypodensity at the right side of the prostate measuring 3.0 x 3.7 cm which may have been present on the prior exam.  This may represent an additional area of infection/abscess.     4.  Additional findings as detailed above.     This report was flagged in Epic as abnormal.  CXR     Impression:     Right lower lobe pneumonia versus atelectasis and pleural fluid.    EKG:  Vent. Rate : 094 BPM     Atrial Rate : 094 BPM      P-R Int : 164 ms          QRS Dur : 096 ms       QT Int : 340 ms       P-R-T Axes : 038 044 -03 degrees      QTc Int : 425 ms     Normal sinus rhythm   Nonspecific ST and T wave abnormality   Abnormal ECG   When compared with ECG of 09-DEC-2022 03:46,   No significant change was found   Confirmed by Johanny HUNTER, Osiris GUTIERREZ ([id number]) on 12/14/2022 10:16:09 PM     2D  Echo:  No results found for this or any previous visit.  2022   The left ventricle is normal in size with mild concentric hypertrophy and normal systolic function.   Mild left atrial enlargement.   The estimated ejection fraction is 65%.   Normal left ventricular diastolic function.   Normal right ventricular size with normal right ventricular systolic function.   Normal central venous pressure (3 mmHg).   The estimated PA systolic pressure is 18 mmHg.      Pre-op Assessment    I have reviewed the Patient Summary Reports.    I have reviewed the NPO Status.   I have reviewed the Medications.     Review of Systems  Anesthesia Hx:  No problems with previous Anesthesia  History of prior surgery of interest to airway management or planning:   Social:  Former Smoker, Social Alcohol Use    Hematology/Oncology:         -- Anemia:   Cardiovascular:   Exercise tolerance: good Denies Pacemaker. Hypertension     Pulmonary:   Right pleural effusion, s/p small volume thoracentesis.   ?right lobe pneumonia   Renal/:   Chronic Renal Disease BPH ATN Prostatic abscess   Hepatic/GI:   Liver Disease, Perihepatic Abscess s/p IR drain 12/20   Neurological:   Denies CVA. Denies Seizures.    Endocrine:  Obesity / BMI > 30      Physical Exam  General: Cooperative, Alert and Oriented    Airway:  Mallampati: III   Mouth Opening: Normal  TM Distance: > 6 cm  Tongue: Normal  Neck ROM: Normal ROM    Dental:  Intact        Anesthesia Plan  Type of Anesthesia, risks & benefits discussed:    Anesthesia Type: Gen ETT  Intra-op Monitoring Plan: Standard ASA Monitors  Post Op Pain Control Plan: multimodal analgesia  Induction:  IV  Airway Plan: Video, Post-Induction  Informed Consent: Informed consent signed with the Patient and all parties understand the risks and agree with anesthesia plan.  All questions answered.   ASA Score: 3  Day of Surgery Review of History & Physical: H&P Update referred to the surgeon/provider.  Anesthesia Plan  Notes: Consented, speaks english  T&S ordered    Ready For Surgery From Anesthesia Perspective.     .

## 2022-12-21 NOTE — NURSING
Bedside shift report received from Leonarda MARTINEZ.  Patient denies any needs or wants at this time.  Call light within reach.  Will continue to monitor.

## 2022-12-21 NOTE — PROGRESS NOTES
[facility] Medicine  Progress Note    Patient Name: Vivek Turner  MRN: [id number]  Patient Class: IP- Inpatient   Admission Date: 12/9/2022  Length of Stay: 12 days  Attending Physician: Sean Myers MD  Primary Care Provider: Primary Doctor No        Subjective:     Principal Problem:Severe sepsis        HPI:  47y M w/ hypertension presents with abdominal pain x 2 days. Pt refused  service. He reports that he had fever/rigors three days prior to admission. He then developed worsening abdominal pain from then until admission. He went to an urgent care where he was started on bactrim for UTI. His abdominal pain increased over the next few days. He had a BM yesterday. Denies vomiting, but has had significant nausea and very limited PO intake in the days leading to admission. Aside from the single episode of fever/rigors he denies subsequent fevers.     In the ED he was found to have a R hepatic abscess vs mass. He traveled to the [facility] eight weeks prior to presentation. He denies fevers except for what is listed above. Denies weight loss, cough, night sweats. Denies prior hx of TB. Denies prior hx of cirrhosis      Overview/Hospital Course:  Mr Vivek Turner was admitted with severe sepsis due to R liver mass vs abscess and acute renal failure. IR consulted and drain placed on 12/9. Cultures with Klebsiella. ID consulted-continue cefazolin, and restarted Flagyl on 12/13. Started on bicarb gtt for acute renal failure and Nephrology consulted. He was moved to ICU for instability. He has ileus; NGT placed with copious bilious output. Renal failure improving. GI consulted to assess for source of liver abscess. MRCP showed persistent heterogeneous collection within the right hepatic lobe compatible with reported abscess noting percutaneous drainage catheter in place. No significant biliary duct abnormality on MRCP assessment. Pt refused inpatient colonoscopy. Pt transferred to the  floor on 12/12. PT/OT recommends HH. Monitor renal function and ileus at this time. Still has some output from NGT, GI recommends clamping today and monitor.     Cr improving with fluids and TPN. Na still high, will continue D5 for now. wbc uptrending and looks like new fluid collection next to liver, will ask IR to drain and send for cultures, labs placed. Will change to Cefepime for broader coverage until those cultures result. WBC# still elevated. IR did not see another fluid pocket that was attainable, Cr improving now at 1.5, may have to re-image/CT in a couple days when clearance is back to normal to confirm source control. Na 151, gave D5 at 100 mL for 10 hours and Na corrected to 143, D5 stopped. Still getting TPN fluids, but will attempt to advance diet, NGT removed.     CrCl back up to 78, will order CT a/p w contrast to assess for resolution or further intervention. Low dose fluid ordered to protect kidneys from contrast for 20 hours ordered given tenuous state.     Suspected that we did not have source control given persistent WBC#, repeat CT showing multiple abdominal abscesses in the abdomen persists, as well as a large R pleural effusion, and prostatic abscess. IR consulted for thoracentesis and potentially placing another drain(s), given multiple abscesses and locations, will ask GenSx to review CT and see if a washout is more appropriate. Urology consulted for prostatic abscess.     1.5L drained from pleural space. GenSx and IR discussed case, thought best to put 2 more drains in larger abscesses and use IV abx for smaller ones, and asking Urology to drain prostatic abscess. Spoke to VIJAY Dominique on the phone for 10m updating him on pt care per pt request.      Interval History:   No abdominal pain at this time. No N/V or diarrhea.  CLD advanced    Review of Systems   Constitutional:  Positive for fatigue. Negative for chills, diaphoresis and fever.   HENT:  Negative for congestion and trouble  swallowing.    Eyes:  Negative for photophobia and visual disturbance.   Respiratory:  Negative for cough, chest tightness and shortness of breath.    Cardiovascular:  Negative for chest pain, palpitations and leg swelling.   Gastrointestinal:  Positive for abdominal distention (improving). Negative for abdominal pain, constipation, diarrhea, nausea and vomiting.   Endocrine: Negative for polydipsia and polyuria.   Musculoskeletal:  Negative for arthralgias and myalgias.   Neurological:  Positive for weakness. Negative for numbness.   Psychiatric/Behavioral:  Negative for confusion.      Objective:     Vital Signs (Most Recent):  Temp: 98.2 °F (36.8 °C) (12/21/22 0434)  Pulse: 97 (12/21/22 0434)  Resp: 18 (12/21/22 0434)  BP: (!) 145/93 (12/21/22 0434)  SpO2: 95 % (12/21/22 0434)   Vital Signs (24h Range):  Temp:  [98.2 °F (36.8 °C)-98.5 °F (36.9 °C)] 98.2 °F (36.8 °C)  Pulse:  [] 97  Resp:  [17-29] 18  SpO2:  [91 %-98 %] 95 %  BP: (122-145)/(72-93) 145/93     Weight: 96.6 kg (212 lb 15.4 oz)  Body mass index is 34.37 kg/m².    Intake/Output Summary (Last 24 hours) at 12/21/2022 0658  Last data filed at 12/21/2022 0605  Gross per 24 hour   Intake 600 ml   Output 2315 ml   Net -1715 ml        Physical Exam  Vitals and nursing note reviewed.   Constitutional:       General: He is not in acute distress.     Appearance: He is well-developed. He is obese. He is ill-appearing. He is not toxic-appearing or diaphoretic.   HENT:      Head: Normocephalic and atraumatic.      Nose:      Comments: NGT in place with bilious output     Mouth/Throat:      Mouth: Mucous membranes are moist.   Eyes:      General: No scleral icterus.     Pupils: Pupils are equal, round, and reactive to light.   Neck:      Thyroid: No thyromegaly.   Cardiovascular:      Rate and Rhythm: Normal rate and regular rhythm.      Pulses: Normal pulses.      Heart sounds: Normal heart sounds. No murmur heard.    No gallop.   Pulmonary:      Effort:  Pulmonary effort is normal. No respiratory distress.      Breath sounds: Normal breath sounds. No stridor. No wheezing or rales.      Comments: Decreased inspiration. No wheezes on exam. On 2L NC  Abdominal:      General: Bowel sounds are normal. There is distension.      Palpations: Abdomen is soft.      Tenderness: There is no abdominal tenderness. There is no guarding.      Comments: RUQ drain in place with minimal brown/orange fluid   Musculoskeletal:         General: No deformity. Normal range of motion.      Cervical back: Normal range of motion. No rigidity.      Right lower leg: No edema.      Left lower leg: No edema.   Lymphadenopathy:      Cervical: No cervical adenopathy.   Skin:     General: Skin is warm.   Neurological:      Mental Status: He is alert and oriented to person, place, and time.   Psychiatric:         Behavior: Behavior normal.      Comments: Appears frustrated that he cannot eat at this time            Recent Results (from the past 24 hour(s))   Gram stain    Collection Time: 12/20/22 11:35 AM    Specimen: Pleural Fluid   Result Value Ref Range    Gram Stain Result Cytospin indicates:     Gram Stain Result Many WBC's     Gram Stain Result No organisms seen    WBC & Diff,Body Fluid Pleural Fluid, Right    Collection Time: 12/20/22 11:36 AM   Result Value Ref Range    Body Fluid Type Pleural Fluid, Right     Fluid Appearance Turbid     Fluid Color Elizabeth     WBC, Body Fluid 1832 /cu mm    Segs, Fluid 55 %    Lymphs, Fluid 19 %    Monocytes/Macrophages, Fluid 24 %    Eos, Fluid 1 %    Baso, Fluid 1 %   Gram stain    Collection Time: 12/20/22 12:10 PM    Specimen: Abdomen; Abscess   Result Value Ref Range    Gram Stain Result Many WBC's     Gram Stain Result No organisms seen    POCT glucose    Collection Time: 12/20/22  1:07 PM   Result Value Ref Range    POCT Glucose 92 70 - 110 mg/dL   Gram stain    Collection Time: 12/20/22  1:34 PM    Specimen: Abdomen; Abscess   Result Value Ref Range     Gram Stain Result Many WBC's     Gram Stain Result No organisms seen    POCT glucose    Collection Time: 12/20/22  3:37 PM   Result Value Ref Range    POCT Glucose 120 (H) 70 - 110 mg/dL       Microbiology Results (last 7 days)       Procedure Component Value Units Date/Time    Gram stain [[id number]] Collected: 12/20/22 1334    Order Status: Completed Specimen: Abscess from Abdomen Updated: 12/20/22 1530     Gram Stain Result Many WBC's      No organisms seen    Narrative:      RLQ Abdomen    Gram stain [[id number]] Collected: 12/20/22 1210    Order Status: Completed Specimen: Abscess from Abdomen Updated: 12/20/22 1529     Gram Stain Result Many WBC's      No organisms seen    Gram stain [[id number]] Collected: 12/20/22 1135    Order Status: Completed Specimen: Pleural Fluid Updated: 12/20/22 1529     Gram Stain Result Cytospin indicates:      Many WBC's      No organisms seen    Aerobic culture [[id number]] Collected: 12/20/22 1210    Order Status: No result Specimen: Abscess from Abdomen Updated: 12/20/22 1450    Culture, Anaerobic [[id number]] Collected: 12/20/22 1210    Order Status: No result Specimen: Abscess Updated: 12/20/22 1448    Aerobic culture [[id number]] Collected: 12/20/22 1135    Order Status: Sent Specimen: Pleural Fluid Updated: 12/20/22 1352    Culture, Anaerobic [[id number]] Collected: 12/20/22 1135    Order Status: Sent Specimen: Pleural Fluid Updated: 12/20/22 1352    Culture, Anaerobe [[id number]] Collected: 12/20/22 1334    Order Status: Sent Specimen: Abscess from Abdomen Updated: 12/20/22 1349    Aerobic culture [[id number]] Collected: 12/20/22 1334    Order Status: Sent Specimen: Abscess from Abdomen Updated: 12/20/22 1349    Gram stain [[id number]] Collected: 12/20/22 1210    Order Status: Canceled Specimen: Abscess from Abdomen     Aerobic culture [[id number]] Collected: 12/20/22 1210    Order Status: Canceled Specimen: Abscess from Abdomen     Culture, Anaerobic [[id number]] Collected: 12/20/22  1210    Order Status: Canceled Specimen: Abscess from Abdomen     Fungus culture [[id number]] Collected: 12/09/22 1831    Order Status: Completed Specimen: Abscess from Abdomen Updated: 12/19/22 1409     Fungus (Mycology) Culture No fungal growth to date    Blood culture [[id number]] Collected: 12/14/22 0205    Order Status: Completed Specimen: Blood from Antecubital, Right Arm Updated: 12/18/22 0303     Blood Culture, Routine No Growth after 4 days.    Narrative:      Blood cx in the AM with AM labs    Blood culture [[id number]] Collected: 12/14/22 0205    Order Status: Completed Specimen: Blood from Antecubital, Right Updated: 12/18/22 0303     Blood Culture, Routine No Growth after 4 days.    Narrative:      Blood cx in the AM with AM labs                       Assessment/Plan:      * Severe sepsis  This patient does have evidence of infective focus. My overall impression is sepsis. Vital signs were reviewed and noted in progress note.  Antibiotics given-   Antibiotics (From admission, onward)    Start     Stop Route Frequency Ordered    12/18/22 1851  cefepime in dextrose 5 % IVPB 2 g         -- IV Every 8 hours (non-standard times) 12/18/22 1424    12/13/22 0833  metronidazole IVPB 500 mg         -- IV Every 8 hours (non-standard times) 12/13/22 0833        Cultures were taken-   Microbiology Results (last 7 days)     Procedure Component Value Units Date/Time    Gram stain [[id number]]     Order Status: No result Specimen: Pleural Fluid     Aerobic culture [[id number]]     Order Status: No result Specimen: Pleural Fluid     Culture, Anaerobic [[id number]]     Order Status: No result Specimen: Pleural Fluid     Fungus culture [[id number]] Collected: 12/09/22 1831    Order Status: Completed Specimen: Abscess from Abdomen Updated: 12/19/22 1409     Fungus (Mycology) Culture No fungal growth to date    Blood culture [[id number]] Collected: 12/14/22 0205    Order Status: Completed Specimen: Blood from Antecubital, Right Arm  Updated: 12/18/22 0303     Blood Culture, Routine No Growth after 4 days.    Narrative:      Blood cx in the AM with AM labs    Blood culture [[id number]] Collected: 12/14/22 0205    Order Status: Completed Specimen: Blood from Antecubital, Right Updated: 12/18/22 0303     Blood Culture, Routine No Growth after 4 days.    Narrative:      Blood cx in the AM with AM labs    Gram stain [[id number]]     Order Status: No result Specimen: Ascites     Aerobic culture [[id number]]     Order Status: No result Specimen: Abdominal     Culture, Anaerobic [[id number]]     Order Status: No result Specimen: Ascites     Culture, Anaerobe [[id number]] Collected: 12/09/22 1831    Order Status: Completed Specimen: Abscess from Abdomen Updated: 12/13/22 0835     Anaerobic Culture No anaerobes isolated        Latest lactate reviewed, they are-  No results for input(s): LACTATE in the last 72 hours.    Organ dysfunction indicated by Acute kidney injury and Acute liver injury  Source- liver abscess    Source control Achieved by- IR    -Due to hepatic abscess  -s/p IR drainage on 12/09  -ID consulted:  Continue cefazolin and Flagyl  -blood cultures with no growth to date   -abscess culture with pansensitive Klebsiella   -continue IV antibiotics as above   -Leukocytosis improving       Ileus  Patient has Non- operative ileus which is adynamic in etiology which is worsening. Will treat conservatively with bowel rest, IV fluids, serial abdominal exams and avoidance of GI paralytics such as narcotics or anti-spasmodics. Monitor patient closely.     -NGT placed 12/11 with large output-- continue  -GI following: plan for clamping the NGT today and advance to clears if tolerated   -Surgery following   -Nutrition consulted  -Initiated TPN on 12/14 but was unable to get PICC due to severe weather.   -PICC placed on 12/15, will continue custom TPN.   -NGT removed and tolerating diet, TPN stopped       Hepatic abscess  Unclear how he got this. IR drain  placed 12/9. Cultures with Klebsiella, pansensitive.   - MRCP:persistent heterogeneous collection within the right hepatic lobe compatible with reported abscess noting percutaneous drainage catheter in place. No significant biliary duct abnormality on MRCP assessment.  - Blood cx with NGTD  - Abscess culture with klebsiella. No anaerobes  -IR drain with minimal output. Discussed with IR and IR suspect its not putting out much because of the heterogeneity of the collection and drain may not be sufficient. Recommended repeat US abdomen   - ID consulted: on cefazolin, added flagyl on 12/13  - GI consulted to assess for source of liver abscess. Pt refuses inpatient colonoscopy. Likely plan for outpatient cscope.   -Suspected that we did not have source control given persistent WBC#, multiple abdominal abscesses in the abdomen persists, as well as a large R pleural effusion, and prostatic abscess.    IR consulted for thoracentesis and potentially placing another drain(s), given multiple abscesses and locations   will ask GenSx to review CT and see if a washout is more appropriate.     Pleural effusion  Large R pleural effusion   -Thoracentesis requested      Prostatic abscess  Urology consulted for prostatic abscess.   See hepatic abscess      Acute hypoxemic respiratory failure  Patient with Hypoxic Respiratory failure which is Acute.  he is not on home oxygen.   Signs/symptoms of respiratory failure include- tachypnea, increased work of breathing and wheezing. Contributing diagnoses includes - atelectasis Labs and images were reviewed. Patient Has not had a recent ABG.     - O2 by NC PRN  - nebs PRN  - CXR shows atelectasis    Hyperglycemia  A1c normal.     Microcytic anemia  Hgb 10-11, no prior to review.   Iron deficient- TSAT 17, but also significant acute inflammation- ferritin 3633.   B12, folate replete.   Stable        Atelectasis  Shortness of breath partly due to atelectasis as noted on CXR  - incentive  spirometer  - O2 by NC as needed      ATN (acute tubular necrosis)  Creatinine at 6.1 at admission. Unknown baseline with no prior labs to review. FENa 1.4% suggesting intrinsic injury. This may be ATN from sepsis +/- Bactrim pseudotoxicity. No hydro seen on CT or US.   - IVF ordered  - Nephrology consulted  - this is improving     Patient with acute kidney injury likely due to IVVD/dehydration and acute tubular necrosis MARYURI is currently improving. Labs reviewed- Renal function/electrolytes with Estimated Creatinine Clearance: 48.3 mL/min (A) (based on SCr of 2.1 mg/dL (H)). according to latest data. Monitor urine output and serial BMP and adjust therapy as needed. Avoid nephrotoxins and renally dose meds for GFR listed above.         VTE Risk Mitigation (From admission, onward)         Ordered     heparin (porcine) injection 5,000 Units  Every 8 hours         12/10/22 0838     IP VTE HIGH RISK PATIENT  Once         12/09/22 1421     Place sequential compression device  Until discontinued         12/09/22 1421                Discharge Planning   VIVIEN: 12/16/2022     Code Status: Full Code   Is the patient medically ready for discharge?:     Reason for patient still in hospital (select all that apply): Patient trending condition, Treatment and Consult recommendations  Discharge Plan A: Home Health   Discharge Delays: None known at this time              Sean Myers MD  Department of Hospital Medicine   [facility] [place] [facility]

## 2022-12-21 NOTE — SUBJECTIVE & OBJECTIVE
Interval History: new drains placed by IR yesterday.  He is voiding, no fever.    Review of Systems   Constitutional: Negative.  Negative for fever.   HENT: Negative.     Eyes: Negative.    Respiratory:  Negative for cough, chest tightness and shortness of breath.    Cardiovascular:  Negative for chest pain.   Gastrointestinal: Negative.  Negative for constipation, diarrhea and nausea.   Genitourinary:  Negative for difficulty urinating.   Musculoskeletal: Negative.    Neurological: Negative.    Psychiatric/Behavioral: Negative.     Objective:     Temp:  [98.2 °F (36.8 °C)-98.5 °F (36.9 °C)] 98.3 °F (36.8 °C)  Pulse:  [] 97  Resp:  [17-29] 18  SpO2:  [91 %-98 %] 96 %  BP: (122-145)/(72-93) 138/93     Body mass index is 34.37 kg/m².      Bladder Scan Volume (mL): 23 mL (12/10/22 1030)    Drains       Drain  Duration                  Closed/Suction Drain 12/09/22 1748 Lateral RLQ Bulb 11 days         Closed/Suction Drain 12/20/22 1210 RUQ Accordion 10 Fr. <1 day         Closed/Suction Drain 12/20/22 1234 Lateral RLQ Accordion 8 Fr. <1 day                    Physical Exam  Vitals and nursing note reviewed.   Constitutional:       Appearance: He is well-developed.   HENT:      Head: Normocephalic.   Eyes:      Conjunctiva/sclera: Conjunctivae normal.   Neck:      Thyroid: No thyromegaly.      Trachea: No tracheal deviation.   Cardiovascular:      Rate and Rhythm: Normal rate.      Heart sounds: Normal heart sounds.   Pulmonary:      Effort: Pulmonary effort is normal. No respiratory distress.      Breath sounds: Normal breath sounds. No wheezing.   Abdominal:      General: Bowel sounds are normal.      Palpations: Abdomen is soft.      Tenderness: There is no abdominal tenderness. There is no rebound.      Hernia: No hernia is present.   Musculoskeletal:         General: No tenderness. Normal range of motion.      Cervical back: Normal range of motion and neck supple.   Lymphadenopathy:      Cervical: No cervical  adenopathy.   Skin:     General: Skin is warm and dry.      Findings: No erythema or rash.   Neurological:      Mental Status: He is alert and oriented to person, place, and time.   Psychiatric:         Behavior: Behavior normal.         Thought Content: Thought content normal.         Judgment: Judgment normal.       Significant Labs:    BMP:  Recent Labs   Lab 12/18/22 0416 12/19/22  0427 12/20/22  0630    139 139   K 3.8 3.7 3.8    104 108   CO2 31* 29 26   BUN 32* 30* 23*   CREATININE 1.4 1.3 1.1   CALCIUM 7.6* 7.6* 7.6*       CBC:   Recent Labs   Lab 12/18/22 0416 12/19/22  0427 12/20/22  0630   WBC 19.61* 17.38* 14.89*   HGB 9.4* 8.8* 9.0*   HCT 28.5* 27.4* 27.8*   * 438 437       Blood Culture: No results for input(s): LABBLOO in the last 168 hours.  Urine Culture: No results for input(s): LABURIN in the last 168 hours.    Significant Imaging:

## 2022-12-21 NOTE — PT/OT/SLP PROGRESS
Physical Therapy      Patient Name:  Vivek Turner   MRN:  [id number]    Patient not seen today secondary to Other (Comment) (Pt sched for TURP today.  PT to hold.). Will follow-up tomorrow.

## 2022-12-21 NOTE — ASSESSMENT & PLAN NOTE
I spoke to his brother in law  Consent performed with  violette  He agrees to a limited TURP to unroof the prostatic abscess  Plan on procedure today

## 2022-12-21 NOTE — PLAN OF CARE
Chart reviewed, pt off the floor - No fevers documented overnight. S/p IR drainage of perihepatic fluid collection (purulent drainage), RLQ fluid (serous), and R thora - cx in process. Pending TURP with urology today.       Recommendations:  -continue cefepime/flagyl pending new cx data, can likely de-escalate in the next day or so  -follow up cx  -follow up TURP, please obtain cx if able

## 2022-12-21 NOTE — NURSING
Received report from CHACE Bradley in PACU. Patient had a TURP, a 22' acosta was placed. /72, O2 94% on RA, R 20, HR 91, Temp 98.2. Awaiting patient's arrival to floor.

## 2022-12-21 NOTE — PLAN OF CARE
AYAKA sent referral to [facility] in [place].        12/21/22 5892   Post-Acute Status   Post-Acute Authorization Placement  (LTAC)   Post-Acute Placement Status Pending post-acute provider review/more information requested   Discharge Delays (!) Post-Acute Set-up   Discharge Plan   Discharge Plan A Long-term acute care facility (LTAC)   Discharge Plan B Home Health

## 2022-12-21 NOTE — NURSING
Bedside report given to night nurse CHACE Barnhart. Walking rounds completed. Visualized and assessed patient. NAD noted. Safety precautions maintained and call light within reach.    Chart check completed.

## 2022-12-21 NOTE — PLAN OF CARE
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 Cassius stated that he is under the supervision of the medical director, Dr. Adria Gaming.  Cassius inquired about discharge planning. 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 Cassius stated that patient lives in a general bunk house with about 150 other men. Cassius stated that the room holds about 8 people. Cassius stated that patient won't be able to isolate and the bunk is not the cleanest for a person that will need IV abx. Cassius informed AYAKA that there is a facility that they send workers to get IV abx, like step down facility and when they are finished they can return to [place]. Cassius stated that he does not know the name of the facility but will call AYAKA back with it. AYAKA informed Cassius that she will have Dr. Myers give him a call so that he can update him on patient's care. AYAKA inquired about fax number to send medical documents for continuity of care ([phone]). Cassius stated that the medical director will be there in two hours and he would like him to review the information.    AYAKA sent Dr. Myers secure chat with Cassius's phone number.        12/21/22 1328   Discharge Reassessment   Assessment Type Discharge Planning Reassessment   Did the patient's condition or plan change since previous assessment? Yes   Discharge Plan discussed with:   (Company Physician's Assistant)   Communicated VIVIEN with patient/caregiver Date not available/Unable to determine   Discharge Plan A Home Health   Discharge Plan B Home   DME Needed Upon Discharge  other (see comments)   Discharge Barriers Identified Unisured   Why the patient remains in the hospital Requires continued medical care   Post-Acute Status   Discharge Delays None known at this time

## 2022-12-21 NOTE — BRIEF OP NOTE
[facility] [place]  Brief Operative Note    SUMMARY     Surgery Date: 12/21/2022     Surgeon(s) and Role:     * OSWALDO Buckley MD - Primary    Assisting Surgeon: None    Pre-op Diagnosis:  Prostatic abscess [N41.2]    Post-op Diagnosis:  Post-Op Diagnosis Codes:     * Prostatic abscess [N41.2]    Procedure(s) (LRB):  TURP (TRANSURETHRAL RESECTION OF PROSTATE) (N/A)    Anesthesia: General    Operative Findings: Prostatic abscess unroofed, purulence expressed    Estimated Blood Loss: * No values recorded between 12/21/2022 10:20 AM and 12/21/2022 10:54 AM *    Estimated Blood Loss has not been documented. EBL = 5.         Specimens:   Specimen (24h ago, onward)       Start     Ordered    12/21/22 1053  Specimen to Pathology, Surgery Urology  Once        Comments: Pre-op Diagnosis: Prostatic abscess [N41.2]Procedure(s):TURP (TRANSURETHRAL RESECTION OF PROSTATE) Number of specimens: 1Name of specimens: Prostate Chips, Prostatic abscess     References:    Click here for ordering Quick Tip   Question Answer Comment   Procedure Type: Urology    Specimen Class: Routine/Screening    Which provider would you like to cc? OSWALDO BUCKLEY    Release to patient Immediate        12/21/22 1055                    [id number]

## 2022-12-21 NOTE — PROGRESS NOTES
[facility]  Urology  Progress Note    Patient Name: Vivek Turner  MRN: [id number]  Admission Date: 12/9/2022  Hospital Length of Stay: 12 days  Code Status: Full Code   Attending Provider: Sean Myers MD   Primary Care Physician: Primary Doctor No    Subjective:     HPI:  Prostatic Abscess  Vivek Turner is a 47 y.o. male who has been feeling poorly for a few weeks.  He currently has no fever but did previously.  He denies hematuria, dysuria, or difficulty voiding.      Interval History: new drains placed by IR yesterday.  He is voiding, no fever.    Review of Systems   Constitutional: Negative.  Negative for fever.   HENT: Negative.     Eyes: Negative.    Respiratory:  Negative for cough, chest tightness and shortness of breath.    Cardiovascular:  Negative for chest pain.   Gastrointestinal: Negative.  Negative for constipation, diarrhea and nausea.   Genitourinary:  Negative for difficulty urinating.   Musculoskeletal: Negative.    Neurological: Negative.    Psychiatric/Behavioral: Negative.     Objective:     Temp:  [98.2 °F (36.8 °C)-98.5 °F (36.9 °C)] 98.3 °F (36.8 °C)  Pulse:  [] 97  Resp:  [17-29] 18  SpO2:  [91 %-98 %] 96 %  BP: (122-145)/(72-93) 138/93     Body mass index is 34.37 kg/m².      Bladder Scan Volume (mL): 23 mL (12/10/22 1030)    Drains       Drain  Duration                  Closed/Suction Drain 12/09/22 1748 Lateral RLQ Bulb 11 days         Closed/Suction Drain 12/20/22 1210 RUQ Accordion 10 Fr. <1 day         Closed/Suction Drain 12/20/22 1234 Lateral RLQ Accordion 8 Fr. <1 day                    Physical Exam  Vitals and nursing note reviewed.   Constitutional:       Appearance: He is well-developed.   HENT:      Head: Normocephalic.   Eyes:      Conjunctiva/sclera: Conjunctivae normal.   Neck:      Thyroid: No thyromegaly.      Trachea: No tracheal deviation.   Cardiovascular:      Rate and Rhythm: Normal rate.      Heart sounds: Normal heart sounds.   Pulmonary:       Effort: Pulmonary effort is normal. No respiratory distress.      Breath sounds: Normal breath sounds. No wheezing.   Abdominal:      General: Bowel sounds are normal.      Palpations: Abdomen is soft.      Tenderness: There is no abdominal tenderness. There is no rebound.      Hernia: No hernia is present.   Musculoskeletal:         General: No tenderness. Normal range of motion.      Cervical back: Normal range of motion and neck supple.   Lymphadenopathy:      Cervical: No cervical adenopathy.   Skin:     General: Skin is warm and dry.      Findings: No erythema or rash.   Neurological:      Mental Status: He is alert and oriented to person, place, and time.   Psychiatric:         Behavior: Behavior normal.         Thought Content: Thought content normal.         Judgment: Judgment normal.       Significant Labs:    BMP:  Recent Labs   Lab 12/18/22 0416 12/19/22 0427 12/20/22  0630    139 139   K 3.8 3.7 3.8    104 108   CO2 31* 29 26   BUN 32* 30* 23*   CREATININE 1.4 1.3 1.1   CALCIUM 7.6* 7.6* 7.6*       CBC:   Recent Labs   Lab 12/18/22 0416 12/19/22 0427 12/20/22  0630   WBC 19.61* 17.38* 14.89*   HGB 9.4* 8.8* 9.0*   HCT 28.5* 27.4* 27.8*   * 438 437       Blood Culture: No results for input(s): LABBLOO in the last 168 hours.  Urine Culture: No results for input(s): LABURIN in the last 168 hours.    Significant Imaging:                      Assessment/Plan:     Prostatic abscess  I spoke to his brother in law  Consent performed with  violette  He agrees to a limited TURP to unroof the prostatic abscess  Plan on procedure today        VTE Risk Mitigation (From admission, onward)         Ordered     heparin (porcine) injection 5,000 Units  Every 8 hours         12/10/22 0838     IP VTE HIGH RISK PATIENT  Once         12/09/22 1421     Place sequential compression device  Until discontinued         12/09/22 1421                WILDER Buckley MD  Urology  [facility] - Telemetry

## 2022-12-21 NOTE — ANESTHESIA PROCEDURE NOTES
Intubation    Date/Time: 12/21/2022 10:12 AM  Performed by: Jameel Mathis CRNA  Authorized by: Pino House MD     Intubation:     Induction:  Inhalational - mask    Intubated:  Postinduction    Mask Ventilation:  Easy mask    Attempts:  1    Attempted By:  Student    Blade:  Jones 4    Laryngeal View Grade: Grade IIA - cords partially seen      Difficult Airway Encountered?: No      Complications:  None    Airway Device:  Oral endotracheal tube    Airway Device Size:  8.0    Style/Cuff Inflation:  Cuffed    Inflation Amount (mL):  5    Tube secured:  22    Secured at:  The teeth    Placement Verified By:  Capnometry    Complicating Factors:  None    Findings Post-Intubation:  BS equal bilateral

## 2022-12-21 NOTE — OP NOTE
DATE OF PROCEDURE:  12/21/2022      PREOPERATIVE DIAGNOSIS:  Prostatic abscess     POSTOPERATIVE DIAGNOSIS:  Prostatic abscess     PROCEDURE PERFORMED: Transurethral Resection of Prostate      PRIMARY SURGEON:  Jhonathan Buckley M.D.     ANESTHESIA:  General.     ESTIMATED BLOOD LOSS:  Minimal.     DRAINS:  A 22-Burkinan Newell catheter.     SPECIMENS REMOVED:  Prostate chips, prostatic abscess     COMPLICATIONS:  None.     INDICATIONS:  Vivek uTrner  is a 47 y.o. male with history of sepsis.  Imaging shows several areas of abscess including the prostate.  It was recommended that he undergo TURP for unroofing of the prostatic abscess.     Vivek Turner  was taken to the Operating Room where he was positively identified   by dee.  He was placed supine on the operating room table.  Following   induction of adequate general anesthesia, he was placed in the dorsal lithotomy   position and his external genitalia were prepped and draped in usual sterile   fashion.     A preoperative timeout was performed as well as confirmation of preoperative   antibiotics.     A 26-Burkinan resectoscope sheath was then passed per urethra into the bladder   using the obturator.     The obturator was withdrawn and the resectoscope with a bipolar electrocautery loop was then inserted.  The bladder was inspected.      Both ureteral orifices were identified.  They were   seen to be in orthotopic position.  The prostate was inspected.  He had bilateral lobe hypertrophy of the prostate noted.     I then began resecting at the 6 o'clock position from the bladder neck to the   verumontanum and then from the 2 o'clock position to the 6 o'clock position from   the bladder neck to the verumontanum, then from the 10 o'clock position to the   6 o'clock position from the bladder neck to the verumontanum.  At this point I did not see any purulence.  I focused mostly on the right side.  As this was the area of abscess on exam as well as on CT  scan.      I performed a digital rectal exam and filled the area of fluctuance on the prostate on the right side.      I continued resection in a posterolateral direction.  Purulence was encountered.  I used my finger with a digital rectal exam to express purulence out of the abscess.  I continued to express purulence until the stopped.      Hemostasis was achieved using the electrocautery loop.    I irrigated the prostatic abscess cavity with irrigant.       Hemostasis was achieved using the electrocautery loop.     The prostate chips were evacuated using an [employer] evacuator.     The bladder was inspected.  There was no evidence of any injury to the bladder,   both ureteral orifices were identified.  There was no evidence of any injury to   the ureteral orifices.     Hemostasis was deemed adequate.  The scope was then withdrawn.     A 22-English Newell catheter was inserted without difficulty into the bladder with  10mL was placed into the balloon.  The catheter was irrigated and the irrigant   remained clear.     The catheter was left to gravity drainage.  Plan will be to remove the Newell catheter tomorrow.     His anesthesia was reversed.  He was then taken to the Recovery Room in stable   condition.

## 2022-12-21 NOTE — TRANSFER OF CARE
"Anesthesia Transfer of Care Note    Patient: Vivek Turner    Procedure(s) Performed: Procedure(s) (LRB):  TURP (TRANSURETHRAL RESECTION OF PROSTATE) (N/A)    Patient location: PACU    Anesthesia Type: general    Transport from OR: Transported from OR on room air with adequate spontaneous ventilation    Post pain: adequate analgesia    Post assessment: no apparent anesthetic complications and tolerated procedure well    Post vital signs: stable    Level of consciousness: awake    Nausea/Vomiting: no nausea/vomiting    Complications: none    Transfer of care protocol was followed      Last vitals:   Visit Vitals  /75   Pulse 105   Temp 36.3 °C (97.3 °F)   Resp (!) 23   Ht 5' 6" (1.676 m)   Wt 96.6 kg (212 lb 15.4 oz)   SpO2 95%   BMI 34.37 kg/m²     "

## 2022-12-21 NOTE — NURSING
"Spoke to the patient.  Patient understands what the procedure is for tomorrow.  Patient stated "my wife, sister and brother in law would like to talk to the doctor before procedure".  Nurse will relay message to day shift nurse in the am.    "

## 2022-12-21 NOTE — NURSING
"Patients brother in law and sister called nurse.  They said patient does not understand what was explained to him by the surgical team.  He does not know what they are doing on the 12/21.  Nurse will pass this along to day shift nurse.  The brother in law stated "the patients main language is Bisayn.     "

## 2022-12-21 NOTE — SUBJECTIVE & OBJECTIVE
Interval History:   No abdominal pain at this time. No N/V or diarrhea.  CLD advanced    Review of Systems   Constitutional:  Positive for fatigue. Negative for chills, diaphoresis and fever.   HENT:  Negative for congestion and trouble swallowing.    Eyes:  Negative for photophobia and visual disturbance.   Respiratory:  Negative for cough, chest tightness and shortness of breath.    Cardiovascular:  Negative for chest pain, palpitations and leg swelling.   Gastrointestinal:  Positive for abdominal distention (improving). Negative for abdominal pain, constipation, diarrhea, nausea and vomiting.   Endocrine: Negative for polydipsia and polyuria.   Musculoskeletal:  Negative for arthralgias and myalgias.   Neurological:  Positive for weakness. Negative for numbness.   Psychiatric/Behavioral:  Negative for confusion.      Objective:     Vital Signs (Most Recent):  Temp: 98.2 °F (36.8 °C) (12/21/22 0434)  Pulse: 97 (12/21/22 0434)  Resp: 18 (12/21/22 0434)  BP: (!) 145/93 (12/21/22 0434)  SpO2: 95 % (12/21/22 0434)   Vital Signs (24h Range):  Temp:  [98.2 °F (36.8 °C)-98.5 °F (36.9 °C)] 98.2 °F (36.8 °C)  Pulse:  [] 97  Resp:  [17-29] 18  SpO2:  [91 %-98 %] 95 %  BP: (122-145)/(72-93) 145/93     Weight: 96.6 kg (212 lb 15.4 oz)  Body mass index is 34.37 kg/m².    Intake/Output Summary (Last 24 hours) at 12/21/2022 0658  Last data filed at 12/21/2022 0605  Gross per 24 hour   Intake 600 ml   Output 2315 ml   Net -1715 ml        Physical Exam  Vitals and nursing note reviewed.   Constitutional:       General: He is not in acute distress.     Appearance: He is well-developed. He is obese. He is ill-appearing. He is not toxic-appearing or diaphoretic.   HENT:      Head: Normocephalic and atraumatic.      Nose:      Comments: NGT in place with bilious output     Mouth/Throat:      Mouth: Mucous membranes are moist.   Eyes:      General: No scleral icterus.     Pupils: Pupils are equal, round, and reactive to light.    Neck:      Thyroid: No thyromegaly.   Cardiovascular:      Rate and Rhythm: Normal rate and regular rhythm.      Pulses: Normal pulses.      Heart sounds: Normal heart sounds. No murmur heard.    No gallop.   Pulmonary:      Effort: Pulmonary effort is normal. No respiratory distress.      Breath sounds: Normal breath sounds. No stridor. No wheezing or rales.      Comments: Decreased inspiration. No wheezes on exam. On 2L NC  Abdominal:      General: Bowel sounds are normal. There is distension.      Palpations: Abdomen is soft.      Tenderness: There is no abdominal tenderness. There is no guarding.      Comments: RUQ drain in place with minimal brown/orange fluid   Musculoskeletal:         General: No deformity. Normal range of motion.      Cervical back: Normal range of motion. No rigidity.      Right lower leg: No edema.      Left lower leg: No edema.   Lymphadenopathy:      Cervical: No cervical adenopathy.   Skin:     General: Skin is warm.   Neurological:      Mental Status: He is alert and oriented to person, place, and time.   Psychiatric:         Behavior: Behavior normal.      Comments: Appears frustrated that he cannot eat at this time            Recent Results (from the past 24 hour(s))   Gram stain    Collection Time: 12/20/22 11:35 AM    Specimen: Pleural Fluid   Result Value Ref Range    Gram Stain Result Cytospin indicates:     Gram Stain Result Many WBC's     Gram Stain Result No organisms seen    WBC & Diff,Body Fluid Pleural Fluid, Right    Collection Time: 12/20/22 11:36 AM   Result Value Ref Range    Body Fluid Type Pleural Fluid, Right     Fluid Appearance Turbid     Fluid Color Elizabeth     WBC, Body Fluid 1832 /cu mm    Segs, Fluid 55 %    Lymphs, Fluid 19 %    Monocytes/Macrophages, Fluid 24 %    Eos, Fluid 1 %    Baso, Fluid 1 %   Gram stain    Collection Time: 12/20/22 12:10 PM    Specimen: Abdomen; Abscess   Result Value Ref Range    Gram Stain Result Many WBC's     Gram Stain Result No  organisms seen    POCT glucose    Collection Time: 12/20/22  1:07 PM   Result Value Ref Range    POCT Glucose 92 70 - 110 mg/dL   Gram stain    Collection Time: 12/20/22  1:34 PM    Specimen: Abdomen; Abscess   Result Value Ref Range    Gram Stain Result Many WBC's     Gram Stain Result No organisms seen    POCT glucose    Collection Time: 12/20/22  3:37 PM   Result Value Ref Range    POCT Glucose 120 (H) 70 - 110 mg/dL       Microbiology Results (last 7 days)       Procedure Component Value Units Date/Time    Gram stain [[id number]] Collected: 12/20/22 1334    Order Status: Completed Specimen: Abscess from Abdomen Updated: 12/20/22 1530     Gram Stain Result Many WBC's      No organisms seen    Narrative:      RLQ Abdomen    Gram stain [[id number]] Collected: 12/20/22 1210    Order Status: Completed Specimen: Abscess from Abdomen Updated: 12/20/22 1529     Gram Stain Result Many WBC's      No organisms seen    Gram stain [[id number]] Collected: 12/20/22 1135    Order Status: Completed Specimen: Pleural Fluid Updated: 12/20/22 1529     Gram Stain Result Cytospin indicates:      Many WBC's      No organisms seen    Aerobic culture [[id number]] Collected: 12/20/22 1210    Order Status: No result Specimen: Abscess from Abdomen Updated: 12/20/22 1450    Culture, Anaerobic [[id number]] Collected: 12/20/22 1210    Order Status: No result Specimen: Abscess Updated: 12/20/22 1448    Aerobic culture [[id number]] Collected: 12/20/22 1135    Order Status: Sent Specimen: Pleural Fluid Updated: 12/20/22 1352    Culture, Anaerobic [[id number]] Collected: 12/20/22 1135    Order Status: Sent Specimen: Pleural Fluid Updated: 12/20/22 1352    Culture, Anaerobe [[id number]] Collected: 12/20/22 1334    Order Status: Sent Specimen: Abscess from Abdomen Updated: 12/20/22 1349    Aerobic culture [[id number]] Collected: 12/20/22 1334    Order Status: Sent Specimen: Abscess from Abdomen Updated: 12/20/22 1349    Gram stain [[id number]] Collected:  12/20/22 1210    Order Status: Canceled Specimen: Abscess from Abdomen     Aerobic culture [[id number]] Collected: 12/20/22 1210    Order Status: Canceled Specimen: Abscess from Abdomen     Culture, Anaerobic [[id number]] Collected: 12/20/22 1210    Order Status: Canceled Specimen: Abscess from Abdomen     Fungus culture [[id number]] Collected: 12/09/22 1831    Order Status: Completed Specimen: Abscess from Abdomen Updated: 12/19/22 1409     Fungus (Mycology) Culture No fungal growth to date    Blood culture [[id number]] Collected: 12/14/22 0205    Order Status: Completed Specimen: Blood from Antecubital, Right Arm Updated: 12/18/22 0303     Blood Culture, Routine No Growth after 4 days.    Narrative:      Blood cx in the AM with AM labs    Blood culture [[id number]] Collected: 12/14/22 0205    Order Status: Completed Specimen: Blood from Antecubital, Right Updated: 12/18/22 0303     Blood Culture, Routine No Growth after 4 days.    Narrative:      Blood cx in the AM with AM labs

## 2022-12-22 PROBLEM — J96.01 ACUTE HYPOXEMIC RESPIRATORY FAILURE: Status: RESOLVED | Noted: 2022-12-10 | Resolved: 2022-12-22

## 2022-12-22 PROBLEM — N17.0 ATN (ACUTE TUBULAR NECROSIS): Status: RESOLVED | Noted: 2022-12-09 | Resolved: 2022-12-22

## 2022-12-22 PROBLEM — R73.9 HYPERGLYCEMIA: Status: RESOLVED | Noted: 2022-12-10 | Resolved: 2022-12-22

## 2022-12-22 PROBLEM — K56.7 ILEUS: Status: RESOLVED | Noted: 2022-12-11 | Resolved: 2022-12-22

## 2022-12-22 LAB
ALBUMIN SERPL BCP-MCNC: 1.8 G/DL (ref 3.5–5.2)
ALP SERPL-CCNC: 70 U/L (ref 55–135)
ALT SERPL W/O P-5'-P-CCNC: 7 U/L (ref 10–44)
ANION GAP SERPL CALC-SCNC: 7 MMOL/L (ref 8–16)
AST SERPL-CCNC: 18 U/L (ref 10–40)
BACTERIA SPEC AEROBE CULT: ABNORMAL
BASOPHILS # BLD AUTO: 0.02 K/UL (ref 0–0.2)
BASOPHILS NFR BLD: 0.2 % (ref 0–1.9)
BILIRUB SERPL-MCNC: 0.6 MG/DL (ref 0.1–1)
BUN SERPL-MCNC: 15 MG/DL (ref 6–20)
CALCIUM SERPL-MCNC: 7.3 MG/DL (ref 8.7–10.5)
CHLORIDE SERPL-SCNC: 110 MMOL/L (ref 95–110)
CO2 SERPL-SCNC: 22 MMOL/L (ref 23–29)
CREAT SERPL-MCNC: 0.9 MG/DL (ref 0.5–1.4)
DIFFERENTIAL METHOD: ABNORMAL
EOSINOPHIL # BLD AUTO: 0.1 K/UL (ref 0–0.5)
EOSINOPHIL NFR BLD: 1.3 % (ref 0–8)
ERYTHROCYTE [DISTWIDTH] IN BLOOD BY AUTOMATED COUNT: 14.3 % (ref 11.5–14.5)
EST. GFR  (NO RACE VARIABLE): >60 ML/MIN/1.73 M^2
GLUCOSE SERPL-MCNC: 108 MG/DL (ref 70–110)
HCT VFR BLD AUTO: 27.1 % (ref 40–54)
HGB BLD-MCNC: 8.9 G/DL (ref 14–18)
IMM GRANULOCYTES # BLD AUTO: 0.08 K/UL (ref 0–0.04)
IMM GRANULOCYTES NFR BLD AUTO: 0.8 % (ref 0–0.5)
LYMPHOCYTES # BLD AUTO: 1.5 K/UL (ref 1–4.8)
LYMPHOCYTES NFR BLD: 14.2 % (ref 18–48)
MAGNESIUM SERPL-MCNC: 1.7 MG/DL (ref 1.6–2.6)
MCH RBC QN AUTO: 29.5 PG (ref 27–31)
MCHC RBC AUTO-ENTMCNC: 32.8 G/DL (ref 32–36)
MCV RBC AUTO: 90 FL (ref 82–98)
MONOCYTES # BLD AUTO: 0.9 K/UL (ref 0.3–1)
MONOCYTES NFR BLD: 8.6 % (ref 4–15)
NEUTROPHILS # BLD AUTO: 7.9 K/UL (ref 1.8–7.7)
NEUTROPHILS NFR BLD: 74.9 % (ref 38–73)
NRBC BLD-RTO: 0 /100 WBC
PHOSPHATE SERPL-MCNC: 3.2 MG/DL (ref 2.7–4.5)
PLATELET # BLD AUTO: 436 K/UL (ref 150–450)
PMV BLD AUTO: 9.8 FL (ref 9.2–12.9)
POCT GLUCOSE: 103 MG/DL (ref 70–110)
POCT GLUCOSE: 115 MG/DL (ref 70–110)
POCT GLUCOSE: 121 MG/DL (ref 70–110)
POCT GLUCOSE: 95 MG/DL (ref 70–110)
POCT GLUCOSE: 98 MG/DL (ref 70–110)
POTASSIUM SERPL-SCNC: 4 MMOL/L (ref 3.5–5.1)
PROT SERPL-MCNC: 5.8 G/DL (ref 6–8.4)
RBC # BLD AUTO: 3.02 M/UL (ref 4.6–6.2)
SODIUM SERPL-SCNC: 139 MMOL/L (ref 136–145)
WBC # BLD AUTO: 10.59 K/UL (ref 3.9–12.7)

## 2022-12-22 PROCEDURE — 80053 COMPREHEN METABOLIC PANEL: CPT | Performed by: UROLOGY

## 2022-12-22 PROCEDURE — 99024 POSTOP FOLLOW-UP VISIT: CPT | Mod: ,,, | Performed by: UROLOGY

## 2022-12-22 PROCEDURE — 99232 SBSQ HOSP IP/OBS MODERATE 35: CPT | Mod: ,,, | Performed by: STUDENT IN AN ORGANIZED HEALTH CARE EDUCATION/TRAINING PROGRAM

## 2022-12-22 PROCEDURE — 25000003 PHARM REV CODE 250: Performed by: UROLOGY

## 2022-12-22 PROCEDURE — 99024 PR POST-OP FOLLOW-UP VISIT: ICD-10-PCS | Mod: ,,, | Performed by: UROLOGY

## 2022-12-22 PROCEDURE — 85025 COMPLETE CBC W/AUTO DIFF WBC: CPT | Performed by: UROLOGY

## 2022-12-22 PROCEDURE — A4216 STERILE WATER/SALINE, 10 ML: HCPCS | Performed by: UROLOGY

## 2022-12-22 PROCEDURE — 97110 THERAPEUTIC EXERCISES: CPT

## 2022-12-22 PROCEDURE — 63600175 PHARM REV CODE 636 W HCPCS: Performed by: UROLOGY

## 2022-12-22 PROCEDURE — 99232 PR SUBSEQUENT HOSPITAL CARE,LEVL II: ICD-10-PCS | Mod: ,,, | Performed by: STUDENT IN AN ORGANIZED HEALTH CARE EDUCATION/TRAINING PROGRAM

## 2022-12-22 PROCEDURE — S0030 INJECTION, METRONIDAZOLE: HCPCS | Performed by: UROLOGY

## 2022-12-22 PROCEDURE — 21400001 HC TELEMETRY ROOM

## 2022-12-22 PROCEDURE — 25000003 PHARM REV CODE 250: Performed by: STUDENT IN AN ORGANIZED HEALTH CARE EDUCATION/TRAINING PROGRAM

## 2022-12-22 PROCEDURE — 83735 ASSAY OF MAGNESIUM: CPT | Performed by: UROLOGY

## 2022-12-22 PROCEDURE — 63600175 PHARM REV CODE 636 W HCPCS: Performed by: STUDENT IN AN ORGANIZED HEALTH CARE EDUCATION/TRAINING PROGRAM

## 2022-12-22 PROCEDURE — 51798 US URINE CAPACITY MEASURE: CPT

## 2022-12-22 PROCEDURE — 84100 ASSAY OF PHOSPHORUS: CPT | Performed by: UROLOGY

## 2022-12-22 RX ORDER — METRONIDAZOLE 500 MG/1
500 TABLET ORAL EVERY 8 HOURS
Status: DISCONTINUED | OUTPATIENT
Start: 2022-12-22 | End: 2022-12-28 | Stop reason: HOSPADM

## 2022-12-22 RX ORDER — CEFTRIAXONE 2 G/50ML
2 INJECTION, SOLUTION INTRAVENOUS
Status: DISCONTINUED | OUTPATIENT
Start: 2022-12-22 | End: 2022-12-28 | Stop reason: HOSPADM

## 2022-12-22 RX ADMIN — Medication 10 ML: at 05:12

## 2022-12-22 RX ADMIN — METRONIDAZOLE 500 MG: 500 TABLET ORAL at 10:12

## 2022-12-22 RX ADMIN — Medication 10 ML: at 10:12

## 2022-12-22 RX ADMIN — HEPARIN SODIUM 5000 UNITS: 5000 INJECTION INTRAVENOUS; SUBCUTANEOUS at 05:12

## 2022-12-22 RX ADMIN — HEPARIN SODIUM 5000 UNITS: 5000 INJECTION INTRAVENOUS; SUBCUTANEOUS at 10:12

## 2022-12-22 RX ADMIN — Medication 10 ML: at 11:12

## 2022-12-22 RX ADMIN — METRONIDAZOLE 500 MG: 500 INJECTION, SOLUTION INTRAVENOUS at 12:12

## 2022-12-22 RX ADMIN — CEFEPIME 2 G: 2 INJECTION, POWDER, FOR SOLUTION INTRAVENOUS at 02:12

## 2022-12-22 RX ADMIN — Medication 10 ML: at 02:12

## 2022-12-22 RX ADMIN — Medication 10 ML: at 12:12

## 2022-12-22 RX ADMIN — SODIUM CHLORIDE: 0.9 INJECTION, SOLUTION INTRAVENOUS at 10:12

## 2022-12-22 RX ADMIN — CEFTRIAXONE SODIUM 2 G: 2 INJECTION, SOLUTION INTRAVENOUS at 11:12

## 2022-12-22 RX ADMIN — SODIUM CHLORIDE: 0.9 INJECTION, SOLUTION INTRAVENOUS at 02:12

## 2022-12-22 RX ADMIN — METRONIDAZOLE 500 MG: 500 TABLET ORAL at 02:12

## 2022-12-22 RX ADMIN — HEPARIN SODIUM 5000 UNITS: 5000 INJECTION INTRAVENOUS; SUBCUTANEOUS at 02:12

## 2022-12-22 RX ADMIN — METRONIDAZOLE 500 MG: 500 INJECTION, SOLUTION INTRAVENOUS at 08:12

## 2022-12-22 NOTE — PROGRESS NOTES
[facility] Medicine  Progress Note    Patient Name: Vivek Turner  MRN: [id number]  Patient Class: IP- Inpatient   Admission Date: 12/9/2022  Length of Stay: 13 days  Attending Physician: Tarun Nickerson MD  Primary Care Provider: Primary Doctor No        Subjective:     Principal Problem:Severe sepsis        HPI:  47y M w/ hypertension presents with abdominal pain x 2 days. Pt refused  service. He reports that he had fever/rigors three days prior to admission. He then developed worsening abdominal pain from then until admission. He went to an urgent care where he was started on bactrim for UTI. His abdominal pain increased over the next few days. He had a BM yesterday. Denies vomiting, but has had significant nausea and very limited PO intake in the days leading to admission. Aside from the single episode of fever/rigors he denies subsequent fevers.     In the ED he was found to have a R hepatic abscess vs mass. He traveled to the [facility] eight weeks prior to presentation. He denies fevers except for what is listed above. Denies weight loss, cough, night sweats. Denies prior hx of TB. Denies prior hx of cirrhosis      Overview/Hospital Course:  Mr Vivek Turner was admitted with severe sepsis due to R liver mass vs abscess and acute renal failure. IR consulted and drain placed on 12/9. Cultures with Klebsiella. ID consulted-continue cefazolin, and restarted Flagyl on 12/13. Started on bicarb gtt for acute renal failure and Nephrology consulted. He was moved to ICU for instability. He has ileus; NGT placed with copious bilious output. Renal failure improving. GI consulted to assess for source of liver abscess. MRCP showed persistent heterogeneous collection within the right hepatic lobe compatible with reported abscess noting percutaneous drainage catheter in place. No significant biliary duct abnormality on MRCP assessment. Pt refused inpatient colonoscopy. Pt transferred to the  floor on 12/12. PT/OT recommends HH. Monitor renal function and ileus at this time. Still has some output from NGT, GI recommends clamping today and monitor.     Cr improving with fluids and TPN. Na still high, will continue D5 for now. wbc uptrending and looks like new fluid collection next to liver, will ask IR to drain and send for cultures, labs placed. Will change to Cefepime for broader coverage until those cultures result. WBC# still elevated. IR did not see another fluid pocket that was attainable, Cr improving now at 1.5, may have to re-image/CT in a couple days when clearance is back to normal to confirm source control. Na 151, gave D5 at 100 mL for 10 hours and Na corrected to 143, D5 stopped. Still getting TPN fluids, but will attempt to advance diet, NGT removed.     CrCl back up to 78, will order CT a/p w contrast to assess for resolution or further intervention. Low dose fluid ordered to protect kidneys from contrast for 20 hours ordered given tenuous state.     Suspected that we did not have source control given persistent WBC#, repeat CT showing multiple abdominal abscesses in the abdomen persists, as well as a large R pleural effusion, and prostatic abscess. IR consulted for thoracentesis and potentially placing another drain(s), given multiple abscesses and locations, will ask GenSx to review CT and see if a washout is more appropriate. Urology consulted for prostatic abscess.     1.5L drained from pleural space. GenSx and IR discussed case, thought best to put 2 more drains in larger abscesses and use IV abx for smaller ones, and asking Urology to drain prostatic abscess. Spoke to VIJAY Dominique on the phone for 10m updating him on pt care per pt request.      Interval History: no new complaints.    Review of Systems   HENT:  Negative for ear discharge and ear pain.    Eyes:  Negative for discharge and itching.   Endocrine: Negative for cold intolerance and heat intolerance.   Neurological:  Negative  for seizures and syncope.   Objective:     Vital Signs (Most Recent):  Temp: 99.2 °F (37.3 °C) (12/22/22 1122)  Pulse: 93 (12/22/22 1122)  Resp: 19 (12/22/22 1122)  BP: 126/75 (12/22/22 1122)  SpO2: 97 % (12/22/22 1122) Vital Signs (24h Range):  Temp:  [98.1 °F (36.7 °C)-99.5 °F (37.5 °C)] 99.2 °F (37.3 °C)  Pulse:  [] 93  Resp:  [18-19] 19  SpO2:  [90 %-98 %] 97 %  BP: (125-134)/(68-85) 126/75     Weight: 96.6 kg (212 lb 15.4 oz)  Body mass index is 34.37 kg/m².    Intake/Output Summary (Last 24 hours) at 12/22/2022 1441  Last data filed at 12/22/2022 1305  Gross per 24 hour   Intake 360 ml   Output 2750 ml   Net -2390 ml      Physical Exam  Vitals and nursing note reviewed.   Constitutional:       General: He is not in acute distress.     Appearance: He is well-developed. He is obese. He is ill-appearing. He is not toxic-appearing or diaphoretic.   HENT:      Head: Normocephalic and atraumatic.      Mouth/Throat:      Mouth: Mucous membranes are moist.   Eyes:      General: No scleral icterus.     Pupils: Pupils are equal, round, and reactive to light.   Neck:      Thyroid: No thyromegaly.   Cardiovascular:      Rate and Rhythm: Normal rate and regular rhythm.      Pulses: Normal pulses.      Heart sounds: Normal heart sounds. No murmur heard.    No gallop.   Pulmonary:      Effort: Pulmonary effort is normal. No respiratory distress.      Breath sounds: Normal breath sounds. No stridor. No wheezing or rales.      Comments: Decreased inspiration. No wheezes on exam. On 2L NC  Abdominal:      General: Bowel sounds are normal.      Palpations: Abdomen is soft.      Tenderness: There is no abdominal tenderness. There is no guarding.      Comments: RUQ drain in place   Musculoskeletal:         General: No deformity. Normal range of motion.      Cervical back: Normal range of motion. No rigidity.      Right lower leg: No edema.      Left lower leg: No edema.   Lymphadenopathy:      Cervical: No cervical  adenopathy.   Skin:     General: Skin is warm.   Neurological:      Mental Status: He is alert and oriented to person, place, and time.   Psychiatric:         Behavior: Behavior normal.       Significant Labs: All pertinent labs within the past 24 hours have been reviewed.  BMP: No results for input(s): GLU, NA, K, CL, CO2, BUN, CREATININE, CALCIUM, MG in the last 48 hours.  CBC: No results for input(s): WBC, HGB, HCT, PLT in the last 48 hours.    Significant Imaging: I have reviewed all pertinent imaging results/findings within the past 24 hours.      Assessment/Plan:      * Severe sepsis  This patient does have evidence of infective focus. My overall impression is sepsis. Vital signs were reviewed and noted in progress note.  Antibiotics given-   Antibiotics (From admission, onward)    Start     Stop Route Frequency Ordered    12/22/22 1400  metroNIDAZOLE tablet 500 mg         -- Oral Every 8 hours 12/22/22 1020    12/22/22 1130  cefTRIAXone 2 gram/50 mL IVPB 2 g         -- IV Every 24 hours (non-standard times) 12/22/22 1020        Cultures were taken-   Microbiology Results (last 7 days)     Procedure Component Value Units Date/Time    Aerobic culture [[id number]] Collected: 12/20/22 1334    Order Status: Completed Specimen: Abscess from Abdomen Updated: 12/22/22 0912     Aerobic Bacterial Culture No growth    Narrative:      RLQ Abdomen    Aerobic culture [[id number]]  (Abnormal)  (Susceptibility) Collected: 12/20/22 1210    Order Status: Completed Specimen: Abscess from Abdomen Updated: 12/22/22 0908     Aerobic Bacterial Culture KLEBSIELLA PNEUMONIAE  Few      Aerobic culture [[id number]] Collected: 12/20/22 1135    Order Status: Completed Specimen: Pleural Fluid Updated: 12/22/22 0859     Aerobic Bacterial Culture No growth    Culture, Anaerobe [[id number]] Collected: 12/20/22 1334    Order Status: Completed Specimen: Abscess from Abdomen Updated: 12/22/22 0738     Anaerobic Culture Culture in progress     Narrative:      RLQ Abdomen    Culture, Anaerobic [[id number]] Collected: 12/20/22 1210    Order Status: Completed Specimen: Abscess Updated: 12/22/22 0736     Anaerobic Culture Culture in progress    Culture, Anaerobic [[id number]] Collected: 12/20/22 1135    Order Status: Completed Specimen: Pleural Fluid Updated: 12/22/22 0733     Anaerobic Culture Culture in progress    Gram stain [[id number]] Collected: 12/20/22 1334    Order Status: Completed Specimen: Abscess from Abdomen Updated: 12/20/22 1530     Gram Stain Result Many WBC's      No organisms seen    Narrative:      RLQ Abdomen    Gram stain [[id number]] Collected: 12/20/22 1210    Order Status: Completed Specimen: Abscess from Abdomen Updated: 12/20/22 1529     Gram Stain Result Many WBC's      No organisms seen    Gram stain [[id number]] Collected: 12/20/22 1135    Order Status: Completed Specimen: Pleural Fluid Updated: 12/20/22 1529     Gram Stain Result Cytospin indicates:      Many WBC's      No organisms seen    Gram stain [[id number]] Collected: 12/20/22 1210    Order Status: Canceled Specimen: Abscess from Abdomen     Aerobic culture [[id number]] Collected: 12/20/22 1210    Order Status: Canceled Specimen: Abscess from Abdomen     Culture, Anaerobic [[id number]] Collected: 12/20/22 1210    Order Status: Canceled Specimen: Abscess from Abdomen     Fungus culture [[id number]] Collected: 12/09/22 1831    Order Status: Completed Specimen: Abscess from Abdomen Updated: 12/19/22 1409     Fungus (Mycology) Culture No fungal growth to date    Blood culture [[id number]] Collected: 12/14/22 0205    Order Status: Completed Specimen: Blood from Antecubital, Right Arm Updated: 12/18/22 0303     Blood Culture, Routine No Growth after 4 days.    Narrative:      Blood cx in the AM with AM labs    Blood culture [[id number]] Collected: 12/14/22 0205    Order Status: Completed Specimen: Blood from Antecubital, Right Updated: 12/18/22 0303     Blood Culture, Routine No Growth  after 4 days.    Narrative:      Blood cx in the AM with AM labs        Latest lactate reviewed, they are-  No results for input(s): LACTATE in the last 72 hours.    Organ dysfunction indicated by Acute kidney injury and Acute liver injury  Source- liver abscess    Source control Achieved by- IR    -Due to hepatic abscess  -s/p IR drainage on 12/09  -ID consulted:  Continue cefazolin and Flagyl  -abscess culture with pansensitive Klebsiella   -continue IV antibiotics  -Leukocytosis improving       Pleural effusion        Prostatic abscess  Urology consulted for prostatic abscess.   See hepatic abscess      Microcytic anemia  Hgb 10-11, no prior to review.   Iron deficient- TSAT 17, but also significant acute inflammation- ferritin 3633.   B12, folate replete.   Stable        Atelectasis  Shortness of breath partly due to atelectasis as noted on CXR  - incentive spirometer  - O2 by NC as needed      Hepatic abscess  Unclear how he got this. IR drain placed 12/9. Cultures with Klebsiella, pansensitive.   - MRCP:persistent heterogeneous collection within the right hepatic lobe compatible with reported abscess noting percutaneous drainage catheter in place. No significant biliary duct abnormality on MRCP assessment.  - Blood cx with NGTD  - Abscess culture with klebsiella. No anaerobes  -IR drain with minimal output. Discussed with IR and IR suspect its not putting out much because of the heterogeneity of the collection and drain may not be sufficient. Recommended repeat US abdomen   - ID consulted: on cefazolin, added flagyl on 12/13  - GI consulted to assess for source of liver abscess. Pt refuses inpatient colonoscopy. Likely plan for outpatient cscope.       VTE Risk Mitigation (From admission, onward)         Ordered     heparin (porcine) injection 5,000 Units  Every 8 hours         12/10/22 0838     IP VTE HIGH RISK PATIENT  Once         12/09/22 1421     Place sequential compression device  Until discontinued          12/09/22 1421                Discharge Planning   VIVIEN: 12/16/2022     Code Status: Full Code   Is the patient medically ready for discharge?:     Reason for patient still in hospital (select all that apply): Treatment  Discharge Plan A: Long-term acute care facility (LTAC)   Discharge Delays: (!) Post-Acute Set-up              Tarun Nickerson MD  Department of Hospital Medicine   [facility]

## 2022-12-22 NOTE — PLAN OF CARE
Problem: Infection Progression (Sepsis/Septic Shock)  Goal: Absence of Infection Signs and Symptoms  Outcome: Ongoing, Progressing     Problem: Oral Intake Inadequate (Acute Kidney Injury/Impairment)  Goal: Optimal Nutrition Intake  Outcome: Ongoing, Progressing

## 2022-12-22 NOTE — NURSING
Bedside shift report received from Leonarda MARTINEZ.  Patient denies any needs or wants.  Call light within reach.  Will continue to monitor.

## 2022-12-22 NOTE — PROGRESS NOTES
Bladder scan done re: patient reported feeling full and pressure  after attempted voids. Bladder scan showed 475 ml. In and Out 500 ml pink tinged urine noted. Patient reported feeling better after cath.

## 2022-12-22 NOTE — ASSESSMENT & PLAN NOTE
This patient does have evidence of infective focus. My overall impression is sepsis. Vital signs were reviewed and noted in progress note.  Antibiotics given-   Antibiotics (From admission, onward)    Start     Stop Route Frequency Ordered    12/22/22 1400  metroNIDAZOLE tablet 500 mg         -- Oral Every 8 hours 12/22/22 1020    12/22/22 1130  cefTRIAXone 2 gram/50 mL IVPB 2 g         -- IV Every 24 hours (non-standard times) 12/22/22 1020        Cultures were taken-   Microbiology Results (last 7 days)     Procedure Component Value Units Date/Time    Aerobic culture [[id number]] Collected: 12/20/22 1334    Order Status: Completed Specimen: Abscess from Abdomen Updated: 12/22/22 0912     Aerobic Bacterial Culture No growth    Narrative:      RLQ Abdomen    Aerobic culture [[id number]]  (Abnormal)  (Susceptibility) Collected: 12/20/22 1210    Order Status: Completed Specimen: Abscess from Abdomen Updated: 12/22/22 0908     Aerobic Bacterial Culture KLEBSIELLA PNEUMONIAE  Few      Aerobic culture [[id number]] Collected: 12/20/22 1135    Order Status: Completed Specimen: Pleural Fluid Updated: 12/22/22 0859     Aerobic Bacterial Culture No growth    Culture, Anaerobe [[id number]] Collected: 12/20/22 1334    Order Status: Completed Specimen: Abscess from Abdomen Updated: 12/22/22 0738     Anaerobic Culture Culture in progress    Narrative:      RLQ Abdomen    Culture, Anaerobic [[id number]] Collected: 12/20/22 1210    Order Status: Completed Specimen: Abscess Updated: 12/22/22 0736     Anaerobic Culture Culture in progress    Culture, Anaerobic [[id number]] Collected: 12/20/22 1135    Order Status: Completed Specimen: Pleural Fluid Updated: 12/22/22 0733     Anaerobic Culture Culture in progress    Gram stain [[id number]] Collected: 12/20/22 1334    Order Status: Completed Specimen: Abscess from Abdomen Updated: 12/20/22 1530     Gram Stain Result Many WBC's      No organisms seen    Narrative:      RLQ Abdomen    Gram stain  [[id number]] Collected: 12/20/22 1210    Order Status: Completed Specimen: Abscess from Abdomen Updated: 12/20/22 1529     Gram Stain Result Many WBC's      No organisms seen    Gram stain [[id number]] Collected: 12/20/22 1135    Order Status: Completed Specimen: Pleural Fluid Updated: 12/20/22 1529     Gram Stain Result Cytospin indicates:      Many WBC's      No organisms seen    Gram stain [[id number]] Collected: 12/20/22 1210    Order Status: Canceled Specimen: Abscess from Abdomen     Aerobic culture [[id number]] Collected: 12/20/22 1210    Order Status: Canceled Specimen: Abscess from Abdomen     Culture, Anaerobic [[id number]] Collected: 12/20/22 1210    Order Status: Canceled Specimen: Abscess from Abdomen     Fungus culture [[id number]] Collected: 12/09/22 1831    Order Status: Completed Specimen: Abscess from Abdomen Updated: 12/19/22 1409     Fungus (Mycology) Culture No fungal growth to date    Blood culture [[id number]] Collected: 12/14/22 0205    Order Status: Completed Specimen: Blood from Antecubital, Right Arm Updated: 12/18/22 0303     Blood Culture, Routine No Growth after 4 days.    Narrative:      Blood cx in the AM with AM labs    Blood culture [[id number]] Collected: 12/14/22 0205    Order Status: Completed Specimen: Blood from Antecubital, Right Updated: 12/18/22 0303     Blood Culture, Routine No Growth after 4 days.    Narrative:      Blood cx in the AM with AM labs        Latest lactate reviewed, they are-  No results for input(s): LACTATE in the last 72 hours.    Organ dysfunction indicated by Acute kidney injury and Acute liver injury  Source- liver abscess    Source control Achieved by- IR    -Due to hepatic abscess  -s/p IR drainage on 12/09  -ID consulted:  Continue cefazolin and Flagyl  -abscess culture with pansensitive Klebsiella   -continue IV antibiotics  -Leukocytosis improving

## 2022-12-22 NOTE — SUBJECTIVE & OBJECTIVE
Interval History: no new complaints.    Review of Systems   HENT:  Negative for ear discharge and ear pain.    Eyes:  Negative for discharge and itching.   Endocrine: Negative for cold intolerance and heat intolerance.   Neurological:  Negative for seizures and syncope.   Objective:     Vital Signs (Most Recent):  Temp: 99.2 °F (37.3 °C) (12/22/22 1122)  Pulse: 93 (12/22/22 1122)  Resp: 19 (12/22/22 1122)  BP: 126/75 (12/22/22 1122)  SpO2: 97 % (12/22/22 1122) Vital Signs (24h Range):  Temp:  [98.1 °F (36.7 °C)-99.5 °F (37.5 °C)] 99.2 °F (37.3 °C)  Pulse:  [] 93  Resp:  [18-19] 19  SpO2:  [90 %-98 %] 97 %  BP: (125-134)/(68-85) 126/75     Weight: 96.6 kg (212 lb 15.4 oz)  Body mass index is 34.37 kg/m².    Intake/Output Summary (Last 24 hours) at 12/22/2022 1441  Last data filed at 12/22/2022 1305  Gross per 24 hour   Intake 360 ml   Output 2750 ml   Net -2390 ml      Physical Exam  Vitals and nursing note reviewed.   Constitutional:       General: He is not in acute distress.     Appearance: He is well-developed. He is obese. He is ill-appearing. He is not toxic-appearing or diaphoretic.   HENT:      Head: Normocephalic and atraumatic.      Mouth/Throat:      Mouth: Mucous membranes are moist.   Eyes:      General: No scleral icterus.     Pupils: Pupils are equal, round, and reactive to light.   Neck:      Thyroid: No thyromegaly.   Cardiovascular:      Rate and Rhythm: Normal rate and regular rhythm.      Pulses: Normal pulses.      Heart sounds: Normal heart sounds. No murmur heard.    No gallop.   Pulmonary:      Effort: Pulmonary effort is normal. No respiratory distress.      Breath sounds: Normal breath sounds. No stridor. No wheezing or rales.      Comments: Decreased inspiration. No wheezes on exam. On 2L NC  Abdominal:      General: Bowel sounds are normal.      Palpations: Abdomen is soft.      Tenderness: There is no abdominal tenderness. There is no guarding.      Comments: RUQ drain in place    Musculoskeletal:         General: No deformity. Normal range of motion.      Cervical back: Normal range of motion. No rigidity.      Right lower leg: No edema.      Left lower leg: No edema.   Lymphadenopathy:      Cervical: No cervical adenopathy.   Skin:     General: Skin is warm.   Neurological:      Mental Status: He is alert and oriented to person, place, and time.   Psychiatric:         Behavior: Behavior normal.       Significant Labs: All pertinent labs within the past 24 hours have been reviewed.  BMP: No results for input(s): GLU, NA, K, CL, CO2, BUN, CREATININE, CALCIUM, MG in the last 48 hours.  CBC: No results for input(s): WBC, HGB, HCT, PLT in the last 48 hours.    Significant Imaging: I have reviewed all pertinent imaging results/findings within the past 24 hours.

## 2022-12-22 NOTE — ASSESSMENT & PLAN NOTE
Creatinine at 6.1 at admission. Unknown baseline with no prior labs to review. FENa 1.4% suggesting intrinsic injury. This may be ATN from sepsis +/- Bactrim pseudotoxicity. No hydro seen on CT or US.   - IVF ordered  - Nephrology consulted  - this is improving     Patient with acute kidney injury likely due to IVVD/dehydration and acute tubular necrosis MARYURI is currently improving. Labs reviewed- Renal function/electrolytes with Estimated Creatinine Clearance: 92.2 mL/min (based on SCr of 1.1 mg/dL). according to latest data. Monitor urine output and serial BMP and adjust therapy as needed. Avoid nephrotoxins and renally dose meds for GFR listed above.

## 2022-12-22 NOTE — ASSESSMENT & PLAN NOTE
47M with h/o HTN admitted 12/9 with abdominal pain, found to have a liver abscess and MARYURI, s/p IR drainage on 12/9 (CX + pan-sen kleb pna), complicated by ileus/MARYURI. Blcx ngtd. Etiology of abscess is unclear. MRCP 12/12 unremarkable.  Repeat CT scan with multiple collections c/f  abscesses (subcapsular hepatic collection, prostatic hypodensity, and abdominal fluid collections). Also ntoed to have R pleural effusion.  S/p IR drainage of perihepatic fluid collection - cx with Kleb pneumo thus far, anaerobic cx in process. RLQ abdominal fluid collection and R thora cx ngtd. S/p TURP with urology on 12/21 - noted to have purulent fluid. Agree with tentative plan for placement for abx therapy per chart review.       Recommendations:   -abx de-escalated to ceftriaxone 2g iv daily for Kleb pneumo  -continue flagyl (switched to PO) for empiric anaerobic coverage while awaiting finalization of cx data  -anticipate 4w course of abx therapy/resolution of abscesses; cash 1/18 with repeat CT abdo/pelvis with contrast 1 week prior to completion of abx therapy to evaluate for source control  -outpatient colonoscopy  -follow up cx data

## 2022-12-22 NOTE — PROGRESS NOTES
[facility]  Urology  Progress Note    Patient Name: Vivek Turner  MRN: [id number]  Admission Date: 12/9/2022  Hospital Length of Stay: 13 days  Code Status: Full Code   Attending Provider: Tarun Nickerson MD   Primary Care Physician: Primary Doctor No    Subjective:     HPI:  Prostatic Abscess  Vivek Turner is a 47 y.o. male who has been feeling poorly for a few weeks.  He currently has no fever but did previously.  He denies hematuria, dysuria, or difficulty voiding.      Interval History: s/p TURP for unroofing of prostatic abscess.  No acute events overnight.  He feels well.      Review of Systems   Constitutional: Negative.  Negative for fever.   HENT: Negative.     Eyes: Negative.    Respiratory:  Negative for cough, chest tightness and shortness of breath.    Cardiovascular:  Negative for chest pain.   Gastrointestinal: Negative.  Negative for constipation, diarrhea and nausea.   Genitourinary:  Positive for hematuria.   Musculoskeletal: Negative.    Neurological: Negative.    Psychiatric/Behavioral: Negative.     Objective:     Temp:  [97.3 °F (36.3 °C)-99.5 °F (37.5 °C)] 98.6 °F (37 °C)  Pulse:  [] 93  Resp:  [18-23] 18  SpO2:  [93 %-98 %] 94 %  BP: (118-140)/(72-93) 127/75     Body mass index is 34.37 kg/m².      Bladder Scan Volume (mL): 23 mL (12/10/22 1030)    Drains       Drain  Duration                  Closed/Suction Drain 12/09/22 1748 Lateral RLQ Bulb 12 days         Closed/Suction Drain 12/20/22 1210 RUQ Accordion 10 Fr. 1 day         Closed/Suction Drain 12/20/22 1234 Lateral RLQ Accordion 8 Fr. 1 day         Urethral Catheter 12/21/22 1044 Latex 22 Fr. <1 day                    Physical Exam  Vitals and nursing note reviewed.   Constitutional:       Appearance: He is well-developed.   HENT:      Head: Normocephalic.   Eyes:      Conjunctiva/sclera: Conjunctivae normal.   Neck:      Thyroid: No thyromegaly.      Trachea: No tracheal deviation.   Cardiovascular:      Rate and  Rhythm: Normal rate.      Heart sounds: Normal heart sounds.   Pulmonary:      Effort: Pulmonary effort is normal. No respiratory distress.      Breath sounds: Normal breath sounds. No wheezing.   Abdominal:      General: Bowel sounds are normal.      Palpations: Abdomen is soft.      Tenderness: There is no abdominal tenderness. There is no rebound.      Hernia: No hernia is present.      Comments: IR drains in place with purulent fluid   Genitourinary:     Comments: Newell in place with clear/pink urine  Musculoskeletal:         General: No tenderness. Normal range of motion.      Cervical back: Normal range of motion and neck supple.   Lymphadenopathy:      Cervical: No cervical adenopathy.   Skin:     General: Skin is warm and dry.      Findings: No erythema or rash.   Neurological:      Mental Status: He is alert and oriented to person, place, and time.   Psychiatric:         Behavior: Behavior normal.         Thought Content: Thought content normal.         Judgment: Judgment normal.       Significant Labs:    BMP:  Recent Labs   Lab 12/18/22 0416 12/19/22  0427 12/20/22  0630    139 139   K 3.8 3.7 3.8    104 108   CO2 31* 29 26   BUN 32* 30* 23*   CREATININE 1.4 1.3 1.1   CALCIUM 7.6* 7.6* 7.6*       CBC:   Recent Labs   Lab 12/18/22 0416 12/19/22  0427 12/20/22  0630   WBC 19.61* 17.38* 14.89*   HGB 9.4* 8.8* 9.0*   HCT 28.5* 27.4* 27.8*   * 438 437       Blood Culture: No results for input(s): LABBLOO in the last 168 hours.  Urine Culture: No results for input(s): LABURIN in the last 168 hours.    Significant Imaging:                      Assessment/Plan:     Prostatic abscess  D/C Newell  Dysuria and some hematuria is expected        VTE Risk Mitigation (From admission, onward)         Ordered     heparin (porcine) injection 5,000 Units  Every 8 hours         12/10/22 0838     IP VTE HIGH RISK PATIENT  Once         12/09/22 1421     Place sequential compression device  Until  discontinued         12/09/22 1421                W Edward Buckley MD  Urology  [facility] - [place] - Telemetry

## 2022-12-22 NOTE — PLAN OF CARE
Problem: Physical Therapy  Goal: Physical Therapy Goal  Description: Goals to be met by: 22     Patient will increase functional independence with mobility by performin. Pt to mod I with bed mobility. Met 22  2. Pt to transfer with supervision/mod I. Met 22  3. Pt to ambulate 150' /c or /s AD and supervision. Met 22    Updated goals:  1. Pt to transfer mod I.  2. Pt to ambulate 200' /s AD (I).    Outcome: Ongoing, Not Progressing   Pt s/p TURP today, found supine in bed, encouraged to sit in chair; assisted to BSC.  Pt reports he has been unable to urinate since acosta removed this morning, nurseTacho advised.

## 2022-12-22 NOTE — SUBJECTIVE & OBJECTIVE
Interval History: s/p TURP for unroofing of prostatic abscess.  No acute events overnight.  He feels well.      Review of Systems   Constitutional: Negative.  Negative for fever.   HENT: Negative.     Eyes: Negative.    Respiratory:  Negative for cough, chest tightness and shortness of breath.    Cardiovascular:  Negative for chest pain.   Gastrointestinal: Negative.  Negative for constipation, diarrhea and nausea.   Genitourinary:  Positive for hematuria.   Musculoskeletal: Negative.    Neurological: Negative.    Psychiatric/Behavioral: Negative.     Objective:     Temp:  [97.3 °F (36.3 °C)-99.5 °F (37.5 °C)] 98.6 °F (37 °C)  Pulse:  [] 93  Resp:  [18-23] 18  SpO2:  [93 %-98 %] 94 %  BP: (118-140)/(72-93) 127/75     Body mass index is 34.37 kg/m².      Bladder Scan Volume (mL): 23 mL (12/10/22 1030)    Drains       Drain  Duration                  Closed/Suction Drain 12/09/22 1748 Lateral RLQ Bulb 12 days         Closed/Suction Drain 12/20/22 1210 RUQ Accordion 10 Fr. 1 day         Closed/Suction Drain 12/20/22 1234 Lateral RLQ Accordion 8 Fr. 1 day         Urethral Catheter 12/21/22 1044 Latex 22 Fr. <1 day                    Physical Exam  Vitals and nursing note reviewed.   Constitutional:       Appearance: He is well-developed.   HENT:      Head: Normocephalic.   Eyes:      Conjunctiva/sclera: Conjunctivae normal.   Neck:      Thyroid: No thyromegaly.      Trachea: No tracheal deviation.   Cardiovascular:      Rate and Rhythm: Normal rate.      Heart sounds: Normal heart sounds.   Pulmonary:      Effort: Pulmonary effort is normal. No respiratory distress.      Breath sounds: Normal breath sounds. No wheezing.   Abdominal:      General: Bowel sounds are normal.      Palpations: Abdomen is soft.      Tenderness: There is no abdominal tenderness. There is no rebound.      Hernia: No hernia is present.      Comments: IR drains in place with purulent fluid   Genitourinary:     Comments: Newell in place with  clear/pink urine  Musculoskeletal:         General: No tenderness. Normal range of motion.      Cervical back: Normal range of motion and neck supple.   Lymphadenopathy:      Cervical: No cervical adenopathy.   Skin:     General: Skin is warm and dry.      Findings: No erythema or rash.   Neurological:      Mental Status: He is alert and oriented to person, place, and time.   Psychiatric:         Behavior: Behavior normal.         Thought Content: Thought content normal.         Judgment: Judgment normal.       Significant Labs:    BMP:  Recent Labs   Lab 12/18/22 0416 12/19/22 0427 12/20/22  0630    139 139   K 3.8 3.7 3.8    104 108   CO2 31* 29 26   BUN 32* 30* 23*   CREATININE 1.4 1.3 1.1   CALCIUM 7.6* 7.6* 7.6*       CBC:   Recent Labs   Lab 12/18/22 0416 12/19/22 0427 12/20/22  0630   WBC 19.61* 17.38* 14.89*   HGB 9.4* 8.8* 9.0*   HCT 28.5* 27.4* 27.8*   * 438 437       Blood Culture: No results for input(s): LABBLOO in the last 168 hours.  Urine Culture: No results for input(s): LABURIN in the last 168 hours.    Significant Imaging:

## 2022-12-22 NOTE — SUBJECTIVE & OBJECTIVE
Interval History: S/p TURP  yesterday with urology - purulent fluid noted, no cx sent. Deferred  today. No fevers documented overnight. Abdo abscess cx with same kleb pneumo. Tolerating abx without issues.       Review of Systems   Constitutional:  Negative for chills and fever.   Gastrointestinal:  Negative for abdominal pain.   All other systems reviewed and are negative.  Objective:     Vital Signs (Most Recent):  Temp: 98.3 °F (36.8 °C) (12/22/22 0743)  Pulse: 95 (12/22/22 0743)  Resp: 19 (12/22/22 0743)  BP: 125/68 (12/22/22 0743)  SpO2: (!) 90 % (12/22/22 0743)   Vital Signs (24h Range):  Temp:  [97.3 °F (36.3 °C)-99.5 °F (37.5 °C)] 98.3 °F (36.8 °C)  Pulse:  [] 95  Resp:  [18-23] 19  SpO2:  [90 %-98 %] 90 %  BP: (118-140)/(68-89) 125/68     Weight: 96.6 kg (212 lb 15.4 oz)  Body mass index is 34.37 kg/m².    Estimated Creatinine Clearance: 92.2 mL/min (based on SCr of 1.1 mg/dL).    Physical Exam  Constitutional:       General: He is not in acute distress.     Appearance: He is not ill-appearing, toxic-appearing or diaphoretic.   HENT:      Head: Normocephalic and atraumatic.      Right Ear: External ear normal.      Left Ear: External ear normal.   Eyes:      General: No scleral icterus.        Right eye: No discharge.         Left eye: No discharge.   Cardiovascular:      Rate and Rhythm: Normal rate and regular rhythm.   Abdominal:      General: There is no distension.      Palpations: Abdomen is soft.      Tenderness: There is no abdominal tenderness. There is no guarding.      Comments: 3 R abdo drain - purulent/murky fluid present   Genitourinary:     Comments:     Musculoskeletal:      Right lower leg: No edema.      Left lower leg: No edema.   Skin:     General: Skin is warm and dry.      Coloration: Skin is not jaundiced.      Findings: No bruising or erythema.      Comments: L picc   Neurological:      Mental Status: He is alert and oriented to person, place, and time. Mental status  is at baseline.      Motor: No weakness.   Psychiatric:         Mood and Affect: Mood normal.         Behavior: Behavior normal.       Significant Labs:   Microbiology Results (last 7 days)       Procedure Component Value Units Date/Time    Aerobic culture [[id number]] Collected: 12/20/22 1334    Order Status: Completed Specimen: Abscess from Abdomen Updated: 12/22/22 0912     Aerobic Bacterial Culture No growth    Narrative:      RLQ Abdomen    Aerobic culture [[id number]]  (Abnormal)  (Susceptibility) Collected: 12/20/22 1210    Order Status: Completed Specimen: Abscess from Abdomen Updated: 12/22/22 0908     Aerobic Bacterial Culture KLEBSIELLA PNEUMONIAE  Few      Aerobic culture [[id number]] Collected: 12/20/22 1135    Order Status: Completed Specimen: Pleural Fluid Updated: 12/22/22 0859     Aerobic Bacterial Culture No growth    Culture, Anaerobe [[id number]] Collected: 12/20/22 1334    Order Status: Completed Specimen: Abscess from Abdomen Updated: 12/22/22 0738     Anaerobic Culture Culture in progress    Narrative:      RLQ Abdomen    Culture, Anaerobic [[id number]] Collected: 12/20/22 1210    Order Status: Completed Specimen: Abscess Updated: 12/22/22 0736     Anaerobic Culture Culture in progress    Culture, Anaerobic [[id number]] Collected: 12/20/22 1135    Order Status: Completed Specimen: Pleural Fluid Updated: 12/22/22 0733     Anaerobic Culture Culture in progress    Gram stain [[id number]] Collected: 12/20/22 1334    Order Status: Completed Specimen: Abscess from Abdomen Updated: 12/20/22 1530     Gram Stain Result Many WBC's      No organisms seen    Narrative:      RLQ Abdomen    Gram stain [[id number]] Collected: 12/20/22 1210    Order Status: Completed Specimen: Abscess from Abdomen Updated: 12/20/22 1529     Gram Stain Result Many WBC's      No organisms seen    Gram stain [[id number]] Collected: 12/20/22 1135    Order Status: Completed Specimen: Pleural Fluid Updated: 12/20/22 1529     Gram Stain  Result Cytospin indicates:      Many WBC's      No organisms seen    Gram stain [[id number]] Collected: 12/20/22 1210    Order Status: Canceled Specimen: Abscess from Abdomen     Aerobic culture [[id number]] Collected: 12/20/22 1210    Order Status: Canceled Specimen: Abscess from Abdomen     Culture, Anaerobic [[id number]] Collected: 12/20/22 1210    Order Status: Canceled Specimen: Abscess from Abdomen     Fungus culture [[id number]] Collected: 12/09/22 1831    Order Status: Completed Specimen: Abscess from Abdomen Updated: 12/19/22 1409     Fungus (Mycology) Culture No fungal growth to date    Blood culture [[id number]] Collected: 12/14/22 0205    Order Status: Completed Specimen: Blood from Antecubital, Right Arm Updated: 12/18/22 0303     Blood Culture, Routine No Growth after 4 days.    Narrative:      Blood cx in the AM with AM labs    Blood culture [[id number]] Collected: 12/14/22 0205    Order Status: Completed Specimen: Blood from Antecubital, Right Updated: 12/18/22 0303     Blood Culture, Routine No Growth after 4 days.    Narrative:      Blood cx in the AM with AM labs            Significant Imaging: I have reviewed all pertinent imaging results/findings within the past 24 hours.

## 2022-12-22 NOTE — PT/OT/SLP PROGRESS
"Physical Therapy Treatment    Patient Name:  Vivek Turner   MRN:  [id number]    Recommendations:     Discharge Recommendations: home health PT  Discharge Equipment Recommendations:  (TBD)      Assessment:     Vivek Turner is a 47 y.o. male admitted with a medical diagnosis of Severe sepsis.  He presents with the following impairments/functional limitations: weakness, impaired endurance, impaired functional mobility, gait instability, impaired skin .    Rehab Prognosis: Fair; patient would benefit from acute skilled PT services to address these deficits and reach maximum level of function.    Recent Surgery: Procedure(s) (LRB):  TURP (TRANSURETHRAL RESECTION OF PROSTATE) (N/A) 1 Day Post-Op    Plan:     During this hospitalization, patient to be seen 5 x/week to address the identified rehab impairments via gait training, therapeutic activities and progress toward the following goals:    Plan of Care Expires:  12/20/22    Subjective     Chief Complaint: "I haven't peed since they took the tube out"  Patient/Family Comments/goals: Pt declined gait but agreeable to sit in chair and do exercises.  Pain/Comfort:  Pain Rating 1: 0/10      Objective:     Communicated with nurse. [place] prior to session.  Patient found supine with MARLEY drain, peripheral IV, telemetry, Other (comments) (another type of drain (2) (R) side) upon PT entry to room.     General Precautions: Standard, fall  Orthopedic Precautions: N/A  Braces: N/A  Respiratory Status: Room air     Functional Mobility:  Bed Mobility:     Supine to Sit: minimum assistance  Transfers:     Sit to Stand:  contact guard assistance with no AD      AM-PAC 6 CLICK MOBILITY  Turning over in bed (including adjusting bedclothes, sheets and blankets)?: 3  Sitting down on and standing up from a chair with arms (e.g., wheelchair, bedside commode, etc.): 3  Moving from lying on back to sitting on the side of the bed?: 3  Moving to and from a bed to a chair (including a " wheelchair)?: 3  Need to walk in hospital room?: 3  Climbing 3-5 steps with a railing?: 3  Basic Mobility Total Score: 18       Treatment & Education:  Performed 1x15 reps BLE exs seated including APs, LAQ, hip abd/add, and adduction squeezes.    Patient left  on BSC  with all lines intact, call button in reach, and Denise ARANA notified.    GOALS:   Multidisciplinary Problems       Physical Therapy Goals          Problem: Physical Therapy    Goal Priority Disciplines Outcome Goal Variances Interventions   Physical Therapy Goal     PT, PT/OT Ongoing, Not Progressing     Description: Goals to be met by: 22     Patient will increase functional independence with mobility by performin. Pt to mod I with bed mobility. Met 22  2. Pt to transfer with supervision/mod I. Met 22  3. Pt to ambulate 150' /c or /s AD and supervision. Met 22    Updated goals:  1. Pt to transfer mod I.  2. Pt to ambulate 200' /s AD (I).                         Time Tracking:     PT Received On: 22  PT Start Time: 1223     PT Stop Time: 1240  PT Total Time (min): 17 min     Billable Minutes: Therapeutic Exercise 17    Treatment Type: Treatment  PT/PTA: PT     PTA Visit Number: 3     2022

## 2022-12-22 NOTE — PLAN OF CARE
[facility] received referral for [facility]. They are reviewing it and will call SW with a decision.

## 2022-12-22 NOTE — NURSING
Patient bladder scanned at bedside 496 ML, dark, juan-colored urine noted.     1620- In/Out Straight Cath performed, 500ML, dark juan-colored urine noted to bag.

## 2022-12-22 NOTE — PLAN OF CARE
Problem: Adult Inpatient Plan of Care  Goal: Plan of Care Review  Outcome: Ongoing, Progressing  Goal: Patient-Specific Goal (Individualized)  Outcome: Ongoing, Progressing  Goal: Absence of Hospital-Acquired Illness or Injury  Outcome: Ongoing, Progressing  Goal: Optimal Comfort and Wellbeing  Outcome: Ongoing, Progressing  Intervention: Monitor Pain and Promote Comfort  Flowsheets (Taken 12/21/2022 1350)  Pain Management Interventions:   care clustered   pillow support provided   position adjusted  Goal: Readiness for Transition of Care  Outcome: Ongoing, Progressing     Problem: Adjustment to Illness (Sepsis/Septic Shock)  Goal: Optimal Coping  Outcome: Ongoing, Progressing     Problem: Bleeding (Sepsis/Septic Shock)  Goal: Absence of Bleeding  Outcome: Ongoing, Progressing     Problem: Glycemic Control Impaired (Sepsis/Septic Shock)  Goal: Blood Glucose Level Within Desired Range  Outcome: Ongoing, Progressing     Problem: Infection Progression (Sepsis/Septic Shock)  Goal: Absence of Infection Signs and Symptoms  Outcome: Ongoing, Progressing     Problem: Nutrition Impaired (Sepsis/Septic Shock)  Goal: Optimal Nutrition Intake  Outcome: Ongoing, Progressing     Problem: Fluid and Electrolyte Imbalance (Acute Kidney Injury/Impairment)  Goal: Fluid and Electrolyte Balance  Outcome: Ongoing, Progressing     Problem: Oral Intake Inadequate (Acute Kidney Injury/Impairment)  Goal: Optimal Nutrition Intake  Outcome: Ongoing, Progressing     Problem: Renal Function Impairment (Acute Kidney Injury/Impairment)  Goal: Effective Renal Function  Outcome: Ongoing, Progressing     Problem: Skin Injury Risk Increased  Goal: Skin Health and Integrity  Outcome: Ongoing, Progressing     Problem: Fall Injury Risk  Goal: Absence of Fall and Fall-Related Injury  Outcome: Ongoing, Progressing     Problem: Mobility Impairment  Goal: Optimal Mobility  Outcome: Ongoing, Progressing     Problem: Infection  Goal: Absence of Infection  Signs and Symptoms  Outcome: Ongoing, Progressing

## 2022-12-22 NOTE — PROGRESS NOTES
[facility]  Infectious Disease  Progress Note    Patient Name: Vivek Turner  MRN: [id number]  Admission Date: 12/9/2022  Length of Stay: 13 days  Attending Physician: Tarun Nickerson MD  Primary Care Provider: Primary Doctor No    Isolation Status: No active isolations  Assessment/Plan:      Prostatic abscess  See hepatic abscess    Hepatic abscess  47M with h/o HTN admitted 12/9 with abdominal pain, found to have a liver abscess and MRAYURI, s/p IR drainage on 12/9 (CX + pan-sen kleb pna), complicated by ileus/MARYURI. Blcx ngtd. Etiology of abscess is unclear. MRCP 12/12 unremarkable.  Repeat CT scan with multiple collections c/f  abscesses (subcapsular hepatic collection, prostatic hypodensity, and abdominal fluid collections). Also ntoed to have R pleural effusion.  S/p IR drainage of perihepatic fluid collection - cx with Kleb pneumo thus far, anaerobic cx in process. RLQ abdominal fluid collection and R thora cx ngtd. S/p TURP with urology on 12/21 - noted to have purulent fluid. Agree with tentative plan for placement for abx therapy per chart review.       Recommendations:   -abx de-escalated to ceftriaxone 2g iv daily for Kleb pneumo  -continue flagyl (switched to PO) for empiric anaerobic coverage while awaiting finalization of cx data  -anticipate 4w course of abx therapy/resolution of abscesses; cash 1/18 with repeat CT abdo/pelvis with contrast 1 week prior to completion of abx therapy to evaluate for source control  -outpatient colonoscopy  -follow up cx data  -drain management per primary team                      Thank you for your consult. I will follow-up with patient. Please contact us if you have any additional questions. Above d/w primary team.     Time: 35 minutes   50% of time spent on face-to-face counseling and coordination of care. Counseling included review of test results, diagnosis, and treatment plan with patient and/or family.        Tejal Gaitan MD  Infectious Disease  [facility] - Telemetry    Subjective:     Principal Problem:Severe sepsis    HPI:   47M with h/o htn admitted 12/9 with several days of progressively worsening abdominal pain. Reports fever and nausea and decreased po intake.  Denies diarrhea. Denies weight loss. Notably reports traveling to [facility] several months ago.     Had abdominal ct and ultrasound  1. No sonographic abnormality of the gallbladder detected.  2. Ill-defined region of decreased echotexture within the posterior aspect of the right hepatic lobe corresponding to the findings seen on recent prior CT.  This could indicate a mass, abscess, or intense fatty infiltration.  3. Diffuse increased hepatic parenchymal echotexture suspected to represent hepatic steatosis.      S/p IR drainage of abscess. Reports pain improving.   KLEBSIELLA PNEUMONIAE   Many      Resulting Agency [facility]        Susceptibility     Klebsiella pneumoniae     CULTURE, AEROBIC  (SPECIFY SOURCE)     Amox/K Clav'ate <=8/4 mcg/mL Sensitive     Amp/Sulbactam <=8/4 mcg/mL Sensitive     Cefazolin <=2 mcg/mL Sensitive     Cefepime <=2 mcg/mL Sensitive     Ceftriaxone <=1 mcg/mL Sensitive     Ciprofloxacin <=1 mcg/mL Sensitive     Ertapenem <=0.5 mcg/mL Sensitive     Gentamicin <=4 mcg/mL Sensitive     Levofloxacin <=2 mcg/mL Sensitive     Meropenem <=1 mcg/mL Sensitive     Minocycline <=4 mcg/mL Sensitive     Piperacillin/Tazo <=16 mcg/mL Sensitive     Tetracycline <=4 mcg/mL Sensitive     Tobramycin <=4 mcg/mL Sensitive     Trimeth/Sulfa <=2/38 mcg/mL Sensitive                 Silvino improving    On cefepime/metronidazole    Hiv ordered, pending      ID consulted for "klebsiella liver abscess"    Interval History: S/p TURP  yesterday with urology - purulent fluid noted, no cx sent. Deferred  today. No fevers documented overnight. Abdo abscess cx with same kleb pneumo. Tolerating abx without issues.       Review of Systems   Constitutional:  Negative for chills and fever. "   Gastrointestinal:  Negative for abdominal pain.   All other systems reviewed and are negative.  Objective:     Vital Signs (Most Recent):  Temp: 98.3 °F (36.8 °C) (12/22/22 0743)  Pulse: 95 (12/22/22 0743)  Resp: 19 (12/22/22 0743)  BP: 125/68 (12/22/22 0743)  SpO2: (!) 90 % (12/22/22 0743)   Vital Signs (24h Range):  Temp:  [97.3 °F (36.3 °C)-99.5 °F (37.5 °C)] 98.3 °F (36.8 °C)  Pulse:  [] 95  Resp:  [18-23] 19  SpO2:  [90 %-98 %] 90 %  BP: (118-140)/(68-89) 125/68     Weight: 96.6 kg (212 lb 15.4 oz)  Body mass index is 34.37 kg/m².    Estimated Creatinine Clearance: 92.2 mL/min (based on SCr of 1.1 mg/dL).    Physical Exam  Constitutional:       General: He is not in acute distress.     Appearance: He is not ill-appearing, toxic-appearing or diaphoretic.   HENT:      Head: Normocephalic and atraumatic.      Right Ear: External ear normal.      Left Ear: External ear normal.   Eyes:      General: No scleral icterus.        Right eye: No discharge.         Left eye: No discharge.   Cardiovascular:      Rate and Rhythm: Normal rate and regular rhythm.   Abdominal:      General: There is no distension.      Palpations: Abdomen is soft.      Tenderness: There is no abdominal tenderness. There is no guarding.      Comments: 3 R abdo drain - purulent/murky fluid present   Genitourinary:     Comments:     Musculoskeletal:      Right lower leg: No edema.      Left lower leg: No edema.   Skin:     General: Skin is warm and dry.      Coloration: Skin is not jaundiced.      Findings: No bruising or erythema.      Comments: L picc   Neurological:      Mental Status: He is alert and oriented to person, place, and time. Mental status is at baseline.      Motor: No weakness.   Psychiatric:         Mood and Affect: Mood normal.         Behavior: Behavior normal.       Significant Labs:   Microbiology Results (last 7 days)       Procedure Component Value Units Date/Time    Aerobic culture [[id number]] Collected: 12/20/22  1334    Order Status: Completed Specimen: Abscess from Abdomen Updated: 12/22/22 0912     Aerobic Bacterial Culture No growth    Narrative:      RLQ Abdomen    Aerobic culture [[id number]]  (Abnormal)  (Susceptibility) Collected: 12/20/22 1210    Order Status: Completed Specimen: Abscess from Abdomen Updated: 12/22/22 0908     Aerobic Bacterial Culture KLEBSIELLA PNEUMONIAE  Few      Aerobic culture [[id number]] Collected: 12/20/22 1135    Order Status: Completed Specimen: Pleural Fluid Updated: 12/22/22 0859     Aerobic Bacterial Culture No growth    Culture, Anaerobe [[id number]] Collected: 12/20/22 1334    Order Status: Completed Specimen: Abscess from Abdomen Updated: 12/22/22 0738     Anaerobic Culture Culture in progress    Narrative:      RLQ Abdomen    Culture, Anaerobic [[id number]] Collected: 12/20/22 1210    Order Status: Completed Specimen: Abscess Updated: 12/22/22 0736     Anaerobic Culture Culture in progress    Culture, Anaerobic [[id number]] Collected: 12/20/22 1135    Order Status: Completed Specimen: Pleural Fluid Updated: 12/22/22 0733     Anaerobic Culture Culture in progress    Gram stain [[id number]] Collected: 12/20/22 1334    Order Status: Completed Specimen: Abscess from Abdomen Updated: 12/20/22 1530     Gram Stain Result Many WBC's      No organisms seen    Narrative:      RLQ Abdomen    Gram stain [[id number]] Collected: 12/20/22 1210    Order Status: Completed Specimen: Abscess from Abdomen Updated: 12/20/22 1529     Gram Stain Result Many WBC's      No organisms seen    Gram stain [[id number]] Collected: 12/20/22 1135    Order Status: Completed Specimen: Pleural Fluid Updated: 12/20/22 1529     Gram Stain Result Cytospin indicates:      Many WBC's      No organisms seen    Gram stain [[id number]] Collected: 12/20/22 1210    Order Status: Canceled Specimen: Abscess from Abdomen     Aerobic culture [[id number]] Collected: 12/20/22 1210    Order Status: Canceled Specimen: Abscess from Abdomen      Culture, Anaerobic [[id number]] Collected: 12/20/22 1210    Order Status: Canceled Specimen: Abscess from Abdomen     Fungus culture [[id number]] Collected: 12/09/22 1831    Order Status: Completed Specimen: Abscess from Abdomen Updated: 12/19/22 1409     Fungus (Mycology) Culture No fungal growth to date    Blood culture [[id number]] Collected: 12/14/22 0205    Order Status: Completed Specimen: Blood from Antecubital, Right Arm Updated: 12/18/22 0303     Blood Culture, Routine No Growth after 4 days.    Narrative:      Blood cx in the AM with AM labs    Blood culture [[id number]] Collected: 12/14/22 0205    Order Status: Completed Specimen: Blood from Antecubital, Right Updated: 12/18/22 0303     Blood Culture, Routine No Growth after 4 days.    Narrative:      Blood cx in the AM with AM labs            Significant Imaging: I have reviewed all pertinent imaging results/findings within the past 24 hours.

## 2022-12-22 NOTE — PLAN OF CARE
Estefani with [facility] called AYAKA to discuss LTAC referral. Estefani informed SW that patient medically meets criteria for LTAC and they will continue to monitor him over the weekend. AYAKA informed Estefani that patient is not medically stable for discharge yet. Estefani agreed. Estefani inquired about contact info for payor source. AYAKA gave Estefani the phone number for Mick Matthew and explained that he is the representative. AYAKA also gave Estefani phone number for DIPIKA Catalan. AYAKA explained that Cassius works under Dr. Shobha Gaming. Estefani stated that she will reach out to [employer] to start working on the financial part so that when patient is medically stable for discharge, everything will be taken care of.     Estefani Klein [phone]       12/22/22 1325   Post-Acute Status   Post-Acute Authorization Placement  (LTAC)   Post-Acute Placement Status Pending post-acute provider review/more information requested   Discharge Delays (!) Post-Acute Set-up   Discharge Plan   Discharge Plan A Long-term acute care facility (LTAC)   Discharge Plan B Home Health

## 2022-12-23 DIAGNOSIS — N41.2 PROSTATIC ABSCESS: Primary | ICD-10-CM

## 2022-12-23 DIAGNOSIS — K75.0 HEPATIC ABSCESS: ICD-10-CM

## 2022-12-23 LAB
ALBUMIN SERPL BCP-MCNC: 1.9 G/DL (ref 3.5–5.2)
ALP SERPL-CCNC: 63 U/L (ref 55–135)
ALT SERPL W/O P-5'-P-CCNC: 8 U/L (ref 10–44)
ANION GAP SERPL CALC-SCNC: 6 MMOL/L (ref 8–16)
AST SERPL-CCNC: 21 U/L (ref 10–40)
BASOPHILS # BLD AUTO: 0.04 K/UL (ref 0–0.2)
BASOPHILS NFR BLD: 0.4 % (ref 0–1.9)
BILIRUB SERPL-MCNC: 0.6 MG/DL (ref 0.1–1)
BUN SERPL-MCNC: 12 MG/DL (ref 6–20)
CALCIUM SERPL-MCNC: 7.5 MG/DL (ref 8.7–10.5)
CHLORIDE SERPL-SCNC: 113 MMOL/L (ref 95–110)
CO2 SERPL-SCNC: 21 MMOL/L (ref 23–29)
CREAT SERPL-MCNC: 0.9 MG/DL (ref 0.5–1.4)
DIFFERENTIAL METHOD: ABNORMAL
EOSINOPHIL # BLD AUTO: 0.2 K/UL (ref 0–0.5)
EOSINOPHIL NFR BLD: 1.6 % (ref 0–8)
ERYTHROCYTE [DISTWIDTH] IN BLOOD BY AUTOMATED COUNT: 14 % (ref 11.5–14.5)
EST. GFR  (NO RACE VARIABLE): >60 ML/MIN/1.73 M^2
FINAL PATHOLOGIC DIAGNOSIS: NORMAL
GLUCOSE SERPL-MCNC: 100 MG/DL (ref 70–110)
HCT VFR BLD AUTO: 27.6 % (ref 40–54)
HGB BLD-MCNC: 8.8 G/DL (ref 14–18)
IMM GRANULOCYTES # BLD AUTO: 0.07 K/UL (ref 0–0.04)
IMM GRANULOCYTES NFR BLD AUTO: 0.7 % (ref 0–0.5)
LYMPHOCYTES # BLD AUTO: 1.5 K/UL (ref 1–4.8)
LYMPHOCYTES NFR BLD: 15.6 % (ref 18–48)
Lab: NORMAL
MCH RBC QN AUTO: 28.5 PG (ref 27–31)
MCHC RBC AUTO-ENTMCNC: 31.9 G/DL (ref 32–36)
MCV RBC AUTO: 89 FL (ref 82–98)
MONOCYTES # BLD AUTO: 0.9 K/UL (ref 0.3–1)
MONOCYTES NFR BLD: 9.4 % (ref 4–15)
NEUTROPHILS # BLD AUTO: 6.8 K/UL (ref 1.8–7.7)
NEUTROPHILS NFR BLD: 72.3 % (ref 38–73)
NRBC BLD-RTO: 0 /100 WBC
PLATELET # BLD AUTO: 429 K/UL (ref 150–450)
PMV BLD AUTO: 9.9 FL (ref 9.2–12.9)
POCT GLUCOSE: 103 MG/DL (ref 70–110)
POCT GLUCOSE: 106 MG/DL (ref 70–110)
POCT GLUCOSE: 124 MG/DL (ref 70–110)
POTASSIUM SERPL-SCNC: 3.8 MMOL/L (ref 3.5–5.1)
PROT SERPL-MCNC: 5.8 G/DL (ref 6–8.4)
RBC # BLD AUTO: 3.09 M/UL (ref 4.6–6.2)
SODIUM SERPL-SCNC: 140 MMOL/L (ref 136–145)
WBC # BLD AUTO: 9.35 K/UL (ref 3.9–12.7)

## 2022-12-23 PROCEDURE — A4216 STERILE WATER/SALINE, 10 ML: HCPCS | Performed by: UROLOGY

## 2022-12-23 PROCEDURE — 99232 SBSQ HOSP IP/OBS MODERATE 35: CPT | Mod: ,,, | Performed by: STUDENT IN AN ORGANIZED HEALTH CARE EDUCATION/TRAINING PROGRAM

## 2022-12-23 PROCEDURE — 99024 POSTOP FOLLOW-UP VISIT: CPT | Mod: ,,, | Performed by: UROLOGY

## 2022-12-23 PROCEDURE — 97116 GAIT TRAINING THERAPY: CPT | Mod: CQ

## 2022-12-23 PROCEDURE — 99024 PR POST-OP FOLLOW-UP VISIT: ICD-10-PCS | Mod: ,,, | Performed by: UROLOGY

## 2022-12-23 PROCEDURE — 99232 PR SUBSEQUENT HOSPITAL CARE,LEVL II: ICD-10-PCS | Mod: ,,, | Performed by: STUDENT IN AN ORGANIZED HEALTH CARE EDUCATION/TRAINING PROGRAM

## 2022-12-23 PROCEDURE — 25000003 PHARM REV CODE 250: Performed by: UROLOGY

## 2022-12-23 PROCEDURE — 85025 COMPLETE CBC W/AUTO DIFF WBC: CPT | Performed by: UROLOGY

## 2022-12-23 PROCEDURE — 63600175 PHARM REV CODE 636 W HCPCS: Performed by: STUDENT IN AN ORGANIZED HEALTH CARE EDUCATION/TRAINING PROGRAM

## 2022-12-23 PROCEDURE — 25000003 PHARM REV CODE 250: Performed by: STUDENT IN AN ORGANIZED HEALTH CARE EDUCATION/TRAINING PROGRAM

## 2022-12-23 PROCEDURE — 97530 THERAPEUTIC ACTIVITIES: CPT | Mod: CQ

## 2022-12-23 PROCEDURE — 63600175 PHARM REV CODE 636 W HCPCS: Performed by: UROLOGY

## 2022-12-23 PROCEDURE — 99900035 HC TECH TIME PER 15 MIN (STAT)

## 2022-12-23 PROCEDURE — 21400001 HC TELEMETRY ROOM

## 2022-12-23 PROCEDURE — 80053 COMPREHEN METABOLIC PANEL: CPT | Performed by: UROLOGY

## 2022-12-23 RX ADMIN — METRONIDAZOLE 500 MG: 500 TABLET ORAL at 01:12

## 2022-12-23 RX ADMIN — Medication 10 ML: at 01:12

## 2022-12-23 RX ADMIN — CEFTRIAXONE SODIUM 2 G: 2 INJECTION, SOLUTION INTRAVENOUS at 11:12

## 2022-12-23 RX ADMIN — Medication 10 ML: at 11:12

## 2022-12-23 RX ADMIN — HEPARIN SODIUM 5000 UNITS: 5000 INJECTION INTRAVENOUS; SUBCUTANEOUS at 10:12

## 2022-12-23 RX ADMIN — Medication 10 ML: at 05:12

## 2022-12-23 RX ADMIN — HEPARIN SODIUM 5000 UNITS: 5000 INJECTION INTRAVENOUS; SUBCUTANEOUS at 06:12

## 2022-12-23 RX ADMIN — Medication 10 ML: at 06:12

## 2022-12-23 RX ADMIN — METRONIDAZOLE 500 MG: 500 TABLET ORAL at 06:12

## 2022-12-23 RX ADMIN — Medication 10 ML: at 10:12

## 2022-12-23 RX ADMIN — HEPARIN SODIUM 5000 UNITS: 5000 INJECTION INTRAVENOUS; SUBCUTANEOUS at 01:12

## 2022-12-23 RX ADMIN — METRONIDAZOLE 500 MG: 500 TABLET ORAL at 10:12

## 2022-12-23 NOTE — PLAN OF CARE
Problem: Physical Therapy  Goal: Physical Therapy Goal  Description: Goals to be met by: 22     Patient will increase functional independence with mobility by performin. Pt to mod I with bed mobility. Met 22  2. Pt to transfer with supervision/mod I. Met 22  3. Pt to ambulate 150' /c or /s AD and supervision. Met 22    Updated goals:  1. Pt to transfer mod I.  2. Pt to ambulate 200' /s AD (I).    Outcome: Ongoing, Progressing

## 2022-12-23 NOTE — PT/OT/SLP PROGRESS
Physical Therapy Treatment    Patient Name:  Vivek Turner   MRN:  [id number]    Recommendations:     Discharge Recommendations: home health PT  Discharge Equipment Recommendations:  (TBD)  Barriers to discharge: None    Assessment:     Vivek Turner is a 47 y.o. male admitted with a medical diagnosis of Severe sepsis.  He presents with the following impairments/functional limitations: weakness, impaired endurance, gait instability, decreased lower extremity function, impaired balance, pain . Pt is safe to ambulate in the wing with RW and nursing staff throughout the day, discussed with nurse Titus about pt mobility level , please encourage pt to OOB to chair and ambulate.     Rehab Prognosis: Good; patient would benefit from acute skilled PT services to address these deficits and reach maximum level of function.    Recent Surgery: Procedure(s) (LRB):  TURP (TRANSURETHRAL RESECTION OF PROSTATE) (N/A) 2 Days Post-Op    Plan:     During this hospitalization, patient to be seen 5 x/week to address the identified rehab impairments via gait training, therapeutic activities and progress toward the following goals:    Plan of Care Expires:  12/20/22    Subjective     Chief Complaint: feeling sad and depress about his current situation , pt became tearful at the end of treatment , emotional support provided , nurse Titus notified .   Patient/Family Comments/goals: pt is pleasant and agreeable to therapy  Pain/Comfort:  Pain Rating 1: 0/10  Pain Rating Post-Intervention 1: 0/10      Objective:     Communicated with nurse prior to session.  Patient found HOB elevated with telemetry, PICC line, bed alarm, acosta catheter , 2 Acordian drains and 1 MARLEY drain upon PT entry to room.     General Precautions: Standard, fall  Orthopedic Precautions: N/A  Braces: N/A  Respiratory Status: Room air   SpO2 : 98% -99% on RA , HR :  bpm   Functional Mobility:  Bed Mobility:  Rolling Left:  stand by assistance  Scooting: stand by  assistance  Supine to Sit: contact guard assistance and minimum assistance  Transfers:     Sit to Stand:  stand by assistance with rolling walker  Bed to Chair: stand by assistance with  rolling walker  using  Step Transfer  Gait: Pt ambulated 500 ft and 250 ft with RW, SBA/S . Noted with decreased reese,decreased step length, no LOB . VC's for safety, proper technique and walker management.   Balance:  good       AM-PAC 6 CLICK MOBILITY  Turning over in bed (including adjusting bedclothes, sheets and blankets)?: 4  Sitting down on and standing up from a chair with arms (e.g., wheelchair, bedside commode, etc.): 4  Moving from lying on back to sitting on the side of the bed?: 3  Moving to and from a bed to a chair (including a wheelchair)?: 3  Need to walk in hospital room?: 3  Climbing 3-5 steps with a railing?: 3  Basic Mobility Total Score: 20       Treatment & Education:  Instructed pt to performed seated BLE x 20 reps : AP, encouraged pt to perform BLE ex's per handout throughout the day, pt verbalized understanding.    Patient left up in chair with all lines intact, call button in reach, nurse notified, and pharmacist  present..    GOALS:   Multidisciplinary Problems       Physical Therapy Goals          Problem: Physical Therapy    Goal Priority Disciplines Outcome Goal Variances Interventions   Physical Therapy Goal     PT, PT/OT Ongoing, Progressing     Description: Goals to be met by: 22     Patient will increase functional independence with mobility by performin. Pt to mod I with bed mobility. Met 22  2. Pt to transfer with supervision/mod I. Met 22  3. Pt to ambulate 150' /c or /s AD and supervision. Met 22    Updated goals:  1. Pt to transfer mod I.  2. Pt to ambulate 200' /s AD (I).                         Time Tracking:     PT Received On: 22  PT Start Time: 1039     PT Stop Time: 1117  PT Total Time (min): 38 min     Billable Minutes: Gait Training 23 and  Therapeutic Activity 15    Treatment Type: Treatment  PT/PTA: PTA     PTA Visit Number: 1     12/23/2022

## 2022-12-23 NOTE — SUBJECTIVE & OBJECTIVE
Interval History: feeling well.    Review of Systems   HENT:  Negative for ear discharge and ear pain.    Eyes:  Negative for discharge and itching.   Endocrine: Negative for cold intolerance and heat intolerance.   Neurological:  Negative for seizures and syncope.   Objective:     Vital Signs (Most Recent):  Temp: 98.7 °F (37.1 °C) (12/23/22 1147)  Pulse: 99 (12/23/22 1147)  Resp: 19 (12/23/22 1147)  BP: 124/81 (12/23/22 1147)  SpO2: 96 % (12/23/22 1147) Vital Signs (24h Range):  Temp:  [97.7 °F (36.5 °C)-98.7 °F (37.1 °C)] 98.7 °F (37.1 °C)  Pulse:  [78-99] 99  Resp:  [17-19] 19  SpO2:  [96 %-98 %] 96 %  BP: (124-153)/(79-84) 124/81     Weight: 96.6 kg (212 lb 15.4 oz)  Body mass index is 34.37 kg/m².    Intake/Output Summary (Last 24 hours) at 12/23/2022 1222  Last data filed at 12/23/2022 0800  Gross per 24 hour   Intake 870 ml   Output 2782 ml   Net -1912 ml        Physical Exam  Vitals and nursing note reviewed.   Constitutional:       General: He is not in acute distress.     Appearance: He is well-developed. He is obese. He is ill-appearing. He is not toxic-appearing or diaphoretic.   HENT:      Head: Normocephalic and atraumatic.      Mouth/Throat:      Mouth: Mucous membranes are moist.   Eyes:      General: No scleral icterus.     Pupils: Pupils are equal, round, and reactive to light.   Neck:      Thyroid: No thyromegaly.   Cardiovascular:      Rate and Rhythm: Normal rate and regular rhythm.      Pulses: Normal pulses.      Heart sounds: Normal heart sounds. No murmur heard.    No gallop.   Pulmonary:      Effort: Pulmonary effort is normal. No respiratory distress.      Breath sounds: Normal breath sounds. No stridor. No wheezing or rales.      Comments: Decreased inspiration. No wheezes on exam.  Abdominal:      General: Bowel sounds are normal.      Palpations: Abdomen is soft.      Tenderness: There is no abdominal tenderness. There is no guarding.      Comments: RUQ drains in place    Musculoskeletal:         General: No deformity. Normal range of motion.      Cervical back: Normal range of motion. No rigidity.      Right lower leg: No edema.      Left lower leg: No edema.   Lymphadenopathy:      Cervical: No cervical adenopathy.   Skin:     General: Skin is warm.   Neurological:      Mental Status: He is alert and oriented to person, place, and time.   Psychiatric:         Behavior: Behavior normal.       Significant Labs: All pertinent labs within the past 24 hours have been reviewed.  BMP:   Recent Labs   Lab 12/22/22 2231 12/23/22  0638    100    140   K 4.0 3.8    113*   CO2 22* 21*   BUN 15 12   CREATININE 0.9 0.9   CALCIUM 7.3* 7.5*   MG 1.7  --      CBC:   Recent Labs   Lab 12/22/22 2231 12/23/22  0638   WBC 10.59 9.35   HGB 8.9* 8.8*   HCT 27.1* 27.6*    429       Significant Imaging: I have reviewed all pertinent imaging results/findings within the past 24 hours.

## 2022-12-23 NOTE — PLAN OF CARE
Problem: Fall Injury Risk  Goal: Absence of Fall and Fall-Related Injury  Outcome: Ongoing, Progressing   No injury during shift. Up to bedside commode

## 2022-12-23 NOTE — PROGRESS NOTES
[facility]  Urology  Progress Note    Patient Name: Vivek Turner  MRN: [id number]  Admission Date: 12/9/2022  Hospital Length of Stay: 14 days  Code Status: Full Code   Attending Provider: Tarun Nickerson MD   Primary Care Physician: Primary Doctor No    Subjective:     HPI:  Prostatic Abscess  Vivek Turner is a 47 y.o. male who has been feeling poorly for a few weeks.  He currently has no fever but did previously.  He denies hematuria, dysuria, or difficulty voiding.      Interval History: unable to void.  CIC once then had Newell placed last night.  He feels okay today.    Review of Systems   Constitutional: Negative.  Negative for fever.   HENT: Negative.     Eyes: Negative.    Respiratory:  Negative for cough, chest tightness and shortness of breath.    Cardiovascular:  Negative for chest pain.   Gastrointestinal: Negative.  Negative for constipation, diarrhea and nausea.   Genitourinary:  Positive for difficulty urinating. Negative for hematuria.   Musculoskeletal: Negative.    Neurological: Negative.    Psychiatric/Behavioral: Negative.     Objective:     Temp:  [97.7 °F (36.5 °C)-99.2 °F (37.3 °C)] 97.7 °F (36.5 °C)  Pulse:  [78-97] 78  Resp:  [17-19] 19  SpO2:  [96 %-98 %] 96 %  BP: (125-153)/(75-84) 138/84     Body mass index is 34.37 kg/m².      Bladder Scan Volume (mL): 500 mL (12/22/22 1950)  Post Void Cath Residual Output (mL): 0 mL (12/22/22 2240)    Drains       Drain  Duration                  Closed/Suction Drain 12/09/22 1748 Lateral RLQ Bulb 13 days         Closed/Suction Drain 12/20/22 1210 RUQ Accordion 10 Fr. 2 days         Closed/Suction Drain 12/20/22 1234 Lateral RLQ Accordion 8 Fr. 2 days         Urethral Catheter 12/22/22 2040 18 Fr. <1 day                    Physical Exam  Vitals and nursing note reviewed.   Constitutional:       Appearance: He is well-developed.   HENT:      Head: Normocephalic.   Eyes:      Conjunctiva/sclera: Conjunctivae normal.   Neck:      Thyroid: No  thyromegaly.      Trachea: No tracheal deviation.   Cardiovascular:      Rate and Rhythm: Normal rate.      Heart sounds: Normal heart sounds.   Pulmonary:      Effort: Pulmonary effort is normal. No respiratory distress.      Breath sounds: Normal breath sounds. No wheezing.   Abdominal:      General: Bowel sounds are normal.      Palpations: Abdomen is soft.      Tenderness: There is no abdominal tenderness. There is no rebound.      Hernia: No hernia is present.   Genitourinary:     Comments: Newell in place with yellow urine  Musculoskeletal:         General: No tenderness. Normal range of motion.      Cervical back: Normal range of motion and neck supple.   Lymphadenopathy:      Cervical: No cervical adenopathy.   Skin:     General: Skin is warm and dry.      Findings: No erythema or rash.   Neurological:      Mental Status: He is alert and oriented to person, place, and time.   Psychiatric:         Behavior: Behavior normal.         Thought Content: Thought content normal.         Judgment: Judgment normal.       Significant Labs:    BMP:  Recent Labs   Lab 12/20/22  0630 12/22/22  2231 12/23/22  0638    139 140   K 3.8 4.0 3.8    110 113*   CO2 26 22* 21*   BUN 23* 15 12   CREATININE 1.1 0.9 0.9   CALCIUM 7.6* 7.3* 7.5*       CBC:   Recent Labs   Lab 12/20/22  0630 12/22/22  2231 12/23/22  0638   WBC 14.89* 10.59 9.35   HGB 9.0* 8.9* 8.8*   HCT 27.8* 27.1* 27.6*    436 429       Blood Culture: No results for input(s): LABBLOO in the last 168 hours.  Urine Culture: No results for input(s): LABURIN in the last 168 hours.    Significant Imaging:                      Assessment/Plan:     Prostatic abscess  Repeat voiding trial in a couple of days        VTE Risk Mitigation (From admission, onward)         Ordered     heparin (porcine) injection 5,000 Units  Every 8 hours         12/10/22 0838     IP VTE HIGH RISK PATIENT  Once         12/09/22 1421     Place sequential compression device  Until  discontinued         12/09/22 1421                W Edward Buckley MD  Urology  [facility] - Telemetry

## 2022-12-23 NOTE — ASSESSMENT & PLAN NOTE
This patient does have evidence of infective focus. My overall impression is sepsis. Vital signs were reviewed and noted in progress note.  Antibiotics given-   Antibiotics (From admission, onward)    Start     Stop Route Frequency Ordered    12/22/22 1400  metroNIDAZOLE tablet 500 mg         -- Oral Every 8 hours 12/22/22 1020    12/22/22 1130  cefTRIAXone 2 gram/50 mL IVPB 2 g         -- IV Every 24 hours (non-standard times) 12/22/22 1020        Cultures were taken-   Microbiology Results (last 7 days)     Procedure Component Value Units Date/Time    Aerobic culture [[id number]] Collected: 12/20/22 1334    Order Status: Completed Specimen: Abscess from Abdomen Updated: 12/23/22 0809     Aerobic Bacterial Culture No growth    Narrative:      RLQ Abdomen    Aerobic culture [[id number]] Collected: 12/20/22 1135    Order Status: Completed Specimen: Pleural Fluid Updated: 12/23/22 0805     Aerobic Bacterial Culture No growth    Aerobic culture [[id number]]  (Abnormal)  (Susceptibility) Collected: 12/20/22 1210    Order Status: Completed Specimen: Abscess from Abdomen Updated: 12/22/22 0908     Aerobic Bacterial Culture KLEBSIELLA PNEUMONIAE  Few      Culture, Anaerobe [[id number]] Collected: 12/20/22 1334    Order Status: Completed Specimen: Abscess from Abdomen Updated: 12/22/22 0738     Anaerobic Culture Culture in progress    Narrative:      RLQ Abdomen    Culture, Anaerobic [[id number]] Collected: 12/20/22 1210    Order Status: Completed Specimen: Abscess Updated: 12/22/22 0736     Anaerobic Culture Culture in progress    Culture, Anaerobic [[id number]] Collected: 12/20/22 1135    Order Status: Completed Specimen: Pleural Fluid Updated: 12/22/22 0733     Anaerobic Culture Culture in progress    Gram stain [[id number]] Collected: 12/20/22 1334    Order Status: Completed Specimen: Abscess from Abdomen Updated: 12/20/22 1530     Gram Stain Result Many WBC's      No organisms seen    Narrative:      RLQ Abdomen    Gram stain  [[id number]] Collected: 12/20/22 1210    Order Status: Completed Specimen: Abscess from Abdomen Updated: 12/20/22 1529     Gram Stain Result Many WBC's      No organisms seen    Gram stain [[id number]] Collected: 12/20/22 1135    Order Status: Completed Specimen: Pleural Fluid Updated: 12/20/22 1529     Gram Stain Result Cytospin indicates:      Many WBC's      No organisms seen    Gram stain [[id number]] Collected: 12/20/22 1210    Order Status: Canceled Specimen: Abscess from Abdomen     Aerobic culture [[id number]] Collected: 12/20/22 1210    Order Status: Canceled Specimen: Abscess from Abdomen     Culture, Anaerobic [[id number]] Collected: 12/20/22 1210    Order Status: Canceled Specimen: Abscess from Abdomen     Fungus culture [[id number]] Collected: 12/09/22 1831    Order Status: Completed Specimen: Abscess from Abdomen Updated: 12/19/22 1409     Fungus (Mycology) Culture No fungal growth to date    Blood culture [[id number]] Collected: 12/14/22 0205    Order Status: Completed Specimen: Blood from Antecubital, Right Arm Updated: 12/18/22 0303     Blood Culture, Routine No Growth after 4 days.    Narrative:      Blood cx in the AM with AM labs    Blood culture [[id number]] Collected: 12/14/22 0205    Order Status: Completed Specimen: Blood from Antecubital, Right Updated: 12/18/22 0303     Blood Culture, Routine No Growth after 4 days.    Narrative:      Blood cx in the AM with AM labs        Latest lactate reviewed, they are-  No results for input(s): LACTATE in the last 72 hours.    Organ dysfunction indicated by Acute kidney injury and Acute liver injury  Source- liver abscess  Drains placed with no improvement of leukocytosis.  -ID consulted: recommending Rocephin and oral Flagyl until 1/18/2023  Awaiting LTAC placement.

## 2022-12-23 NOTE — SUBJECTIVE & OBJECTIVE
Interval History: No fevers documented overnight.   Acosta placed overnight due to inability to urinate. Working with therapy this morning. Tolerating abx without issues.       Review of Systems   Constitutional:  Negative for chills and fever.   Gastrointestinal:  Negative for abdominal pain.   All other systems reviewed and are negative.  Objective:     Vital Signs (Most Recent):  Temp: 97.7 °F (36.5 °C) (12/23/22 0718)  Pulse: 78 (12/23/22 0718)  Resp: 19 (12/23/22 0718)  BP: 138/84 (12/23/22 0718)  SpO2: 96 % (12/23/22 0718)   Vital Signs (24h Range):  Temp:  [97.7 °F (36.5 °C)-99.2 °F (37.3 °C)] 97.7 °F (36.5 °C)  Pulse:  [78-97] 78  Resp:  [17-19] 19  SpO2:  [96 %-98 %] 96 %  BP: (125-153)/(75-84) 138/84     Weight: 96.6 kg (212 lb 15.4 oz)  Body mass index is 34.37 kg/m².    Estimated Creatinine Clearance: 112.7 mL/min (based on SCr of 0.9 mg/dL).    Physical Exam  Constitutional:       General: He is not in acute distress.     Appearance: He is not ill-appearing, toxic-appearing or diaphoretic.   HENT:      Head: Normocephalic and atraumatic.      Right Ear: External ear normal.      Left Ear: External ear normal.   Eyes:      General: No scleral icterus.        Right eye: No discharge.         Left eye: No discharge.   Cardiovascular:      Rate and Rhythm: Normal rate and regular rhythm.   Abdominal:      General: There is no distension.      Palpations: Abdomen is soft.      Tenderness: There is no abdominal tenderness. There is no guarding.      Comments: 3 R abdo drain - purulent/murky fluid present   Genitourinary:     Comments:   acosta  Musculoskeletal:      Right lower leg: No edema.      Left lower leg: No edema.   Skin:     General: Skin is warm and dry.      Coloration: Skin is not jaundiced.      Findings: No bruising or erythema.      Comments: L picc   Neurological:      Mental Status: He is alert and oriented to person, place, and time. Mental status is at baseline.      Motor: No weakness.    Psychiatric:         Mood and Affect: Mood normal.         Behavior: Behavior normal.       Significant Labs:   Microbiology Results (last 7 days)       Procedure Component Value Units Date/Time    Aerobic culture [[id number]] Collected: 12/20/22 1334    Order Status: Completed Specimen: Abscess from Abdomen Updated: 12/23/22 0809     Aerobic Bacterial Culture No growth    Narrative:      RLQ Abdomen    Aerobic culture [[id number]] Collected: 12/20/22 1135    Order Status: Completed Specimen: Pleural Fluid Updated: 12/23/22 0805     Aerobic Bacterial Culture No growth    Aerobic culture [[id number]]  (Abnormal)  (Susceptibility) Collected: 12/20/22 1210    Order Status: Completed Specimen: Abscess from Abdomen Updated: 12/22/22 0908     Aerobic Bacterial Culture KLEBSIELLA PNEUMONIAE  Few      Culture, Anaerobe [[id number]] Collected: 12/20/22 1334    Order Status: Completed Specimen: Abscess from Abdomen Updated: 12/22/22 0738     Anaerobic Culture Culture in progress    Narrative:      RLQ Abdomen    Culture, Anaerobic [[id number]] Collected: 12/20/22 1210    Order Status: Completed Specimen: Abscess Updated: 12/22/22 0736     Anaerobic Culture Culture in progress    Culture, Anaerobic [[id number]] Collected: 12/20/22 1135    Order Status: Completed Specimen: Pleural Fluid Updated: 12/22/22 0733     Anaerobic Culture Culture in progress    Gram stain [[id number]] Collected: 12/20/22 1334    Order Status: Completed Specimen: Abscess from Abdomen Updated: 12/20/22 1530     Gram Stain Result Many WBC's      No organisms seen    Narrative:      RLQ Abdomen    Gram stain [[id number]] Collected: 12/20/22 1210    Order Status: Completed Specimen: Abscess from Abdomen Updated: 12/20/22 1529     Gram Stain Result Many WBC's      No organisms seen    Gram stain [[id number]] Collected: 12/20/22 1135    Order Status: Completed Specimen: Pleural Fluid Updated: 12/20/22 1529     Gram Stain Result Cytospin indicates:      Many WBC's       No organisms seen    Gram stain [[id number]] Collected: 12/20/22 1210    Order Status: Canceled Specimen: Abscess from Abdomen     Aerobic culture [[id number]] Collected: 12/20/22 1210    Order Status: Canceled Specimen: Abscess from Abdomen     Culture, Anaerobic [[id number]] Collected: 12/20/22 1210    Order Status: Canceled Specimen: Abscess from Abdomen     Fungus culture [[id number]] Collected: 12/09/22 1831    Order Status: Completed Specimen: Abscess from Abdomen Updated: 12/19/22 1409     Fungus (Mycology) Culture No fungal growth to date    Blood culture [[id number]] Collected: 12/14/22 0205    Order Status: Completed Specimen: Blood from Antecubital, Right Arm Updated: 12/18/22 0303     Blood Culture, Routine No Growth after 4 days.    Narrative:      Blood cx in the AM with AM labs    Blood culture [[id number]] Collected: 12/14/22 0205    Order Status: Completed Specimen: Blood from Antecubital, Right Updated: 12/18/22 0303     Blood Culture, Routine No Growth after 4 days.    Narrative:      Blood cx in the AM with AM labs            Significant Imaging: I have reviewed all pertinent imaging results/findings within the past 24 hours.

## 2022-12-23 NOTE — SUBJECTIVE & OBJECTIVE
Interval History: unable to void.  CIC once then had Newell placed last night.  He feels okay today.    Review of Systems   Constitutional: Negative.  Negative for fever.   HENT: Negative.     Eyes: Negative.    Respiratory:  Negative for cough, chest tightness and shortness of breath.    Cardiovascular:  Negative for chest pain.   Gastrointestinal: Negative.  Negative for constipation, diarrhea and nausea.   Genitourinary:  Positive for difficulty urinating. Negative for hematuria.   Musculoskeletal: Negative.    Neurological: Negative.    Psychiatric/Behavioral: Negative.     Objective:     Temp:  [97.7 °F (36.5 °C)-99.2 °F (37.3 °C)] 97.7 °F (36.5 °C)  Pulse:  [78-97] 78  Resp:  [17-19] 19  SpO2:  [96 %-98 %] 96 %  BP: (125-153)/(75-84) 138/84     Body mass index is 34.37 kg/m².      Bladder Scan Volume (mL): 500 mL (12/22/22 1950)  Post Void Cath Residual Output (mL): 0 mL (12/22/22 2240)    Drains       Drain  Duration                  Closed/Suction Drain 12/09/22 1748 Lateral RLQ Bulb 13 days         Closed/Suction Drain 12/20/22 1210 RUQ Accordion 10 Fr. 2 days         Closed/Suction Drain 12/20/22 1234 Lateral RLQ Accordion 8 Fr. 2 days         Urethral Catheter 12/22/22 2040 18 Fr. <1 day                    Physical Exam  Vitals and nursing note reviewed.   Constitutional:       Appearance: He is well-developed.   HENT:      Head: Normocephalic.   Eyes:      Conjunctiva/sclera: Conjunctivae normal.   Neck:      Thyroid: No thyromegaly.      Trachea: No tracheal deviation.   Cardiovascular:      Rate and Rhythm: Normal rate.      Heart sounds: Normal heart sounds.   Pulmonary:      Effort: Pulmonary effort is normal. No respiratory distress.      Breath sounds: Normal breath sounds. No wheezing.   Abdominal:      General: Bowel sounds are normal.      Palpations: Abdomen is soft.      Tenderness: There is no abdominal tenderness. There is no rebound.      Hernia: No hernia is present.   Genitourinary:      Comments: Newell in place with yellow urine  Musculoskeletal:         General: No tenderness. Normal range of motion.      Cervical back: Normal range of motion and neck supple.   Lymphadenopathy:      Cervical: No cervical adenopathy.   Skin:     General: Skin is warm and dry.      Findings: No erythema or rash.   Neurological:      Mental Status: He is alert and oriented to person, place, and time.   Psychiatric:         Behavior: Behavior normal.         Thought Content: Thought content normal.         Judgment: Judgment normal.       Significant Labs:    BMP:  Recent Labs   Lab 12/20/22  0630 12/22/22 2231 12/23/22  0638    139 140   K 3.8 4.0 3.8    110 113*   CO2 26 22* 21*   BUN 23* 15 12   CREATININE 1.1 0.9 0.9   CALCIUM 7.6* 7.3* 7.5*       CBC:   Recent Labs   Lab 12/20/22  0630 12/22/22 2231 12/23/22  0638   WBC 14.89* 10.59 9.35   HGB 9.0* 8.9* 8.8*   HCT 27.8* 27.1* 27.6*    436 429       Blood Culture: No results for input(s): LABBLOO in the last 168 hours.  Urine Culture: No results for input(s): LABURIN in the last 168 hours.    Significant Imaging:

## 2022-12-23 NOTE — PROGRESS NOTES
[facility]  Infectious Disease  Progress Note    Patient Name: Vivek Turner  MRN: [id number]  Admission Date: 12/9/2022  Length of Stay: 14 days  Attending Physician: Tarun Nickerson MD  Primary Care Provider: Primary Doctor No    Isolation Status: No active isolations  Assessment/Plan:      Prostatic abscess  See hepatic abscess    Hepatic abscess  47M with h/o HTN admitted 12/9 with abdominal pain, found to have a liver abscess and MARYURI, s/p IR drainage on 12/9 (CX + pan-sen kleb pna), complicated by ileus/MARYURI. Blcx ngtd. Etiology of abscess is unclear. MRCP 12/12 unremarkable.  Repeat CT scan with multiple collections c/f  abscesses (subcapsular hepatic collection, prostatic hypodensity, and abdominal fluid collections). Also ntoed to have R pleural effusion.  S/p IR drainage of perihepatic fluid collection - cx with Kleb pneumo thus far, anaerobic cx in process. RLQ abdominal fluid collection and R thora cx ngtd. S/p TURP with urology on 12/21 - noted to have purulent fluid. Agree with LTAC placement for abx therapy.       Recommendations:   -continue ceftriaxone 2g iv daily for Kleb pneumo  -continue flagyl (switched to PO) for empiric anaerobic coverage while awaiting finalization of cx data  -anticipate 4w course of abx therapy/resolution of abscesses; cash 1/18 with repeat CT abdo/pelvis with contrast 1 week prior to completion of abx therapy to evaluate for source control  -outpatient colonoscopy  -follow up cx data  -leukocytosis resolved    Outpatient Antibiotic Therapy Plan for LTAC:        1) Infection: Kleb pneumo abscess     2) Discharge Antibiotics:    Intravenous antibiotics:   Ceftriaxone 2g iv daily    Oral antibiotics:  Flagyl 500 mg PO TID    3) Therapy Duration:  4w (pending repeat CT scan 1w prior to completion of abx therapy)    Estimated end date of IV antibiotics: 1/18/23    4) Outpatient Weekly Labs:    Order the following labs to be drawn on Mondays:    CBC   CMP       5)  Fax Lab Results to Infectious Diseases Provider: Kandy    [facility] Fax Number: [phone]    6) Outpatient Infectious Diseases Follow-up     Follow-up appointment will be arranged by the ID clinic and will be found in the patient's appointments tab.     Prior to discharge, please ensure the patient's follow-up has been scheduled.     If there is still no follow-up scheduled prior to discharge, please send an EPIC message to Elizabeth Goyal in Infectious Diseases.                        Anticipated Disposition: LTAC    Thank you for your consult. I will sign off. Please contact us if you have any additional questions. Above d/w primary team.     Time: 35 minutes   50% of time spent on face-to-face counseling and coordination of care. Counseling included review of test results, diagnosis, and treatment plan with patient and/or family.        Tejal Gaitan MD  Infectious Disease  [facility] - Telemetry    Subjective:     Principal Problem:Severe sepsis    HPI:   47M with h/o htn admitted 12/9 with several days of progressively worsening abdominal pain. Reports fever and nausea and decreased po intake.  Denies diarrhea. Denies weight loss. Notably reports traveling to [facility] several months ago.     Had abdominal ct and ultrasound  1. No sonographic abnormality of the gallbladder detected.  2. Ill-defined region of decreased echotexture within the posterior aspect of the right hepatic lobe corresponding to the findings seen on recent prior CT.  This could indicate a mass, abscess, or intense fatty infiltration.  3. Diffuse increased hepatic parenchymal echotexture suspected to represent hepatic steatosis.      S/p IR drainage of abscess. Reports pain improving.   KLEBSIELLA PNEUMONIAE   Many      Resulting Agency [facility]        Susceptibility     Klebsiella pneumoniae     CULTURE, AEROBIC  (SPECIFY SOURCE)     Amox/K Clav'ate <=8/4 mcg/mL Sensitive     Amp/Sulbactam <=8/4 mcg/mL Sensitive     Cefazolin  "<=2 mcg/mL Sensitive     Cefepime <=2 mcg/mL Sensitive     Ceftriaxone <=1 mcg/mL Sensitive     Ciprofloxacin <=1 mcg/mL Sensitive     Ertapenem <=0.5 mcg/mL Sensitive     Gentamicin <=4 mcg/mL Sensitive     Levofloxacin <=2 mcg/mL Sensitive     Meropenem <=1 mcg/mL Sensitive     Minocycline <=4 mcg/mL Sensitive     Piperacillin/Tazo <=16 mcg/mL Sensitive     Tetracycline <=4 mcg/mL Sensitive     Tobramycin <=4 mcg/mL Sensitive     Trimeth/Sulfa <=2/38 mcg/mL Sensitive                 Silvino improving    On cefepime/metronidazole    Hiv ordered, pending      ID consulted for "klebsiella liver abscess"    Interval History: No fevers documented overnight.   Newell placed overnight due to inability to urinate. Working with therapy this morning. Tolerating abx without issues.       Review of Systems   Constitutional:  Negative for chills and fever.   Gastrointestinal:  Negative for abdominal pain.   All other systems reviewed and are negative.  Objective:     Vital Signs (Most Recent):  Temp: 97.7 °F (36.5 °C) (12/23/22 0718)  Pulse: 78 (12/23/22 0718)  Resp: 19 (12/23/22 0718)  BP: 138/84 (12/23/22 0718)  SpO2: 96 % (12/23/22 0718)   Vital Signs (24h Range):  Temp:  [97.7 °F (36.5 °C)-99.2 °F (37.3 °C)] 97.7 °F (36.5 °C)  Pulse:  [78-97] 78  Resp:  [17-19] 19  SpO2:  [96 %-98 %] 96 %  BP: (125-153)/(75-84) 138/84     Weight: 96.6 kg (212 lb 15.4 oz)  Body mass index is 34.37 kg/m².    Estimated Creatinine Clearance: 112.7 mL/min (based on SCr of 0.9 mg/dL).    Physical Exam  Constitutional:       General: He is not in acute distress.     Appearance: He is not ill-appearing, toxic-appearing or diaphoretic.   HENT:      Head: Normocephalic and atraumatic.      Right Ear: External ear normal.      Left Ear: External ear normal.   Eyes:      General: No scleral icterus.        Right eye: No discharge.         Left eye: No discharge.   Cardiovascular:      Rate and Rhythm: Normal rate and regular rhythm.   Abdominal:      " General: There is no distension.      Palpations: Abdomen is soft.      Tenderness: There is no abdominal tenderness. There is no guarding.      Comments: 3 R abdo drain - purulent/murky fluid present   Genitourinary:     Comments:   dave  Musculoskeletal:      Right lower leg: No edema.      Left lower leg: No edema.   Skin:     General: Skin is warm and dry.      Coloration: Skin is not jaundiced.      Findings: No bruising or erythema.      Comments: L picc   Neurological:      Mental Status: He is alert and oriented to person, place, and time. Mental status is at baseline.      Motor: No weakness.   Psychiatric:         Mood and Affect: Mood normal.         Behavior: Behavior normal.       Significant Labs:   Microbiology Results (last 7 days)       Procedure Component Value Units Date/Time    Aerobic culture [[id number]] Collected: 12/20/22 1334    Order Status: Completed Specimen: Abscess from Abdomen Updated: 12/23/22 0809     Aerobic Bacterial Culture No growth    Narrative:      RLQ Abdomen    Aerobic culture [[id number]] Collected: 12/20/22 1135    Order Status: Completed Specimen: Pleural Fluid Updated: 12/23/22 0805     Aerobic Bacterial Culture No growth    Aerobic culture [[id number]]  (Abnormal)  (Susceptibility) Collected: 12/20/22 1210    Order Status: Completed Specimen: Abscess from Abdomen Updated: 12/22/22 0908     Aerobic Bacterial Culture KLEBSIELLA PNEUMONIAE  Few      Culture, Anaerobe [[id number]] Collected: 12/20/22 1334    Order Status: Completed Specimen: Abscess from Abdomen Updated: 12/22/22 0738     Anaerobic Culture Culture in progress    Narrative:      RLQ Abdomen    Culture, Anaerobic [[id number]] Collected: 12/20/22 1210    Order Status: Completed Specimen: Abscess Updated: 12/22/22 0736     Anaerobic Culture Culture in progress    Culture, Anaerobic [[id number]] Collected: 12/20/22 1135    Order Status: Completed Specimen: Pleural Fluid Updated: 12/22/22 0733     Anaerobic Culture  Culture in progress    Gram stain [[id number]] Collected: 12/20/22 1334    Order Status: Completed Specimen: Abscess from Abdomen Updated: 12/20/22 1530     Gram Stain Result Many WBC's      No organisms seen    Narrative:      RLQ Abdomen    Gram stain [[id number]] Collected: 12/20/22 1210    Order Status: Completed Specimen: Abscess from Abdomen Updated: 12/20/22 1529     Gram Stain Result Many WBC's      No organisms seen    Gram stain [[id number]] Collected: 12/20/22 1135    Order Status: Completed Specimen: Pleural Fluid Updated: 12/20/22 1529     Gram Stain Result Cytospin indicates:      Many WBC's      No organisms seen    Gram stain [[id number]] Collected: 12/20/22 1210    Order Status: Canceled Specimen: Abscess from Abdomen     Aerobic culture [[id number]] Collected: 12/20/22 1210    Order Status: Canceled Specimen: Abscess from Abdomen     Culture, Anaerobic [[id number]] Collected: 12/20/22 1210    Order Status: Canceled Specimen: Abscess from Abdomen     Fungus culture [[id number]] Collected: 12/09/22 1831    Order Status: Completed Specimen: Abscess from Abdomen Updated: 12/19/22 1409     Fungus (Mycology) Culture No fungal growth to date    Blood culture [[id number]] Collected: 12/14/22 0205    Order Status: Completed Specimen: Blood from Antecubital, Right Arm Updated: 12/18/22 0303     Blood Culture, Routine No Growth after 4 days.    Narrative:      Blood cx in the AM with AM labs    Blood culture [[id number]] Collected: 12/14/22 0205    Order Status: Completed Specimen: Blood from Antecubital, Right Updated: 12/18/22 0303     Blood Culture, Routine No Growth after 4 days.    Narrative:      Blood cx in the AM with AM labs            Significant Imaging: I have reviewed all pertinent imaging results/findings within the past 24 hours.

## 2022-12-23 NOTE — PROGRESS NOTES
[facility] Medicine  Progress Note    Patient Name: Vivek Turner  MRN: [id number]  Patient Class: IP- Inpatient   Admission Date: 12/9/2022  Length of Stay: 14 days  Attending Physician: Tarun Nickerson MD  Primary Care Provider: Primary Doctor No        Subjective:     Principal Problem:Severe sepsis        HPI:  47y M w/ hypertension presents with abdominal pain x 2 days. Pt refused  service. He reports that he had fever/rigors three days prior to admission. He then developed worsening abdominal pain from then until admission. He went to an urgent care where he was started on bactrim for UTI. His abdominal pain increased over the next few days. He had a BM yesterday. Denies vomiting, but has had significant nausea and very limited PO intake in the days leading to admission. Aside from the single episode of fever/rigors he denies subsequent fevers.     In the ED he was found to have a R hepatic abscess vs mass. He traveled to the [facility] eight weeks prior to presentation. He denies fevers except for what is listed above. Denies weight loss, cough, night sweats. Denies prior hx of TB. Denies prior hx of cirrhosis      Overview/Hospital Course:  Mr Vivek Turner was admitted with severe sepsis due to R liver mass vs abscess and acute renal failure. IR consulted and drain placed on 12/9. Cultures with Klebsiella. ID consulted for ABX recommendations. Started on bicarb gtt for acute renal failure and Nephrology consulted. He was moved to ICU for instability. Diagnosed with Ileus; NGT placed with copious bilious output. GI consulted to assess for source of liver abscess. MRCP showed persistent heterogeneous collection within the right hepatic lobe compatible with reported abscess noting percutaneous drainage catheter in place. No significant biliary duct abnormality on MRCP assessment. Pt refused inpatient colonoscopy. Pt transferred to the floor on 12/12.  Cr improving with fluids  and TPN. wbc uptrending and looks like new fluid collection next to liver. IR consulted. IR did not see another fluid pocket that was attainable.  Creat continued to improve with resolution of ARF.  Continued on TPN.  Resolution of Ileus and NGT removed.   Persistent leukocytosis.  Repeat CT showing multiple abdominal abscesses in the abdomen persists, as well as a large R pleural effusion, and prostatic abscess. IR consulted for thoracentesis and potentially placing another drain(s), given multiple abscesses and locations.  General Surgery also consulted.  Urology consulted for prostatic abscess.   S/p IR drainage of perihepatic fluid collection - cx with Kleb pneumo thus far. RLQ abdominal fluid collection and R thora cx ngtd. S/p TURP with urology on 12/21 - noted to have purulent fluid.      Interval History: feeling well.    Review of Systems   HENT:  Negative for ear discharge and ear pain.    Eyes:  Negative for discharge and itching.   Endocrine: Negative for cold intolerance and heat intolerance.   Neurological:  Negative for seizures and syncope.   Objective:     Vital Signs (Most Recent):  Temp: 98.7 °F (37.1 °C) (12/23/22 1147)  Pulse: 99 (12/23/22 1147)  Resp: 19 (12/23/22 1147)  BP: 124/81 (12/23/22 1147)  SpO2: 96 % (12/23/22 1147) Vital Signs (24h Range):  Temp:  [97.7 °F (36.5 °C)-98.7 °F (37.1 °C)] 98.7 °F (37.1 °C)  Pulse:  [78-99] 99  Resp:  [17-19] 19  SpO2:  [96 %-98 %] 96 %  BP: (124-153)/(79-84) 124/81     Weight: 96.6 kg (212 lb 15.4 oz)  Body mass index is 34.37 kg/m².    Intake/Output Summary (Last 24 hours) at 12/23/2022 1222  Last data filed at 12/23/2022 0800  Gross per 24 hour   Intake 870 ml   Output 2782 ml   Net -1912 ml        Physical Exam  Vitals and nursing note reviewed.   Constitutional:       General: He is not in acute distress.     Appearance: He is well-developed. He is obese. He is ill-appearing. He is not toxic-appearing or diaphoretic.   HENT:      Head: Normocephalic  and atraumatic.      Mouth/Throat:      Mouth: Mucous membranes are moist.   Eyes:      General: No scleral icterus.     Pupils: Pupils are equal, round, and reactive to light.   Neck:      Thyroid: No thyromegaly.   Cardiovascular:      Rate and Rhythm: Normal rate and regular rhythm.      Pulses: Normal pulses.      Heart sounds: Normal heart sounds. No murmur heard.    No gallop.   Pulmonary:      Effort: Pulmonary effort is normal. No respiratory distress.      Breath sounds: Normal breath sounds. No stridor. No wheezing or rales.      Comments: Decreased inspiration. No wheezes on exam.  Abdominal:      General: Bowel sounds are normal.      Palpations: Abdomen is soft.      Tenderness: There is no abdominal tenderness. There is no guarding.      Comments: RUQ drains in place   Musculoskeletal:         General: No deformity. Normal range of motion.      Cervical back: Normal range of motion. No rigidity.      Right lower leg: No edema.      Left lower leg: No edema.   Lymphadenopathy:      Cervical: No cervical adenopathy.   Skin:     General: Skin is warm.   Neurological:      Mental Status: He is alert and oriented to person, place, and time.   Psychiatric:         Behavior: Behavior normal.       Significant Labs: All pertinent labs within the past 24 hours have been reviewed.  BMP:   Recent Labs   Lab 12/22/22  2231 12/23/22  0638    100    140   K 4.0 3.8    113*   CO2 22* 21*   BUN 15 12   CREATININE 0.9 0.9   CALCIUM 7.3* 7.5*   MG 1.7  --      CBC:   Recent Labs   Lab 12/22/22  2231 12/23/22  0638   WBC 10.59 9.35   HGB 8.9* 8.8*   HCT 27.1* 27.6*    429       Significant Imaging: I have reviewed all pertinent imaging results/findings within the past 24 hours.      Assessment/Plan:      * Severe sepsis  This patient does have evidence of infective focus. My overall impression is sepsis. Vital signs were reviewed and noted in progress note.  Antibiotics given-   Antibiotics  (From admission, onward)    Start     Stop Route Frequency Ordered    12/22/22 1400  metroNIDAZOLE tablet 500 mg         -- Oral Every 8 hours 12/22/22 1020    12/22/22 1130  cefTRIAXone 2 gram/50 mL IVPB 2 g         -- IV Every 24 hours (non-standard times) 12/22/22 1020        Cultures were taken-   Microbiology Results (last 7 days)     Procedure Component Value Units Date/Time    Aerobic culture [[id number]] Collected: 12/20/22 1334    Order Status: Completed Specimen: Abscess from Abdomen Updated: 12/23/22 0809     Aerobic Bacterial Culture No growth    Narrative:      RLQ Abdomen    Aerobic culture [[id number]] Collected: 12/20/22 1135    Order Status: Completed Specimen: Pleural Fluid Updated: 12/23/22 0805     Aerobic Bacterial Culture No growth    Aerobic culture [[id number]]  (Abnormal)  (Susceptibility) Collected: 12/20/22 1210    Order Status: Completed Specimen: Abscess from Abdomen Updated: 12/22/22 0908     Aerobic Bacterial Culture KLEBSIELLA PNEUMONIAE  Few      Culture, Anaerobe [[id number]] Collected: 12/20/22 1334    Order Status: Completed Specimen: Abscess from Abdomen Updated: 12/22/22 0738     Anaerobic Culture Culture in progress    Narrative:      RLQ Abdomen    Culture, Anaerobic [[id number]] Collected: 12/20/22 1210    Order Status: Completed Specimen: Abscess Updated: 12/22/22 0736     Anaerobic Culture Culture in progress    Culture, Anaerobic [[id number]] Collected: 12/20/22 1135    Order Status: Completed Specimen: Pleural Fluid Updated: 12/22/22 0733     Anaerobic Culture Culture in progress    Gram stain [[id number]] Collected: 12/20/22 1334    Order Status: Completed Specimen: Abscess from Abdomen Updated: 12/20/22 1530     Gram Stain Result Many WBC's      No organisms seen    Narrative:      RLQ Abdomen    Gram stain [[id number]] Collected: 12/20/22 1210    Order Status: Completed Specimen: Abscess from Abdomen Updated: 12/20/22 1529     Gram Stain Result Many WBC's      No organisms  seen    Gram stain [[id number]] Collected: 12/20/22 1135    Order Status: Completed Specimen: Pleural Fluid Updated: 12/20/22 1529     Gram Stain Result Cytospin indicates:      Many WBC's      No organisms seen    Gram stain [[id number]] Collected: 12/20/22 1210    Order Status: Canceled Specimen: Abscess from Abdomen     Aerobic culture [[id number]] Collected: 12/20/22 1210    Order Status: Canceled Specimen: Abscess from Abdomen     Culture, Anaerobic [[id number]] Collected: 12/20/22 1210    Order Status: Canceled Specimen: Abscess from Abdomen     Fungus culture [[id number]] Collected: 12/09/22 1831    Order Status: Completed Specimen: Abscess from Abdomen Updated: 12/19/22 1409     Fungus (Mycology) Culture No fungal growth to date    Blood culture [[id number]] Collected: 12/14/22 0205    Order Status: Completed Specimen: Blood from Antecubital, Right Arm Updated: 12/18/22 0303     Blood Culture, Routine No Growth after 4 days.    Narrative:      Blood cx in the AM with AM labs    Blood culture [[id number]] Collected: 12/14/22 0205    Order Status: Completed Specimen: Blood from Antecubital, Right Updated: 12/18/22 0303     Blood Culture, Routine No Growth after 4 days.    Narrative:      Blood cx in the AM with AM labs        Latest lactate reviewed, they are-  No results for input(s): LACTATE in the last 72 hours.    Organ dysfunction indicated by Acute kidney injury and Acute liver injury  Source- liver abscess  Drains placed with no improvement of leukocytosis.  -ID consulted: recommending Rocephin and oral Flagyl until 1/18/2023  Awaiting LTAC placement.    Pleural effusion        Prostatic abscess  Urology consulted for prostatic abscess.   S/p turp  Patient with urinary retention and acosta placed.      Microcytic anemia  Hgb 10-11, no prior to review.   Iron deficient- TSAT 17, but also significant acute inflammation- ferritin 3633.   B12, folate replete.   Stable        Atelectasis  Shortness of breath partly  due to atelectasis as noted on CXR  - incentive spirometer  - O2 by NC as needed      Hepatic abscess  Unclear how he got this. IR drain placed 12/9. Cultures with Klebsiella, pansensitive.   - MRCP:persistent heterogeneous collection within the right hepatic lobe compatible with reported abscess noting percutaneous drainage catheter in place. No significant biliary duct abnormality on MRCP assessment.  - Blood cx with NGTD  - Abscess culture with klebsiella. No anaerobes  -- GI consulted to assess for source of liver abscess. Pt refuses inpatient colonoscopy. Likely plan for outpatient cscope.  Drains placed with resolution of leukocytosis.       VTE Risk Mitigation (From admission, onward)         Ordered     heparin (porcine) injection 5,000 Units  Every 8 hours         12/10/22 0838     IP VTE HIGH RISK PATIENT  Once         12/09/22 1421     Place sequential compression device  Until discontinued         12/09/22 1421                Discharge Planning   VIVIEN: 12/16/2022     Code Status: Full Code   Is the patient medically ready for discharge?:     Reason for patient still in hospital (select all that apply): Pending disposition  Discharge Plan A: Long-term acute care facility (LTAC)   Discharge Delays: (!) Post-Acute Set-up              Tarun Nickerson MD  Department of Hospital Medicine   [facility] [place] - Telemetry

## 2022-12-23 NOTE — ASSESSMENT & PLAN NOTE
Unclear how he got this. IR drain placed 12/9. Cultures with Klebsiella, pansensitive.   - MRCP:persistent heterogeneous collection within the right hepatic lobe compatible with reported abscess noting percutaneous drainage catheter in place. No significant biliary duct abnormality on MRCP assessment.  - Blood cx with NGTD  - Abscess culture with klebsiella. No anaerobes  -- GI consulted to assess for source of liver abscess. Pt refuses inpatient colonoscopy. Likely plan for outpatient cscope.  Drains placed with resolution of leukocytosis.

## 2022-12-23 NOTE — PROGRESS NOTES
Upon walking round during shift change, c/o that he was having difficulty urinating again. Said he feels pressure to lower abd. After bedside report bladder scan showed 500 mL. Called Dr. Theo Moncada to explain that previous indwelling cath was removed this morning, had to be in & out cath at 1630 with 500 mL of urine returned and present bladder scan vol. Gave ok to place to in dwelling cath now. Orders initiated immediately. Received 500 mL back after 18 Fr acosta insertion. States that he has no pain now. Bladder scan showed 0 ml. Acosta secured to right upper thigh.

## 2022-12-23 NOTE — ASSESSMENT & PLAN NOTE
47M with h/o HTN admitted 12/9 with abdominal pain, found to have a liver abscess and MARYURI, s/p IR drainage on 12/9 (CX + pan-sen kleb pna), complicated by ileus/MARYURI. Blcx ngtd. Etiology of abscess is unclear. MRCP 12/12 unremarkable.  Repeat CT scan with multiple collections c/f  abscesses (subcapsular hepatic collection, prostatic hypodensity, and abdominal fluid collections). Also ntoed to have R pleural effusion.  S/p IR drainage of perihepatic fluid collection - cx with Kleb pneumo thus far, anaerobic cx in process. RLQ abdominal fluid collection and R thora cx ngtd. S/p TURP with urology on 12/21 - noted to have purulent fluid. Agree with LTAC placement for abx therapy.       Recommendations:   -continue ceftriaxone 2g iv daily for Kleb pneumo  -continue flagyl (switched to PO) for empiric anaerobic coverage while awaiting finalization of cx data  -anticipate 4w course of abx therapy/resolution of abscesses; cash 1/18 with repeat CT abdo/pelvis with contrast 1 week prior to completion of abx therapy to evaluate for source control  -outpatient colonoscopy  -follow up cx data  -leukocytosis resolved

## 2022-12-23 NOTE — PLAN OF CARE
DIPIKA Catalan at [facility] notified AYAKA that he spoke to the owner of the company about patient. Cassius stated that the owner thinks that the bunk house is not the place for patient to recover and thinks that a step down facility is best. AYAKA informed Cassius that Estefani at [facility] is following patient. AYAKA informed Cassius that Estefani is reaching out to corporate to begin the financial paperwork. AYAKA informed Cassius that Estefani has his phone number.

## 2022-12-23 NOTE — ASSESSMENT & PLAN NOTE
Urology consulted for prostatic abscess.   S/p turp  Patient with urinary retention and acosta placed.

## 2022-12-24 LAB
ALBUMIN SERPL BCP-MCNC: 1.9 G/DL (ref 3.5–5.2)
ALP SERPL-CCNC: 61 U/L (ref 55–135)
ALT SERPL W/O P-5'-P-CCNC: 9 U/L (ref 10–44)
ANION GAP SERPL CALC-SCNC: 6 MMOL/L (ref 8–16)
AST SERPL-CCNC: 17 U/L (ref 10–40)
BACTERIA SPEC AEROBE CULT: NO GROWTH
BACTERIA SPEC AEROBE CULT: NO GROWTH
BACTERIA SPEC ANAEROBE CULT: NORMAL
BASOPHILS # BLD AUTO: 0.03 K/UL (ref 0–0.2)
BASOPHILS NFR BLD: 0.3 % (ref 0–1.9)
BILIRUB SERPL-MCNC: 0.6 MG/DL (ref 0.1–1)
BUN SERPL-MCNC: 13 MG/DL (ref 6–20)
CALCIUM SERPL-MCNC: 7.7 MG/DL (ref 8.7–10.5)
CHLORIDE SERPL-SCNC: 110 MMOL/L (ref 95–110)
CO2 SERPL-SCNC: 22 MMOL/L (ref 23–29)
CREAT SERPL-MCNC: 0.9 MG/DL (ref 0.5–1.4)
DIFFERENTIAL METHOD: ABNORMAL
EOSINOPHIL # BLD AUTO: 0.2 K/UL (ref 0–0.5)
EOSINOPHIL NFR BLD: 2 % (ref 0–8)
ERYTHROCYTE [DISTWIDTH] IN BLOOD BY AUTOMATED COUNT: 14.2 % (ref 11.5–14.5)
EST. GFR  (NO RACE VARIABLE): >60 ML/MIN/1.73 M^2
GLUCOSE SERPL-MCNC: 93 MG/DL (ref 70–110)
HCT VFR BLD AUTO: 27.6 % (ref 40–54)
HGB BLD-MCNC: 8.8 G/DL (ref 14–18)
IMM GRANULOCYTES # BLD AUTO: 0.06 K/UL (ref 0–0.04)
IMM GRANULOCYTES NFR BLD AUTO: 0.7 % (ref 0–0.5)
LYMPHOCYTES # BLD AUTO: 1.4 K/UL (ref 1–4.8)
LYMPHOCYTES NFR BLD: 15.2 % (ref 18–48)
MCH RBC QN AUTO: 28.3 PG (ref 27–31)
MCHC RBC AUTO-ENTMCNC: 31.9 G/DL (ref 32–36)
MCV RBC AUTO: 89 FL (ref 82–98)
MONOCYTES # BLD AUTO: 0.7 K/UL (ref 0.3–1)
MONOCYTES NFR BLD: 8.3 % (ref 4–15)
NEUTROPHILS # BLD AUTO: 6.6 K/UL (ref 1.8–7.7)
NEUTROPHILS NFR BLD: 73.5 % (ref 38–73)
NRBC BLD-RTO: 0 /100 WBC
PLATELET # BLD AUTO: 412 K/UL (ref 150–450)
PMV BLD AUTO: 9.7 FL (ref 9.2–12.9)
POCT GLUCOSE: 91 MG/DL (ref 70–110)
POTASSIUM SERPL-SCNC: 3.8 MMOL/L (ref 3.5–5.1)
PROT SERPL-MCNC: 5.8 G/DL (ref 6–8.4)
RBC # BLD AUTO: 3.11 M/UL (ref 4.6–6.2)
SODIUM SERPL-SCNC: 138 MMOL/L (ref 136–145)
WBC # BLD AUTO: 8.94 K/UL (ref 3.9–12.7)

## 2022-12-24 PROCEDURE — 80053 COMPREHEN METABOLIC PANEL: CPT | Performed by: UROLOGY

## 2022-12-24 PROCEDURE — 63600175 PHARM REV CODE 636 W HCPCS: Performed by: UROLOGY

## 2022-12-24 PROCEDURE — 99024 POSTOP FOLLOW-UP VISIT: CPT | Mod: ,,, | Performed by: UROLOGY

## 2022-12-24 PROCEDURE — A4216 STERILE WATER/SALINE, 10 ML: HCPCS | Performed by: UROLOGY

## 2022-12-24 PROCEDURE — 63600175 PHARM REV CODE 636 W HCPCS: Performed by: STUDENT IN AN ORGANIZED HEALTH CARE EDUCATION/TRAINING PROGRAM

## 2022-12-24 PROCEDURE — 85025 COMPLETE CBC W/AUTO DIFF WBC: CPT | Performed by: UROLOGY

## 2022-12-24 PROCEDURE — 25000003 PHARM REV CODE 250: Performed by: STUDENT IN AN ORGANIZED HEALTH CARE EDUCATION/TRAINING PROGRAM

## 2022-12-24 PROCEDURE — 25000003 PHARM REV CODE 250: Performed by: UROLOGY

## 2022-12-24 PROCEDURE — 99024 PR POST-OP FOLLOW-UP VISIT: ICD-10-PCS | Mod: ,,, | Performed by: UROLOGY

## 2022-12-24 PROCEDURE — 21400001 HC TELEMETRY ROOM

## 2022-12-24 RX ADMIN — HEPARIN SODIUM 5000 UNITS: 5000 INJECTION INTRAVENOUS; SUBCUTANEOUS at 09:12

## 2022-12-24 RX ADMIN — HEPARIN SODIUM 5000 UNITS: 5000 INJECTION INTRAVENOUS; SUBCUTANEOUS at 01:12

## 2022-12-24 RX ADMIN — HEPARIN SODIUM 5000 UNITS: 5000 INJECTION INTRAVENOUS; SUBCUTANEOUS at 05:12

## 2022-12-24 RX ADMIN — Medication 10 ML: at 05:12

## 2022-12-24 RX ADMIN — Medication 10 ML: at 01:12

## 2022-12-24 RX ADMIN — Medication 10 ML: at 09:12

## 2022-12-24 RX ADMIN — METRONIDAZOLE 500 MG: 500 TABLET ORAL at 05:12

## 2022-12-24 RX ADMIN — Medication 10 ML: at 11:12

## 2022-12-24 RX ADMIN — METRONIDAZOLE 500 MG: 500 TABLET ORAL at 01:12

## 2022-12-24 RX ADMIN — METRONIDAZOLE 500 MG: 500 TABLET ORAL at 09:12

## 2022-12-24 RX ADMIN — Medication 10 ML: at 10:12

## 2022-12-24 RX ADMIN — Medication 6 MG: at 09:12

## 2022-12-24 RX ADMIN — CEFTRIAXONE SODIUM 2 G: 2 INJECTION, SOLUTION INTRAVENOUS at 11:12

## 2022-12-24 NOTE — PROGRESS NOTES
[facility]  Urology  Progress Note    Patient Name: Vivek Turner  MRN: [id number]  Admission Date: 12/9/2022  Hospital Length of Stay: 15 days  Code Status: Full Code   Attending Provider: Tarun Nickerson MD   Primary Care Physician: Primary Doctor No    Subjective:     HPI:  Prostatic Abscess  Vivek Turner is a 47 y.o. male who has been feeling poorly for a few weeks.  He currently has no fever but did previously.  He denies hematuria, dysuria, or difficulty voiding.      Interval History: he feels well.  Tolerating Newell    Review of Systems   Constitutional: Negative.  Negative for fever.   HENT: Negative.     Eyes: Negative.    Respiratory:  Negative for cough, chest tightness and shortness of breath.    Cardiovascular:  Negative for chest pain.   Gastrointestinal: Negative.  Negative for constipation, diarrhea and nausea.   Genitourinary:  Positive for difficulty urinating. Negative for hematuria.   Musculoskeletal: Negative.    Neurological: Negative.    Psychiatric/Behavioral: Negative.     Objective:     Temp:  [98.4 °F (36.9 °C)-98.8 °F (37.1 °C)] 98.6 °F (37 °C)  Pulse:  [83-99] 92  Resp:  [18-20] 20  SpO2:  [95 %-97 %] 95 %  BP: (128-153)/(72-94) 138/90     Body mass index is 34.37 kg/m².      Bladder Scan Volume (mL): 500 mL (12/22/22 1950)  Post Void Cath Residual Output (mL): 0 mL (12/22/22 2240)    Drains       Drain  Duration                  Closed/Suction Drain 12/09/22 1748 Lateral RLQ Bulb 14 days         Closed/Suction Drain 12/20/22 1210 RUQ Accordion 10 Fr. 4 days         Closed/Suction Drain 12/20/22 1234 Lateral RLQ Accordion 8 Fr. 4 days         Urethral Catheter 12/22/22 2040 18 Fr. 1 day                    Physical Exam  Vitals and nursing note reviewed.   Constitutional:       Appearance: He is well-developed.   HENT:      Head: Normocephalic.   Eyes:      Conjunctiva/sclera: Conjunctivae normal.   Neck:      Thyroid: No thyromegaly.      Trachea: No tracheal deviation.    Cardiovascular:      Rate and Rhythm: Normal rate.      Heart sounds: Normal heart sounds.   Pulmonary:      Effort: Pulmonary effort is normal. No respiratory distress.      Breath sounds: Normal breath sounds. No wheezing.   Abdominal:      General: Bowel sounds are normal.      Palpations: Abdomen is soft.      Tenderness: There is no abdominal tenderness. There is no rebound.      Hernia: No hernia is present.   Genitourinary:     Comments: Newell in place with yellow urine  Musculoskeletal:         General: No tenderness. Normal range of motion.      Cervical back: Normal range of motion and neck supple.   Lymphadenopathy:      Cervical: No cervical adenopathy.   Skin:     General: Skin is warm and dry.      Findings: No erythema or rash.   Neurological:      Mental Status: He is alert and oriented to person, place, and time.   Psychiatric:         Behavior: Behavior normal.         Thought Content: Thought content normal.         Judgment: Judgment normal.       Significant Labs:    BMP:  Recent Labs   Lab 12/22/22 2231 12/23/22  0638 12/24/22  0422    140 138   K 4.0 3.8 3.8    113* 110   CO2 22* 21* 22*   BUN 15 12 13   CREATININE 0.9 0.9 0.9   CALCIUM 7.3* 7.5* 7.7*       CBC:   Recent Labs   Lab 12/22/22 2231 12/23/22  0638 12/24/22  0422   WBC 10.59 9.35 8.94   HGB 8.9* 8.8* 8.8*   HCT 27.1* 27.6* 27.6*    429 412       Blood Culture: No results for input(s): LABBLOO in the last 168 hours.  Urine Culture: No results for input(s): LABURIN in the last 168 hours.    Significant Imaging:                      Assessment/Plan:     Prostatic abscess  D/C Newell on Monday        VTE Risk Mitigation (From admission, onward)         Ordered     heparin (porcine) injection 5,000 Units  Every 8 hours         12/10/22 0838     IP VTE HIGH RISK PATIENT  Once         12/09/22 1421     Place sequential compression device  Until discontinued         12/09/22 1421                WILDER Buckley  MD  Urology  [facility] [place] - Telemetry

## 2022-12-24 NOTE — SUBJECTIVE & OBJECTIVE
Interval History: he feels well.  Tolerating Newell    Review of Systems   Constitutional: Negative.  Negative for fever.   HENT: Negative.     Eyes: Negative.    Respiratory:  Negative for cough, chest tightness and shortness of breath.    Cardiovascular:  Negative for chest pain.   Gastrointestinal: Negative.  Negative for constipation, diarrhea and nausea.   Genitourinary:  Positive for difficulty urinating. Negative for hematuria.   Musculoskeletal: Negative.    Neurological: Negative.    Psychiatric/Behavioral: Negative.     Objective:     Temp:  [98.4 °F (36.9 °C)-98.8 °F (37.1 °C)] 98.6 °F (37 °C)  Pulse:  [83-99] 92  Resp:  [18-20] 20  SpO2:  [95 %-97 %] 95 %  BP: (128-153)/(72-94) 138/90     Body mass index is 34.37 kg/m².      Bladder Scan Volume (mL): 500 mL (12/22/22 1950)  Post Void Cath Residual Output (mL): 0 mL (12/22/22 2240)    Drains       Drain  Duration                  Closed/Suction Drain 12/09/22 1748 Lateral RLQ Bulb 14 days         Closed/Suction Drain 12/20/22 1210 RUQ Accordion 10 Fr. 4 days         Closed/Suction Drain 12/20/22 1234 Lateral RLQ Accordion 8 Fr. 4 days         Urethral Catheter 12/22/22 2040 18 Fr. 1 day                    Physical Exam  Vitals and nursing note reviewed.   Constitutional:       Appearance: He is well-developed.   HENT:      Head: Normocephalic.   Eyes:      Conjunctiva/sclera: Conjunctivae normal.   Neck:      Thyroid: No thyromegaly.      Trachea: No tracheal deviation.   Cardiovascular:      Rate and Rhythm: Normal rate.      Heart sounds: Normal heart sounds.   Pulmonary:      Effort: Pulmonary effort is normal. No respiratory distress.      Breath sounds: Normal breath sounds. No wheezing.   Abdominal:      General: Bowel sounds are normal.      Palpations: Abdomen is soft.      Tenderness: There is no abdominal tenderness. There is no rebound.      Hernia: No hernia is present.   Genitourinary:     Comments: Newell in place with yellow  urine  Musculoskeletal:         General: No tenderness. Normal range of motion.      Cervical back: Normal range of motion and neck supple.   Lymphadenopathy:      Cervical: No cervical adenopathy.   Skin:     General: Skin is warm and dry.      Findings: No erythema or rash.   Neurological:      Mental Status: He is alert and oriented to person, place, and time.   Psychiatric:         Behavior: Behavior normal.         Thought Content: Thought content normal.         Judgment: Judgment normal.       Significant Labs:    BMP:  Recent Labs   Lab 12/22/22 2231 12/23/22  0638 12/24/22  0422    140 138   K 4.0 3.8 3.8    113* 110   CO2 22* 21* 22*   BUN 15 12 13   CREATININE 0.9 0.9 0.9   CALCIUM 7.3* 7.5* 7.7*       CBC:   Recent Labs   Lab 12/22/22 2231 12/23/22  0638 12/24/22  0422   WBC 10.59 9.35 8.94   HGB 8.9* 8.8* 8.8*   HCT 27.1* 27.6* 27.6*    429 412       Blood Culture: No results for input(s): LABBLOO in the last 168 hours.  Urine Culture: No results for input(s): LABURIN in the last 168 hours.    Significant Imaging:

## 2022-12-24 NOTE — PROGRESS NOTES
[facility] [place] [facility]  Progress Note    Patient Name: Vivek Turner  MRN: [id number]  Patient Class: IP- Inpatient   Admission Date: 12/9/2022  Length of Stay: 15 days  Attending Physician: Tarun Nickerson MD  Primary Care Provider: Primary Doctor No        Subjective:     Principal Problem:Severe sepsis        HPI:  47y M w/ hypertension presents with abdominal pain x 2 days. Pt refused  service. He reports that he had fever/rigors three days prior to admission. He then developed worsening abdominal pain from then until admission. He went to an urgent care where he was started on bactrim for UTI. His abdominal pain increased over the next few days. He had a BM yesterday. Denies vomiting, but has had significant nausea and very limited PO intake in the days leading to admission. Aside from the single episode of fever/rigors he denies subsequent fevers.     In the ED he was found to have a R hepatic abscess vs mass. He traveled to the [facility] eight weeks prior to presentation. He denies fevers except for what is listed above. Denies weight loss, cough, night sweats. Denies prior hx of TB. Denies prior hx of cirrhosis      Overview/Hospital Course:  Mr Vivek Turner was admitted with severe sepsis due to R liver mass vs abscess and acute renal failure. IR consulted and drain placed on 12/9. Cultures with Klebsiella. ID consulted for ABX recommendations. Started on bicarb gtt for acute renal failure and Nephrology consulted. He was moved to ICU for instability. Diagnosed with Ileus; NGT placed with copious bilious output. GI consulted to assess for source of liver abscess. MRCP showed persistent heterogeneous collection within the right hepatic lobe compatible with reported abscess noting percutaneous drainage catheter in place. No significant biliary duct abnormality on MRCP assessment. Pt refused inpatient colonoscopy. Pt transferred to the floor on 12/12.  Cr improving with fluids  and TPN. wbc uptrending and looks like new fluid collection next to liver. IR consulted. IR did not see another fluid pocket that was attainable.  Creat continued to improve with resolution of ARF.  Continued on TPN.  Resolution of Ileus and NGT removed.   Persistent leukocytosis.  Repeat CT showing multiple abdominal abscesses in the abdomen persists, as well as a large R pleural effusion, and prostatic abscess. IR consulted for thoracentesis and potentially placing another drain(s), given multiple abscesses and locations.  General Surgery also consulted.  Urology consulted for prostatic abscess.   S/p IR drainage of perihepatic fluid collection - cx with Kleb pneumo thus far. RLQ abdominal fluid collection and R thora cx ngtd. S/p TURP with urology on 12/21 - noted to have purulent fluid.      Interval History: no new complaints.    Review of Systems   HENT:  Negative for ear discharge and ear pain.    Eyes:  Negative for discharge and itching.   Endocrine: Negative for cold intolerance and heat intolerance.   Neurological:  Negative for seizures and syncope.   Objective:     Vital Signs (Most Recent):  Temp: 98.8 °F (37.1 °C) (12/24/22 0752)  Pulse: 99 (12/24/22 0752)  Resp: 20 (12/24/22 0752)  BP: 128/72 (12/24/22 0752)  SpO2: 96 % (12/24/22 0752) Vital Signs (24h Range):  Temp:  [98.4 °F (36.9 °C)-98.8 °F (37.1 °C)] 98.8 °F (37.1 °C)  Pulse:  [83-99] 99  Resp:  [18-20] 20  SpO2:  [96 %-97 %] 96 %  BP: (124-153)/(72-94) 128/72     Weight: 96.6 kg (212 lb 15.4 oz)  Body mass index is 34.37 kg/m².    Intake/Output Summary (Last 24 hours) at 12/24/2022 0928  Last data filed at 12/24/2022 0550  Gross per 24 hour   Intake 720 ml   Output 2960 ml   Net -2240 ml        Physical Exam  Vitals and nursing note reviewed.   Constitutional:       General: He is not in acute distress.     Appearance: He is well-developed. He is obese. He is ill-appearing. He is not toxic-appearing or diaphoretic.   HENT:      Head:  Normocephalic and atraumatic.      Mouth/Throat:      Mouth: Mucous membranes are moist.   Eyes:      General: No scleral icterus.     Pupils: Pupils are equal, round, and reactive to light.   Neck:      Thyroid: No thyromegaly.   Cardiovascular:      Rate and Rhythm: Normal rate and regular rhythm.      Pulses: Normal pulses.      Heart sounds: Normal heart sounds. No murmur heard.    No gallop.   Pulmonary:      Effort: Pulmonary effort is normal. No respiratory distress.      Breath sounds: Normal breath sounds. No stridor. No wheezing or rales.      Comments: Decreased inspiration. No wheezes on exam.  Abdominal:      General: Bowel sounds are normal.      Palpations: Abdomen is soft.      Tenderness: There is no abdominal tenderness. There is no guarding.      Comments: RUQ drains in place   Musculoskeletal:         General: No deformity. Normal range of motion.      Cervical back: Normal range of motion. No rigidity.      Right lower leg: No edema.      Left lower leg: No edema.   Lymphadenopathy:      Cervical: No cervical adenopathy.   Skin:     General: Skin is warm.   Neurological:      Mental Status: He is alert and oriented to person, place, and time.   Psychiatric:         Behavior: Behavior normal.       Significant Labs: All pertinent labs within the past 24 hours have been reviewed.  BMP:   Recent Labs   Lab 12/22/22 2231 12/23/22  0638 12/24/22  0422      < > 93      < > 138   K 4.0   < > 3.8      < > 110   CO2 22*   < > 22*   BUN 15   < > 13   CREATININE 0.9   < > 0.9   CALCIUM 7.3*   < > 7.7*   MG 1.7  --   --     < > = values in this interval not displayed.       CBC:   Recent Labs   Lab 12/22/22 2231 12/23/22  0638 12/24/22  0422   WBC 10.59 9.35 8.94   HGB 8.9* 8.8* 8.8*   HCT 27.1* 27.6* 27.6*    429 412         Significant Imaging: I have reviewed all pertinent imaging results/findings within the past 24 hours.      Assessment/Plan:      * Severe sepsis  This  patient does have evidence of infective focus. My overall impression is sepsis. Vital signs were reviewed and noted in progress note.  Antibiotics given-   Antibiotics (From admission, onward)    Start     Stop Route Frequency Ordered    12/22/22 1400  metroNIDAZOLE tablet 500 mg         -- Oral Every 8 hours 12/22/22 1020    12/22/22 1130  cefTRIAXone 2 gram/50 mL IVPB 2 g         -- IV Every 24 hours (non-standard times) 12/22/22 1020        Cultures were taken-   Microbiology Results (last 7 days)     Procedure Component Value Units Date/Time    Aerobic culture [[id number]] Collected: 12/20/22 1334    Order Status: Completed Specimen: Abscess from Abdomen Updated: 12/24/22 0845     Aerobic Bacterial Culture No growth    Narrative:      RLQ Abdomen    Aerobic culture [[id number]] Collected: 12/20/22 1135    Order Status: Completed Specimen: Pleural Fluid Updated: 12/24/22 0844     Aerobic Bacterial Culture No growth    Aerobic culture [[id number]]  (Abnormal)  (Susceptibility) Collected: 12/20/22 1210    Order Status: Completed Specimen: Abscess from Abdomen Updated: 12/22/22 0908     Aerobic Bacterial Culture KLEBSIELLA PNEUMONIAE  Few      Culture, Anaerobe [[id number]] Collected: 12/20/22 1334    Order Status: Completed Specimen: Abscess from Abdomen Updated: 12/22/22 0738     Anaerobic Culture Culture in progress    Narrative:      RLQ Abdomen    Culture, Anaerobic [[id number]] Collected: 12/20/22 1210    Order Status: Completed Specimen: Abscess Updated: 12/22/22 0736     Anaerobic Culture Culture in progress    Culture, Anaerobic [[id number]] Collected: 12/20/22 1135    Order Status: Completed Specimen: Pleural Fluid Updated: 12/22/22 0733     Anaerobic Culture Culture in progress    Gram stain [[id number]] Collected: 12/20/22 1334    Order Status: Completed Specimen: Abscess from Abdomen Updated: 12/20/22 1530     Gram Stain Result Many WBC's      No organisms seen    Narrative:      RLQ Abdomen    Gram stain  [[id number]] Collected: 12/20/22 1210    Order Status: Completed Specimen: Abscess from Abdomen Updated: 12/20/22 1529     Gram Stain Result Many WBC's      No organisms seen    Gram stain [[id number]] Collected: 12/20/22 1135    Order Status: Completed Specimen: Pleural Fluid Updated: 12/20/22 1529     Gram Stain Result Cytospin indicates:      Many WBC's      No organisms seen    Gram stain [[id number]] Collected: 12/20/22 1210    Order Status: Canceled Specimen: Abscess from Abdomen     Aerobic culture [[id number]] Collected: 12/20/22 1210    Order Status: Canceled Specimen: Abscess from Abdomen     Culture, Anaerobic [[id number]] Collected: 12/20/22 1210    Order Status: Canceled Specimen: Abscess from Abdomen     Fungus culture [[id number]] Collected: 12/09/22 1831    Order Status: Completed Specimen: Abscess from Abdomen Updated: 12/19/22 1409     Fungus (Mycology) Culture No fungal growth to date    Blood culture [[id number]] Collected: 12/14/22 0205    Order Status: Completed Specimen: Blood from Antecubital, Right Arm Updated: 12/18/22 0303     Blood Culture, Routine No Growth after 4 days.    Narrative:      Blood cx in the AM with AM labs    Blood culture [[id number]] Collected: 12/14/22 0205    Order Status: Completed Specimen: Blood from Antecubital, Right Updated: 12/18/22 0303     Blood Culture, Routine No Growth after 4 days.    Narrative:      Blood cx in the AM with AM labs        Latest lactate reviewed, they are-  No results for input(s): LACTATE in the last 72 hours.    Organ dysfunction indicated by Acute kidney injury and Acute liver injury  Source- liver abscess  Drains placed with no improvement of leukocytosis.  -ID consulted: recommending Rocephin and oral Flagyl until 1/18/2023  Awaiting LTAC placement.    Pleural effusion        Prostatic abscess  Urology consulted for prostatic abscess.   S/p turp  Patient with urinary retention and acosta placed.      Microcytic anemia  Hgb 10-11, no prior to  review.   Iron deficient- TSAT 17, but also significant acute inflammation- ferritin 3633.   B12, folate replete.   Stable        Atelectasis  Shortness of breath partly due to atelectasis as noted on CXR  - incentive spirometer  - O2 by NC as needed      Hepatic abscess  Unclear how he got this. IR drain placed 12/9. Cultures with Klebsiella, pansensitive.   - MRCP:persistent heterogeneous collection within the right hepatic lobe compatible with reported abscess noting percutaneous drainage catheter in place. No significant biliary duct abnormality on MRCP assessment.  - Blood cx with NGTD  - Abscess culture with klebsiella. No anaerobes  -- GI consulted to assess for source of liver abscess. Pt refuses inpatient colonoscopy. Likely plan for outpatient cscope.  Drains placed with resolution of leukocytosis.       VTE Risk Mitigation (From admission, onward)         Ordered     heparin (porcine) injection 5,000 Units  Every 8 hours         12/10/22 0838     IP VTE HIGH RISK PATIENT  Once         12/09/22 1421     Place sequential compression device  Until discontinued         12/09/22 1421                Discharge Planning   VIVIEN: 12/16/2022     Code Status: Full Code   Is the patient medically ready for discharge?:     Reason for patient still in hospital (select all that apply): Pending disposition  Discharge Plan A: Long-term acute care facility (LTAC)   Discharge Delays: (!) Post-Acute Set-up              Tarun Nickerson MD  Department of Hospital Medicine   [facility] [place] [facility]

## 2022-12-24 NOTE — PLAN OF CARE
Problem: Mobility Impairment  Goal: Optimal Mobility  Outcome: Ongoing, Progressing   Promoted bed mobility and use of IS. Proper technique demonstrated. Education provided  Problem: Infection  Goal: Absence of Infection Signs and Symptoms  Outcome: Ongoing, Progressing  Intervention: Prevent or Manage Infection  Flowsheets (Taken 12/24/2022 4702)  Infection Management: aseptic technique maintained

## 2022-12-24 NOTE — SUBJECTIVE & OBJECTIVE
Interval History: no new complaints.    Review of Systems   HENT:  Negative for ear discharge and ear pain.    Eyes:  Negative for discharge and itching.   Endocrine: Negative for cold intolerance and heat intolerance.   Neurological:  Negative for seizures and syncope.   Objective:     Vital Signs (Most Recent):  Temp: 98.8 °F (37.1 °C) (12/24/22 0752)  Pulse: 99 (12/24/22 0752)  Resp: 20 (12/24/22 0752)  BP: 128/72 (12/24/22 0752)  SpO2: 96 % (12/24/22 0752) Vital Signs (24h Range):  Temp:  [98.4 °F (36.9 °C)-98.8 °F (37.1 °C)] 98.8 °F (37.1 °C)  Pulse:  [83-99] 99  Resp:  [18-20] 20  SpO2:  [96 %-97 %] 96 %  BP: (124-153)/(72-94) 128/72     Weight: 96.6 kg (212 lb 15.4 oz)  Body mass index is 34.37 kg/m².    Intake/Output Summary (Last 24 hours) at 12/24/2022 0938  Last data filed at 12/24/2022 0550  Gross per 24 hour   Intake 720 ml   Output 2960 ml   Net -2240 ml        Physical Exam  Vitals and nursing note reviewed.   Constitutional:       General: He is not in acute distress.     Appearance: He is well-developed. He is obese. He is ill-appearing. He is not toxic-appearing or diaphoretic.   HENT:      Head: Normocephalic and atraumatic.      Mouth/Throat:      Mouth: Mucous membranes are moist.   Eyes:      General: No scleral icterus.     Pupils: Pupils are equal, round, and reactive to light.   Neck:      Thyroid: No thyromegaly.   Cardiovascular:      Rate and Rhythm: Normal rate and regular rhythm.      Pulses: Normal pulses.      Heart sounds: Normal heart sounds. No murmur heard.    No gallop.   Pulmonary:      Effort: Pulmonary effort is normal. No respiratory distress.      Breath sounds: Normal breath sounds. No stridor. No wheezing or rales.      Comments: Decreased inspiration. No wheezes on exam.  Abdominal:      General: Bowel sounds are normal.      Palpations: Abdomen is soft.      Tenderness: There is no abdominal tenderness. There is no guarding.      Comments: RUQ drains in place    Musculoskeletal:         General: No deformity. Normal range of motion.      Cervical back: Normal range of motion. No rigidity.      Right lower leg: No edema.      Left lower leg: No edema.   Lymphadenopathy:      Cervical: No cervical adenopathy.   Skin:     General: Skin is warm.   Neurological:      Mental Status: He is alert and oriented to person, place, and time.   Psychiatric:         Behavior: Behavior normal.       Significant Labs: All pertinent labs within the past 24 hours have been reviewed.  BMP:   Recent Labs   Lab 12/22/22 2231 12/23/22 0638 12/24/22  0422      < > 93      < > 138   K 4.0   < > 3.8      < > 110   CO2 22*   < > 22*   BUN 15   < > 13   CREATININE 0.9   < > 0.9   CALCIUM 7.3*   < > 7.7*   MG 1.7  --   --     < > = values in this interval not displayed.       CBC:   Recent Labs   Lab 12/22/22 2231 12/23/22 0638 12/24/22  0422   WBC 10.59 9.35 8.94   HGB 8.9* 8.8* 8.8*   HCT 27.1* 27.6* 27.6*    429 412         Significant Imaging: I have reviewed all pertinent imaging results/findings within the past 24 hours.

## 2022-12-25 LAB
ALBUMIN SERPL BCP-MCNC: 2 G/DL (ref 3.5–5.2)
ALP SERPL-CCNC: 56 U/L (ref 55–135)
ALT SERPL W/O P-5'-P-CCNC: 10 U/L (ref 10–44)
ANION GAP SERPL CALC-SCNC: 6 MMOL/L (ref 8–16)
AST SERPL-CCNC: 16 U/L (ref 10–40)
BASOPHILS # BLD AUTO: 0.01 K/UL (ref 0–0.2)
BASOPHILS NFR BLD: 0.1 % (ref 0–1.9)
BILIRUB SERPL-MCNC: 0.5 MG/DL (ref 0.1–1)
BUN SERPL-MCNC: 11 MG/DL (ref 6–20)
CALCIUM SERPL-MCNC: 7.7 MG/DL (ref 8.7–10.5)
CHLORIDE SERPL-SCNC: 109 MMOL/L (ref 95–110)
CO2 SERPL-SCNC: 22 MMOL/L (ref 23–29)
CREAT SERPL-MCNC: 0.9 MG/DL (ref 0.5–1.4)
DIFFERENTIAL METHOD: ABNORMAL
EOSINOPHIL # BLD AUTO: 0.2 K/UL (ref 0–0.5)
EOSINOPHIL NFR BLD: 2.1 % (ref 0–8)
ERYTHROCYTE [DISTWIDTH] IN BLOOD BY AUTOMATED COUNT: 14.5 % (ref 11.5–14.5)
EST. GFR  (NO RACE VARIABLE): >60 ML/MIN/1.73 M^2
GLUCOSE SERPL-MCNC: 101 MG/DL (ref 70–110)
HCT VFR BLD AUTO: 28 % (ref 40–54)
HGB BLD-MCNC: 9.2 G/DL (ref 14–18)
IMM GRANULOCYTES # BLD AUTO: 0.08 K/UL (ref 0–0.04)
IMM GRANULOCYTES NFR BLD AUTO: 1 % (ref 0–0.5)
LYMPHOCYTES # BLD AUTO: 1.4 K/UL (ref 1–4.8)
LYMPHOCYTES NFR BLD: 17.6 % (ref 18–48)
MAGNESIUM SERPL-MCNC: 1.6 MG/DL (ref 1.6–2.6)
MCH RBC QN AUTO: 29.4 PG (ref 27–31)
MCHC RBC AUTO-ENTMCNC: 32.9 G/DL (ref 32–36)
MCV RBC AUTO: 90 FL (ref 82–98)
MONOCYTES # BLD AUTO: 0.7 K/UL (ref 0.3–1)
MONOCYTES NFR BLD: 8.2 % (ref 4–15)
NEUTROPHILS # BLD AUTO: 5.7 K/UL (ref 1.8–7.7)
NEUTROPHILS NFR BLD: 71 % (ref 38–73)
NRBC BLD-RTO: 0 /100 WBC
PHOSPHATE SERPL-MCNC: 3.5 MG/DL (ref 2.7–4.5)
PLATELET # BLD AUTO: 397 K/UL (ref 150–450)
PMV BLD AUTO: 9.5 FL (ref 9.2–12.9)
POCT GLUCOSE: 102 MG/DL (ref 70–110)
POCT GLUCOSE: 120 MG/DL (ref 70–110)
POCT GLUCOSE: 98 MG/DL (ref 70–110)
POTASSIUM SERPL-SCNC: 3.9 MMOL/L (ref 3.5–5.1)
PROT SERPL-MCNC: 5.9 G/DL (ref 6–8.4)
RBC # BLD AUTO: 3.13 M/UL (ref 4.6–6.2)
SODIUM SERPL-SCNC: 137 MMOL/L (ref 136–145)
WBC # BLD AUTO: 8.05 K/UL (ref 3.9–12.7)

## 2022-12-25 PROCEDURE — 25000003 PHARM REV CODE 250: Performed by: UROLOGY

## 2022-12-25 PROCEDURE — 21400001 HC TELEMETRY ROOM

## 2022-12-25 PROCEDURE — 84100 ASSAY OF PHOSPHORUS: CPT | Performed by: HOSPITALIST

## 2022-12-25 PROCEDURE — A4216 STERILE WATER/SALINE, 10 ML: HCPCS | Performed by: UROLOGY

## 2022-12-25 PROCEDURE — 80053 COMPREHEN METABOLIC PANEL: CPT | Performed by: UROLOGY

## 2022-12-25 PROCEDURE — 83735 ASSAY OF MAGNESIUM: CPT | Performed by: HOSPITALIST

## 2022-12-25 PROCEDURE — 85025 COMPLETE CBC W/AUTO DIFF WBC: CPT | Performed by: UROLOGY

## 2022-12-25 PROCEDURE — 99024 PR POST-OP FOLLOW-UP VISIT: ICD-10-PCS | Mod: ,,, | Performed by: UROLOGY

## 2022-12-25 PROCEDURE — 63600175 PHARM REV CODE 636 W HCPCS: Performed by: UROLOGY

## 2022-12-25 PROCEDURE — 25000003 PHARM REV CODE 250: Performed by: STUDENT IN AN ORGANIZED HEALTH CARE EDUCATION/TRAINING PROGRAM

## 2022-12-25 PROCEDURE — 63600175 PHARM REV CODE 636 W HCPCS: Performed by: STUDENT IN AN ORGANIZED HEALTH CARE EDUCATION/TRAINING PROGRAM

## 2022-12-25 PROCEDURE — 99024 POSTOP FOLLOW-UP VISIT: CPT | Mod: ,,, | Performed by: UROLOGY

## 2022-12-25 RX ADMIN — HEPARIN SODIUM 5000 UNITS: 5000 INJECTION INTRAVENOUS; SUBCUTANEOUS at 08:12

## 2022-12-25 RX ADMIN — METRONIDAZOLE 500 MG: 500 TABLET ORAL at 01:12

## 2022-12-25 RX ADMIN — Medication 6 MG: at 08:12

## 2022-12-25 RX ADMIN — Medication 10 ML: at 05:12

## 2022-12-25 RX ADMIN — Medication 10 ML: at 10:12

## 2022-12-25 RX ADMIN — METRONIDAZOLE 500 MG: 500 TABLET ORAL at 08:12

## 2022-12-25 RX ADMIN — HEPARIN SODIUM 5000 UNITS: 5000 INJECTION INTRAVENOUS; SUBCUTANEOUS at 05:12

## 2022-12-25 RX ADMIN — Medication 10 ML: at 06:12

## 2022-12-25 RX ADMIN — METRONIDAZOLE 500 MG: 500 TABLET ORAL at 05:12

## 2022-12-25 RX ADMIN — Medication 10 ML: at 01:12

## 2022-12-25 RX ADMIN — Medication 10 ML: at 11:12

## 2022-12-25 RX ADMIN — HEPARIN SODIUM 5000 UNITS: 5000 INJECTION INTRAVENOUS; SUBCUTANEOUS at 01:12

## 2022-12-25 RX ADMIN — Medication 10 ML: at 12:12

## 2022-12-25 RX ADMIN — CEFTRIAXONE SODIUM 2 G: 2 INJECTION, SOLUTION INTRAVENOUS at 11:12

## 2022-12-25 NOTE — SUBJECTIVE & OBJECTIVE
Interval History: He feels well.  Tolerating Newell    Review of Systems   Constitutional: Negative.    HENT: Negative.     Eyes: Negative.    Respiratory:  Negative for cough, chest tightness and shortness of breath.    Cardiovascular:  Negative for chest pain.   Gastrointestinal: Negative.  Negative for constipation, diarrhea and nausea.   Genitourinary:  Positive for difficulty urinating.   Musculoskeletal: Negative.    Neurological: Negative.    Psychiatric/Behavioral: Negative.     Objective:     Temp:  [98 °F (36.7 °C)-99.1 °F (37.3 °C)] 99.1 °F (37.3 °C)  Pulse:  [77-94] 88  Resp:  [18-20] 18  SpO2:  [95 %-97 %] 95 %  BP: (133-153)/(79-91) 133/79     Body mass index is 34.37 kg/m².      Bladder Scan Volume (mL): 500 mL (12/22/22 1950)  Post Void Cath Residual Output (mL): 0 mL (12/22/22 2240)    Drains       Drain  Duration                  Closed/Suction Drain 12/09/22 1748 Lateral RLQ Bulb 15 days         Closed/Suction Drain 12/20/22 1210 RUQ Accordion 10 Fr. 4 days         Closed/Suction Drain 12/20/22 1234 Lateral RLQ Accordion 8 Fr. 4 days         Urethral Catheter 12/22/22 2040 18 Fr. 2 days                    Physical Exam  Vitals and nursing note reviewed.   Constitutional:       Appearance: He is well-developed.   HENT:      Head: Normocephalic.   Eyes:      Conjunctiva/sclera: Conjunctivae normal.   Neck:      Thyroid: No thyromegaly.      Trachea: No tracheal deviation.   Cardiovascular:      Rate and Rhythm: Normal rate.      Heart sounds: Normal heart sounds.   Pulmonary:      Effort: Pulmonary effort is normal. No respiratory distress.      Breath sounds: Normal breath sounds. No wheezing.   Abdominal:      General: Bowel sounds are normal.      Palpations: Abdomen is soft.      Tenderness: There is no abdominal tenderness. There is no rebound.      Hernia: No hernia is present.   Genitourinary:     Comments: 16 Fr Newell in place with yellow urine, purulent material noted in  tubing  Musculoskeletal:         General: No tenderness. Normal range of motion.      Cervical back: Normal range of motion and neck supple.   Lymphadenopathy:      Cervical: No cervical adenopathy.   Skin:     General: Skin is warm and dry.      Findings: No erythema or rash.   Neurological:      Mental Status: He is alert and oriented to person, place, and time.   Psychiatric:         Behavior: Behavior normal.         Thought Content: Thought content normal.         Judgment: Judgment normal.       Significant Labs:    BMP:  Recent Labs   Lab 12/23/22  0638 12/24/22  0422 12/25/22  0434    138 137   K 3.8 3.8 3.9   * 110 109   CO2 21* 22* 22*   BUN 12 13 11   CREATININE 0.9 0.9 0.9   CALCIUM 7.5* 7.7* 7.7*       CBC:   Recent Labs   Lab 12/23/22  0638 12/24/22  0422 12/25/22  0434   WBC 9.35 8.94 8.05   HGB 8.8* 8.8* 9.2*   HCT 27.6* 27.6* 28.0*    412 397       Blood Culture: No results for input(s): LABBLOO in the last 168 hours.  Urine Culture: No results for input(s): LABURIN in the last 168 hours.    Significant Imaging:

## 2022-12-25 NOTE — SUBJECTIVE & OBJECTIVE
Interval History: no new complaints.    Review of Systems   HENT:  Negative for ear discharge and ear pain.    Eyes:  Negative for discharge and itching.   Endocrine: Negative for cold intolerance and heat intolerance.   Neurological:  Negative for seizures and syncope.   Objective:     Vital Signs (Most Recent):  Temp: 99.1 °F (37.3 °C) (12/25/22 0759)  Pulse: 88 (12/25/22 0759)  Resp: 18 (12/25/22 0759)  BP: 133/79 (12/25/22 0759)  SpO2: 95 % (12/25/22 0759) Vital Signs (24h Range):  Temp:  [98 °F (36.7 °C)-99.1 °F (37.3 °C)] 99.1 °F (37.3 °C)  Pulse:  [77-94] 88  Resp:  [18-20] 18  SpO2:  [95 %-97 %] 95 %  BP: (133-153)/(79-91) 133/79     Weight: 96.6 kg (212 lb 15.4 oz)  Body mass index is 34.37 kg/m².    Intake/Output Summary (Last 24 hours) at 12/25/2022 1133  Last data filed at 12/25/2022 1110  Gross per 24 hour   Intake 600 ml   Output 4305 ml   Net -3705 ml        Physical Exam  Vitals and nursing note reviewed.   Constitutional:       General: He is not in acute distress.     Appearance: He is well-developed. He is obese. He is ill-appearing. He is not toxic-appearing or diaphoretic.   HENT:      Head: Normocephalic and atraumatic.      Mouth/Throat:      Mouth: Mucous membranes are moist.   Eyes:      General: No scleral icterus.     Pupils: Pupils are equal, round, and reactive to light.   Neck:      Thyroid: No thyromegaly.   Cardiovascular:      Rate and Rhythm: Normal rate and regular rhythm.      Pulses: Normal pulses.      Heart sounds: Normal heart sounds. No murmur heard.    No gallop.   Pulmonary:      Effort: Pulmonary effort is normal. No respiratory distress.      Breath sounds: Normal breath sounds. No stridor. No wheezing or rales.      Comments: Decreased inspiration. No wheezes on exam.  Abdominal:      General: Bowel sounds are normal.      Palpations: Abdomen is soft.      Tenderness: There is no abdominal tenderness. There is no guarding.      Comments: RUQ drains in place    Musculoskeletal:         General: No deformity. Normal range of motion.      Cervical back: Normal range of motion. No rigidity.      Right lower leg: No edema.      Left lower leg: No edema.   Lymphadenopathy:      Cervical: No cervical adenopathy.   Skin:     General: Skin is warm.   Neurological:      Mental Status: He is alert and oriented to person, place, and time.   Psychiatric:         Behavior: Behavior normal.       Significant Labs: All pertinent labs within the past 24 hours have been reviewed.  BMP:   Recent Labs   Lab 12/25/22  0434         K 3.9      CO2 22*   BUN 11   CREATININE 0.9   CALCIUM 7.7*       CBC:   Recent Labs   Lab 12/24/22  0422 12/25/22  0434   WBC 8.94 8.05   HGB 8.8* 9.2*   HCT 27.6* 28.0*    397         Significant Imaging: I have reviewed all pertinent imaging results/findings within the past 24 hours.

## 2022-12-25 NOTE — PROGRESS NOTES
Monitored this shift Q1-2 hrs. No changes in status. Drain/devices in place to right lower abd. Purulent drainage noted. Left PICC noted. Newell remains in place re: urinary retention .

## 2022-12-25 NOTE — PROGRESS NOTES
[facility]  Urology  Progress Note    Patient Name: Vivek Turner  MRN: [id number]  Admission Date: 12/9/2022  Hospital Length of Stay: 16 days  Code Status: Full Code   Attending Provider: Tarun Nickerson MD   Primary Care Physician: Primary Doctor No    Subjective:     HPI:  Prostatic Abscess  Vivek Turner is a 47 y.o. male who has been feeling poorly for a few weeks.  He currently has no fever but did previously.  He denies hematuria, dysuria, or difficulty voiding.      Interval History: He feels well.  Tolerating Newell    Review of Systems   Constitutional: Negative.    HENT: Negative.     Eyes: Negative.    Respiratory:  Negative for cough, chest tightness and shortness of breath.    Cardiovascular:  Negative for chest pain.   Gastrointestinal: Negative.  Negative for constipation, diarrhea and nausea.   Genitourinary:  Positive for difficulty urinating.   Musculoskeletal: Negative.    Neurological: Negative.    Psychiatric/Behavioral: Negative.     Objective:     Temp:  [98 °F (36.7 °C)-99.1 °F (37.3 °C)] 99.1 °F (37.3 °C)  Pulse:  [77-94] 88  Resp:  [18-20] 18  SpO2:  [95 %-97 %] 95 %  BP: (133-153)/(79-91) 133/79     Body mass index is 34.37 kg/m².      Bladder Scan Volume (mL): 500 mL (12/22/22 1950)  Post Void Cath Residual Output (mL): 0 mL (12/22/22 2240)    Drains       Drain  Duration                  Closed/Suction Drain 12/09/22 1748 Lateral RLQ Bulb 15 days         Closed/Suction Drain 12/20/22 1210 RUQ Accordion 10 Fr. 4 days         Closed/Suction Drain 12/20/22 1234 Lateral RLQ Accordion 8 Fr. 4 days         Urethral Catheter 12/22/22 2040 18 Fr. 2 days                    Physical Exam  Vitals and nursing note reviewed.   Constitutional:       Appearance: He is well-developed.   HENT:      Head: Normocephalic.   Eyes:      Conjunctiva/sclera: Conjunctivae normal.   Neck:      Thyroid: No thyromegaly.      Trachea: No tracheal deviation.   Cardiovascular:      Rate and Rhythm:  Normal rate.      Heart sounds: Normal heart sounds.   Pulmonary:      Effort: Pulmonary effort is normal. No respiratory distress.      Breath sounds: Normal breath sounds. No wheezing.   Abdominal:      General: Bowel sounds are normal.      Palpations: Abdomen is soft.      Tenderness: There is no abdominal tenderness. There is no rebound.      Hernia: No hernia is present.   Genitourinary:     Comments: 16 Fr Newell in place with yellow urine, purulent material noted in tubing  Musculoskeletal:         General: No tenderness. Normal range of motion.      Cervical back: Normal range of motion and neck supple.   Lymphadenopathy:      Cervical: No cervical adenopathy.   Skin:     General: Skin is warm and dry.      Findings: No erythema or rash.   Neurological:      Mental Status: He is alert and oriented to person, place, and time.   Psychiatric:         Behavior: Behavior normal.         Thought Content: Thought content normal.         Judgment: Judgment normal.       Significant Labs:    BMP:  Recent Labs   Lab 12/23/22  0638 12/24/22  0422 12/25/22  0434    138 137   K 3.8 3.8 3.9   * 110 109   CO2 21* 22* 22*   BUN 12 13 11   CREATININE 0.9 0.9 0.9   CALCIUM 7.5* 7.7* 7.7*       CBC:   Recent Labs   Lab 12/23/22  0638 12/24/22  0422 12/25/22  0434   WBC 9.35 8.94 8.05   HGB 8.8* 8.8* 9.2*   HCT 27.6* 27.6* 28.0*    412 397       Blood Culture: No results for input(s): LABBLOO in the last 168 hours.  Urine Culture: No results for input(s): LABURIN in the last 168 hours.    Significant Imaging:                      Assessment/Plan:     Prostatic abscess  D/C Newell in AM, Monday        VTE Risk Mitigation (From admission, onward)         Ordered     heparin (porcine) injection 5,000 Units  Every 8 hours         12/10/22 0838     IP VTE HIGH RISK PATIENT  Once         12/09/22 1421     Place sequential compression device  Until discontinued         12/09/22 1421                WILDER Buckley  MD  Urology  [facility] [place] - Telemetry

## 2022-12-25 NOTE — PROGRESS NOTES
[facility] Medicine  Progress Note    Patient Name: Vivek Turner  MRN: [id number]  Patient Class: IP- Inpatient   Admission Date: 12/9/2022  Length of Stay: 16 days  Attending Physician: Tarun Nickerson MD  Primary Care Provider: Primary Doctor No        Subjective:     Principal Problem:Severe sepsis        HPI:  47y M w/ hypertension presents with abdominal pain x 2 days. Pt refused  service. He reports that he had fever/rigors three days prior to admission. He then developed worsening abdominal pain from then until admission. He went to an urgent care where he was started on bactrim for UTI. His abdominal pain increased over the next few days. He had a BM yesterday. Denies vomiting, but has had significant nausea and very limited PO intake in the days leading to admission. Aside from the single episode of fever/rigors he denies subsequent fevers.     In the ED he was found to have a R hepatic abscess vs mass. He traveled to the [facility] eight weeks prior to presentation. He denies fevers except for what is listed above. Denies weight loss, cough, night sweats. Denies prior hx of TB. Denies prior hx of cirrhosis      Overview/Hospital Course:  Mr Vivek Turner was admitted with severe sepsis due to R liver mass vs abscess and acute renal failure. IR consulted and drain placed on 12/9. Cultures with Klebsiella. ID consulted for ABX recommendations. Started on bicarb gtt for acute renal failure and Nephrology consulted. He was moved to ICU for instability. Diagnosed with Ileus; NGT placed with copious bilious output. GI consulted to assess for source of liver abscess. MRCP showed persistent heterogeneous collection within the right hepatic lobe compatible with reported abscess noting percutaneous drainage catheter in place. No significant biliary duct abnormality on MRCP assessment. Pt refused inpatient colonoscopy. Pt transferred to the floor on 12/12.  Cr improving with fluids  and TPN. wbc uptrending and looks like new fluid collection next to liver. IR consulted. IR did not see another fluid pocket that was attainable.  Creat continued to improve with resolution of ARF.  Continued on TPN.  Resolution of Ileus and NGT removed.   Persistent leukocytosis.  Repeat CT showing multiple abdominal abscesses in the abdomen persists, as well as a large R pleural effusion, and prostatic abscess. IR consulted for thoracentesis and potentially placing another drain(s), given multiple abscesses and locations.  General Surgery also consulted.  Urology consulted for prostatic abscess.   S/p IR drainage of perihepatic fluid collection - cx with Kleb pneumo thus far. RLQ abdominal fluid collection and R thora cx ngtd. S/p TURP with urology on 12/21 - noted to have purulent fluid.      Interval History: no new complaints.    Review of Systems   HENT:  Negative for ear discharge and ear pain.    Eyes:  Negative for discharge and itching.   Endocrine: Negative for cold intolerance and heat intolerance.   Neurological:  Negative for seizures and syncope.   Objective:     Vital Signs (Most Recent):  Temp: 99.1 °F (37.3 °C) (12/25/22 0759)  Pulse: 88 (12/25/22 0759)  Resp: 18 (12/25/22 0759)  BP: 133/79 (12/25/22 0759)  SpO2: 95 % (12/25/22 0759) Vital Signs (24h Range):  Temp:  [98 °F (36.7 °C)-99.1 °F (37.3 °C)] 99.1 °F (37.3 °C)  Pulse:  [77-94] 88  Resp:  [18-20] 18  SpO2:  [95 %-97 %] 95 %  BP: (133-153)/(79-91) 133/79     Weight: 96.6 kg (212 lb 15.4 oz)  Body mass index is 34.37 kg/m².    Intake/Output Summary (Last 24 hours) at 12/25/2022 1133  Last data filed at 12/25/2022 1110  Gross per 24 hour   Intake 600 ml   Output 4305 ml   Net -3705 ml        Physical Exam  Vitals and nursing note reviewed.   Constitutional:       General: He is not in acute distress.     Appearance: He is well-developed. He is obese. He is ill-appearing. He is not toxic-appearing or diaphoretic.   HENT:      Head:  Normocephalic and atraumatic.      Mouth/Throat:      Mouth: Mucous membranes are moist.   Eyes:      General: No scleral icterus.     Pupils: Pupils are equal, round, and reactive to light.   Neck:      Thyroid: No thyromegaly.   Cardiovascular:      Rate and Rhythm: Normal rate and regular rhythm.      Pulses: Normal pulses.      Heart sounds: Normal heart sounds. No murmur heard.    No gallop.   Pulmonary:      Effort: Pulmonary effort is normal. No respiratory distress.      Breath sounds: Normal breath sounds. No stridor. No wheezing or rales.      Comments: Decreased inspiration. No wheezes on exam.  Abdominal:      General: Bowel sounds are normal.      Palpations: Abdomen is soft.      Tenderness: There is no abdominal tenderness. There is no guarding.      Comments: RUQ drains in place   Musculoskeletal:         General: No deformity. Normal range of motion.      Cervical back: Normal range of motion. No rigidity.      Right lower leg: No edema.      Left lower leg: No edema.   Lymphadenopathy:      Cervical: No cervical adenopathy.   Skin:     General: Skin is warm.   Neurological:      Mental Status: He is alert and oriented to person, place, and time.   Psychiatric:         Behavior: Behavior normal.       Significant Labs: All pertinent labs within the past 24 hours have been reviewed.  BMP:   Recent Labs   Lab 12/25/22  0434         K 3.9      CO2 22*   BUN 11   CREATININE 0.9   CALCIUM 7.7*       CBC:   Recent Labs   Lab 12/24/22  0422 12/25/22  0434   WBC 8.94 8.05   HGB 8.8* 9.2*   HCT 27.6* 28.0*    397         Significant Imaging: I have reviewed all pertinent imaging results/findings within the past 24 hours.      Assessment/Plan:      * Severe sepsis  This patient does have evidence of infective focus. My overall impression is sepsis. Vital signs were reviewed and noted in progress note.  Antibiotics given-   Antibiotics (From admission, onward)    Start     Stop Route  Frequency Ordered    12/22/22 1400  metroNIDAZOLE tablet 500 mg         -- Oral Every 8 hours 12/22/22 1020    12/22/22 1130  cefTRIAXone 2 gram/50 mL IVPB 2 g         -- IV Every 24 hours (non-standard times) 12/22/22 1020        Cultures were taken-   Microbiology Results (last 7 days)     Procedure Component Value Units Date/Time    Culture, Anaerobe [[id number]] Collected: 12/20/22 1334    Order Status: Completed Specimen: Abscess from Abdomen Updated: 12/24/22 1257     Anaerobic Culture No anaerobes isolated    Narrative:      RLQ Abdomen    Culture, Anaerobic [[id number]] Collected: 12/20/22 1210    Order Status: Completed Specimen: Abscess Updated: 12/24/22 1255     Anaerobic Culture No anaerobes isolated    Culture, Anaerobic [[id number]] Collected: 12/20/22 1135    Order Status: Completed Specimen: Pleural Fluid Updated: 12/24/22 1244     Anaerobic Culture No anaerobes isolated    Aerobic culture [[id number]] Collected: 12/20/22 1334    Order Status: Completed Specimen: Abscess from Abdomen Updated: 12/24/22 0845     Aerobic Bacterial Culture No growth    Narrative:      RLQ Abdomen    Aerobic culture [[id number]] Collected: 12/20/22 1135    Order Status: Completed Specimen: Pleural Fluid Updated: 12/24/22 0844     Aerobic Bacterial Culture No growth    Aerobic culture [[id number]]  (Abnormal)  (Susceptibility) Collected: 12/20/22 1210    Order Status: Completed Specimen: Abscess from Abdomen Updated: 12/22/22 0908     Aerobic Bacterial Culture KLEBSIELLA PNEUMONIAE  Few      Gram stain [[id number]] Collected: 12/20/22 1334    Order Status: Completed Specimen: Abscess from Abdomen Updated: 12/20/22 1530     Gram Stain Result Many WBC's      No organisms seen    Narrative:      RLQ Abdomen    Gram stain [[id number]] Collected: 12/20/22 1210    Order Status: Completed Specimen: Abscess from Abdomen Updated: 12/20/22 1529     Gram Stain Result Many WBC's      No organisms seen    Gram stain [[id number]] Collected:  12/20/22 1135    Order Status: Completed Specimen: Pleural Fluid Updated: 12/20/22 1529     Gram Stain Result Cytospin indicates:      Many WBC's      No organisms seen    Gram stain [[id number]] Collected: 12/20/22 1210    Order Status: Canceled Specimen: Abscess from Abdomen     Aerobic culture [[id number]] Collected: 12/20/22 1210    Order Status: Canceled Specimen: Abscess from Abdomen     Culture, Anaerobic [[id number]] Collected: 12/20/22 1210    Order Status: Canceled Specimen: Abscess from Abdomen     Fungus culture [[id number]] Collected: 12/09/22 1831    Order Status: Completed Specimen: Abscess from Abdomen Updated: 12/19/22 1409     Fungus (Mycology) Culture No fungal growth to date        Latest lactate reviewed, they are-  No results for input(s): LACTATE in the last 72 hours.    Organ dysfunction indicated by Acute kidney injury and Acute liver injury  Source- liver abscess  Drains placed with no improvement of leukocytosis.  -ID consulted: recommending Rocephin and oral Flagyl until 1/18/2023  Awaiting LTAC placement.    Pleural effusion        Prostatic abscess  Urology consulted for prostatic abscess.   S/p turp  Patient with urinary retention and acosta placed.      Microcytic anemia  Hgb 10-11, no prior to review.   Iron deficient- TSAT 17, but also significant acute inflammation- ferritin 3633.   B12, folate replete.   Stable        Atelectasis  Shortness of breath partly due to atelectasis as noted on CXR  - incentive spirometer  - O2 by NC as needed      Hepatic abscess  Unclear how he got this. IR drain placed 12/9. Cultures with Klebsiella, pansensitive.   - MRCP:persistent heterogeneous collection within the right hepatic lobe compatible with reported abscess noting percutaneous drainage catheter in place. No significant biliary duct abnormality on MRCP assessment.  - Blood cx with NGTD  - Abscess culture with klebsiella. No anaerobes  -- GI consulted to assess for source of liver abscess. Pt  refuses inpatient colonoscopy. Likely plan for outpatient cscope.  Drains placed with resolution of leukocytosis.       VTE Risk Mitigation (From admission, onward)         Ordered     heparin (porcine) injection 5,000 Units  Every 8 hours         12/10/22 0838     IP VTE HIGH RISK PATIENT  Once         12/09/22 1421     Place sequential compression device  Until discontinued         12/09/22 1421                Discharge Planning   VIVIEN: 12/16/2022     Code Status: Full Code   Is the patient medically ready for discharge?:     Reason for patient still in hospital (select all that apply): Pending disposition  Discharge Plan A: Long-term acute care facility (LTAC)   Discharge Delays: (!) Post-Acute Set-up              Tarun Nickerson MD  Department of Hospital Medicine   [facility]

## 2022-12-25 NOTE — ASSESSMENT & PLAN NOTE
This patient does have evidence of infective focus. My overall impression is sepsis. Vital signs were reviewed and noted in progress note.  Antibiotics given-   Antibiotics (From admission, onward)    Start     Stop Route Frequency Ordered    12/22/22 1400  metroNIDAZOLE tablet 500 mg         -- Oral Every 8 hours 12/22/22 1020    12/22/22 1130  cefTRIAXone 2 gram/50 mL IVPB 2 g         -- IV Every 24 hours (non-standard times) 12/22/22 1020        Cultures were taken-   Microbiology Results (last 7 days)     Procedure Component Value Units Date/Time    Culture, Anaerobe [[id number]] Collected: 12/20/22 1334    Order Status: Completed Specimen: Abscess from Abdomen Updated: 12/24/22 1257     Anaerobic Culture No anaerobes isolated    Narrative:      RLQ Abdomen    Culture, Anaerobic [[id number]] Collected: 12/20/22 1210    Order Status: Completed Specimen: Abscess Updated: 12/24/22 1255     Anaerobic Culture No anaerobes isolated    Culture, Anaerobic [[id number]] Collected: 12/20/22 1135    Order Status: Completed Specimen: Pleural Fluid Updated: 12/24/22 1244     Anaerobic Culture No anaerobes isolated    Aerobic culture [[id number]] Collected: 12/20/22 1334    Order Status: Completed Specimen: Abscess from Abdomen Updated: 12/24/22 0845     Aerobic Bacterial Culture No growth    Narrative:      RLQ Abdomen    Aerobic culture [[id number]] Collected: 12/20/22 1135    Order Status: Completed Specimen: Pleural Fluid Updated: 12/24/22 0844     Aerobic Bacterial Culture No growth    Aerobic culture [[id number]]  (Abnormal)  (Susceptibility) Collected: 12/20/22 1210    Order Status: Completed Specimen: Abscess from Abdomen Updated: 12/22/22 0908     Aerobic Bacterial Culture KLEBSIELLA PNEUMONIAE  Few      Gram stain [[id number]] Collected: 12/20/22 1334    Order Status: Completed Specimen: Abscess from Abdomen Updated: 12/20/22 1530     Gram Stain Result Many WBC's      No organisms seen    Narrative:      RLQ Abdomen    Gram  stain [[id number]] Collected: 12/20/22 1210    Order Status: Completed Specimen: Abscess from Abdomen Updated: 12/20/22 1529     Gram Stain Result Many WBC's      No organisms seen    Gram stain [[id number]] Collected: 12/20/22 1135    Order Status: Completed Specimen: Pleural Fluid Updated: 12/20/22 1529     Gram Stain Result Cytospin indicates:      Many WBC's      No organisms seen    Gram stain [[id number]] Collected: 12/20/22 1210    Order Status: Canceled Specimen: Abscess from Abdomen     Aerobic culture [[id number]] Collected: 12/20/22 1210    Order Status: Canceled Specimen: Abscess from Abdomen     Culture, Anaerobic [[id number]] Collected: 12/20/22 1210    Order Status: Canceled Specimen: Abscess from Abdomen     Fungus culture [[id number]] Collected: 12/09/22 1831    Order Status: Completed Specimen: Abscess from Abdomen Updated: 12/19/22 1409     Fungus (Mycology) Culture No fungal growth to date        Latest lactate reviewed, they are-  No results for input(s): LACTATE in the last 72 hours.    Organ dysfunction indicated by Acute kidney injury and Acute liver injury  Source- liver abscess  Drains placed with no improvement of leukocytosis.  -ID consulted: recommending Rocephin and oral Flagyl until 1/18/2023  Awaiting LTAC placement.

## 2022-12-26 PROCEDURE — 63600175 PHARM REV CODE 636 W HCPCS: Performed by: UROLOGY

## 2022-12-26 PROCEDURE — A4216 STERILE WATER/SALINE, 10 ML: HCPCS | Performed by: UROLOGY

## 2022-12-26 PROCEDURE — 25000003 PHARM REV CODE 250: Performed by: UROLOGY

## 2022-12-26 PROCEDURE — 63600175 PHARM REV CODE 636 W HCPCS: Performed by: STUDENT IN AN ORGANIZED HEALTH CARE EDUCATION/TRAINING PROGRAM

## 2022-12-26 PROCEDURE — 99024 PR POST-OP FOLLOW-UP VISIT: ICD-10-PCS | Mod: ,,, | Performed by: UROLOGY

## 2022-12-26 PROCEDURE — 25000003 PHARM REV CODE 250: Performed by: STUDENT IN AN ORGANIZED HEALTH CARE EDUCATION/TRAINING PROGRAM

## 2022-12-26 PROCEDURE — 99024 POSTOP FOLLOW-UP VISIT: CPT | Mod: ,,, | Performed by: UROLOGY

## 2022-12-26 PROCEDURE — 21400001 HC TELEMETRY ROOM

## 2022-12-26 PROCEDURE — 94760 N-INVAS EAR/PLS OXIMETRY 1: CPT

## 2022-12-26 RX ADMIN — HEPARIN SODIUM 5000 UNITS: 5000 INJECTION INTRAVENOUS; SUBCUTANEOUS at 05:12

## 2022-12-26 RX ADMIN — Medication 10 ML: at 09:12

## 2022-12-26 RX ADMIN — Medication 10 ML: at 12:12

## 2022-12-26 RX ADMIN — Medication 10 ML: at 02:12

## 2022-12-26 RX ADMIN — Medication 6 MG: at 09:12

## 2022-12-26 RX ADMIN — Medication 10 ML: at 06:12

## 2022-12-26 RX ADMIN — HEPARIN SODIUM 5000 UNITS: 5000 INJECTION INTRAVENOUS; SUBCUTANEOUS at 09:12

## 2022-12-26 RX ADMIN — Medication 10 ML: at 11:12

## 2022-12-26 RX ADMIN — HEPARIN SODIUM 5000 UNITS: 5000 INJECTION INTRAVENOUS; SUBCUTANEOUS at 02:12

## 2022-12-26 RX ADMIN — METRONIDAZOLE 500 MG: 500 TABLET ORAL at 05:12

## 2022-12-26 RX ADMIN — METRONIDAZOLE 500 MG: 500 TABLET ORAL at 02:12

## 2022-12-26 RX ADMIN — CEFTRIAXONE SODIUM 2 G: 2 INJECTION, SOLUTION INTRAVENOUS at 11:12

## 2022-12-26 RX ADMIN — Medication 10 ML: at 05:12

## 2022-12-26 RX ADMIN — METRONIDAZOLE 500 MG: 500 TABLET ORAL at 09:12

## 2022-12-26 NOTE — PLAN OF CARE
Problem: Adult Inpatient Plan of Care  Goal: Plan of Care Review  Outcome: Ongoing, Progressing  Goal: Patient-Specific Goal (Individualized)  Outcome: Ongoing, Progressing  Goal: Absence of Hospital-Acquired Illness or Injury  Outcome: Ongoing, Progressing  Goal: Optimal Comfort and Wellbeing  Outcome: Ongoing, Progressing  Goal: Readiness for Transition of Care  Outcome: Ongoing, Progressing     Problem: Adjustment to Illness (Sepsis/Septic Shock)  Goal: Optimal Coping  Outcome: Ongoing, Progressing     Problem: Bleeding (Sepsis/Septic Shock)  Goal: Absence of Bleeding  Outcome: Ongoing, Progressing     Problem: Nutrition Impaired (Sepsis/Septic Shock)  Goal: Optimal Nutrition Intake  Outcome: Ongoing, Progressing     Problem: Renal Function Impairment (Acute Kidney Injury/Impairment)  Goal: Effective Renal Function  Outcome: Ongoing, Progressing     Problem: Mobility Impairment  Goal: Optimal Mobility  Outcome: Ongoing, Progressing     Problem: Infection  Goal: Absence of Infection Signs and Symptoms  Outcome: Ongoing, Progressing   No falls, safety maintained. Zero output from three abd drains, sites intact/clean/dry; no orders to flush drains. BM today. No c/o pain. IV abx administered via PICC line, ports working well. Patient tolerating food, good appetite. Up to chair twice today. VSS, Patient bathed self sitting in front of sink. Linens changed.

## 2022-12-26 NOTE — PROGRESS NOTES
[facility]  Urology  Progress Note    Patient Name: Vivek Turner  MRN: [id number]  Admission Date: 12/9/2022  Hospital Length of Stay: 17 days  Code Status: Full Code   Attending Provider: Tarun Nickerson MD   Primary Care Physician: Primary Doctor No    Subjective:     HPI:  Prostatic Abscess  Vivek Turner is a 47 y.o. male who has been feeling poorly for a few weeks.  He currently has no fever but did previously.  He denies hematuria, dysuria, or difficulty voiding.      Interval History: He feels well.  He is voiding.    Review of Systems   Constitutional: Negative.  Negative for fever.   HENT: Negative.     Eyes: Negative.    Respiratory:  Negative for cough, chest tightness and shortness of breath.    Cardiovascular:  Negative for chest pain.   Gastrointestinal: Negative.  Negative for constipation, diarrhea and nausea.   Genitourinary:  Negative for difficulty urinating.   Musculoskeletal: Negative.    Neurological: Negative.    Psychiatric/Behavioral: Negative.     Objective:     Temp:  [97.8 °F (36.6 °C)-98.8 °F (37.1 °C)] 98.3 °F (36.8 °C)  Pulse:  [84-95] 85  Resp:  [18-20] 18  SpO2:  [95 %-98 %] 96 %  BP: (132-157)/(84-91) 157/84     Body mass index is 34.37 kg/m².    Date 12/26/22 0700 - 12/27/22 0659   Shift 4369-5163 0093-9956 6880-4549 24 Hour Total   INTAKE   Shift Total(mL/kg)       OUTPUT   Urine(mL/kg/hr) 400   400   Shift Total(mL/kg) 400(4.1)   400(4.1)   Weight (kg) 96.6 96.6 96.6 96.6     Bladder Scan Volume (mL): 500 mL (12/22/22 1950)  Post Void Cath Residual Output (mL): 300 mL (12/26/22 0601)    Drains       Drain  Duration                  Closed/Suction Drain 12/09/22 1748 Lateral RLQ Bulb 16 days         Closed/Suction Drain 12/20/22 1210 RUQ Accordion 10 Fr. 5 days         Closed/Suction Drain 12/20/22 1234 Lateral RLQ Accordion 8 Fr. 5 days                    Physical Exam  Vitals and nursing note reviewed.   Constitutional:       Appearance: He is well-developed.    HENT:      Head: Normocephalic.   Eyes:      Conjunctiva/sclera: Conjunctivae normal.   Neck:      Thyroid: No thyromegaly.      Trachea: No tracheal deviation.   Cardiovascular:      Rate and Rhythm: Normal rate.      Heart sounds: Normal heart sounds.   Pulmonary:      Effort: Pulmonary effort is normal. No respiratory distress.      Breath sounds: Normal breath sounds. No wheezing.   Abdominal:      General: Bowel sounds are normal.      Palpations: Abdomen is soft.      Tenderness: There is no abdominal tenderness. There is no rebound.      Hernia: No hernia is present.   Musculoskeletal:         General: No tenderness. Normal range of motion.      Cervical back: Normal range of motion and neck supple.   Lymphadenopathy:      Cervical: No cervical adenopathy.   Skin:     General: Skin is warm and dry.      Findings: No erythema or rash.   Neurological:      Mental Status: He is alert and oriented to person, place, and time.   Psychiatric:         Behavior: Behavior normal.         Thought Content: Thought content normal.         Judgment: Judgment normal.       Significant Labs:    BMP:  Recent Labs   Lab 12/23/22  0638 12/24/22  0422 12/25/22  0434    138 137   K 3.8 3.8 3.9   * 110 109   CO2 21* 22* 22*   BUN 12 13 11   CREATININE 0.9 0.9 0.9   CALCIUM 7.5* 7.7* 7.7*       CBC:   Recent Labs   Lab 12/23/22  0638 12/24/22  0422 12/25/22  0434   WBC 9.35 8.94 8.05   HGB 8.8* 8.8* 9.2*   HCT 27.6* 27.6* 28.0*    412 397       Blood Culture: No results for input(s): LABBLOO in the last 168 hours.  Urine Culture: No results for input(s): LABURIN in the last 168 hours.    Significant Imaging:                      Assessment/Plan:     Prostatic abscess  S/p TURP for unroofing  Voiding          VTE Risk Mitigation (From admission, onward)         Ordered     heparin (porcine) injection 5,000 Units  Every 8 hours         12/10/22 0838     IP VTE HIGH RISK PATIENT  Once         12/09/22 1421      Place sequential compression device  Until discontinued         12/09/22 1421                W Edward Buckley MD  Urology  [facility] [place] - Telemetry

## 2022-12-26 NOTE — SUBJECTIVE & OBJECTIVE
Interval History: about the same.    Review of Systems   HENT:  Negative for ear discharge and ear pain.    Eyes:  Negative for discharge and itching.   Endocrine: Negative for cold intolerance and heat intolerance.   Neurological:  Negative for seizures and syncope.   Objective:     Vital Signs (Most Recent):  Temp: 98.3 °F (36.8 °C) (12/26/22 0724)  Pulse: 85 (12/26/22 0724)  Resp: 18 (12/26/22 0724)  BP: (!) 157/84 (12/26/22 0724)  SpO2: 96 % (12/26/22 0757) Vital Signs (24h Range):  Temp:  [97.8 °F (36.6 °C)-98.8 °F (37.1 °C)] 98.3 °F (36.8 °C)  Pulse:  [84-95] 85  Resp:  [18-20] 18  SpO2:  [95 %-98 %] 96 %  BP: (132-157)/(84-91) 157/84     Weight: 96.6 kg (212 lb 15.4 oz)  Body mass index is 34.37 kg/m².    Intake/Output Summary (Last 24 hours) at 12/26/2022 0848  Last data filed at 12/26/2022 0724  Gross per 24 hour   Intake 650 ml   Output 3600 ml   Net -2950 ml        Physical Exam  Vitals and nursing note reviewed.   Constitutional:       General: He is not in acute distress.     Appearance: He is well-developed. He is obese. He is not toxic-appearing or diaphoretic.   HENT:      Head: Normocephalic and atraumatic.      Mouth/Throat:      Mouth: Mucous membranes are moist.   Eyes:      General: No scleral icterus.     Pupils: Pupils are equal, round, and reactive to light.   Neck:      Thyroid: No thyromegaly.   Cardiovascular:      Rate and Rhythm: Normal rate and regular rhythm.      Pulses: Normal pulses.      Heart sounds: Normal heart sounds. No murmur heard.    No gallop.   Pulmonary:      Effort: Pulmonary effort is normal. No respiratory distress.      Breath sounds: Normal breath sounds. No stridor. No wheezing or rales.   Abdominal:      General: Bowel sounds are normal.      Palpations: Abdomen is soft.      Tenderness: There is no abdominal tenderness. There is no guarding.      Comments: RUQ drains in place   Musculoskeletal:         General: No deformity. Normal range of motion.      Cervical  back: Normal range of motion. No rigidity.      Right lower leg: No edema.      Left lower leg: No edema.   Lymphadenopathy:      Cervical: No cervical adenopathy.   Skin:     General: Skin is warm.   Neurological:      Mental Status: He is alert and oriented to person, place, and time.   Psychiatric:         Behavior: Behavior normal.       Significant Labs: All pertinent labs within the past 24 hours have been reviewed.  BMP:   Recent Labs   Lab 12/25/22  0434 12/25/22  1421     --      --    K 3.9  --      --    CO2 22*  --    BUN 11  --    CREATININE 0.9  --    CALCIUM 7.7*  --    MG  --  1.6       CBC:   Recent Labs   Lab 12/25/22  0434   WBC 8.05   HGB 9.2*   HCT 28.0*            Significant Imaging: I have reviewed all pertinent imaging results/findings within the past 24 hours.

## 2022-12-26 NOTE — ASSESSMENT & PLAN NOTE
Urology consulted for prostatic abscess.   S/p turp  Patient with urinary retention and acosta placed.  Acosta removed and patient able to void.

## 2022-12-26 NOTE — ASSESSMENT & PLAN NOTE
This patient does have evidence of infective focus. My overall impression is sepsis. Vital signs were reviewed and noted in progress note.  Antibiotics given-   Antibiotics (From admission, onward)    Start     Stop Route Frequency Ordered    12/22/22 1400  metroNIDAZOLE tablet 500 mg         -- Oral Every 8 hours 12/22/22 1020    12/22/22 1130  cefTRIAXone 2 gram/50 mL IVPB 2 g         -- IV Every 24 hours (non-standard times) 12/22/22 1020        Cultures were taken-   Microbiology Results (last 7 days)     Procedure Component Value Units Date/Time    Culture, Anaerobe [[id number]] Collected: 12/20/22 1334    Order Status: Completed Specimen: Abscess from Abdomen Updated: 12/24/22 1257     Anaerobic Culture No anaerobes isolated    Narrative:      RLQ Abdomen    Culture, Anaerobic [[id number]] Collected: 12/20/22 1210    Order Status: Completed Specimen: Abscess Updated: 12/24/22 1255     Anaerobic Culture No anaerobes isolated    Culture, Anaerobic [[id number]] Collected: 12/20/22 1135    Order Status: Completed Specimen: Pleural Fluid Updated: 12/24/22 1244     Anaerobic Culture No anaerobes isolated    Aerobic culture [[id number]] Collected: 12/20/22 1334    Order Status: Completed Specimen: Abscess from Abdomen Updated: 12/24/22 0845     Aerobic Bacterial Culture No growth    Narrative:      RLQ Abdomen    Aerobic culture [[id number]] Collected: 12/20/22 1135    Order Status: Completed Specimen: Pleural Fluid Updated: 12/24/22 0844     Aerobic Bacterial Culture No growth    Aerobic culture [[id number]]  (Abnormal)  (Susceptibility) Collected: 12/20/22 1210    Order Status: Completed Specimen: Abscess from Abdomen Updated: 12/22/22 0908     Aerobic Bacterial Culture KLEBSIELLA PNEUMONIAE  Few      Gram stain [[id number]] Collected: 12/20/22 1334    Order Status: Completed Specimen: Abscess from Abdomen Updated: 12/20/22 1530     Gram Stain Result Many WBC's      No organisms seen    Narrative:      RLQ Abdomen    Gram  stain [[id number]] Collected: 12/20/22 1210    Order Status: Completed Specimen: Abscess from Abdomen Updated: 12/20/22 1529     Gram Stain Result Many WBC's      No organisms seen    Gram stain [[id number]] Collected: 12/20/22 1135    Order Status: Completed Specimen: Pleural Fluid Updated: 12/20/22 1529     Gram Stain Result Cytospin indicates:      Many WBC's      No organisms seen    Gram stain [[id number]] Collected: 12/20/22 1210    Order Status: Canceled Specimen: Abscess from Abdomen     Aerobic culture [[id number]] Collected: 12/20/22 1210    Order Status: Canceled Specimen: Abscess from Abdomen     Culture, Anaerobic [[id number]] Collected: 12/20/22 1210    Order Status: Canceled Specimen: Abscess from Abdomen     Fungus culture [[id number]] Collected: 12/09/22 1831    Order Status: Completed Specimen: Abscess from Abdomen Updated: 12/19/22 1409     Fungus (Mycology) Culture No fungal growth to date        Latest lactate reviewed, they are-  No results for input(s): LACTATE in the last 72 hours.    Organ dysfunction indicated by Acute kidney injury and Acute liver injury  Source- liver abscess  Drains placed with no improvement of leukocytosis.  -ID consulted: recommending Rocephin and oral Flagyl until 1/18/2023  Awaiting LTAC placement.

## 2022-12-26 NOTE — PROGRESS NOTES
[facility]  Progress Note    Patient Name: Vivek Turner  MRN: [id number]  Patient Class: IP- Inpatient   Admission Date: 12/9/2022  Length of Stay: 17 days  Attending Physician: Tarun Nickerson MD  Primary Care Provider: Primary Doctor No        Subjective:     Principal Problem:Severe sepsis        HPI:  47y M w/ hypertension presents with abdominal pain x 2 days. Pt refused  service. He reports that he had fever/rigors three days prior to admission. He then developed worsening abdominal pain from then until admission. He went to an urgent care where he was started on bactrim for UTI. His abdominal pain increased over the next few days. He had a BM yesterday. Denies vomiting, but has had significant nausea and very limited PO intake in the days leading to admission. Aside from the single episode of fever/rigors he denies subsequent fevers.     In the ED he was found to have a R hepatic abscess vs mass. He traveled to the [place] eight weeks prior to presentation. He denies fevers except for what is listed above. Denies weight loss, cough, night sweats. Denies prior hx of TB. Denies prior hx of cirrhosis      Overview/Hospital Course:  Mr Vivek Turner was admitted with severe sepsis due to R liver mass vs abscess and acute renal failure. IR consulted and drain placed on 12/9. Cultures with Klebsiella. ID consulted for ABX recommendations. Started on bicarb gtt for acute renal failure and Nephrology consulted. He was moved to ICU for instability. Diagnosed with Ileus; NGT placed with copious bilious output. GI consulted to assess for source of liver abscess. MRCP showed persistent heterogeneous collection within the right hepatic lobe compatible with reported abscess noting percutaneous drainage catheter in place. No significant biliary duct abnormality on MRCP assessment. Pt refused inpatient colonoscopy. Pt transferred to the floor on 12/12.  Cr improving with fluids  and TPN. wbc uptrending and looks like new fluid collection next to liver. IR consulted. IR did not see another fluid pocket that was attainable.  Creat continued to improve with resolution of ARF.  Continued on TPN.  Resolution of Ileus and NGT removed.   Persistent leukocytosis.  Repeat CT showing multiple abdominal abscesses in the abdomen persists, as well as a large R pleural effusion, and prostatic abscess. IR consulted for thoracentesis and potentially placing another drain(s), given multiple abscesses and locations.  General Surgery also consulted.  Urology consulted for prostatic abscess.   S/p IR drainage of perihepatic fluid collection - cx with Kleb pneumo thus far. RLQ abdominal fluid collection and R thora cx ngtd. S/p TURP with urology on 12/21 - noted to have purulent fluid.  Leukocytosis finally resolved.  ID recommending IV Ceftriaxone and PO Flagyl until 1/18/2023.  SW working on LTAC placement.      Interval History: about the same.    Review of Systems   HENT:  Negative for ear discharge and ear pain.    Eyes:  Negative for discharge and itching.   Endocrine: Negative for cold intolerance and heat intolerance.   Neurological:  Negative for seizures and syncope.   Objective:     Vital Signs (Most Recent):  Temp: 98.3 °F (36.8 °C) (12/26/22 0724)  Pulse: 85 (12/26/22 0724)  Resp: 18 (12/26/22 0724)  BP: (!) 157/84 (12/26/22 0724)  SpO2: 96 % (12/26/22 0757) Vital Signs (24h Range):  Temp:  [97.8 °F (36.6 °C)-98.8 °F (37.1 °C)] 98.3 °F (36.8 °C)  Pulse:  [84-95] 85  Resp:  [18-20] 18  SpO2:  [95 %-98 %] 96 %  BP: (132-157)/(84-91) 157/84     Weight: 96.6 kg (212 lb 15.4 oz)  Body mass index is 34.37 kg/m².    Intake/Output Summary (Last 24 hours) at 12/26/2022 0848  Last data filed at 12/26/2022 0724  Gross per 24 hour   Intake 650 ml   Output 3600 ml   Net -2950 ml        Physical Exam  Vitals and nursing note reviewed.   Constitutional:       General: He is not in acute distress.     Appearance:  He is well-developed. He is obese. He is not toxic-appearing or diaphoretic.   HENT:      Head: Normocephalic and atraumatic.      Mouth/Throat:      Mouth: Mucous membranes are moist.   Eyes:      General: No scleral icterus.     Pupils: Pupils are equal, round, and reactive to light.   Neck:      Thyroid: No thyromegaly.   Cardiovascular:      Rate and Rhythm: Normal rate and regular rhythm.      Pulses: Normal pulses.      Heart sounds: Normal heart sounds. No murmur heard.    No gallop.   Pulmonary:      Effort: Pulmonary effort is normal. No respiratory distress.      Breath sounds: Normal breath sounds. No stridor. No wheezing or rales.   Abdominal:      General: Bowel sounds are normal.      Palpations: Abdomen is soft.      Tenderness: There is no abdominal tenderness. There is no guarding.      Comments: RUQ drains in place   Musculoskeletal:         General: No deformity. Normal range of motion.      Cervical back: Normal range of motion. No rigidity.      Right lower leg: No edema.      Left lower leg: No edema.   Lymphadenopathy:      Cervical: No cervical adenopathy.   Skin:     General: Skin is warm.   Neurological:      Mental Status: He is alert and oriented to person, place, and time.   Psychiatric:         Behavior: Behavior normal.       Significant Labs: All pertinent labs within the past 24 hours have been reviewed.  BMP:   Recent Labs   Lab 12/25/22  0434 12/25/22  1421     --      --    K 3.9  --      --    CO2 22*  --    BUN 11  --    CREATININE 0.9  --    CALCIUM 7.7*  --    MG  --  1.6       CBC:   Recent Labs   Lab 12/25/22  0434   WBC 8.05   HGB 9.2*   HCT 28.0*            Significant Imaging: I have reviewed all pertinent imaging results/findings within the past 24 hours.      Assessment/Plan:      * Severe sepsis  This patient does have evidence of infective focus. My overall impression is sepsis. Vital signs were reviewed and noted in progress  note.  Antibiotics given-   Antibiotics (From admission, onward)    Start     Stop Route Frequency Ordered    12/22/22 1400  metroNIDAZOLE tablet 500 mg         -- Oral Every 8 hours 12/22/22 1020    12/22/22 1130  cefTRIAXone 2 gram/50 mL IVPB 2 g         -- IV Every 24 hours (non-standard times) 12/22/22 1020        Cultures were taken-   Microbiology Results (last 7 days)     Procedure Component Value Units Date/Time    Culture, Anaerobe [[id number]] Collected: 12/20/22 1334    Order Status: Completed Specimen: Abscess from Abdomen Updated: 12/24/22 1257     Anaerobic Culture No anaerobes isolated    Narrative:      RLQ Abdomen    Culture, Anaerobic [[id number]] Collected: 12/20/22 1210    Order Status: Completed Specimen: Abscess Updated: 12/24/22 1255     Anaerobic Culture No anaerobes isolated    Culture, Anaerobic [[id number]] Collected: 12/20/22 1135    Order Status: Completed Specimen: Pleural Fluid Updated: 12/24/22 1244     Anaerobic Culture No anaerobes isolated    Aerobic culture [[id number]] Collected: 12/20/22 1334    Order Status: Completed Specimen: Abscess from Abdomen Updated: 12/24/22 0845     Aerobic Bacterial Culture No growth    Narrative:      RLQ Abdomen    Aerobic culture [[id number]] Collected: 12/20/22 1135    Order Status: Completed Specimen: Pleural Fluid Updated: 12/24/22 0844     Aerobic Bacterial Culture No growth    Aerobic culture [[id number]]  (Abnormal)  (Susceptibility) Collected: 12/20/22 1210    Order Status: Completed Specimen: Abscess from Abdomen Updated: 12/22/22 0908     Aerobic Bacterial Culture KLEBSIELLA PNEUMONIAE  Few      Gram stain [[id number]] Collected: 12/20/22 1334    Order Status: Completed Specimen: Abscess from Abdomen Updated: 12/20/22 1530     Gram Stain Result Many WBC's      No organisms seen    Narrative:      RLQ Abdomen    Gram stain [[id number]] Collected: 12/20/22 1210    Order Status: Completed Specimen: Abscess from Abdomen Updated: 12/20/22 1529     Gram  Stain Result Many WBC's      No organisms seen    Gram stain [[id number]] Collected: 12/20/22 1135    Order Status: Completed Specimen: Pleural Fluid Updated: 12/20/22 1529     Gram Stain Result Cytospin indicates:      Many WBC's      No organisms seen    Gram stain [[id number]] Collected: 12/20/22 1210    Order Status: Canceled Specimen: Abscess from Abdomen     Aerobic culture [[id number]] Collected: 12/20/22 1210    Order Status: Canceled Specimen: Abscess from Abdomen     Culture, Anaerobic [[id number]] Collected: 12/20/22 1210    Order Status: Canceled Specimen: Abscess from Abdomen     Fungus culture [[id number]] Collected: 12/09/22 1831    Order Status: Completed Specimen: Abscess from Abdomen Updated: 12/19/22 1409     Fungus (Mycology) Culture No fungal growth to date        Latest lactate reviewed, they are-  No results for input(s): LACTATE in the last 72 hours.    Organ dysfunction indicated by Acute kidney injury and Acute liver injury  Source- liver abscess  Drains placed with no improvement of leukocytosis.  -ID consulted: recommending Rocephin and oral Flagyl until 1/18/2023  Awaiting LTAC placement.    Pleural effusion        Prostatic abscess  Urology consulted for prostatic abscess.   S/p turp  Patient with urinary retention and acosta placed.  Acosta removed and patient able to void.      Microcytic anemia  Hgb 10-11, no prior to review.   Iron deficient- TSAT 17, but also significant acute inflammation- ferritin 3633.   B12, folate replete.   Stable        Atelectasis  Shortness of breath partly due to atelectasis as noted on CXR  - incentive spirometer  - O2 by NC as needed      Hepatic abscess  Unclear how he got this. IR drain placed 12/9. Cultures with Klebsiella, pansensitive.   - MRCP:persistent heterogeneous collection within the right hepatic lobe compatible with reported abscess noting percutaneous drainage catheter in place. No significant biliary duct abnormality on MRCP assessment.  -  Blood cx with NGTD  - Abscess culture with klebsiella. No anaerobes  -- GI consulted to assess for source of liver abscess. Pt refuses inpatient colonoscopy. Likely plan for outpatient cscope.  Drains placed with resolution of leukocytosis.       VTE Risk Mitigation (From admission, onward)         Ordered     heparin (porcine) injection 5,000 Units  Every 8 hours         12/10/22 0838     IP VTE HIGH RISK PATIENT  Once         12/09/22 1421     Place sequential compression device  Until discontinued         12/09/22 1421                Discharge Planning   VIVIEN: 12/16/2022     Code Status: Full Code   Is the patient medically ready for discharge?:     Reason for patient still in hospital (select all that apply): Pending disposition  Discharge Plan A: Long-term acute care facility (LTAC)   Discharge Delays: (!) Post-Acute Set-up              Tarun Nickerson MD  Department of Hospital Medicine   [facility] - Telemetry

## 2022-12-26 NOTE — SUBJECTIVE & OBJECTIVE
Interval History: He feels well.  He is voiding.    Review of Systems   Constitutional: Negative.  Negative for fever.   HENT: Negative.     Eyes: Negative.    Respiratory:  Negative for cough, chest tightness and shortness of breath.    Cardiovascular:  Negative for chest pain.   Gastrointestinal: Negative.  Negative for constipation, diarrhea and nausea.   Genitourinary:  Negative for difficulty urinating.   Musculoskeletal: Negative.    Neurological: Negative.    Psychiatric/Behavioral: Negative.     Objective:     Temp:  [97.8 °F (36.6 °C)-98.8 °F (37.1 °C)] 98.3 °F (36.8 °C)  Pulse:  [84-95] 85  Resp:  [18-20] 18  SpO2:  [95 %-98 %] 96 %  BP: (132-157)/(84-91) 157/84     Body mass index is 34.37 kg/m².    Date 12/26/22 0700 - 12/27/22 0659   Shift 4830-5443 0841-8079 3790-1531 24 Hour Total   INTAKE   Shift Total(mL/kg)       OUTPUT   Urine(mL/kg/hr) 400   400   Shift Total(mL/kg) 400(4.1)   400(4.1)   Weight (kg) 96.6 96.6 96.6 96.6     Bladder Scan Volume (mL): 500 mL (12/22/22 1950)  Post Void Cath Residual Output (mL): 300 mL (12/26/22 0601)    Drains       Drain  Duration                  Closed/Suction Drain 12/09/22 1748 Lateral RLQ Bulb 16 days         Closed/Suction Drain 12/20/22 1210 RUQ Accordion 10 Fr. 5 days         Closed/Suction Drain 12/20/22 1234 Lateral RLQ Accordion 8 Fr. 5 days                    Physical Exam  Vitals and nursing note reviewed.   Constitutional:       Appearance: He is well-developed.   HENT:      Head: Normocephalic.   Eyes:      Conjunctiva/sclera: Conjunctivae normal.   Neck:      Thyroid: No thyromegaly.      Trachea: No tracheal deviation.   Cardiovascular:      Rate and Rhythm: Normal rate.      Heart sounds: Normal heart sounds.   Pulmonary:      Effort: Pulmonary effort is normal. No respiratory distress.      Breath sounds: Normal breath sounds. No wheezing.   Abdominal:      General: Bowel sounds are normal.      Palpations: Abdomen is soft.      Tenderness: There  is no abdominal tenderness. There is no rebound.      Hernia: No hernia is present.   Musculoskeletal:         General: No tenderness. Normal range of motion.      Cervical back: Normal range of motion and neck supple.   Lymphadenopathy:      Cervical: No cervical adenopathy.   Skin:     General: Skin is warm and dry.      Findings: No erythema or rash.   Neurological:      Mental Status: He is alert and oriented to person, place, and time.   Psychiatric:         Behavior: Behavior normal.         Thought Content: Thought content normal.         Judgment: Judgment normal.       Significant Labs:    BMP:  Recent Labs   Lab 12/23/22  0638 12/24/22  0422 12/25/22  0434    138 137   K 3.8 3.8 3.9   * 110 109   CO2 21* 22* 22*   BUN 12 13 11   CREATININE 0.9 0.9 0.9   CALCIUM 7.5* 7.7* 7.7*       CBC:   Recent Labs   Lab 12/23/22  0638 12/24/22  0422 12/25/22  0434   WBC 9.35 8.94 8.05   HGB 8.8* 8.8* 9.2*   HCT 27.6* 27.6* 28.0*    412 397       Blood Culture: No results for input(s): LABBLOO in the last 168 hours.  Urine Culture: No results for input(s): LABURIN in the last 168 hours.    Significant Imaging:

## 2022-12-26 NOTE — PT/OT/SLP PROGRESS
Physical Therapy      Patient Name:  Vivek Turner   MRN:  [id number]    Patient not seen today secondary to Patient fatigue, Patient unwilling to participate, Other (pt politely refused 2* he just got back to bed, stated he had been up and stayed in chair for a while used BS and now he is back in bed, asking if he can have therapy tomorrow). Will follow-up tomorrow 12/27/22.

## 2022-12-27 LAB
ALBUMIN SERPL BCP-MCNC: 2.2 G/DL (ref 3.5–5.2)
ALP SERPL-CCNC: 59 U/L (ref 55–135)
ALT SERPL W/O P-5'-P-CCNC: 11 U/L (ref 10–44)
ANION GAP SERPL CALC-SCNC: 6 MMOL/L (ref 8–16)
AST SERPL-CCNC: 18 U/L (ref 10–40)
BASOPHILS # BLD AUTO: 0.03 K/UL (ref 0–0.2)
BASOPHILS NFR BLD: 0.4 % (ref 0–1.9)
BILIRUB SERPL-MCNC: 0.6 MG/DL (ref 0.1–1)
BUN SERPL-MCNC: 11 MG/DL (ref 6–20)
CALCIUM SERPL-MCNC: 8.3 MG/DL (ref 8.7–10.5)
CHLORIDE SERPL-SCNC: 106 MMOL/L (ref 95–110)
CO2 SERPL-SCNC: 25 MMOL/L (ref 23–29)
CREAT SERPL-MCNC: 0.9 MG/DL (ref 0.5–1.4)
DIFFERENTIAL METHOD: ABNORMAL
EOSINOPHIL # BLD AUTO: 0.2 K/UL (ref 0–0.5)
EOSINOPHIL NFR BLD: 2.7 % (ref 0–8)
ERYTHROCYTE [DISTWIDTH] IN BLOOD BY AUTOMATED COUNT: 14.7 % (ref 11.5–14.5)
EST. GFR  (NO RACE VARIABLE): >60 ML/MIN/1.73 M^2
GLUCOSE SERPL-MCNC: 97 MG/DL (ref 70–110)
HCT VFR BLD AUTO: 29 % (ref 40–54)
HGB BLD-MCNC: 9.4 G/DL (ref 14–18)
IMM GRANULOCYTES # BLD AUTO: 0.05 K/UL (ref 0–0.04)
IMM GRANULOCYTES NFR BLD AUTO: 0.7 % (ref 0–0.5)
LYMPHOCYTES # BLD AUTO: 1.3 K/UL (ref 1–4.8)
LYMPHOCYTES NFR BLD: 18.9 % (ref 18–48)
MCH RBC QN AUTO: 29 PG (ref 27–31)
MCHC RBC AUTO-ENTMCNC: 32.4 G/DL (ref 32–36)
MCV RBC AUTO: 90 FL (ref 82–98)
MONOCYTES # BLD AUTO: 0.6 K/UL (ref 0.3–1)
MONOCYTES NFR BLD: 8.9 % (ref 4–15)
NEUTROPHILS # BLD AUTO: 4.8 K/UL (ref 1.8–7.7)
NEUTROPHILS NFR BLD: 68.4 % (ref 38–73)
NRBC BLD-RTO: 0 /100 WBC
PLATELET # BLD AUTO: 395 K/UL (ref 150–450)
PMV BLD AUTO: 9.7 FL (ref 9.2–12.9)
POTASSIUM SERPL-SCNC: 3.8 MMOL/L (ref 3.5–5.1)
PROT SERPL-MCNC: 6.4 G/DL (ref 6–8.4)
RBC # BLD AUTO: 3.24 M/UL (ref 4.6–6.2)
SODIUM SERPL-SCNC: 137 MMOL/L (ref 136–145)
WBC # BLD AUTO: 7.05 K/UL (ref 3.9–12.7)

## 2022-12-27 PROCEDURE — 94761 N-INVAS EAR/PLS OXIMETRY MLT: CPT

## 2022-12-27 PROCEDURE — 25500020 PHARM REV CODE 255: Performed by: STUDENT IN AN ORGANIZED HEALTH CARE EDUCATION/TRAINING PROGRAM

## 2022-12-27 PROCEDURE — 63600175 PHARM REV CODE 636 W HCPCS: Performed by: UROLOGY

## 2022-12-27 PROCEDURE — 25000003 PHARM REV CODE 250: Performed by: UROLOGY

## 2022-12-27 PROCEDURE — 85025 COMPLETE CBC W/AUTO DIFF WBC: CPT | Performed by: HOSPITALIST

## 2022-12-27 PROCEDURE — 51798 US URINE CAPACITY MEASURE: CPT

## 2022-12-27 PROCEDURE — A4216 STERILE WATER/SALINE, 10 ML: HCPCS | Performed by: UROLOGY

## 2022-12-27 PROCEDURE — A9698 NON-RAD CONTRAST MATERIALNOC: HCPCS | Performed by: STUDENT IN AN ORGANIZED HEALTH CARE EDUCATION/TRAINING PROGRAM

## 2022-12-27 PROCEDURE — 63600175 PHARM REV CODE 636 W HCPCS: Performed by: STUDENT IN AN ORGANIZED HEALTH CARE EDUCATION/TRAINING PROGRAM

## 2022-12-27 PROCEDURE — 25000003 PHARM REV CODE 250: Performed by: STUDENT IN AN ORGANIZED HEALTH CARE EDUCATION/TRAINING PROGRAM

## 2022-12-27 PROCEDURE — 80053 COMPREHEN METABOLIC PANEL: CPT | Performed by: HOSPITALIST

## 2022-12-27 PROCEDURE — 97530 THERAPEUTIC ACTIVITIES: CPT | Mod: CQ

## 2022-12-27 PROCEDURE — 21400001 HC TELEMETRY ROOM

## 2022-12-27 PROCEDURE — 99024 POSTOP FOLLOW-UP VISIT: CPT | Mod: ,,, | Performed by: UROLOGY

## 2022-12-27 PROCEDURE — 99024 PR POST-OP FOLLOW-UP VISIT: ICD-10-PCS | Mod: ,,, | Performed by: UROLOGY

## 2022-12-27 RX ADMIN — HEPARIN SODIUM 5000 UNITS: 5000 INJECTION INTRAVENOUS; SUBCUTANEOUS at 09:12

## 2022-12-27 RX ADMIN — METRONIDAZOLE 500 MG: 500 TABLET ORAL at 02:12

## 2022-12-27 RX ADMIN — Medication 10 ML: at 09:12

## 2022-12-27 RX ADMIN — OXYCODONE AND ACETAMINOPHEN 1 TABLET: 5; 325 TABLET ORAL at 08:12

## 2022-12-27 RX ADMIN — Medication 10 ML: at 12:12

## 2022-12-27 RX ADMIN — Medication 10 ML: at 06:12

## 2022-12-27 RX ADMIN — Medication 10 ML: at 10:12

## 2022-12-27 RX ADMIN — HEPARIN SODIUM 5000 UNITS: 5000 INJECTION INTRAVENOUS; SUBCUTANEOUS at 06:12

## 2022-12-27 RX ADMIN — METRONIDAZOLE 500 MG: 500 TABLET ORAL at 06:12

## 2022-12-27 RX ADMIN — HEPARIN SODIUM 5000 UNITS: 5000 INJECTION INTRAVENOUS; SUBCUTANEOUS at 02:12

## 2022-12-27 RX ADMIN — Medication 10 ML: at 02:12

## 2022-12-27 RX ADMIN — CEFTRIAXONE SODIUM 2 G: 2 INJECTION, SOLUTION INTRAVENOUS at 11:12

## 2022-12-27 RX ADMIN — METRONIDAZOLE 500 MG: 500 TABLET ORAL at 09:12

## 2022-12-27 RX ADMIN — IOHEXOL 100 ML: 350 INJECTION, SOLUTION INTRAVENOUS at 04:12

## 2022-12-27 RX ADMIN — POLYETHYLENE GLYCOL 3350 17 G: 17 POWDER, FOR SOLUTION ORAL at 08:12

## 2022-12-27 RX ADMIN — BARIUM SULFATE 450 ML: 20 SUSPENSION ORAL at 04:12

## 2022-12-27 RX ADMIN — Medication 10 ML: at 11:12

## 2022-12-27 RX ADMIN — Medication 6 MG: at 08:12

## 2022-12-27 NOTE — PROGRESS NOTES
[facility]  Urology  Progress Note    Patient Name: Vivek Turner  MRN: [id number]  Admission Date: 12/9/2022  Hospital Length of Stay: 18 days  Code Status: Full Code   Attending Provider: Mike Schroeder MD   Primary Care Physician: Primary Doctor No    Subjective:     HPI:  Prostatic Abscess  Vivek Turner is a 47 y.o. male who has been feeling poorly for a few weeks.  He currently has no fever but did previously.  He denies hematuria, dysuria, or difficulty voiding.      Interval History: He is voiding without complaints.    Review of Systems   Constitutional: Negative.  Negative for fever.   HENT: Negative.     Eyes: Negative.    Respiratory:  Negative for cough, chest tightness and shortness of breath.    Cardiovascular:  Negative for chest pain.   Gastrointestinal: Negative.  Negative for constipation, diarrhea and nausea.   Genitourinary:  Negative for difficulty urinating and flank pain.   Musculoskeletal: Negative.    Neurological: Negative.    Psychiatric/Behavioral: Negative.     Objective:     Temp:  [98.1 °F (36.7 °C)-98.9 °F (37.2 °C)] 98.5 °F (36.9 °C)  Pulse:  [81-98] 98  Resp:  [18-19] 18  SpO2:  [95 %-97 %] 95 %  BP: (136-156)/(75-90) 136/85     Body mass index is 34.37 kg/m².      Bladder Scan Volume (mL): 417 mL (12/27/22 0122)  Post Void Cath Residual Output (mL): 900 mL (12/27/22 0122)    Drains       Drain  Duration                  Closed/Suction Drain 12/09/22 1748 Lateral RLQ Bulb 17 days         Closed/Suction Drain 12/20/22 1210 RUQ Accordion 10 Fr. 7 days         Closed/Suction Drain 12/20/22 1234 Lateral RLQ Accordion 8 Fr. 6 days                    Physical Exam  Vitals and nursing note reviewed.   Constitutional:       Appearance: He is well-developed.   HENT:      Head: Normocephalic.   Eyes:      Conjunctiva/sclera: Conjunctivae normal.   Neck:      Thyroid: No thyromegaly.      Trachea: No tracheal deviation.   Cardiovascular:      Rate and Rhythm: Normal rate.       Heart sounds: Normal heart sounds.   Pulmonary:      Effort: Pulmonary effort is normal. No respiratory distress.      Breath sounds: Normal breath sounds. No wheezing.   Abdominal:      General: Bowel sounds are normal.      Palpations: Abdomen is soft.      Tenderness: There is no abdominal tenderness. There is no rebound.      Hernia: No hernia is present.   Musculoskeletal:         General: No tenderness. Normal range of motion.      Cervical back: Normal range of motion and neck supple.   Lymphadenopathy:      Cervical: No cervical adenopathy.   Skin:     General: Skin is warm and dry.      Findings: No erythema or rash.   Neurological:      Mental Status: He is alert and oriented to person, place, and time.   Psychiatric:         Behavior: Behavior normal.         Thought Content: Thought content normal.         Judgment: Judgment normal.       Significant Labs:    BMP:  Recent Labs   Lab 12/24/22 0422 12/25/22 0434 12/27/22  0521    137 137   K 3.8 3.9 3.8    109 106   CO2 22* 22* 25   BUN 13 11 11   CREATININE 0.9 0.9 0.9   CALCIUM 7.7* 7.7* 8.3*       CBC:   Recent Labs   Lab 12/24/22 0422 12/25/22 0434 12/27/22  0521   WBC 8.94 8.05 7.05   HGB 8.8* 9.2* 9.4*   HCT 27.6* 28.0* 29.0*    397 395       Blood Culture: No results for input(s): LABBLOO in the last 168 hours.  Urine Culture: No results for input(s): LABURIN in the last 168 hours.    Significant Imaging:                      Assessment/Plan:     Prostatic abscess  S/p TURP for unroofing  Voiding  Will sign off  Follow up in 1 month        VTE Risk Mitigation (From admission, onward)         Ordered     heparin (porcine) injection 5,000 Units  Every 8 hours         12/10/22 0838     IP VTE HIGH RISK PATIENT  Once         12/09/22 1421     Place sequential compression device  Until discontinued         12/09/22 1421                WILDER Buckley MD  Urology  [facility] - Telemetry

## 2022-12-27 NOTE — PT/OT/SLP PROGRESS
Physical Therapy Treatment    Patient Name:  Vivek Turner   MRN:  [id number]    Recommendations:     Discharge Recommendations: home health PT  Discharge Equipment Recommendations:  (TBD)  Barriers to discharge: None    Assessment:     Vivek Turner is a 47 y.o. male admitted with a medical diagnosis of Severe sepsis.  He presents with the following impairments/functional limitations: weakness, impaired endurance, gait instability, impaired balance, decreased lower extremity function, pain, impaired skin.    Rehab Prognosis: Good; patient would benefit from acute skilled PT services to address these deficits and reach maximum level of function.    Recent Surgery: Procedure(s) (LRB):  TURP (TRANSURETHRAL RESECTION OF PROSTATE) (N/A) 6 Days Post-Op    Plan:     During this hospitalization, patient to be seen 5 x/week to address the identified rehab impairments via gait training, therapeutic activities, therapeutic exercises and progress toward the following goals:    Plan of Care Expires:  12/26/22    Subjective     Chief Complaint: little pain to the Right abdomen where the suction tubes at  Patient/Family Comments/goals: Pt agreed to participate.  Pain/Comfort:  Pain Rating 1:  (a little, pt didn't rate)  Location - Side 1: Right  Location 1: abdomen  Pain Rating Post-Intervention 1:  (pt didn't rate)      Objective:     Communicated with nurse Mary prior to session.  Patient found HOB elevated with telemetry, PICC line (closed suction drain on the Right abdomen) upon PT entry to room.     General Precautions: Standard, fall  Orthopedic Precautions: N/A  Braces: N/A  Respiratory Status: Room air     Functional Mobility:  Bed Mobility:     Scooting: anterior scoot to EOB with supervision  Supine to Sit: supervision with HOB slightly elevated  Transfers:   Gait Belt don prior OOB activity  Sit to Stand: from EOB with stand by assistance with no AD  Gait: Patient ambulated ~500 and ~250  feet on level tile with No  Assistive Device with SBA/S. Pt with demonstrating a reciprocal gait with min unsteadiness but no LOB, decreased reese and decreased weight-shifting ability. Impairments contributing to gait deviations include impaired balance and decreased strength; v/c for ; fair+ L/R visual scanning and avoiding obstacles.  Balance: Good Sitting; Fair+ Standing      AM-PAC 6 CLICK MOBILITY  Turning over in bed (including adjusting bedclothes, sheets and blankets)?: 4  Sitting down on and standing up from a chair with arms (e.g., wheelchair, bedside commode, etc.): 4  Moving from lying on back to sitting on the side of the bed?: 4  Moving to and from a bed to a chair (including a wheelchair)?: 3  Need to walk in hospital room?: 3  Climbing 3-5 steps with a railing?: 3  Basic Mobility Total Score: 21       Treatment & Education:  Educated pt on benefits of OOB activity and performing BLE ex throughout the day, pt verbalized understanding    Patient left up in chair with tray table near by, all lines intact, call button in reach, and nurse notified.    GOALS:   Multidisciplinary Problems       Physical Therapy Goals          Problem: Physical Therapy    Goal Priority Disciplines Outcome Goal Variances Interventions   Physical Therapy Goal     PT, PT/OT Ongoing, Progressing     Description: Goals to be met by: 22     Patient will increase functional independence with mobility by performin. Pt to mod I with bed mobility. Met 22  2. Pt to transfer with supervision/mod I. Met 22  3. Pt to ambulate 150' /c or /s AD and supervision. Met 22    Updated goals:  1. Pt to transfer mod I.  2. Pt to ambulate 200' /s AD (I).                         Time Tracking:     PT Received On: 22  PT Start Time: 1047     PT Stop Time: 1057  PT Total Time (min): 10 min     Billable Minutes: Therapeutic Activity 10 min    Treatment Type: Treatment  PT/PTA: PTA     PTA Visit Number: 2     2022

## 2022-12-27 NOTE — ASSESSMENT & PLAN NOTE
This patient does have evidence of infective focus. My overall impression is sepsis. Vital signs were reviewed and noted in progress note.  Antibiotics given-   Antibiotics (From admission, onward)    Start     Stop Route Frequency Ordered    12/22/22 1400  metroNIDAZOLE tablet 500 mg         -- Oral Every 8 hours 12/22/22 1020    12/22/22 1130  cefTRIAXone 2 gram/50 mL IVPB 2 g         -- IV Every 24 hours (non-standard times) 12/22/22 1020        Cultures were taken-   Microbiology Results (last 7 days)     Procedure Component Value Units Date/Time    Culture, Anaerobe [[id number]] Collected: 12/20/22 1334    Order Status: Completed Specimen: Abscess from Abdomen Updated: 12/24/22 1257     Anaerobic Culture No anaerobes isolated    Narrative:      RLQ Abdomen    Culture, Anaerobic [[id number]] Collected: 12/20/22 1210    Order Status: Completed Specimen: Abscess Updated: 12/24/22 1255     Anaerobic Culture No anaerobes isolated    Culture, Anaerobic [[id number]] Collected: 12/20/22 1135    Order Status: Completed Specimen: Pleural Fluid Updated: 12/24/22 1244     Anaerobic Culture No anaerobes isolated    Aerobic culture [[id number]] Collected: 12/20/22 1334    Order Status: Completed Specimen: Abscess from Abdomen Updated: 12/24/22 0845     Aerobic Bacterial Culture No growth    Narrative:      RLQ Abdomen    Aerobic culture [[id number]] Collected: 12/20/22 1135    Order Status: Completed Specimen: Pleural Fluid Updated: 12/24/22 0844     Aerobic Bacterial Culture No growth    Aerobic culture [[id number]]  (Abnormal)  (Susceptibility) Collected: 12/20/22 1210    Order Status: Completed Specimen: Abscess from Abdomen Updated: 12/22/22 0908     Aerobic Bacterial Culture KLEBSIELLA PNEUMONIAE  Few          Latest lactate reviewed, they are-  No results for input(s): LACTATE in the last 72 hours.    Organ dysfunction indicated by Acute kidney injury and Acute liver injury  Source- liver abscess  Drains placed with no  improvement of leukocytosis.  -ID consulted: recommending Rocephin and oral Flagyl until 1/18/2023  Awaiting LTAC placement.

## 2022-12-27 NOTE — PROGRESS NOTES
[facility] Medicine  Telemedicine Progress Note    Patient Name: Vivek Turner  MRN: [id number]  Patient Class: IP- Inpatient   Admission Date: 12/9/2022  Length of Stay: 18 days  Attending Physician: Mike Schroeder MD  Primary Care Provider: Primary Doctor No          Subjective:     Principal Problem:Severe sepsis        HPI:  47y M w/ hypertension presents with abdominal pain x 2 days. Pt refused  service. He reports that he had fever/rigors three days prior to admission. He then developed worsening abdominal pain from then until admission. He went to an urgent care where he was started on bactrim for UTI. His abdominal pain increased over the next few days. He had a BM yesterday. Denies vomiting, but has had significant nausea and very limited PO intake in the days leading to admission. Aside from the single episode of fever/rigors he denies subsequent fevers.     In the ED he was found to have a R hepatic abscess vs mass. He traveled to the [facility] eight weeks prior to presentation. He denies fevers except for what is listed above. Denies weight loss, cough, night sweats. Denies prior hx of TB. Denies prior hx of cirrhosis      Overview/Hospital Course:  Mr Vivek Turner was admitted with severe sepsis due to R liver mass vs abscess and acute renal failure. IR consulted and drain placed on 12/9. Cultures with Klebsiella. ID consulted for ABX recommendations. Started on bicarb gtt for acute renal failure and Nephrology consulted. He was moved to ICU for instability. Diagnosed with Ileus; NGT placed with copious bilious output. GI consulted to assess for source of liver abscess. MRCP showed persistent heterogeneous collection within the right hepatic lobe compatible with reported abscess noting percutaneous drainage catheter in place. No significant biliary duct abnormality on MRCP assessment. Pt refused inpatient colonoscopy. Pt transferred to the floor on 12/12.  Cr  improving with fluids and TPN. wbc uptrending and looks like new fluid collection next to liver. IR consulted. IR did not see another fluid pocket that was attainable.  Creat continued to improve with resolution of ARF.  Continued on TPN.  Resolution of Ileus and NGT removed.   Persistent leukocytosis.  Repeat CT showing multiple abdominal abscesses in the abdomen persists, as well as a large R pleural effusion, and prostatic abscess. IR consulted for thoracentesis and potentially placing another drain(s), given multiple abscesses and locations.  General Surgery also consulted.  Urology consulted for prostatic abscess.   S/p IR drainage of perihepatic fluid collection - cx with Kleb pneumo thus far. RLQ abdominal fluid collection and R thora cx ngtd. S/p TURP with urology on 12/21 - noted to have purulent fluid.  Leukocytosis finally resolved.  ID recommending IV Ceftriaxone and PO Flagyl until 1/18/2023.  SW working on LTAC placement.      Interval History: IR re-consulted for drain evaluation/possible removal. Output noted in all 3 drains today, IR recommending repeat CT scan prior to evaluation for removal. CT ordered, need accurate output of all 3 drains - nursing notified. Patient without complaints and feeling well. Anticipate discharge to LTAC tomorrow pending CT/IR evaluation of drains.     Review of Systems   HENT:  Negative for ear discharge and ear pain.    Eyes:  Negative for discharge and itching.   Endocrine: Negative for cold intolerance and heat intolerance.   Neurological:  Negative for seizures and syncope.   Objective:     Vital Signs (Most Recent):  Temp: 98.5 °F (36.9 °C) (12/27/22 1231)  Pulse: 98 (12/27/22 1231)  Resp: 18 (12/27/22 1231)  BP: 135/80 (12/27/22 1231)  SpO2: 95 % (12/27/22 1231) Vital Signs (24h Range):  Temp:  [98.1 °F (36.7 °C)-98.9 °F (37.2 °C)] 98.5 °F (36.9 °C)  Pulse:  [81-98] 98  Resp:  [18-19] 18  SpO2:  [95 %-97 %] 95 %  BP: (135-156)/(75-90) 135/80     Weight: 96.6 kg  (212 lb 15.4 oz)  Body mass index is 34.37 kg/m².    Intake/Output Summary (Last 24 hours) at 12/27/2022 1636  Last data filed at 12/27/2022 1424  Gross per 24 hour   Intake 250 ml   Output 2600 ml   Net -2350 ml        Physical Exam  Vitals reviewed.   Constitutional:       Appearance: Normal appearance.   HENT:      Head: Normocephalic and atraumatic.   Eyes:      General: No scleral icterus.     Conjunctiva/sclera: Conjunctivae normal.   Pulmonary:      Effort: Pulmonary effort is normal. No respiratory distress.   Abdominal:      Comments: RUQ drains in place   Skin:     Coloration: Skin is not jaundiced.      Findings: No erythema.   Neurological:      General: No focal deficit present.      Mental Status: He is alert and oriented to person, place, and time.   Psychiatric:         Mood and Affect: Mood normal.         Behavior: Behavior normal.       Significant Labs: All pertinent labs within the past 24 hours have been reviewed.  BMP:   Recent Labs   Lab 12/27/22  0521   GLU 97      K 3.8      CO2 25   BUN 11   CREATININE 0.9   CALCIUM 8.3*       CBC:   Recent Labs   Lab 12/27/22  0521   WBC 7.05   HGB 9.4*   HCT 29.0*            Significant Imaging: I have reviewed all pertinent imaging results/findings within the past 24 hours.      Assessment/Plan:      * Severe sepsis  This patient does have evidence of infective focus. My overall impression is sepsis. Vital signs were reviewed and noted in progress note.  Antibiotics given-   Antibiotics (From admission, onward)    Start     Stop Route Frequency Ordered    12/22/22 1400  metroNIDAZOLE tablet 500 mg         -- Oral Every 8 hours 12/22/22 1020    12/22/22 1130  cefTRIAXone 2 gram/50 mL IVPB 2 g         -- IV Every 24 hours (non-standard times) 12/22/22 1020        Cultures were taken-   Microbiology Results (last 7 days)     Procedure Component Value Units Date/Time    Culture, Anaerobe [[id number]] Collected: 12/20/22 1334    Order  Status: Completed Specimen: Abscess from Abdomen Updated: 12/24/22 1257     Anaerobic Culture No anaerobes isolated    Narrative:      RLQ Abdomen    Culture, Anaerobic [[id number]] Collected: 12/20/22 1210    Order Status: Completed Specimen: Abscess Updated: 12/24/22 1255     Anaerobic Culture No anaerobes isolated    Culture, Anaerobic [[id number]] Collected: 12/20/22 1135    Order Status: Completed Specimen: Pleural Fluid Updated: 12/24/22 1244     Anaerobic Culture No anaerobes isolated    Aerobic culture [[id number]] Collected: 12/20/22 1334    Order Status: Completed Specimen: Abscess from Abdomen Updated: 12/24/22 0845     Aerobic Bacterial Culture No growth    Narrative:      RLQ Abdomen    Aerobic culture [[id number]] Collected: 12/20/22 1135    Order Status: Completed Specimen: Pleural Fluid Updated: 12/24/22 0844     Aerobic Bacterial Culture No growth    Aerobic culture [[id number]]  (Abnormal)  (Susceptibility) Collected: 12/20/22 1210    Order Status: Completed Specimen: Abscess from Abdomen Updated: 12/22/22 0908     Aerobic Bacterial Culture KLEBSIELLA PNEUMONIAE  Few          Latest lactate reviewed, they are-  No results for input(s): LACTATE in the last 72 hours.    Organ dysfunction indicated by Acute kidney injury and Acute liver injury  Source- liver abscess  Drains placed with no improvement of leukocytosis.  -ID consulted: recommending Rocephin and oral Flagyl until 1/18/2023  Awaiting LTAC placement.    Pleural effusion        Prostatic abscess  Urology consulted for prostatic abscess.   S/p turp  Patient with urinary retention and acosta placed.  Acosta removed and patient able to void.      Microcytic anemia  Hgb 10-11, no prior to review.   Iron deficient- TSAT 17, but also significant acute inflammation- ferritin 3633.   B12, folate replete.   Stable        Atelectasis  Shortness of breath partly due to atelectasis as noted on CXR  - incentive spirometer  - O2 by NC as needed      Hepatic  abscess  Unclear how he got this. IR drain placed 12/9. Cultures with Klebsiella, pansensitive.   - MRCP:persistent heterogeneous collection within the right hepatic lobe compatible with reported abscess noting percutaneous drainage catheter in place. No significant biliary duct abnormality on MRCP assessment.  - Blood cx with NGTD  - Abscess culture with klebsiella. No anaerobes  -- GI consulted to assess for source of liver abscess. Pt refuses inpatient colonoscopy. Likely plan for outpatient cscope.  Drains placed with resolution of leukocytosis.   -- IR re-consulted for drain evaluation/possible removal. Output noted in all 3 drains today, IR recommending repeat CT scan prior to evaluation for removal. CT ordered, need accurate output of all 3 drains - nursing notified.      VTE Risk Mitigation (From admission, onward)         Ordered     heparin (porcine) injection 5,000 Units  Every 8 hours         12/10/22 0838     IP VTE HIGH RISK PATIENT  Once         12/09/22 1421     Place sequential compression device  Until discontinued         12/09/22 1421                      I have completed this tele-visit without the assistance of a telepresenter.    The attending portion of this evaluation, treatment, and documentation was performed per Mike Schroeder MD via Telemedicine AudioVisual using the secure [employer] software platform with 2 way audio/video. The provider was located off-site and the patient is located in the hospital. The aforementioned video software was utilized to document the relevant history and physical exam    Mike Schroeder MD  Department of Hospital Medicine   [facility]

## 2022-12-27 NOTE — CONSULTS
Radiology Consult    Vivek Turner is a 47 y.o. male with a history of HTN who presented with abdominal pain and was found in be in sepsis s/p 3 IR drains. IR was consulted for drain removal. The patient declined a  at this time. No acute complaints.   Past Medical History:   Diagnosis Date    Hypertension     Prostatic abscess 12/20/2022     Past Surgical History:   Procedure Laterality Date    TRANSURETHRAL RESECTION OF PROSTATE N/A 12/21/2022    Procedure: TURP (TRANSURETHRAL RESECTION OF PROSTATE);  Surgeon: OSWALDO Buckley MD;  Location: [facility];  Service: Urology;  Laterality: N/A;         Scheduled Meds:    cefTRIAXone (ROCEPHIN) IVPB  2 g Intravenous Q24H    heparin (porcine)  5,000 Units Subcutaneous Q8H    metroNIDAZOLE  500 mg Oral Q8H    polyethylene glycol  17 g Oral Daily    sodium chloride 0.9%  10 mL Intravenous Q8H    sodium chloride 0.9%  10 mL Intravenous Q6H     Continuous Infusions:   PRN Meds:acetaminophen, acetaminophen, albuterol-ipratropium, dextrose 10%, dextrose 10%, fentaNYL, glucagon (human recombinant), glucose, glucose, HYDROmorphone, HYDROmorphone, melatonin, naloxone, nitroGLYCERIN, oxyCODONE-acetaminophen, Flushing PICC Protocol **AND** sodium chloride 0.9% **AND** sodium chloride 0.9%, sodium chloride 0.9%    Allergies: Review of patient's allergies indicates:  No Known Allergies    Labs:  No results for input(s): INR in the last 168 hours.    Invalid input(s):  PT,  PTT    Recent Labs   Lab 12/27/22  0521   WBC 7.05   HGB 9.4*   HCT 29.0*   MCV 90         Recent Labs   Lab 12/25/22  1421 12/27/22  0521   GLU  --  97   NA  --  137   K  --  3.8   CL  --  106   CO2  --  25   BUN  --  11   CREATININE  --  0.9   CALCIUM  --  8.3*   MG 1.6  --    ALT  --  11   AST  --  18   ALBUMIN  --  2.2*   BILITOT  --  0.6         Vitals (Most Recent):  Temp: 98.5 °F (36.9 °C) (12/27/22 1231)  Pulse: 98 (12/27/22 1231)  Resp: 18 (12/27/22 1231)  BP: 135/80 (12/27/22 1231)  SpO2:  95 % (12/27/22 1231)    Assessment and Plan:       Vivek Turner is a 47 y.o. male with a history of HTN who presented with abdominal pain and was found in be in sepsis s/p 3 IR drains. IR was consulted for drain removal. Initial report  was little output from the drains, although no formal record of output. Upon bedside evaluation and discussion with nursing 2 of the three drains had 20-30 mL of purulent appearing fluid and the 3rd drain had at least 10 mL of a thinner yellow fluid. At this time with this output thought to be from today do not recommend removal of drains out. Primary team has ordered a CT to evaluate the collections. Recommend order for output of drains to be recorded.     Once CT has been performed please reach back out to IR to discuss findings/ drain output and appropriateness for removal.        Bree Mendoza M.D.  Interventional Radiology

## 2022-12-27 NOTE — PLAN OF CARE
AYAKA called Estefani at [facility] to discuss discharge planning. AYAKA informed Estefani that patient is medically stable. AYAKA explained that patient is having drains removed today and he will be ready for discharge. Estefani stated that she will take a look at the chart and meet with her admissions team to discuss patient further for final answer. Estefani stated that she will also reach out to company to discuss payment plan.     10:32 am    Estefani with [facility] informed AYAKA that patient has been medically accepted. Estefani stated that Mick with [employer] is not at work today so the financial part won't be completed until tomorrow. Estefani stated that they will be ready for patient tomorrow.     1:55 pm    AYAKA met with patient to discuss discharge planning. AYAKA informed patient that Estefani from [facility] would like to speak with him to complete consents. AYAKA called Estefani and consents were completed. Estefani stated that patient could discharge today because contracts were being drawn up. AYAKA informed Estefani that patient still has two drains and they plan on re-imaging and monitoring patient overnight. Estefani stated that patient can discharge tomorrow morning. AYAKA notified. Dr. Schroeder.

## 2022-12-27 NOTE — SUBJECTIVE & OBJECTIVE
Interval History: He is voiding without complaints.    Review of Systems   Constitutional: Negative.  Negative for fever.   HENT: Negative.     Eyes: Negative.    Respiratory:  Negative for cough, chest tightness and shortness of breath.    Cardiovascular:  Negative for chest pain.   Gastrointestinal: Negative.  Negative for constipation, diarrhea and nausea.   Genitourinary:  Negative for difficulty urinating and flank pain.   Musculoskeletal: Negative.    Neurological: Negative.    Psychiatric/Behavioral: Negative.     Objective:     Temp:  [98.1 °F (36.7 °C)-98.9 °F (37.2 °C)] 98.5 °F (36.9 °C)  Pulse:  [81-98] 98  Resp:  [18-19] 18  SpO2:  [95 %-97 %] 95 %  BP: (136-156)/(75-90) 136/85     Body mass index is 34.37 kg/m².      Bladder Scan Volume (mL): 417 mL (12/27/22 0122)  Post Void Cath Residual Output (mL): 900 mL (12/27/22 0122)    Drains       Drain  Duration                  Closed/Suction Drain 12/09/22 1748 Lateral RLQ Bulb 17 days         Closed/Suction Drain 12/20/22 1210 RUQ Accordion 10 Fr. 7 days         Closed/Suction Drain 12/20/22 1234 Lateral RLQ Accordion 8 Fr. 6 days                    Physical Exam  Vitals and nursing note reviewed.   Constitutional:       Appearance: He is well-developed.   HENT:      Head: Normocephalic.   Eyes:      Conjunctiva/sclera: Conjunctivae normal.   Neck:      Thyroid: No thyromegaly.      Trachea: No tracheal deviation.   Cardiovascular:      Rate and Rhythm: Normal rate.      Heart sounds: Normal heart sounds.   Pulmonary:      Effort: Pulmonary effort is normal. No respiratory distress.      Breath sounds: Normal breath sounds. No wheezing.   Abdominal:      General: Bowel sounds are normal.      Palpations: Abdomen is soft.      Tenderness: There is no abdominal tenderness. There is no rebound.      Hernia: No hernia is present.   Musculoskeletal:         General: No tenderness. Normal range of motion.      Cervical back: Normal range of motion and neck  supple.   Lymphadenopathy:      Cervical: No cervical adenopathy.   Skin:     General: Skin is warm and dry.      Findings: No erythema or rash.   Neurological:      Mental Status: He is alert and oriented to person, place, and time.   Psychiatric:         Behavior: Behavior normal.         Thought Content: Thought content normal.         Judgment: Judgment normal.       Significant Labs:    BMP:  Recent Labs   Lab 12/24/22 0422 12/25/22 0434 12/27/22  0521    137 137   K 3.8 3.9 3.8    109 106   CO2 22* 22* 25   BUN 13 11 11   CREATININE 0.9 0.9 0.9   CALCIUM 7.7* 7.7* 8.3*       CBC:   Recent Labs   Lab 12/24/22 0422 12/25/22 0434 12/27/22  0521   WBC 8.94 8.05 7.05   HGB 8.8* 9.2* 9.4*   HCT 27.6* 28.0* 29.0*    397 395       Blood Culture: No results for input(s): LABBLOO in the last 168 hours.  Urine Culture: No results for input(s): LABURIN in the last 168 hours.    Significant Imaging:

## 2022-12-27 NOTE — CONSULTS
Thank you for your consult to [facility]. We have reviewed the patient chart. This patient does meet criteria for [facility] service at this time. Will assume care on 12/27/22 at 6AM

## 2022-12-27 NOTE — PROGRESS NOTES
[facility]  [facility]ult [facility]  Progress Note    SUMMARY     Recommendations  1) Continue Regular diet   2) Weekly wts   3) If PO < 50% meals, add Boost Plus TID    Goals: 1) Patient to consume adequate oral intake   Nutrition Goal Status: goal met  Communication of RD Recs:  (POC)    Assessment and Plan  Nutrition Problem  Inadequate Oral intake     Related to (etiology):  Condition associated with diagnosis     Signs and Symptoms (as evidenced by):   Decreased po intake and tolerance issues      Interventions:  Collaboration of care with other providers   General, Healthful Diet    Nutrition Diagnosis Status:   Improved      Malnutrition Assessment   INEZ NFPE due to RD remote    Reason for Assessment  Reason For Assessment: RD follow-up  Diagnosis:  (Hepatic abcess)  Relevant Medical History:   Past Medical History:   Diagnosis Date    Hypertension     Prostatic abscess 12/20/2022       Interdisciplinary Rounds: did not attend  General Information Comments: RD remote for coverage, TPN discontinued and NGT removed, ileus resolved. Tolerating 100% intake at meals, LBM 12/26. -22.7 L since 12/13 likely why pt has had 14 lbs wt loss during admit. Incisions to spine and RL quadrant. NIEZ NFPE due to remote assessment. Possible discharge today.  Nutrition Discharge Planning: Pending Medical Course    Nutrition Risk Screen  Nutrition Risk Screen: no indicators present    Nutrition/Diet History  Spiritual, Cultural Beliefs, Advent Practices, Values that Affect Care: no  Food Allergies: NKFA  Factors Affecting Nutritional Intake: NPO, nausea/vomiting, constipation, abdominal distension    Anthropometrics  Temp: 98.5 °F (36.9 °C)  Height Method: Stated  Height: 5' 6" (167.6 cm)  Height (inches): 66 in  Weight Method: Bed Scale  Weight: 96.6 kg (212 lb 15.4 oz)  Weight (lb): 212.97 lb  Ideal Body Weight (IBW), Male: 142 lb  % Ideal Body Weight, Male (lb): 160.85 %  BMI (Calculated): 34.4  BMI Grade: 35 - 39.9 - " obesity - grade II  Weight Loss:  (INEZ)  Usual Body Weight (UBW), kg:  (INEZ)       Lab/Procedures/Meds  Pertinent Labs Reviewed: reviewed  Pertinent Labs Comments: Ca 8.3, Alb 2.2  Pertinent Medications Reviewed: reviewed  Pertinent Medications Comments: heparin, polyethylene glycol    Estimated/Assessed Needs  Weight Used For Calorie Calculations: 103.6 kg (228 lb 6.3 oz)  Energy Calorie Requirements (kcal): 2072 - 2590  Energy Need Method: Kcal/kg (20 - 25 per hypermetabolic status)  Protein Requirements: 77 - 97g (1.2 - 1.5 g/kg IBW per BMI > 30, MARYURI/ATN)  Weight Used For Protein Calculations: 64.4 kg (142 lb)  Fluid Requirements (mL): 2.07(1mL/kcal) - 3.6 L (3g mL/kg IBW) (1 mL/kcal - or 35 mL / kg depending rate of drain output and dehydration status)  Estimated Fluid Requirement Method:  (or per MD)  RDA Method (mL): 2072  CHO Requirement: 259g    Nutrition Prescription Ordered  Current Diet Order: Regular    Evaluation of Received Nutrient/Fluid Intake    Intake/Output Summary (Last 24 hours) at 12/27/2022 1302  Last data filed at 12/27/2022 0306  Gross per 24 hour   Intake 490 ml   Output 2700 ml   Net -2210 ml       I/O: -22.7 L since admit  Energy Calories Required: meeting needs  Protein Required: meeting needs  Fluid Required: meeting needs  Comments: LBM 12/26  Tolerance: tolerating  % Intake of Estimated Energy Needs: 75 - 100 %  % Meal Intake: 75 - 100 %    Nutrition Risk  Level of Risk/Frequency of Follow-up:  (1-2x weekly)     Monitor and Evaluation  Food and Nutrient Intake: energy intake, parenteral nutrition intake  Food and Nutrient Adminstration: diet order, enteral and parenteral nutrition administration  Knowledge/Beliefs/Attitudes: beliefs and attitudes  Physical Activity and Function: nutrition-related ADLs and IADLs  Anthropometric Measurements: weight, weight change  Biochemical Data, Medical Tests and Procedures: electrolyte and renal panel, gastrointestinal profile, glucose/endocrine  profile, inflammatory profile, lipid profile  Nutrition-Focused Physical Findings: overall appearance     Nutrition Follow-Up  RD Follow-up?: Yes

## 2022-12-27 NOTE — SUBJECTIVE & OBJECTIVE
Interval History: IR re-consulted for drain evaluation/possible removal. Output noted in all 3 drains today, IR recommending repeat CT scan prior to evaluation for removal. CT ordered, need accurate output of all 3 drains - nursing notified. Patient without complaints and feeling well. Anticipate discharge to LTAC tomorrow pending CT/IR evaluation of drains.     Review of Systems   HENT:  Negative for ear discharge and ear pain.    Eyes:  Negative for discharge and itching.   Endocrine: Negative for cold intolerance and heat intolerance.   Neurological:  Negative for seizures and syncope.   Objective:     Vital Signs (Most Recent):  Temp: 98.5 °F (36.9 °C) (12/27/22 1231)  Pulse: 98 (12/27/22 1231)  Resp: 18 (12/27/22 1231)  BP: 135/80 (12/27/22 1231)  SpO2: 95 % (12/27/22 1231) Vital Signs (24h Range):  Temp:  [98.1 °F (36.7 °C)-98.9 °F (37.2 °C)] 98.5 °F (36.9 °C)  Pulse:  [81-98] 98  Resp:  [18-19] 18  SpO2:  [95 %-97 %] 95 %  BP: (135-156)/(75-90) 135/80     Weight: 96.6 kg (212 lb 15.4 oz)  Body mass index is 34.37 kg/m².    Intake/Output Summary (Last 24 hours) at 12/27/2022 1636  Last data filed at 12/27/2022 1424  Gross per 24 hour   Intake 250 ml   Output 2600 ml   Net -2350 ml        Physical Exam  Vitals reviewed.   Constitutional:       Appearance: Normal appearance.   HENT:      Head: Normocephalic and atraumatic.   Eyes:      General: No scleral icterus.     Conjunctiva/sclera: Conjunctivae normal.   Pulmonary:      Effort: Pulmonary effort is normal. No respiratory distress.   Abdominal:      Comments: RUQ drains in place   Skin:     Coloration: Skin is not jaundiced.      Findings: No erythema.   Neurological:      General: No focal deficit present.      Mental Status: He is alert and oriented to person, place, and time.   Psychiatric:         Mood and Affect: Mood normal.         Behavior: Behavior normal.       Significant Labs: All pertinent labs within the past 24 hours have been reviewed.  BMP:    Recent Labs   Lab 12/27/22 0521   GLU 97      K 3.8      CO2 25   BUN 11   CREATININE 0.9   CALCIUM 8.3*       CBC:   Recent Labs   Lab 12/27/22 0521   WBC 7.05   HGB 9.4*   HCT 29.0*            Significant Imaging: I have reviewed all pertinent imaging results/findings within the past 24 hours.

## 2022-12-27 NOTE — PLAN OF CARE
Recommendations  1) Continue Regular diet   2) Weekly wts   3) If PO < 50% meals, add Boost Plus TID    Goals: 1) Patient to consume adequate oral intake   Nutrition Goal Status: goal met  Communication of RD Recs:  (POC)    Assessment and Plan  Nutrition Problem  Inadequate Oral intake     Related to (etiology):  Condition associated with diagnosis     Signs and Symptoms (as evidenced by):   Decreased po intake and tolerance issues      Interventions:  Collaboration of care with other providers   General, Healthful Diet    Nutrition Diagnosis Status:   Improved

## 2022-12-27 NOTE — ASSESSMENT & PLAN NOTE
Unclear how he got this. IR drain placed 12/9. Cultures with Klebsiella, pansensitive.   - MRCP:persistent heterogeneous collection within the right hepatic lobe compatible with reported abscess noting percutaneous drainage catheter in place. No significant biliary duct abnormality on MRCP assessment.  - Blood cx with NGTD  - Abscess culture with klebsiella. No anaerobes  -- GI consulted to assess for source of liver abscess. Pt refuses inpatient colonoscopy. Likely plan for outpatient cscope.  Drains placed with resolution of leukocytosis.   -- IR re-consulted for drain evaluation/possible removal. Output noted in all 3 drains today, IR recommending repeat CT scan prior to evaluation for removal. CT ordered, need accurate output of all 3 drains - nursing notified.

## 2022-12-28 ENCOUNTER — TELEPHONE (OUTPATIENT)
Dept: UROLOGY | Facility: CLINIC | Age: 47
End: 2022-12-28

## 2022-12-28 VITALS
HEIGHT: 66 IN | WEIGHT: 212.94 LBS | TEMPERATURE: 98 F | BODY MASS INDEX: 34.22 KG/M2 | RESPIRATION RATE: 18 BRPM | HEART RATE: 92 BPM | DIASTOLIC BLOOD PRESSURE: 94 MMHG | OXYGEN SATURATION: 94 % | SYSTOLIC BLOOD PRESSURE: 164 MMHG

## 2022-12-28 PROCEDURE — 25000003 PHARM REV CODE 250: Performed by: UROLOGY

## 2022-12-28 PROCEDURE — A4216 STERILE WATER/SALINE, 10 ML: HCPCS | Performed by: UROLOGY

## 2022-12-28 PROCEDURE — 63600175 PHARM REV CODE 636 W HCPCS: Performed by: UROLOGY

## 2022-12-28 PROCEDURE — 25000003 PHARM REV CODE 250: Performed by: STUDENT IN AN ORGANIZED HEALTH CARE EDUCATION/TRAINING PROGRAM

## 2022-12-28 PROCEDURE — 97530 THERAPEUTIC ACTIVITIES: CPT | Mod: CQ

## 2022-12-28 PROCEDURE — 63600175 PHARM REV CODE 636 W HCPCS: Performed by: STUDENT IN AN ORGANIZED HEALTH CARE EDUCATION/TRAINING PROGRAM

## 2022-12-28 PROCEDURE — 99900035 HC TECH TIME PER 15 MIN (STAT)

## 2022-12-28 RX ORDER — CEFTRIAXONE 2 G/50ML
2 INJECTION, SOLUTION INTRAVENOUS DAILY
Start: 2022-12-28 | End: 2023-01-18

## 2022-12-28 RX ORDER — METRONIDAZOLE 500 MG/1
500 TABLET ORAL EVERY 8 HOURS
Start: 2022-12-28

## 2022-12-28 RX ORDER — POLYETHYLENE GLYCOL 3350 17 G/17G
17 POWDER, FOR SOLUTION ORAL DAILY
Refills: 0
Start: 2022-12-29

## 2022-12-28 RX ORDER — OXYCODONE AND ACETAMINOPHEN 5; 325 MG/1; MG/1
1 TABLET ORAL EVERY 4 HOURS PRN
Refills: 0
Start: 2022-12-28

## 2022-12-28 RX ADMIN — Medication 10 ML: at 02:12

## 2022-12-28 RX ADMIN — Medication 10 ML: at 11:12

## 2022-12-28 RX ADMIN — Medication 10 ML: at 05:12

## 2022-12-28 RX ADMIN — HEPARIN SODIUM 5000 UNITS: 5000 INJECTION INTRAVENOUS; SUBCUTANEOUS at 02:12

## 2022-12-28 RX ADMIN — METRONIDAZOLE 500 MG: 500 TABLET ORAL at 05:12

## 2022-12-28 RX ADMIN — HEPARIN SODIUM 5000 UNITS: 5000 INJECTION INTRAVENOUS; SUBCUTANEOUS at 05:12

## 2022-12-28 RX ADMIN — METRONIDAZOLE 500 MG: 500 TABLET ORAL at 02:12

## 2022-12-28 RX ADMIN — CEFTRIAXONE SODIUM 2 G: 2 INJECTION, SOLUTION INTRAVENOUS at 11:12

## 2022-12-28 RX ADMIN — Medication 10 ML: at 12:12

## 2022-12-28 RX ADMIN — Medication 10 ML: at 06:12

## 2022-12-28 NOTE — NURSING
The pt was discharged. Discharge instructions reviewed with pt. The pt acknowledged/verbalized understanding. PICC line to upper left arm remains in place for IV abx to be given by [facility] in [place]. Pt will travel to [facility] via [employer] transportation.

## 2022-12-28 NOTE — DISCHARGE SUMMARY
[facility] Medicine  Discharge Summary      Patient Name: Vivek Turner  MRN: [id number]  Patient Class: IP- Inpatient  Admission Date: 12/9/2022  Hospital Length of Stay: 19 days  Discharge Date and Time:  12/28/2022 11:25 AM  Attending Physician: Mike Schroeder MD   Discharging Provider: Mike Schroeder MD  Primary Care Provider: Primary Doctor No      HPI:   47y M w/ hypertension presents with abdominal pain x 2 days. Pt refused  service. He reports that he had fever/rigors three days prior to admission. He then developed worsening abdominal pain from then until admission. He went to an urgent care where he was started on bactrim for UTI. His abdominal pain increased over the next few days. He had a BM yesterday. Denies vomiting, but has had significant nausea and very limited PO intake in the days leading to admission. Aside from the single episode of fever/rigors he denies subsequent fevers.     In the ED he was found to have a R hepatic abscess vs mass. He traveled to the [facility] eight weeks prior to presentation. He denies fevers except for what is listed above. Denies weight loss, cough, night sweats. Denies prior hx of TB. Denies prior hx of cirrhosis      Procedure(s) (LRB):  TURP (TRANSURETHRAL RESECTION OF PROSTATE) (N/A)      Hospital Course:   Mr Vivek Turner was admitted with severe sepsis due to R liver mass vs abscess and acute renal failure. IR consulted and drain placed on 12/9. Cultures with Klebsiella. ID consulted for ABX recommendations. Started on bicarb gtt for acute renal failure and Nephrology consulted. He was moved to ICU for instability. Diagnosed with Ileus; NGT placed with copious bilious output. GI consulted to assess for source of liver abscess. MRCP showed persistent heterogeneous collection within the right hepatic lobe compatible with reported abscess noting percutaneous drainage catheter in place. No significant biliary duct  abnormality on MRCP assessment. Pt refused inpatient colonoscopy. Pt transferred to the floor on 12/12.  Cr improving with fluids and TPN. wbc uptrending and looks like new fluid collection next to liver. IR consulted. IR did not see another fluid pocket that was attainable.  Creat continued to improve with resolution of ARF.  Continued on TPN.  Resolution of Ileus and NGT removed.   Persistent leukocytosis.  Repeat CT showing multiple abdominal abscesses in the abdomen persists, as well as a large R pleural effusion, and prostatic abscess. IR consulted for thoracentesis and potentially placing another drain(s), given multiple abscesses and locations.  General Surgery also consulted.  Urology consulted for prostatic abscess.   S/p IR drainage of perihepatic fluid collection - cx with Kleb pneumo thus far. RLQ abdominal fluid collection and R thora cx ngtd. S/p TURP with urology on 12/21 - noted to have purulent fluid.  Leukocytosis finally resolved.  ID recommending IV Ceftriaxone and PO Flagyl until 1/18/2023.  SW working on LTAC placement. IR re-consulted for possible drain removal, recommended repeat CT scan which showed improvement in sizes of fluid collections. Drains still with some output so patient discharged to LTAC with drains in place (instructed to remove once output is 0 for > 24 hours) and to complete antibiotics until 1/18/23.       Goals of Care Treatment Preferences:  Code Status: Full Code      Consults:   Consults (From admission, onward)        Status Ordering Provider     Inpatient consult to Interventional Radiology  Once        Provider:  Bree Mendoza MD    Completed LEXIE SANTANA     Inpatient virtual consult to Hospital Medicine  Once        Provider:  Tarun Nickerson MD    Completed TARUN NICKERSON     Inpatient consult to Urology  Once        Provider:  OSWALDO Buckley MD    Completed HARPER FORBES     Inpatient consult to Interventional Radiology  Once         Provider:  (Not yet assigned)    Completed HARPER FORBES     Inpatient consult to Interventional Radiology  Once        Provider:  (Not yet assigned)    Completed HARPER FORBES     Inpatient consult to PICC team ([facility])  Once        Provider:  (Not yet assigned)    Completed ROSE JAMES     Inpatient consult to Registered Dietitian/Nutritionist  Once        Provider:  (Not yet assigned)    Completed ROSE JAMES.     Inpatient consult to Gastroenterology  Once        Provider:  Breezy Ortega MD    Completed DELISA KRAMER     Inpatient consult to Infectious Diseases  Once        Provider:  Rashaun Bateman MD    Completed DELISA KRAMER     Inpatient consult to Nephrology  Once        Provider:  Everardo Cheema MD    Acknowledged OSWALDO BETANCOURT     Inpatient consult to General Surgery  Once        Provider:  Erasmo Russ MD    Completed KELL APARICIO          * Severe sepsis  This patient does have evidence of infective focus. My overall impression is sepsis. Vital signs were reviewed and noted in progress note.  Antibiotics given-   Antibiotics (From admission, onward)    Start     Stop Route Frequency Ordered    12/22/22 1400  metroNIDAZOLE tablet 500 mg         -- Oral Every 8 hours 12/22/22 1020    12/22/22 1130  cefTRIAXone 2 gram/50 mL IVPB 2 g         -- IV Every 24 hours (non-standard times) 12/22/22 1020        Cultures were taken-   Microbiology Results (last 7 days)     Procedure Component Value Units Date/Time    Culture, Anaerobe [[id number]] Collected: 12/20/22 1334    Order Status: Completed Specimen: Abscess from Abdomen Updated: 12/24/22 1257     Anaerobic Culture No anaerobes isolated    Narrative:      RLQ Abdomen    Culture, Anaerobic [[id number]] Collected: 12/20/22 1210    Order Status: Completed Specimen: Abscess Updated: 12/24/22 1255     Anaerobic Culture No anaerobes isolated    Culture, Anaerobic [[id number]] Collected: 12/20/22 1135    Order  Status: Completed Specimen: Pleural Fluid Updated: 12/24/22 1244     Anaerobic Culture No anaerobes isolated    Aerobic culture [[id number]] Collected: 12/20/22 1334    Order Status: Completed Specimen: Abscess from Abdomen Updated: 12/24/22 0845     Aerobic Bacterial Culture No growth    Narrative:      RLQ Abdomen    Aerobic culture [[id number]] Collected: 12/20/22 1135    Order Status: Completed Specimen: Pleural Fluid Updated: 12/24/22 0844     Aerobic Bacterial Culture No growth    Aerobic culture [[id number]]  (Abnormal)  (Susceptibility) Collected: 12/20/22 1210    Order Status: Completed Specimen: Abscess from Abdomen Updated: 12/22/22 0908     Aerobic Bacterial Culture KLEBSIELLA PNEUMONIAE  Few          Latest lactate reviewed, they are-  No results for input(s): LACTATE in the last 72 hours.    Organ dysfunction indicated by Acute kidney injury and Acute liver injury  Source- liver abscess  Drains placed with no improvement of leukocytosis.  -ID consulted: recommending Rocephin and oral Flagyl until 1/18/2023  Awaiting LTAC placement.    Pleural effusion        Prostatic abscess  Urology consulted for prostatic abscess.   S/p turp  Patient with urinary retention and acosta placed.  Acosta removed and patient able to void.      Microcytic anemia  Hgb 10-11, no prior to review.   Iron deficient- TSAT 17, but also significant acute inflammation- ferritin 3633.   B12, folate replete.   Stable        Atelectasis  Shortness of breath partly due to atelectasis as noted on CXR  - incentive spirometer  - O2 by NC as needed      Hepatic abscess  Unclear how he got this. IR drain placed 12/9. Cultures with Klebsiella, pansensitive.   - MRCP:persistent heterogeneous collection within the right hepatic lobe compatible with reported abscess noting percutaneous drainage catheter in place. No significant biliary duct abnormality on MRCP assessment.  - Blood cx with NGTD  - Abscess culture with klebsiella. No anaerobes  -- GI  consulted to assess for source of liver abscess. Pt refuses inpatient colonoscopy. Likely plan for outpatient cscope.  Drains placed with resolution of leukocytosis.   -- IR re-consulted for drain evaluation/possible removal. Output noted in all 3 drains today, IR recommending repeat CT scan prior to evaluation for removal. CT ordered, need accurate output of all 3 drains - nursing notified.      Final Active Diagnoses:    Diagnosis Date Noted POA    PRINCIPAL PROBLEM:  Severe sepsis [A41.9, R65.20] 12/09/2022 Yes    Prostatic abscess [N41.2] 12/20/2022 Yes    Pleural effusion [J90] 12/20/2022 Yes    Microcytic anemia [D50.9] 12/10/2022 Yes    Hepatic abscess [K75.0] 12/09/2022 Yes    Atelectasis [J98.11] 12/09/2022 Yes      Problems Resolved During this Admission:    Diagnosis Date Noted Date Resolved POA    Ileus [K56.7] 12/11/2022 12/22/2022 Yes    Hyperglycemia [R73.9] 12/10/2022 12/22/2022 Yes    Acute hypoxemic respiratory failure [J96.01] 12/10/2022 12/22/2022 Yes    ATN (acute tubular necrosis) [N17.0] 12/09/2022 12/22/2022 Yes    Elevated brain natriuretic peptide (BNP) level [R79.89] 12/09/2022 12/11/2022 Yes    Pyuria [R82.81] 12/09/2022 12/11/2022 Yes       Discharged Condition: stable    Disposition: Long Term Acute Care    Follow Up:   Follow-up Information     W Edward Buckley MD Follow up in 1 month(s).    Specialty: Urology  Contact information:  [address]  [address]  [facility] [address]  [phone]                       Patient Instructions:   No discharge procedures on file.    Significant Diagnostic Studies: Labs:   BMP:   Recent Labs   Lab 12/27/22  0521   GLU 97      K 3.8      CO2 25   BUN 11   CREATININE 0.9   CALCIUM 8.3*    and CBC   Recent Labs   Lab 12/27/22  0521   WBC 7.05   HGB 9.4*   HCT 29.0*        Microbiology:   Blood Culture   Lab Results   Component Value Date    LABBLOO No Growth after 4 days. 12/14/2022    LABBLOO No Growth after 4 days.  12/14/2022    and Urine Culture    Lab Results   Component Value Date    LABURIN No growth 12/09/2022       Pending Diagnostic Studies:     Procedure Component Value Units Date/Time    Specimen to Pathology, Surgery Urology [[id number]] Collected: 12/21/22 1052    Order Status: Sent Lab Status: In process Updated: 12/21/22 1223    Specimen: Tissue          Medications:  Reconciled Home Medications:      Medication List      START taking these medications    cefTRIAXone 2 gram/50 mL IVPB  Commonly known as: ROCEPHIN  Inject 50 mLs (2 g total) into the vein once daily. for 21 days     metroNIDAZOLE 500 MG tablet  Commonly known as: FLAGYL  Take 1 tablet (500 mg total) by mouth every 8 (eight) hours.     oxyCODONE-acetaminophen 5-325 mg per tablet  Commonly known as: PERCOCET  Take 1 tablet by mouth every 4 (four) hours as needed for Pain.     polyethylene glycol 17 gram Pwpk  Commonly known as: GLYCOLAX  Take 17 g by mouth once daily.  Start taking on: December 29, 2022        CONTINUE taking these medications    NON FORMULARY MEDICATION  Losartan/amlodipine 100/10 mg   Take 1 tablet by mouth daily            Indwelling Lines/Drains at time of discharge:   Lines/Drains/Airways     Peripherally Inserted Central Catheter Line  Duration           PICC Double Lumen 12/15/22 1428 left basilic 12 days          Drain  Duration                Closed/Suction Drain 12/09/22 1748 Lateral RLQ Bulb 18 days         Closed/Suction Drain 12/20/22 1210 RUQ Accordion 10 Fr. 7 days         Closed/Suction Drain 12/20/22 1234 Lateral RLQ Accordion 8 Fr. 7 days                Time spent on the discharge of patient: > 35 minutes         The attending portion of this evaluation, treatment, and documentation was performed per Mike Schroeder MD via Telemedicine AudioVisual using the secure [employer] software platform with 2 way audio/video. The provider was located off-site and the patient is located in the hospital. The aforementioned video  software was utilized to document the relevant history and physical exam    Mike Schroeder MD  Department of Hospital Medicine  [facility]

## 2022-12-28 NOTE — PLAN OF CARE
Problem: Physical Therapy  Goal: Physical Therapy Goal  Description: Goals to be met by: 23     Patient will increase functional independence with mobility by performin. Pt to mod I with bed mobility. Met 22  2. Pt to transfer with supervision/mod I. Met 22  3. Pt to ambulate 150' /c or /s AD and supervision. Met 22    Updated goals:  1. Pt to transfer mod I.  2. Pt to ambulate 200' /s AD (I).    Outcome: Ongoing, Progressing

## 2022-12-28 NOTE — TELEPHONE ENCOUNTER
I attempted to call him from  message we received in the in basket but it did not give me the option to leave a voicemail.

## 2022-12-28 NOTE — NURSING
Received call from [facility] requesting patient be brought down for dischagre. Per [facility]  she does not have a wheelchair and is not equipped to transport one. Informed [facility] that order is for wheelchair van transport to LTAC facility. Per  will contact dispatch to inform of need for wheelchair and will notify this writer of ETA.       [facility] called back at 14:30- stated they will need to send a different  for patient transport. No ETA provided.    14:29- called [facility] for new ETA on wheelchair van. Per Amy, will be next 30 minutes or so depending on traffic.

## 2022-12-28 NOTE — PLAN OF CARE
[facility]    FACILITY TRANSFER ORDERS      Patient Name: Vivek Turner  YOB: 1975    PCP: Primary Doctor No   PCP Address: None  PCP Phone Number: None  PCP Fax: None    Encounter Date: 12/28/2022    Admit to: [facility]    Vital Signs:  Routine    Diagnoses:   Active Hospital Problems    Diagnosis  POA    *Severe sepsis [A41.9, R65.20]  Yes    Prostatic abscess [N41.2]  Yes    Pleural effusion [J90]  Yes    Microcytic anemia [D50.9]  Yes    Hepatic abscess [K75.0]  Yes    Atelectasis [J98.11]  Yes      Resolved Hospital Problems    Diagnosis Date Resolved POA    Ileus [K56.7] 12/22/2022 Yes    Hyperglycemia [R73.9] 12/22/2022 Yes    Acute hypoxemic respiratory failure [J96.01] 12/22/2022 Yes    ATN (acute tubular necrosis) [N17.0] 12/22/2022 Yes    Elevated brain natriuretic peptide (BNP) level [R79.89] 12/11/2022 Yes    Pyuria [R82.81] 12/11/2022 Yes       Allergies:Review of patient's allergies indicates:  No Known Allergies    Diet: regular diet    Activities: Activity as tolerated    Goals of Care Treatment Preferences:  Code Status: Full Code      Nursing: per facility protocol, patient needs  (Jose J)     Labs: as below    CONSULTS:    Physical Therapy to evaluate and treat. , Occupational Therapy to evaluate and treat., and  to evaluate for community resources/long-range planning.    MISCELLANEOUS CARE:  Drain care: 3 drains in RLQ - when output becomes zero > 24 hours,  cut end of drain near stop-cock to release the pigtail and pull drains    WOUND CARE ORDERS  None    Antibiotics:     Discharge Antibiotics:     Intravenous antibiotics:  Ceftriaxone 2g iv daily     Oral antibiotics:  Flagyl 500 mg PO TID     Therapy Duration:  4w (pending repeat CT scan 1w prior to completion of abx therapy)     Estimated end date of IV antibiotics: 1/18/23     Outpatient Weekly Labs:     Order the following labs to be drawn on Mondays:   CBC  CMP         Fax Lab  Results to Infectious Diseases Provider: Kandy     [facility] Fax Number: [phone]      Medications: Review discharge medications with patient and family and provide education.      Current Discharge Medication List        START taking these medications    Details   cefTRIAXone (ROCEPHIN) 2 gram/50 mL IVPB Inject 50 mLs (2 g total) into the vein once daily. for 21 days      metroNIDAZOLE (FLAGYL) 500 MG tablet Take 1 tablet (500 mg total) by mouth every 8 (eight) hours.      oxyCODONE-acetaminophen (PERCOCET) 5-325 mg per tablet Take 1 tablet by mouth every 4 (four) hours as needed for Pain.  Refills: 0    Comments: Quantity prescribed more than 7 day supply? No      polyethylene glycol (GLYCOLAX) 17 gram PwPk Take 17 g by mouth once daily.  Refills: 0           CONTINUE these medications which have NOT CHANGED    Details   NON FORMULARY MEDICATION Losartan/amlodipine 100/10 mg   Take 1 tablet by mouth daily                Immunizations Administered as of 12/28/2022  Reviewed on 12/27/2022      No immunizations on file.              _________________________________  Mike Schroeder MD  12/28/2022

## 2022-12-28 NOTE — PLAN OF CARE
[place] - Telemetry  Discharge Final Note    Primary Care Provider: Primary Doctor No    Expected Discharge Date: 12/28/2022    All needs met. SW notified patient's brother in law that patient is [facility]. AYAKA notified nurse Kiara that patient is ready for discharge from case management standpoint.     Call Report to [phone] ask for Charge Nurse.     ADT 30 order placed for Van Transportation.  Requested  time: 12:30 pm [facility]  If transportation does not arrive at ETA time nurse will be instructed to follow protocol for transportation below:  How can I get in touch directly with dispatch, if needed?                 Non-emergent dispatch: [phone]      +++NURSING:  If Van does not arrive at requested time please call the above Non Emergent Dispatcher.  If issue not resolved please escalate to your charge nurse for further instructions.      Final Discharge Note (most recent)       Final Note - 12/28/22 1152          Final Note    Assessment Type Final Discharge Note     Anticipated Discharge Disposition Long Term Care     What phone number can be called within the next 1-3 days to see how you are doing after discharge? [phone]     Hospital Resources/Appts/Education Provided Appointments scheduled and added to AVS;Appointments scheduled by Navigator/Coordinator        Post-Acute Status    Post-Acute Authorization Placement   LTAC    Post-Acute Placement Status Set-up Complete/Auth obtained     Discharge Delays None known at this time                     Important Message from Medicare             Contact Info       W Edward Buckley MD   Specialty: Urology    [address]  [address]  [facility] [address]   Phone: [phone]       Next Steps: Follow up in 1 month(s)    Instructions: Clinic will call patient to schedule appointment.

## 2022-12-28 NOTE — ASSESSMENT & PLAN NOTE
This patient does have evidence of infective focus. My overall impression is sepsis. Vital signs were reviewed and noted in progress note.  Antibiotics given-   Antibiotics (From admission, onward)    Start     Stop Route Frequency Ordered    12/22/22 1400  metroNIDAZOLE tablet 500 mg         -- Oral Every 8 hours 12/22/22 1020    12/22/22 1130  cefTRIAXone 2 gram/50 mL IVPB 2 g         -- IV Every 24 hours (non-standard times) 12/22/22 1020        Cultures were taken-   Microbiology Results (last 7 days)     Procedure Component Value Units Date/Time    Culture, Anaerobe [[id number]] Collected: 12/20/22 1334    Order Status: Completed Specimen: Abscess from Abdomen Updated: 12/24/22 1257     Anaerobic Culture No anaerobes isolated    Narrative:      RLQ Abdomen    Culture, Anaerobic [[id number]] Collected: 12/20/22 1210    Order Status: Completed Specimen: Abscess Updated: 12/24/22 1255     Anaerobic Culture No anaerobes isolated    Culture, Anaerobic [[id number]] Collected: 12/20/22 1135    Order Status: Completed Specimen: Pleural Fluid Updated: 12/24/22 1244     Anaerobic Culture No anaerobes isolated    Aerobic culture [[id number]] Collected: 12/20/22 1334    Order Status: Completed Specimen: Abscess from Abdomen Updated: 12/24/22 0845     Aerobic Bacterial Culture No growth    Narrative:      RLQ Abdomen    Aerobic culture [[id number]] Collected: 12/20/22 1135    Order Status: Completed Specimen: Pleural Fluid Updated: 12/24/22 0844     Aerobic Bacterial Culture No growth    Aerobic culture [[id number]]  (Abnormal)  (Susceptibility) Collected: 12/20/22 1210    Order Status: Completed Specimen: Abscess from Abdomen Updated: 12/22/22 0908     Aerobic Bacterial Culture KLEBSIELLA PNEUMONIAE  Few          Latest lactate reviewed, they are-  No results for input(s): LACTATE in the last 72 hours.    Organ dysfunction indicated by Acute kidney injury and Acute liver injury  Source- liver abscess  Drains placed with no  improvement of leukocytosis.  -ID consulted: recommending Rocephin and oral Flagyl until 1/18/2023  Awaiting LTAC placement.

## 2022-12-28 NOTE — PLAN OF CARE
Plan of care reviewed with pt, any questions or concerns addressed. Pt verbalized understanding. Vital signs stable. Medicated per MAR. Pt free of falls/injuries, bed in lowest position with bed locked, side rails up x2, call light and belongings within reach. Will continue to monitor throughout shift.

## 2022-12-28 NOTE — PT/OT/SLP PROGRESS
Physical Therapy Treatment    Patient Name:  Vivek Turner   MRN:  [id number]    Recommendations:     Discharge Recommendations: home health PT  Discharge Equipment Recommendations:  (TBD)  Barriers to discharge: None    Assessment:     Vivek Turner is a 47 y.o. male admitted with a medical diagnosis of Severe sepsis.  He presents with the following impairments/functional limitations: weakness, impaired endurance, gait instability, impaired balance, impaired skin, pain.    Rehab Prognosis: Good; patient would benefit from acute skilled PT services to address these deficits and reach maximum level of function.    Recent Surgery: Procedure(s) (LRB):  TURP (TRANSURETHRAL RESECTION OF PROSTATE) (N/A) 7 Days Post-Op    Plan:     During this hospitalization, patient to be seen 5 x/week to address the identified rehab impairments via gait training, therapeutic activities, therapeutic exercises and progress toward the following goals:    Plan of Care Expires:  01/09/23    Subjective     Chief Complaint: n/a  Patient/Family Comments/goals: Pt agreed to ambulate in hallway.  Pain/Comfort:  Pain Rating 1:  (a little, pt didn't rate)  Location - Side 1: Right  Location - Orientation 1: lower  Location 1: abdomen  Pain Addressed 1: Reposition  Pain Rating Post-Intervention 1:  (pt didn't rate)      Objective:     Communicated with nurse Craig prior to session.  Patient found HOB elevated with PICC line (closed suction drain on the Right abdomen) upon PT entry to room.     General Precautions: Standard, fall  Orthopedic Precautions: N/A  Braces: N/A  Respiratory Status: Room air     Functional Mobility:  Bed Mobility:     Scooting: anterior scoot to EOB with Mod I  Supine to Sit: Mod I with HOB slightly elevated  Transfers:   Gait Belt don prior OOB activity  Sit to Stand: from EOB with Supervision with no AD  Gait: Patient ambulated 2 trials x ~500 feet on level tile with No Assistive Device with Supervision. Pt with  demonstrating a reciprocal gait with decreased reese and decreased weight-shifting ability. Impairments contributing to gait deviations include impaired balance and decreased strength; v/c for ; Fair+/Good L/R visual scanning and avoiding obstacles.  Balance: Good Sitting; Fair+ Standing      AM-PAC 6 CLICK MOBILITY  Turning over in bed (including adjusting bedclothes, sheets and blankets)?: 4  Sitting down on and standing up from a chair with arms (e.g., wheelchair, bedside commode, etc.): 4  Moving from lying on back to sitting on the side of the bed?: 4  Moving to and from a bed to a chair (including a wheelchair)?: 4  Need to walk in hospital room?: 4  Climbing 3-5 steps with a railing?: 4  Basic Mobility Total Score: 24       Treatment & Education:  Re-educated pt on benefits of OOB activity and performing BLE ex throughout the day, pt verbalized understanding     Patient left up in chair with tray table near by, all lines intact, call button in reach, and nurse notified.    GOALS:   Multidisciplinary Problems       Physical Therapy Goals          Problem: Physical Therapy    Goal Priority Disciplines Outcome Goal Variances Interventions   Physical Therapy Goal     PT, PT/OT Ongoing, Progressing     Description: Goals to be met by: 23     Patient will increase functional independence with mobility by performin. Pt to mod I with bed mobility. Met 22  2. Pt to transfer with supervision/mod I. Met 22  3. Pt to ambulate 150' /c or /s AD and supervision. Met 22    Updated goals:  1. Pt to transfer mod I.  2. Pt to ambulate 200' /s AD (I).                         Time Tracking:     PT Received On: 22  PT Start Time: 1050     PT Stop Time: 1100  PT Total Time (min): 10 min     Billable Minutes: Therapeutic Activity 10 min    Treatment Type: Treatment  PT/PTA: PTA     PTA Visit Number: 3     2022

## 2022-12-29 LAB
FINAL PATHOLOGIC DIAGNOSIS: NORMAL
Lab: NORMAL

## 2023-01-09 LAB — FUNGUS SPEC CULT: NORMAL

## 2023-01-10 NOTE — ANESTHESIA POSTPROCEDURE EVALUATION
Anesthesia Post Evaluation    Patient: Vivek Turner    Procedure(s) Performed: Procedure(s) (LRB):  TURP (TRANSURETHRAL RESECTION OF PROSTATE) (N/A)    Final Anesthesia Type: general      Patient location during evaluation: PACU  Patient participation: Yes- Able to Participate  Level of consciousness: awake and alert  Post-procedure vital signs: reviewed and stable  Pain management: adequate  Airway patency: patent    PONV status at discharge: No PONV  Anesthetic complications: no      Cardiovascular status: blood pressure returned to baseline and hemodynamically stable  Respiratory status: unassisted and spontaneous ventilation  Hydration status: euvolemic  Follow-up not needed.          Vitals Value Taken Time   /94 12/28/22 1650   Temp 36.8 °C (98.2 °F) 12/28/22 1650   Pulse 92 12/28/22 1650   Resp 18 12/28/22 1650   SpO2 94 % 12/28/22 1650         Event Time   Out of Recovery 12/21/2022 12:50:00         Pain/Rashaad Score: No data recorded

## 2023-03-27 PROBLEM — A41.9 SEVERE SEPSIS: Status: RESOLVED | Noted: 2022-12-09 | Resolved: 2023-03-27

## 2023-03-27 PROBLEM — R65.20 SEVERE SEPSIS: Status: RESOLVED | Noted: 2022-12-09 | Resolved: 2023-03-27

## 2023-09-05 NOTE — ASSESSMENT & PLAN NOTE
This patient does have evidence of infective focus. My overall impression is sepsis. Vital signs were reviewed and noted in progress note.  Antibiotics given-   Antibiotics (From admission, onward)    Start     Stop Route Frequency Ordered    12/22/22 1400  metroNIDAZOLE tablet 500 mg         -- Oral Every 8 hours 12/22/22 1020    12/22/22 1130  cefTRIAXone 2 gram/50 mL IVPB 2 g         -- IV Every 24 hours (non-standard times) 12/22/22 1020        Cultures were taken-   Microbiology Results (last 7 days)     Procedure Component Value Units Date/Time    Aerobic culture [[id number]] Collected: 12/20/22 1334    Order Status: Completed Specimen: Abscess from Abdomen Updated: 12/24/22 0845     Aerobic Bacterial Culture No growth    Narrative:      RLQ Abdomen    Aerobic culture [[id number]] Collected: 12/20/22 1135    Order Status: Completed Specimen: Pleural Fluid Updated: 12/24/22 0844     Aerobic Bacterial Culture No growth    Aerobic culture [[id number]]  (Abnormal)  (Susceptibility) Collected: 12/20/22 1210    Order Status: Completed Specimen: Abscess from Abdomen Updated: 12/22/22 0908     Aerobic Bacterial Culture KLEBSIELLA PNEUMONIAE  Few      Culture, Anaerobe [[id number]] Collected: 12/20/22 1334    Order Status: Completed Specimen: Abscess from Abdomen Updated: 12/22/22 0738     Anaerobic Culture Culture in progress    Narrative:      RLQ Abdomen    Culture, Anaerobic [[id number]] Collected: 12/20/22 1210    Order Status: Completed Specimen: Abscess Updated: 12/22/22 0736     Anaerobic Culture Culture in progress    Culture, Anaerobic [[id number]] Collected: 12/20/22 1135    Order Status: Completed Specimen: Pleural Fluid Updated: 12/22/22 0733     Anaerobic Culture Culture in progress    Gram stain [[id number]] Collected: 12/20/22 1334    Order Status: Completed Specimen: Abscess from Abdomen Updated: 12/20/22 1530     Gram Stain Result Many WBC's      No organisms seen    Narrative:      RLQ Abdomen    Gram stain  [[id number]] Collected: 12/20/22 1210    Order Status: Completed Specimen: Abscess from Abdomen Updated: 12/20/22 1529     Gram Stain Result Many WBC's      No organisms seen    Gram stain [[id number]] Collected: 12/20/22 1135    Order Status: Completed Specimen: Pleural Fluid Updated: 12/20/22 1529     Gram Stain Result Cytospin indicates:      Many WBC's      No organisms seen    Gram stain [[id number]] Collected: 12/20/22 1210    Order Status: Canceled Specimen: Abscess from Abdomen     Aerobic culture [[id number]] Collected: 12/20/22 1210    Order Status: Canceled Specimen: Abscess from Abdomen     Culture, Anaerobic [[id number]] Collected: 12/20/22 1210    Order Status: Canceled Specimen: Abscess from Abdomen     Fungus culture [[id number]] Collected: 12/09/22 1831    Order Status: Completed Specimen: Abscess from Abdomen Updated: 12/19/22 1409     Fungus (Mycology) Culture No fungal growth to date    Blood culture [[id number]] Collected: 12/14/22 0205    Order Status: Completed Specimen: Blood from Antecubital, Right Arm Updated: 12/18/22 0303     Blood Culture, Routine No Growth after 4 days.    Narrative:      Blood cx in the AM with AM labs    Blood culture [[id number]] Collected: 12/14/22 0205    Order Status: Completed Specimen: Blood from Antecubital, Right Updated: 12/18/22 0303     Blood Culture, Routine No Growth after 4 days.    Narrative:      Blood cx in the AM with AM labs        Latest lactate reviewed, they are-  No results for input(s): LACTATE in the last 72 hours.    Organ dysfunction indicated by Acute kidney injury and Acute liver injury  Source- liver abscess  Drains placed with no improvement of leukocytosis.  -ID consulted: recommending Rocephin and oral Flagyl until 1/18/2023  Awaiting LTAC placement.   Prednisone Counseling:  I discussed with the patient the risks of prolonged use of prednisone including but not limited to weight gain, insomnia, osteoporosis, mood changes, diabetes, susceptibility to infection, glaucoma and high blood pressure.  In cases where prednisone use is prolonged, patients should be monitored with blood pressure checks, serum glucose levels and an eye exam.  Additionally, the patient may need to be placed on GI prophylaxis, PCP prophylaxis, and calcium and vitamin D supplementation and/or a bisphosphonate.  The patient verbalized understanding of the proper use and the possible adverse effects of prednisone.  All of the patient's questions and concerns were addressed.

## 2024-12-20 NOTE — PLAN OF CARE
MRN [id number] Pt arrived to unit awake, alert, and oriented. HR running NSR to ST on monitor. BP stable. On 4L NC sating above 95%. Pt's highest temp was 100.4. Respiratory rate between 20- 30s. Sodium Bicarb infusing per order. PRN tylenol given for complaints of pain. Pt still had complaints of right flank pain, eICU contacted. New order for one time dose of Dilaudid given. Pain improved after Dilaudid was given. Accordion drain in place with 50ml of sanguineous output. Pt voids via urinal with good urine output. Updated on plan of care. No new falls, injuries, or skin breakdown this shift.

## (undated) DEVICE — SEE L#95700

## (undated) DEVICE — Device

## (undated) DEVICE — GLOVE SURG BIOGEL LATEX SZ 7.5

## (undated) DEVICE — SOL 9P NACL IRR PIC IL

## (undated) DEVICE — MAT QUICK 40X30 FLOOR FLUID LF

## (undated) DEVICE — GOWN B1 X-LG X-LONG

## (undated) DEVICE — ELECTRODE REM PLYHSV RETURN 9

## (undated) DEVICE — BLANKET UPPER BODY 78.7X29.9IN

## (undated) DEVICE — SOL IRR STRL WATER 500ML

## (undated) DEVICE — TUBING SUC UNIV W/CONN 12FT

## (undated) DEVICE — COVER SNAP KAP 26IN

## (undated) DEVICE — SUPPORT ULNA NERVE PROTECTOR

## (undated) DEVICE — SYR ONLY LUER LOCK 20CC

## (undated) DEVICE — STRAP CATH ELASTIC HOOK&LOOP

## (undated) DEVICE — SOL IRR NACL .9% 3000ML

## (undated) DEVICE — BAG URINARY DRN 2000ML

## (undated) DEVICE — SYR 50ML CATH TIP